# Patient Record
Sex: FEMALE | Race: WHITE | NOT HISPANIC OR LATINO | Employment: OTHER | URBAN - METROPOLITAN AREA
[De-identification: names, ages, dates, MRNs, and addresses within clinical notes are randomized per-mention and may not be internally consistent; named-entity substitution may affect disease eponyms.]

---

## 2017-01-03 RX ORDER — NITROFURANTOIN 25; 75 MG/1; MG/1
100 CAPSULE ORAL 2 TIMES DAILY
Qty: 20 CAPSULE | Refills: 0 | Status: SHIPPED | OUTPATIENT
Start: 2017-01-03 | End: 2017-01-13

## 2017-01-30 RX ORDER — LOSARTAN POTASSIUM AND HYDROCHLOROTHIAZIDE 25; 100 MG/1; MG/1
TABLET ORAL
Qty: 30 TABLET | Refills: 5 | Status: SHIPPED | OUTPATIENT
Start: 2017-01-30 | End: 2017-05-04 | Stop reason: SDUPTHER

## 2017-04-24 ENCOUNTER — TELEPHONE (OUTPATIENT)
Dept: INTERNAL MEDICINE | Facility: CLINIC | Age: 67
End: 2017-04-24

## 2017-04-24 RX ORDER — ATORVASTATIN CALCIUM 10 MG/1
TABLET, FILM COATED ORAL
Qty: 30 TABLET | Refills: 5 | Status: SHIPPED | OUTPATIENT
Start: 2017-04-24 | End: 2017-08-07 | Stop reason: SDUPTHER

## 2017-04-24 NOTE — TELEPHONE ENCOUNTER
----- Message from Luz Ryan sent at 4/24/2017  8:34 AM EDT -----  [phone]  PT NEEDS A SCRIPT FOR A STAIR LIFT SENT TO TRANSITION STAIR LIFT  [facility] PHONE # [phone]

## 2017-04-24 NOTE — TELEPHONE ENCOUNTER
I spoke with patient and  and explained this to them. They verbalized they understood but were upset because they state that medicare will not cover this and they will pay out of pocket but they need a letter to be able to claim this as a tax write off. I explained that we will need to still see her and submit this through medicare due to their regulations. They state that they will not be able to come in and see us until she gets the lift. I advised them that I would notify Evy, I have verbally spoken with them concerning this.

## 2017-04-24 NOTE — TELEPHONE ENCOUNTER
Per medicare regulations, she will need an appt prior to me providing this.  I haven't seen her in over 11 months.  Kee Hoffman MD  1:55 PM  04/24/17

## 2017-05-04 RX ORDER — LOSARTAN POTASSIUM AND HYDROCHLOROTHIAZIDE 25; 100 MG/1; MG/1
1 TABLET ORAL DAILY
Qty: 90 TABLET | Refills: 0 | Status: SHIPPED | OUTPATIENT
Start: 2017-05-04 | End: 2017-08-27 | Stop reason: SDUPTHER

## 2017-06-05 RX ORDER — DOXAZOSIN MESYLATE 4 MG/1
TABLET ORAL
Qty: 60 TABLET | Refills: 5 | Status: SHIPPED | OUTPATIENT
Start: 2017-06-05 | End: 2017-07-11 | Stop reason: SDUPTHER

## 2017-07-11 RX ORDER — DOXAZOSIN MESYLATE 4 MG/1
4 TABLET ORAL 2 TIMES DAILY
Qty: 180 TABLET | Refills: 1 | Status: SHIPPED | OUTPATIENT
Start: 2017-07-11 | End: 2018-02-02 | Stop reason: SDUPTHER

## 2017-08-07 RX ORDER — ATORVASTATIN CALCIUM 10 MG/1
10 TABLET, FILM COATED ORAL DAILY
Qty: 90 TABLET | Refills: 1 | Status: SHIPPED | OUTPATIENT
Start: 2017-08-07 | End: 2018-03-14 | Stop reason: SDUPTHER

## 2017-08-28 RX ORDER — LOSARTAN POTASSIUM AND HYDROCHLOROTHIAZIDE 25; 100 MG/1; MG/1
TABLET ORAL
Qty: 30 TABLET | Refills: 0 | Status: SHIPPED | OUTPATIENT
Start: 2017-08-28 | End: 2017-10-05 | Stop reason: SDUPTHER

## 2017-10-05 ENCOUNTER — TELEPHONE (OUTPATIENT)
Dept: INTERNAL MEDICINE | Facility: CLINIC | Age: 67
End: 2017-10-05

## 2017-10-05 RX ORDER — LOSARTAN POTASSIUM AND HYDROCHLOROTHIAZIDE 25; 100 MG/1; MG/1
1 TABLET ORAL DAILY
Qty: 30 TABLET | Refills: 0 | Status: SHIPPED | OUTPATIENT
Start: 2017-10-05 | End: 2017-10-09 | Stop reason: SDUPTHER

## 2017-10-09 RX ORDER — LOSARTAN POTASSIUM AND HYDROCHLOROTHIAZIDE 25; 100 MG/1; MG/1
1 TABLET ORAL DAILY
Qty: 90 TABLET | Refills: 0 | Status: SHIPPED | OUTPATIENT
Start: 2017-10-09 | End: 2018-01-30 | Stop reason: SDUPTHER

## 2018-01-30 RX ORDER — LOSARTAN POTASSIUM AND HYDROCHLOROTHIAZIDE 25; 100 MG/1; MG/1
TABLET ORAL
Qty: 90 TABLET | Refills: 0 | Status: SHIPPED | OUTPATIENT
Start: 2018-01-30 | End: 2018-03-14 | Stop reason: SDUPTHER

## 2018-02-02 RX ORDER — DOXAZOSIN MESYLATE 4 MG/1
TABLET ORAL
Qty: 180 TABLET | Refills: 0 | Status: SHIPPED | OUTPATIENT
Start: 2018-02-02 | End: 2018-03-14 | Stop reason: SDUPTHER

## 2018-02-26 RX ORDER — ATORVASTATIN CALCIUM 10 MG/1
TABLET, FILM COATED ORAL
Qty: 90 TABLET | Refills: 1 | OUTPATIENT
Start: 2018-02-26

## 2018-03-14 ENCOUNTER — LAB REQUISITION (OUTPATIENT)
Dept: LAB | Facility: HOSPITAL | Age: 68
End: 2018-03-14

## 2018-03-14 ENCOUNTER — OFFICE VISIT (OUTPATIENT)
Dept: INTERNAL MEDICINE | Facility: CLINIC | Age: 68
End: 2018-03-14

## 2018-03-14 VITALS
SYSTOLIC BLOOD PRESSURE: 118 MMHG | TEMPERATURE: 98.5 F | DIASTOLIC BLOOD PRESSURE: 68 MMHG | WEIGHT: 293 LBS | BODY MASS INDEX: 52.74 KG/M2 | HEART RATE: 64 BPM | RESPIRATION RATE: 20 BRPM

## 2018-03-14 DIAGNOSIS — Z12.31 ENCOUNTER FOR SCREENING MAMMOGRAM FOR BREAST CANCER: ICD-10-CM

## 2018-03-14 DIAGNOSIS — Z23 NEED FOR PNEUMOCOCCAL VACCINE: ICD-10-CM

## 2018-03-14 DIAGNOSIS — Z00.00 HEALTHCARE MAINTENANCE: ICD-10-CM

## 2018-03-14 DIAGNOSIS — K59.00 CONSTIPATION, UNSPECIFIED CONSTIPATION TYPE: ICD-10-CM

## 2018-03-14 DIAGNOSIS — Z79.4 TYPE 2 DIABETES MELLITUS WITHOUT COMPLICATION, WITH LONG-TERM CURRENT USE OF INSULIN (HCC): ICD-10-CM

## 2018-03-14 DIAGNOSIS — I10 ESSENTIAL HYPERTENSION: Primary | ICD-10-CM

## 2018-03-14 DIAGNOSIS — E11.9 TYPE 2 DIABETES MELLITUS WITHOUT COMPLICATION, WITH LONG-TERM CURRENT USE OF INSULIN (HCC): ICD-10-CM

## 2018-03-14 DIAGNOSIS — E03.9 HYPOTHYROIDISM, UNSPECIFIED TYPE: ICD-10-CM

## 2018-03-14 DIAGNOSIS — Z00.00 ROUTINE GENERAL MEDICAL EXAMINATION AT A HEALTH CARE FACILITY: ICD-10-CM

## 2018-03-14 DIAGNOSIS — E78.5 HYPERLIPIDEMIA, UNSPECIFIED HYPERLIPIDEMIA TYPE: ICD-10-CM

## 2018-03-14 LAB
ALBUMIN SERPL-MCNC: 3.4 G/DL (ref 3.2–4.8)
ALBUMIN/GLOB SERPL: 1.1 G/DL (ref 1.5–2.5)
ALP SERPL-CCNC: 100 U/L (ref 25–100)
ALT SERPL W P-5'-P-CCNC: 29 U/L (ref 7–40)
ANION GAP SERPL CALCULATED.3IONS-SCNC: 7 MMOL/L (ref 3–11)
ARTICHOKE IGE QN: 135 MG/DL (ref 0–130)
AST SERPL-CCNC: 26 U/L (ref 0–33)
BILIRUB SERPL-MCNC: 0.7 MG/DL (ref 0.3–1.2)
BUN BLD-MCNC: 33 MG/DL (ref 9–23)
BUN/CREAT SERPL: 27.5 (ref 7–25)
CALCIUM SPEC-SCNC: 9.6 MG/DL (ref 8.7–10.4)
CHLORIDE SERPL-SCNC: 98 MMOL/L (ref 99–109)
CHOLEST SERPL-MCNC: 170 MG/DL (ref 0–200)
CO2 SERPL-SCNC: 38 MMOL/L (ref 20–31)
CREAT BLD-MCNC: 1.2 MG/DL (ref 0.6–1.3)
GFR SERPL CREATININE-BSD FRML MDRD: 45 ML/MIN/1.73
GLOBULIN UR ELPH-MCNC: 3.2 GM/DL
GLUCOSE BLD-MCNC: 152 MG/DL (ref 70–100)
HBA1C MFR BLD: 5.6 % (ref 4.8–5.6)
HCV AB SER DONR QL: NORMAL
HDLC SERPL-MCNC: 33 MG/DL (ref 40–60)
POTASSIUM BLD-SCNC: 4.5 MMOL/L (ref 3.5–5.5)
PROT SERPL-MCNC: 6.6 G/DL (ref 5.7–8.2)
SODIUM BLD-SCNC: 143 MMOL/L (ref 132–146)
TRIGL SERPL-MCNC: 118 MG/DL (ref 0–150)
TSH SERPL DL<=0.05 MIU/L-ACNC: 1.91 MIU/ML (ref 0.35–5.35)

## 2018-03-14 PROCEDURE — G0009 ADMIN PNEUMOCOCCAL VACCINE: HCPCS | Performed by: INTERNAL MEDICINE

## 2018-03-14 PROCEDURE — 36415 COLL VENOUS BLD VENIPUNCTURE: CPT | Performed by: INTERNAL MEDICINE

## 2018-03-14 PROCEDURE — 86803 HEPATITIS C AB TEST: CPT | Performed by: INTERNAL MEDICINE

## 2018-03-14 PROCEDURE — 90670 PCV13 VACCINE IM: CPT | Performed by: INTERNAL MEDICINE

## 2018-03-14 PROCEDURE — 80061 LIPID PANEL: CPT | Performed by: INTERNAL MEDICINE

## 2018-03-14 PROCEDURE — 80053 COMPREHEN METABOLIC PANEL: CPT | Performed by: INTERNAL MEDICINE

## 2018-03-14 PROCEDURE — 83036 HEMOGLOBIN GLYCOSYLATED A1C: CPT | Performed by: INTERNAL MEDICINE

## 2018-03-14 PROCEDURE — 99214 OFFICE O/P EST MOD 30 MIN: CPT | Performed by: INTERNAL MEDICINE

## 2018-03-14 PROCEDURE — 84443 ASSAY THYROID STIM HORMONE: CPT | Performed by: INTERNAL MEDICINE

## 2018-03-14 RX ORDER — LEVOTHYROXINE SODIUM 0.1 MG/1
100 TABLET ORAL DAILY
Qty: 90 TABLET | Refills: 1 | Status: SHIPPED | OUTPATIENT
Start: 2018-03-14

## 2018-03-14 RX ORDER — DOXAZOSIN MESYLATE 4 MG/1
4 TABLET ORAL 2 TIMES DAILY
Qty: 180 TABLET | Refills: 1 | Status: SHIPPED | OUTPATIENT
Start: 2018-03-14 | End: 2018-10-31 | Stop reason: SDUPTHER

## 2018-03-14 RX ORDER — TORSEMIDE 20 MG/1
20 TABLET ORAL 2 TIMES DAILY
Qty: 180 TABLET | Refills: 1 | Status: SHIPPED | OUTPATIENT
Start: 2018-03-14 | End: 2019-12-28

## 2018-03-14 RX ORDER — LOSARTAN POTASSIUM AND HYDROCHLOROTHIAZIDE 25; 100 MG/1; MG/1
1 TABLET ORAL DAILY
Qty: 90 TABLET | Refills: 1 | Status: SHIPPED | OUTPATIENT
Start: 2018-03-14 | End: 2018-10-31 | Stop reason: SDUPTHER

## 2018-03-14 RX ORDER — ATORVASTATIN CALCIUM 10 MG/1
10 TABLET, FILM COATED ORAL DAILY
Qty: 90 TABLET | Refills: 1 | Status: SHIPPED | OUTPATIENT
Start: 2018-03-14 | End: 2018-09-20 | Stop reason: SDUPTHER

## 2018-03-19 LAB
ALBUMIN SERPL-MCNC: 3.4 G/DL (ref 3.2–4.8)
ALBUMIN/GLOB SERPL: 1.1 G/DL
ALP SERPL-CCNC: 100 U/L (ref 25–100)
ALT SERPL-CCNC: 29 U/L (ref 7–40)
AST SERPL-CCNC: 26 U/L (ref 0–33)
BILIRUB SERPL-MCNC: 0.7 MG/DL (ref 0.3–1.2)
BUN SERPL-MCNC: 33 MG/DL (ref 9–23)
BUN/CREAT SERPL: 27.5 (ref 7–25)
CALCIUM SERPL-MCNC: 9.6 MG/DL (ref 8.7–10.4)
CHLORIDE SERPL-SCNC: 98 MMOL/L (ref 99–109)
CHOLEST SERPL-MCNC: 170 MG/DL (ref 0–200)
CO2 SERPL-SCNC: 38 MMOL/L (ref 20–31)
CREAT SERPL-MCNC: 1.2 MG/DL (ref 0.6–1.3)
GFR SERPLBLD CREATININE-BSD FMLA CKD-EPI: 45 ML/MIN/1.732
GLOBULIN SER CALC-MCNC: 3.2 G/DL
GLUCOSE SERPL-MCNC: 152 MG/DL (ref 70–100)
HBA1C MFR BLD: 5.6 % (ref 4.8–5.6)
HCV AB S/CO SERPL IA: NEGATIVE S/CO RATIO (ref 0–0.9)
HDLC SERPL-MCNC: 33 MG/DL (ref 40–60)
LDLC SERPL CALC-MCNC: 135 MG/DL (ref 0–99)
POTASSIUM SERPL-SCNC: 4.5 MMOL/L (ref 3.5–5.5)
PROT SERPL-MCNC: 6.6 G/DL (ref 5.7–8.2)
SODIUM SERPL-SCNC: 143 MMOL/L (ref 132–146)
TRIGL SERPL-MCNC: 118 MG/DL (ref 0–150)
TSH SERPL DL<=0.005 MIU/L-ACNC: 1.91 MIU/ML (ref 0.35–5.35)
VLDLC SERPL CALC-MCNC: 24 MG/DL

## 2018-03-19 NOTE — PROGRESS NOTES
Chief Complaint   Patient presents with   • Follow-up     3 MONTH FOLLOW UP CHRONIC MEDICAL PROBLEMS       History of Present Illness    The patient presents for a follow-up related to hypertension. The patient reports that she has had no headaches, chest pain, dyspnea, edema, syncope, blurred vision or palpitations. She states that she is taking her medication as prescribed. She is not having medication side effects. She does not check her blood pressures at home.    The patient presents for a follow-up related to hyperlipidemia. She is not following a low fat diet. She reports that she is not exercising. She is taking atorvastatin. The patient is taking her medication as prescribed. She reports no medication side effects, including muscle cramps, abdominal pain, headaches or weakness. She denies orthopnea, paroxysmal nocturnal dyspnea or dyspnea on exertion.    As relates to hypothyroidism, the patient reports a good energy level. She reports constipation but denies hair loss, heat intolerance, cold intolerance, diarrhea or sweats. She is taking her medication as prescribed.    The patient presents for a follow-up related to Type 2 Diabetes Mellitus without complication with insulin usage and reports that she checks her blood sugars at home. Her sugars are checked daily. The average sugars are in the 100-150 range. She denies hypoglycemic symptoms. The patient denies polyuria, polydypsia or polyphagia. She reports no symptoms of neuropathy. The patient takes her medication as prescribed. She is taking insulin. Her injection sites are rotated appropriately. The patient does check her feet daily at home.    The patient presents for follow-up of constipation. The constipation has worsened. The patient's bowel movements occur approximately every three days. The stools are hard. Bowel Movements are not painful. There is no associated enuresis. The patient does not have abdominal pain. The patient denies neglecting the  urge or putting off bowel movements.    Review of Systems    GENERAL- Denies Unexplained Weight Loss, Fever, Chills, Sweats, Weakness or Malaise.    CARDIOVASCULAR- Denies Claudication.    GASTROINTESTINAL- Denies Nausea, Vomiting or Blood per Rectum.    PULMONARY- Denies Wheezing, Sputum Production, Cough, Hemoptysis or Pleuritic Chest Pain.    Medications      Current Outpatient Prescriptions:   •  aspirin 81 MG tablet, Take 1 tablet by mouth daily., Disp: , Rfl:   •  atorvastatin (LIPITOR) 10 MG tablet, Take 1 tablet by mouth Daily., Disp: 90 tablet, Rfl: 1  •  doxazosin (CARDURA) 4 MG tablet, Take 1 tablet by mouth 2 (Two) Times a Day., Disp: 180 tablet, Rfl: 1  •  insulin lispro protamine-insulin lispro (HUMALOG MIX 75/25) (75-25) 100 UNIT/ML suspension injection, Inject  under the skin. INJECT 60 UNITS SQ IN AM AND 46 UNITS SQ IN PM, Disp: , Rfl:   •  ipratropium (ATROVENT HFA) 17 MCG/ACT inhaler, Inhale 2 puffs 4 (Four) Times a Day., Disp: 3 inhaler, Rfl: 1  •  levothyroxine (SYNTHROID, LEVOTHROID) 100 MCG tablet, Take 1 tablet by mouth Daily., Disp: 90 tablet, Rfl: 1  •  losartan-hydrochlorothiazide (HYZAAR) 100-25 MG per tablet, Take 1 tablet by mouth Daily., Disp: 90 tablet, Rfl: 1  •  methylcellulose (CITRUCEL) oral powder, Take  by mouth daily as needed. MIX 1 TABLESPOON IN WATER AND DRINK DAILY AS NEEDED, Disp: , Rfl:   •  linaclotide (LINZESS) 145 MCG capsule capsule, Take 1 capsule by mouth Every Morning Before Breakfast., Disp: 90 capsule, Rfl: 1  •  torsemide (DEMADEX) 20 MG tablet, Take 1 tablet by mouth 2 (Two) Times a Day., Disp: 180 tablet, Rfl: 1     Allergies    Allergies   Allergen Reactions   • Amoxicillin-Pot Clavulanate Other (See Comments)     HEADACHE       Problem List    Patient Active Problem List   Diagnosis   • Anxiety   • Chronic obstructive pulmonary disease   • Diabetes mellitus   • Hyperlipidemia   • Hypertension   • Hypothyroidism   • Morbid obesity   • Extreme obesity with  alveolar hypoventilation       Medications, Allergies, Problems List and Past History were reviewed and updated.    Physical Examination    /68 (BP Location: Left arm, Patient Position: Sitting, Cuff Size: Large Adult)   Pulse 64   Temp 98.5 °F (36.9 °C) (Temporal Artery )   Resp 20   Wt 133 kg (293 lb)   LMP  (LMP Unknown)   Breastfeeding? No   BMI 52.74 kg/m²     HEENT: Head- Normocephalic Atraumatic. Facies- Within normal limits. Pinnas- Normal texture and shape bilaterally. Canals- Normal bilaterally. TMs- Normal bilaterally. Nares- Patent bilaterally. Nasal Septum- is normal. There is no tenderness to palpation over the frontal or maxillary sinuses. Lids- Normal bilaterally. Conjunctiva- Clear bilaterally. Sclera- Anicteric bilaterally. Oropharynx- Moist with no lesions. Tonsils- No enlargement, erythema or exudate.    Neck: Thyroid- non enlarged, symmetric and has no nodules. No bruits are detected. ROM- Normal Range of Motion with no rigidity.    Lungs: Auscultation- Clear to auscultation bilaterally. There are no retractions, clubbing or cyanosis. The Expiratory to Inspiratory ratio is equal.    Cardiovascular: There are no carotid bruits. Heart- Normal Rate with Regular rhythm and no murmurs. There are no gallops. There are no rubs. In the lower extremities there is no edema. The upper extremities do not have edema.    Abdomen: Soft, benign, non-tender with no masses, hernias, organomegaly or scars.    Impression and Assessment    Encounter for Immunization Administration.    Essential Hypertension.    Hyperlipidemia.    Hypothyroidism.    Type 2 Diabetes Mellitus without complication with insulin usage.    Constipation.    Plan    Constipation Plan: She was encouraged to use over the counter Miralax daily. Medication will be added as noted below.    Essential Hypertension Plan: The current plan was continued.    Hyperlipidemia Plan: She was instructed to eat a low fat diet. She was encouraged  to exercise daily. Weight loss was encouraged. The patient was instructed to continue the current medications.    Hypothyroidism Plan: Further plans will be formulated after test results are reviewed.    Type 2 Diabetes Mellitus without complication with insulin usage Plan: She will follow-up with her endocrinologist. She was referred to ophthalmology. The patient was instructed to continue the current medications.    The following was ordered for screening and health maintenance: Screening Mammogram.    Counseled regarding immunizations and applicable VIS given.    Immunizations Ordered and Administered: Prevnar 13.    Julissa was seen today for follow-up.    Diagnoses and all orders for this visit:    Essential hypertension  -     Cancel: Comprehensive Metabolic Panel  -     torsemide (DEMADEX) 20 MG tablet; Take 1 tablet by mouth 2 (Two) Times a Day.  -     losartan-hydrochlorothiazide (HYZAAR) 100-25 MG per tablet; Take 1 tablet by mouth Daily.  -     doxazosin (CARDURA) 4 MG tablet; Take 1 tablet by mouth 2 (Two) Times a Day.    Hyperlipidemia, unspecified hyperlipidemia type  -     Cancel: Comprehensive Metabolic Panel  -     Cancel: Lipid Panel  -     atorvastatin (LIPITOR) 10 MG tablet; Take 1 tablet by mouth Daily.    Hypothyroidism, unspecified type  -     Cancel: TSH  -     levothyroxine (SYNTHROID, LEVOTHROID) 100 MCG tablet; Take 1 tablet by mouth Daily.    Type 2 diabetes mellitus without complication, with long-term current use of insulin  -     Ambulatory referral to Ophthalmology  -     Cancel: Comprehensive Metabolic Panel  -     Cancel: Hemoglobin A1c    Encounter for screening mammogram for breast cancer  -     Mammo Screening Digital Tomosynthesis Bilateral With CAD; Future    Need for pneumococcal vaccine  -     Pneumococcal Conjugate Vaccine 13-Valent All (PCV13)    Healthcare maintenance  -     Cancel: Hepatitis C Antibody    Constipation, unspecified constipation type  -     linaclotide  (LINZESS) 145 MCG capsule capsule; Take 1 capsule by mouth Every Morning Before Breakfast.    Other orders  -     ipratropium (ATROVENT HFA) 17 MCG/ACT inhaler; Inhale 2 puffs 4 (Four) Times a Day.        Return to Office    The patient was instructed to return for follow-up in 1 month.    The patient was instructed to return sooner if the condition changes, worsens, or doesn't resolve.

## 2018-05-06 ENCOUNTER — TELEPHONE (OUTPATIENT)
Dept: INTERNAL MEDICINE | Facility: CLINIC | Age: 68
End: 2018-05-06

## 2018-05-06 NOTE — TELEPHONE ENCOUNTER
----- Message from Kristen Sanchez sent at 5/4/2018  2:31 PM EDT -----  Concerning ophthalmology referral, pt no showed her appt and did not reschedule  How would you like to proceed with this?

## 2018-05-23 ENCOUNTER — TELEPHONE (OUTPATIENT)
Dept: INTERNAL MEDICINE | Facility: CLINIC | Age: 68
End: 2018-05-23

## 2018-05-23 NOTE — TELEPHONE ENCOUNTER
----- Message from Ginger Cordoba sent at 5/22/2018  1:51 PM EDT -----  Patient's  dropped off a form at the front office for patient to get her parking permit for disabled person's. Form has been placed in provider's mail box up at the front office. Patient's  has requested a call back when form has been completed and ready for .     Call back for patient's , Familia: [phone]     Thank you!

## 2018-05-23 NOTE — TELEPHONE ENCOUNTER
If you can locate this form, I'll sign it so they can pick it up.  Kee Hoffman MD  1:09 PM  05/23/18

## 2018-05-30 ENCOUNTER — TELEPHONE (OUTPATIENT)
Dept: INTERNAL MEDICINE | Facility: CLINIC | Age: 68
End: 2018-05-30

## 2018-05-30 NOTE — TELEPHONE ENCOUNTER
5-30-18  S/w patient informed her that she needed to schedule her mammogram number to do this was given to patient and she gave verbal understanding and stated would do this as soon as she can.

## 2018-09-20 DIAGNOSIS — E78.5 HYPERLIPIDEMIA, UNSPECIFIED HYPERLIPIDEMIA TYPE: ICD-10-CM

## 2018-09-20 RX ORDER — ATORVASTATIN CALCIUM 10 MG/1
10 TABLET, FILM COATED ORAL DAILY
Qty: 90 TABLET | Refills: 1 | Status: SHIPPED | OUTPATIENT
Start: 2018-09-20 | End: 2019-03-26 | Stop reason: SDUPTHER

## 2018-10-31 DIAGNOSIS — I10 ESSENTIAL HYPERTENSION: ICD-10-CM

## 2018-10-31 RX ORDER — DOXAZOSIN MESYLATE 4 MG/1
4 TABLET ORAL 2 TIMES DAILY
Qty: 180 TABLET | Refills: 1 | Status: SHIPPED | OUTPATIENT
Start: 2018-10-31 | End: 2019-04-21 | Stop reason: SDUPTHER

## 2018-10-31 RX ORDER — LOSARTAN POTASSIUM AND HYDROCHLOROTHIAZIDE 25; 100 MG/1; MG/1
1 TABLET ORAL DAILY
Qty: 90 TABLET | Refills: 1 | Status: SHIPPED | OUTPATIENT
Start: 2018-10-31 | End: 2019-04-21 | Stop reason: SDUPTHER

## 2018-11-07 ENCOUNTER — OFFICE VISIT (OUTPATIENT)
Dept: INTERNAL MEDICINE | Facility: CLINIC | Age: 68
End: 2018-11-07

## 2018-11-07 VITALS
BODY MASS INDEX: 52.74 KG/M2 | RESPIRATION RATE: 20 BRPM | WEIGHT: 293 LBS | OXYGEN SATURATION: 95 % | DIASTOLIC BLOOD PRESSURE: 62 MMHG | HEART RATE: 65 BPM | SYSTOLIC BLOOD PRESSURE: 116 MMHG

## 2018-11-07 DIAGNOSIS — E78.5 HYPERLIPIDEMIA, UNSPECIFIED HYPERLIPIDEMIA TYPE: ICD-10-CM

## 2018-11-07 DIAGNOSIS — I10 ESSENTIAL HYPERTENSION: Primary | ICD-10-CM

## 2018-11-07 DIAGNOSIS — E03.9 HYPOTHYROIDISM, UNSPECIFIED TYPE: ICD-10-CM

## 2018-11-07 PROCEDURE — 36415 COLL VENOUS BLD VENIPUNCTURE: CPT | Performed by: INTERNAL MEDICINE

## 2018-11-07 PROCEDURE — 99214 OFFICE O/P EST MOD 30 MIN: CPT | Performed by: INTERNAL MEDICINE

## 2018-11-07 NOTE — PROGRESS NOTES
Chief Complaint   Patient presents with   • Hypertension       History of Present Illness      The patient presents for a follow-up related to hyperlipidemia. She is following a low fat diet. She reports that she is exercising. She is taking atorvastatin. The patient is taking her medication as prescribed. She reports no medication side effects, including muscle cramps, abdominal pain, headaches or weakness. She denies chest pain, shortness of breath, orthopnea, paroxysmal nocturnal dyspnea, dyspnea on exertion, edema, palpitations or syncope.    The patient presents for a follow-up related to hypothyroidism. She reports a good energy level. She reports no hair loss, heat intolerance, cold intolerance, diarrhea, constipation or sweats. She is taking her medication as prescribed.    The patient presents for a follow-up related to hypertension. The patient reports that she has had no headaches or blurred vision. She states that she is taking her medication as prescribed. She is not having medication side effects.    Review of Systems    GENERAL/CONSTITUTIONAL- Denies Fever, Chills, Sweats, Weakness or Malaise.    CARDIOVASCULAR- Denies Claudication.    GASTROINTESTINAL- Denies Abdominal Pain, Nausea, Vomiting, Blood per Rectum or Heartburn.    PULMONARY- Denies Wheezing, Sputum Production, Cough, Hemoptysis or Pleuritic Chest Pain.    ENDOCRINE- Denies Depression, Memory Loss, Polydypsia, Polyphagia, Polyuria, Sleep Disturbance, Weight Gain or Weight Loss.    Medications      Current Outpatient Prescriptions:   •  aspirin 81 MG tablet, Take 1 tablet by mouth daily., Disp: , Rfl:   •  atorvastatin (LIPITOR) 10 MG tablet, TAKE 1 TABLET BY MOUTH DAILY., Disp: 90 tablet, Rfl: 1  •  doxazosin (CARDURA) 4 MG tablet, TAKE 1 TABLET BY MOUTH 2 (TWO) TIMES A DAY., Disp: 180 tablet, Rfl: 1  •  insulin lispro protamine-insulin lispro (HUMALOG MIX 75/25) (75-25) 100 UNIT/ML suspension injection, Inject  under the skin. INJECT 60  UNITS SQ IN AM AND 46 UNITS SQ IN PM, Disp: , Rfl:   •  ipratropium (ATROVENT HFA) 17 MCG/ACT inhaler, Inhale 2 puffs 4 (Four) Times a Day., Disp: 3 inhaler, Rfl: 1  •  levothyroxine (SYNTHROID, LEVOTHROID) 100 MCG tablet, Take 1 tablet by mouth Daily., Disp: 90 tablet, Rfl: 1  •  linaclotide (LINZESS) 145 MCG capsule capsule, Take 1 capsule by mouth Every Morning Before Breakfast., Disp: 90 capsule, Rfl: 1  •  losartan-hydrochlorothiazide (HYZAAR) 100-25 MG per tablet, TAKE 1 TABLET BY MOUTH DAILY., Disp: 90 tablet, Rfl: 1  •  methylcellulose (CITRUCEL) oral powder, Take  by mouth daily as needed. MIX 1 TABLESPOON IN WATER AND DRINK DAILY AS NEEDED, Disp: , Rfl:   •  torsemide (DEMADEX) 20 MG tablet, Take 1 tablet by mouth 2 (Two) Times a Day., Disp: 180 tablet, Rfl: 1     Allergies    Allergies   Allergen Reactions   • Amoxicillin-Pot Clavulanate Other (See Comments)     HEADACHE       Problem List    Patient Active Problem List   Diagnosis   • Anxiety   • Chronic obstructive pulmonary disease (CMS/HCC)   • Diabetes mellitus (CMS/HCC)   • Hyperlipidemia   • Hypertension   • Hypothyroidism   • Morbid obesity (CMS/HCC)   • Extreme obesity with alveolar hypoventilation (CMS/HCC)       Medications, Allergies, Problems List and Past History were reviewed and updated.    Physical Examination    /62   Pulse 65   Resp 20   Wt 133 kg (293 lb)   LMP  (LMP Unknown)   SpO2 95%   BMI 52.74 kg/m²     HEENT: Head- Normocephalic Atraumatic. Facies- Within normal limits. Pinnas- Normal texture and shape bilaterally. Canals- Normal bilaterally. TMs- Normal bilaterally. Nares- Patent bilaterally. Nasal Septum- is normal. There is no tenderness to palpation over the frontal or maxillary sinuses. Lids- Normal bilaterally. Conjunctiva- Clear bilaterally. Sclera- Anicteric bilaterally. Oropharynx- Moist with no lesions. Tonsils- No enlargement, erythema or exudate.    Neck: Thyroid- non enlarged, symmetric and has no  nodules. No bruits are detected. ROM- Normal Range of Motion with no rigidity.    Lungs: Auscultation- Clear to auscultation bilaterally. There are no retractions, clubbing or cyanosis. The Expiratory to Inspiratory ratio is equal.    Cardiovascular: There are no carotid bruits. Heart- Normal Rate with Regular rhythm and no murmurs. There are no gallops. There are no rubs. In the lower extremities there is no edema. The upper extremities do not have edema.    Abdomen: Soft, benign, non-tender with no masses, hernias, organomegaly or scars.    Impression and Assessment    Hyperlipidemia.    Hypothyroidism.    Essential Hypertension.    Plan    Hyperlipidemia Plan: The current plan was continued.    Essential Hypertension Plan: The current plan was continued.    Hypothyroidism Plan: The current plan was continued.    Julissa was seen today for hypertension.    Diagnoses and all orders for this visit:    Essential hypertension  -     Comprehensive Metabolic Panel    Hyperlipidemia, unspecified hyperlipidemia type  -     Comprehensive Metabolic Panel  -     Lipid Panel    Hypothyroidism, unspecified type  -     TSH  -     T4, Free        Return to Office    The patient was instructed to return for follow-up in 6 months.    The patient was instructed to return sooner if the condition changes, worsens, or doesn't resolve.

## 2018-11-08 LAB
ALBUMIN SERPL-MCNC: 3.67 G/DL (ref 3.2–4.8)
ALBUMIN/GLOB SERPL: 1.5 G/DL (ref 1.5–2.5)
ALP SERPL-CCNC: 81 U/L (ref 25–100)
ALT SERPL-CCNC: 13 U/L (ref 7–40)
AST SERPL-CCNC: 15 U/L (ref 0–33)
BILIRUB SERPL-MCNC: 0.8 MG/DL (ref 0.3–1.2)
BUN SERPL-MCNC: 23 MG/DL (ref 9–23)
BUN/CREAT SERPL: 20.2 (ref 7–25)
CALCIUM SERPL-MCNC: 9.7 MG/DL (ref 8.7–10.4)
CHLORIDE SERPL-SCNC: 95 MMOL/L (ref 99–109)
CHOLEST SERPL-MCNC: 130 MG/DL (ref 0–200)
CO2 SERPL-SCNC: 38 MMOL/L (ref 20–31)
CREAT SERPL-MCNC: 1.14 MG/DL (ref 0.6–1.3)
GLOBULIN SER CALC-MCNC: 2.5 GM/DL
GLUCOSE SERPL-MCNC: 86 MG/DL (ref 70–100)
HDLC SERPL-MCNC: 29 MG/DL (ref 40–60)
LDLC SERPL CALC-MCNC: 74 MG/DL (ref 0–100)
POTASSIUM SERPL-SCNC: 4.2 MMOL/L (ref 3.5–5.5)
PROT SERPL-MCNC: 6.2 G/DL (ref 5.7–8.2)
SODIUM SERPL-SCNC: 139 MMOL/L (ref 132–146)
T4 FREE SERPL-MCNC: 1.53 NG/DL (ref 0.89–1.76)
TRIGL SERPL-MCNC: 134 MG/DL (ref 0–150)
TSH SERPL DL<=0.005 MIU/L-ACNC: 2.46 MIU/ML (ref 0.35–5.35)
VLDLC SERPL CALC-MCNC: 26.8 MG/DL

## 2019-03-26 DIAGNOSIS — E78.5 HYPERLIPIDEMIA, UNSPECIFIED HYPERLIPIDEMIA TYPE: ICD-10-CM

## 2019-03-26 RX ORDER — ATORVASTATIN CALCIUM 10 MG/1
10 TABLET, FILM COATED ORAL DAILY
Qty: 90 TABLET | Refills: 0 | Status: SHIPPED | OUTPATIENT
Start: 2019-03-26 | End: 2019-07-07 | Stop reason: SDUPTHER

## 2019-04-21 DIAGNOSIS — I10 ESSENTIAL HYPERTENSION: ICD-10-CM

## 2019-04-22 RX ORDER — DOXAZOSIN MESYLATE 4 MG/1
4 TABLET ORAL 2 TIMES DAILY
Qty: 180 TABLET | Refills: 0 | Status: SHIPPED | OUTPATIENT
Start: 2019-04-22 | End: 2019-05-22

## 2019-04-22 RX ORDER — LOSARTAN POTASSIUM AND HYDROCHLOROTHIAZIDE 25; 100 MG/1; MG/1
1 TABLET ORAL DAILY
Qty: 90 TABLET | Refills: 0 | Status: SHIPPED | OUTPATIENT
Start: 2019-04-22 | End: 2019-07-19 | Stop reason: SDUPTHER

## 2019-05-13 ENCOUNTER — TELEPHONE (OUTPATIENT)
Dept: INTERNAL MEDICINE | Facility: CLINIC | Age: 69
End: 2019-05-13

## 2019-05-22 ENCOUNTER — OFFICE VISIT (OUTPATIENT)
Dept: INTERNAL MEDICINE | Facility: CLINIC | Age: 69
End: 2019-05-22

## 2019-05-22 VITALS
RESPIRATION RATE: 22 BRPM | HEART RATE: 72 BPM | DIASTOLIC BLOOD PRESSURE: 48 MMHG | TEMPERATURE: 97.8 F | SYSTOLIC BLOOD PRESSURE: 92 MMHG

## 2019-05-22 DIAGNOSIS — F33.1 MODERATE EPISODE OF RECURRENT MAJOR DEPRESSIVE DISORDER (HCC): ICD-10-CM

## 2019-05-22 DIAGNOSIS — E78.5 HYPERLIPIDEMIA, UNSPECIFIED HYPERLIPIDEMIA TYPE: ICD-10-CM

## 2019-05-22 DIAGNOSIS — I10 ESSENTIAL HYPERTENSION: Primary | ICD-10-CM

## 2019-05-22 DIAGNOSIS — E03.9 HYPOTHYROIDISM, UNSPECIFIED TYPE: ICD-10-CM

## 2019-05-22 PROCEDURE — 99214 OFFICE O/P EST MOD 30 MIN: CPT | Performed by: INTERNAL MEDICINE

## 2019-05-22 PROCEDURE — 36415 COLL VENOUS BLD VENIPUNCTURE: CPT | Performed by: INTERNAL MEDICINE

## 2019-05-22 RX ORDER — DOXAZOSIN MESYLATE 4 MG/1
4 TABLET ORAL NIGHTLY
Qty: 30 TABLET | Refills: 5 | Status: SHIPPED | OUTPATIENT
Start: 2019-05-22 | End: 2020-01-08 | Stop reason: HOSPADM

## 2019-05-22 NOTE — PROGRESS NOTES
Chief Complaint   Patient presents with   • Follow-up     Follow up chronic medical problems       History of Present Illness      The patient presents for a follow-up related to hyperlipidemia. She is following a low fat diet. She reports that she is exercising. She is taking atorvastatin. The patient is taking her medication as prescribed. She reports no medication side effects, including muscle cramps, abdominal pain, headaches or weakness. She denies chest pain, shortness of breath, orthopnea, paroxysmal nocturnal dyspnea, dyspnea on exertion, edema, palpitations or syncope.    The patient presents for a follow-up related to hypertension. The patient reports that she has had no headaches or blurred vision. She states that she is taking her medication as prescribed. She is not having medication side effects.    The patient presents for a follow-up related to hypothyroidism. She reports that she is fatigued. She reports no hair loss, heat intolerance, cold intolerance, diarrhea, constipation or sweats. She is taking her medication as prescribed.    The patient presents for follow-up of depression. She reports currently having depression symptoms. She denies suicidal ideation. Her energy level is very low. She denies agorophobia. She sleeps well. She is not tearful. She states that her current depression symptoms are worsened. She is not on a medication but has used sertraline in the past.    Review of Systems    CONSTITUTIONAL- Denies Unexplained Weight Loss, Fever, Chills, Sweats or Weakness.    HENT- Denies Nasal Discharge, Sore Throat, Ear Pain, Ear Drainage, Sinus Pain, Nasal Congestion, Decreased Hearing or Tinnitus.    GASTROINTESTINAL- Denies Abdominal Pain, Nausea, Vomiting, Blood per Rectum or Heartburn.    PULMONARY- Denies Wheezing, Sputum Production, Cough, Hemoptysis or Pleuritic Chest Pain.    CARDIOVASCULAR- Denies Claudication or Irregular Heart Beat.    Medications      Current Outpatient  Medications:   •  aspirin 81 MG tablet, Take 1 tablet by mouth daily., Disp: , Rfl:   •  atorvastatin (LIPITOR) 10 MG tablet, Take 1 tablet by mouth Daily., Disp: 90 tablet, Rfl: 0  •  doxazosin (CARDURA) 4 MG tablet, TAKE 1 TABLET BY MOUTH 2 (TWO) TIMES A DAY., Disp: 180 tablet, Rfl: 0  •  insulin lispro protamine-insulin lispro (HUMALOG MIX 75/25) (75-25) 100 UNIT/ML suspension injection, Inject  under the skin into the appropriate area as directed. INJECT 60 UNITS SQ IN AM, Disp: , Rfl:   •  ipratropium (ATROVENT HFA) 17 MCG/ACT inhaler, Inhale 2 puffs 4 (Four) Times a Day. (Patient taking differently: Inhale 2 puffs 4 (Four) Times a Day As Needed.), Disp: 3 inhaler, Rfl: 1  •  levothyroxine (SYNTHROID, LEVOTHROID) 100 MCG tablet, Take 1 tablet by mouth Daily., Disp: 90 tablet, Rfl: 1  •  losartan-hydrochlorothiazide (HYZAAR) 100-25 MG per tablet, TAKE 1 TABLET BY MOUTH DAILY., Disp: 90 tablet, Rfl: 0  •  methylcellulose (CITRUCEL) oral powder, Take  by mouth daily as needed. MIX 1 TABLESPOON IN WATER AND DRINK DAILY AS NEEDED, Disp: , Rfl:   •  torsemide (DEMADEX) 20 MG tablet, Take 1 tablet by mouth 2 (Two) Times a Day., Disp: 180 tablet, Rfl: 1     Allergies    Allergies   Allergen Reactions   • Amoxicillin-Pot Clavulanate Other (See Comments)     HEADACHE       Problem List    Patient Active Problem List   Diagnosis   • Anxiety   • Chronic obstructive pulmonary disease (CMS/HCC)   • Diabetes mellitus (CMS/HCC)   • Hyperlipidemia   • Hypertension   • Hypothyroidism   • Morbid obesity (CMS/HCC)   • Extreme obesity with alveolar hypoventilation (CMS/HCC)       Medications, Allergies, Problems List and Past History were reviewed and updated.    Physical Examination    BP 92/48 (BP Location: Left arm, Patient Position: Sitting, Cuff Size: Adult)   Pulse 72   Temp 97.8 °F (36.6 °C) (Temporal)   Resp 22   LMP  (LMP Unknown)   Breastfeeding? No       HEENT: Facies- Within normal limits. Lids- Normal bilaterally.  Conjunctiva- Clear bilaterally. Sclera- Anicteric bilaterally.    Neck: Thyroid- non enlarged, symmetric and has no nodules. No bruits are detected. ROM- Normal Range of Motion with no rigidity.    Lungs: Auscultation- Clear to auscultation bilaterally. There are no retractions, clubbing or cyanosis. The Expiratory to Inspiratory ratio is equal.    Cardiovascular: There are no carotid bruits. Heart- Normal Rate with Regular rhythm and no murmurs. There are no gallops. There are no rubs. In the lower extremities there is no edema. The upper extremities do not have edema.    Abdomen: Soft, benign, non-tender with no masses, hernias, organomegaly or scars.    Impression and Assessment    Hyperlipidemia.    Essential Hypertension.    Hypothyroidism.    Major Depressive Disorder Single Episode Unspecified.    Plan    Hyperlipidemia Plan: The current plan was continued.    Essential Hypertension Plan: The medications were changed as noted.    Hypothyroidism Plan: The current plan was continued.    Major Depressive Disorder Single Episode Unspecified Plan: Medication was added as noted below.    Julissa was seen today for follow-up.    Diagnoses and all orders for this visit:    Essential hypertension  -     Comprehensive Metabolic Panel  -     doxazosin (CARDURA) 4 MG tablet; Take 1 tablet by mouth Every Night.    Hyperlipidemia, unspecified hyperlipidemia type  -     Comprehensive Metabolic Panel  -     Lipid Panel    Hypothyroidism, unspecified type  -     TSH  -     T4, Free    Moderate episode of recurrent major depressive disorder (CMS/HCC)  -     sertraline (ZOLOFT) 50 MG tablet; Take 1 tablet by mouth Daily.        Return to Office    The patient was instructed to return for follow-up in 4 months.    The patient was instructed to return sooner if the condition changes, worsens, or does not resolve.

## 2019-05-24 LAB
ALBUMIN SERPL-MCNC: 3.2 G/DL (ref 3.5–5.2)
ALBUMIN/GLOB SERPL: 1 G/DL
ALP SERPL-CCNC: 89 U/L (ref 39–117)
ALT SERPL-CCNC: 13 U/L (ref 1–33)
AST SERPL-CCNC: 14 U/L (ref 1–32)
BILIRUB SERPL-MCNC: 0.7 MG/DL (ref 0.2–1.2)
BUN SERPL-MCNC: 22 MG/DL (ref 8–23)
BUN/CREAT SERPL: 17.6 (ref 7–25)
CALCIUM SERPL-MCNC: 10.6 MG/DL (ref 8.6–10.5)
CHLORIDE SERPL-SCNC: 92 MMOL/L (ref 98–107)
CHOLEST SERPL-MCNC: 154 MG/DL (ref 0–200)
CO2 SERPL-SCNC: 33.8 MMOL/L (ref 22–29)
CREAT SERPL-MCNC: 1.25 MG/DL (ref 0.57–1)
GLOBULIN SER CALC-MCNC: 3.3 GM/DL
GLUCOSE SERPL-MCNC: 118 MG/DL (ref 65–99)
HDLC SERPL-MCNC: 33 MG/DL (ref 40–60)
LDLC SERPL CALC-MCNC: 88 MG/DL (ref 0–100)
POTASSIUM SERPL-SCNC: 5.2 MMOL/L (ref 3.5–5.2)
PROT SERPL-MCNC: 6.5 G/DL (ref 6–8.5)
SODIUM SERPL-SCNC: 142 MMOL/L (ref 136–145)
T4 FREE SERPL-MCNC: 1.46 NG/DL (ref 0.93–1.7)
TRIGL SERPL-MCNC: 163 MG/DL (ref 0–150)
TSH SERPL DL<=0.005 MIU/L-ACNC: 3.14 UIU/ML (ref 0.45–4.5)
VLDLC SERPL CALC-MCNC: 32.6 MG/DL

## 2019-06-08 DIAGNOSIS — E83.52 HYPERCALCEMIA: Primary | ICD-10-CM

## 2019-06-24 DIAGNOSIS — F33.1 MODERATE EPISODE OF RECURRENT MAJOR DEPRESSIVE DISORDER (HCC): ICD-10-CM

## 2019-07-07 DIAGNOSIS — E78.5 HYPERLIPIDEMIA, UNSPECIFIED HYPERLIPIDEMIA TYPE: ICD-10-CM

## 2019-07-08 RX ORDER — ATORVASTATIN CALCIUM 10 MG/1
TABLET, FILM COATED ORAL
Qty: 90 TABLET | Refills: 1 | Status: SHIPPED | OUTPATIENT
Start: 2019-07-08 | End: 2019-12-23

## 2019-07-17 ENCOUNTER — TELEPHONE (OUTPATIENT)
Dept: INTERNAL MEDICINE | Facility: CLINIC | Age: 69
End: 2019-07-17

## 2019-07-17 NOTE — TELEPHONE ENCOUNTER
7-17-19  S/w patient informed of blood work needing to be drawn , she stated was unaware of this but will try to come in within the next week or two .  Lab hours were given.     abdominal pain

## 2019-07-19 DIAGNOSIS — I10 ESSENTIAL HYPERTENSION: ICD-10-CM

## 2019-07-19 RX ORDER — LOSARTAN POTASSIUM AND HYDROCHLOROTHIAZIDE 25; 100 MG/1; MG/1
1 TABLET ORAL DAILY
Qty: 90 TABLET | Refills: 0 | Status: SHIPPED | OUTPATIENT
Start: 2019-07-19 | End: 2019-10-13 | Stop reason: SDUPTHER

## 2019-07-19 RX ORDER — DOXAZOSIN MESYLATE 4 MG/1
4 TABLET ORAL NIGHTLY
Qty: 90 TABLET | Refills: 0 | Status: SHIPPED | OUTPATIENT
Start: 2019-07-19 | End: 2019-12-23

## 2019-10-13 DIAGNOSIS — I10 ESSENTIAL HYPERTENSION: ICD-10-CM

## 2019-10-14 RX ORDER — LOSARTAN POTASSIUM AND HYDROCHLOROTHIAZIDE 25; 100 MG/1; MG/1
1 TABLET ORAL DAILY
Qty: 90 TABLET | Refills: 0 | Status: SHIPPED | OUTPATIENT
Start: 2019-10-14 | End: 2020-01-08 | Stop reason: HOSPADM

## 2019-11-08 ENCOUNTER — LAB REQUISITION (OUTPATIENT)
Dept: LAB | Facility: HOSPITAL | Age: 69
End: 2019-11-08

## 2019-11-08 ENCOUNTER — OFFICE VISIT (OUTPATIENT)
Dept: INTERNAL MEDICINE | Facility: CLINIC | Age: 69
End: 2019-11-08

## 2019-11-08 DIAGNOSIS — E03.9 HYPOTHYROIDISM, UNSPECIFIED TYPE: ICD-10-CM

## 2019-11-08 DIAGNOSIS — E78.5 HYPERLIPIDEMIA, UNSPECIFIED HYPERLIPIDEMIA TYPE: ICD-10-CM

## 2019-11-08 DIAGNOSIS — E83.52 HYPERCALCEMIA: ICD-10-CM

## 2019-11-08 DIAGNOSIS — E11.9 TYPE 2 DIABETES MELLITUS WITHOUT COMPLICATION, WITH LONG-TERM CURRENT USE OF INSULIN (HCC): Primary | ICD-10-CM

## 2019-11-08 DIAGNOSIS — Z79.4 TYPE 2 DIABETES MELLITUS WITHOUT COMPLICATION, WITH LONG-TERM CURRENT USE OF INSULIN (HCC): Primary | ICD-10-CM

## 2019-11-08 DIAGNOSIS — Z00.00 ROUTINE GENERAL MEDICAL EXAMINATION AT A HEALTH CARE FACILITY: ICD-10-CM

## 2019-11-08 DIAGNOSIS — L89.302 PRESSURE INJURY OF BUTTOCK, STAGE 2, UNSPECIFIED LATERALITY (HCC): ICD-10-CM

## 2019-11-08 DIAGNOSIS — I10 ESSENTIAL HYPERTENSION: ICD-10-CM

## 2019-11-08 LAB
ALBUMIN SERPL-MCNC: 3.5 G/DL (ref 3.5–5.2)
ALBUMIN/GLOB SERPL: 1.1 G/DL
ALP SERPL-CCNC: 94 U/L (ref 39–117)
ALT SERPL W P-5'-P-CCNC: 10 U/L (ref 1–33)
ANION GAP SERPL CALCULATED.3IONS-SCNC: 12 MMOL/L (ref 5–15)
AST SERPL-CCNC: 15 U/L (ref 1–32)
BASOPHILS # BLD AUTO: 0.04 10*3/MM3 (ref 0–0.2)
BASOPHILS NFR BLD AUTO: 0.5 % (ref 0–1.5)
BILIRUB SERPL-MCNC: 0.6 MG/DL (ref 0.2–1.2)
BUN BLD-MCNC: 24 MG/DL (ref 8–23)
BUN/CREAT SERPL: 17.6 (ref 7–25)
CALCIUM SPEC-SCNC: 10.1 MG/DL (ref 8.6–10.5)
CHLORIDE SERPL-SCNC: 93 MMOL/L (ref 98–107)
CHOLEST SERPL-MCNC: 158 MG/DL (ref 0–200)
CO2 SERPL-SCNC: 36 MMOL/L (ref 22–29)
CREAT BLD-MCNC: 1.36 MG/DL (ref 0.57–1)
DEPRECATED RDW RBC AUTO: 53.5 FL (ref 37–54)
EOSINOPHIL # BLD AUTO: 0.09 10*3/MM3 (ref 0–0.4)
EOSINOPHIL NFR BLD AUTO: 1.1 % (ref 0.3–6.2)
ERYTHROCYTE [DISTWIDTH] IN BLOOD BY AUTOMATED COUNT: 13.4 % (ref 12.3–15.4)
GFR SERPL CREATININE-BSD FRML MDRD: 38 ML/MIN/1.73
GLOBULIN UR ELPH-MCNC: 3.1 GM/DL
GLUCOSE BLD-MCNC: 180 MG/DL (ref 65–99)
HCT VFR BLD AUTO: 42.1 % (ref 34–46.6)
HDLC SERPL-MCNC: 33 MG/DL (ref 40–60)
HGB BLD-MCNC: 13 G/DL (ref 12–15.9)
IMM GRANULOCYTES # BLD AUTO: 0.03 10*3/MM3 (ref 0–0.05)
IMM GRANULOCYTES NFR BLD AUTO: 0.4 % (ref 0–0.5)
LDLC SERPL CALC-MCNC: 85 MG/DL (ref 0–100)
LDLC/HDLC SERPL: 2.57 {RATIO}
LYMPHOCYTES # BLD AUTO: 1.45 10*3/MM3 (ref 0.7–3.1)
LYMPHOCYTES NFR BLD AUTO: 17.7 % (ref 19.6–45.3)
MCH RBC QN AUTO: 33.2 PG (ref 26.6–33)
MCHC RBC AUTO-ENTMCNC: 30.9 G/DL (ref 31.5–35.7)
MCV RBC AUTO: 107.4 FL (ref 79–97)
MONOCYTES # BLD AUTO: 0.35 10*3/MM3 (ref 0.1–0.9)
MONOCYTES NFR BLD AUTO: 4.3 % (ref 5–12)
NEUTROPHILS # BLD AUTO: 6.23 10*3/MM3 (ref 1.7–7)
NEUTROPHILS NFR BLD AUTO: 76 % (ref 42.7–76)
NRBC BLD AUTO-RTO: 0 /100 WBC (ref 0–0.2)
PLATELET # BLD AUTO: 198 10*3/MM3 (ref 140–450)
PMV BLD AUTO: 11.7 FL (ref 6–12)
POTASSIUM BLD-SCNC: 4.7 MMOL/L (ref 3.5–5.2)
PROT SERPL-MCNC: 6.6 G/DL (ref 6–8.5)
RBC # BLD AUTO: 3.92 10*6/MM3 (ref 3.77–5.28)
SODIUM BLD-SCNC: 141 MMOL/L (ref 136–145)
T4 FREE SERPL-MCNC: 1.57 NG/DL (ref 0.93–1.7)
TRIGL SERPL-MCNC: 201 MG/DL (ref 0–150)
TSH SERPL DL<=0.05 MIU/L-ACNC: 1.96 UIU/ML (ref 0.27–4.2)
VLDLC SERPL-MCNC: 40.2 MG/DL
WBC NRBC COR # BLD: 8.19 10*3/MM3 (ref 3.4–10.8)

## 2019-11-08 PROCEDURE — 82310 ASSAY OF CALCIUM: CPT | Performed by: INTERNAL MEDICINE

## 2019-11-08 PROCEDURE — 84443 ASSAY THYROID STIM HORMONE: CPT | Performed by: INTERNAL MEDICINE

## 2019-11-08 PROCEDURE — 80061 LIPID PANEL: CPT | Performed by: INTERNAL MEDICINE

## 2019-11-08 PROCEDURE — 99214 OFFICE O/P EST MOD 30 MIN: CPT | Performed by: INTERNAL MEDICINE

## 2019-11-08 PROCEDURE — 80053 COMPREHEN METABOLIC PANEL: CPT | Performed by: INTERNAL MEDICINE

## 2019-11-08 PROCEDURE — 84439 ASSAY OF FREE THYROXINE: CPT | Performed by: INTERNAL MEDICINE

## 2019-11-08 PROCEDURE — 82306 VITAMIN D 25 HYDROXY: CPT | Performed by: INTERNAL MEDICINE

## 2019-11-08 PROCEDURE — 85025 COMPLETE CBC W/AUTO DIFF WBC: CPT | Performed by: INTERNAL MEDICINE

## 2019-11-08 PROCEDURE — 83970 ASSAY OF PARATHORMONE: CPT | Performed by: INTERNAL MEDICINE

## 2019-11-09 LAB — 25(OH)D3 SERPL-MCNC: 13.1 NG/ML (ref 30–100)

## 2019-11-10 VITALS — DIASTOLIC BLOOD PRESSURE: 52 MMHG | SYSTOLIC BLOOD PRESSURE: 104 MMHG | HEART RATE: 80 BPM | TEMPERATURE: 97.1 F

## 2019-11-11 LAB
CALCIUM SERPL-MCNC: 9.6 MG/DL (ref 8.7–10.3)
INTACT PTH: NORMAL
PTH-INTACT SERPL-MCNC: NORMAL PG/ML

## 2019-11-11 NOTE — PROGRESS NOTES
Chief Complaint   Patient presents with   • Follow-up       History of Present Illness      The patient presents for a follow-up related to Type 2 Diabetes Mellitus and reports that she doesn't check her blood sugars at home. She denies hypoglycemic symptoms. The patient denies polyuria, polydypsia or polyphagia. She reports no symptoms of neuropathy. The patient takes her medication as prescribed. She is taking insulin. Her injection sites are rotated appropriately. The patient does check her feet daily at home. She denies chest pain, shortness of breath, orthopnea, paroxysmal nocturnal dyspnea, dyspnea on exertion, edema, palpitations or syncope.    The patient presents for a follow-up related to hyperlipidemia. She is not following a low fat diet. She reports that she is not exercising. She is taking atorvastatin. The patient is taking her medication as prescribed. She reports no medication side effects, including muscle cramps, abdominal pain, headaches or weakness.    The patient presents for a follow-up related to hypertension. The patient reports that she has had no headaches or blurred vision. She states that she is taking her medication as prescribed. She is not having medication side effects.    The patient presents for a follow-up related to hypothyroidism. She reports a good energy level. She reports no hair loss, heat intolerance, cold intolerance, diarrhea, constipation or sweats. She is taking her medication as prescribed.    The patient presents for a follow-up visit regarding her hypercalcemia. She denies anorexia, nausea, vomiting, abdominal pain or bone pain. There are no reports of confusion, depression, nocturia, muscle pain, weakness or pruritis. She denies nephrolithiasis. There patient denies excessive intake of vitamin A, and she denies excessive intake of vitamin D. She denies cough, hemoptysis or breast lumps.    She presents for an initial evaluation with an ulcerated area on the gluteal  cleft. This has been present for two months. The condition is painful and enlarging. There are no exposures to people with similar lesions. No prior treatment has been administered. Her , who presents with her, states that she wants to sit in a wheelchair all day. They have tried egg-crate pillows. She is refusing to shower. Her  attempts to bath her but she doesn't cooperate.    Review of Systems    CONSTITUTIONAL- Denies Unexplained Weight Loss, Fever, Chills, Sweats, Weakness or Malaise.    HENT- Denies Nasal Discharge, Sore Throat, Ear Pain, Ear Drainage, Sinus Pain, Nasal Congestion, Decreased Hearing or Tinnitus.    GASTROINTESTINAL- Denies Blood per Rectum or Heartburn.    PULMONARY- Denies Wheezing, Sputum Production or Pleuritic Chest Pain.    CARDIOVASCULAR- Denies Claudication or Irregular Heart Beat.    Medications      Current Outpatient Medications:   •  aspirin 81 MG tablet, Take 1 tablet by mouth daily., Disp: , Rfl:   •  atorvastatin (LIPITOR) 10 MG tablet, TAKE 1 TABLET BY MOUTH EVERY DAY, Disp: 90 tablet, Rfl: 1  •  doxazosin (CARDURA) 4 MG tablet, Take 1 tablet by mouth Every Night., Disp: 30 tablet, Rfl: 5  •  doxazosin (CARDURA) 4 MG tablet, Take 1 tablet by mouth Every Night., Disp: 90 tablet, Rfl: 0  •  insulin lispro protamine-insulin lispro (HUMALOG MIX 75/25) (75-25) 100 UNIT/ML suspension injection, Inject  under the skin into the appropriate area as directed. INJECT 60 UNITS SQ IN AM, Disp: , Rfl:   •  ipratropium (ATROVENT HFA) 17 MCG/ACT inhaler, Inhale 2 puffs 4 (Four) Times a Day., Disp: 3 inhaler, Rfl: 1  •  levothyroxine (SYNTHROID, LEVOTHROID) 100 MCG tablet, Take 1 tablet by mouth Daily., Disp: 90 tablet, Rfl: 1  •  losartan-hydrochlorothiazide (HYZAAR) 100-25 MG per tablet, TAKE 1 TABLET BY MOUTH DAILY., Disp: 90 tablet, Rfl: 0  •  methylcellulose (CITRUCEL) oral powder, Take  by mouth daily as needed. MIX 1 TABLESPOON IN WATER AND DRINK DAILY AS NEEDED, Disp: ,  Rfl:   •  sertraline (ZOLOFT) 50 MG tablet, Take 1 tablet by mouth Daily., Disp: 90 tablet, Rfl: 1  •  torsemide (DEMADEX) 20 MG tablet, Take 1 tablet by mouth 2 (Two) Times a Day., Disp: 180 tablet, Rfl: 1     Allergies    Allergies   Allergen Reactions   • Amoxicillin-Pot Clavulanate Other (See Comments)     HEADACHE       Problem List    Patient Active Problem List   Diagnosis   • Anxiety   • Chronic obstructive pulmonary disease (CMS/HCC)   • Diabetes mellitus (CMS/HCC)   • Hyperlipidemia   • Hypertension   • Hypothyroidism   • Morbid obesity (CMS/HCC)   • Extreme obesity with alveolar hypoventilation (CMS/HCC)       Medications, Allergies, Problems List and Past History were reviewed and updated.    Physical Examination    /52 (BP Location: Left arm, Cuff Size: Large Adult)   Pulse 80   Temp 97.1 °F (36.2 °C)   LMP  (LMP Unknown)     HEENT: Facies- Within normal limits. Lids- Normal bilaterally. Conjunctiva- Clear bilaterally. Sclera- Anicteric bilaterally.    Neck: Thyroid- non enlarged, symmetric and has no nodules. No bruits are detected.    Lungs: Auscultation- Clear to auscultation bilaterally. There are no retractions, clubbing or cyanosis. The Expiratory to Inspiratory ratio is equal.    Cardiovascular: There are no carotid bruits. Heart- Normal Rate with Regular rhythm and no murmurs. There are no gallops. There are no rubs. In the lower extremities there is no edema. The upper extremities do not have edema.    Abdomen: Soft, benign, non-tender with no masses, hernias, organomegaly or scars.    Feet: The feet are symmetric with normal alicia landmarks. There is no tenderness to palpation bilaterally. The feet have normal posterior tibial and dorsalis pedis pulses and normal capillary refill bilaterally. The monofilament examination is normal bilaterally.       The arches are normal bilaterally. There are no skin or nail lesions present. There are no ingrown nails. There are no bunions  noted.    Dermatologic: The patient has an ulcerated area on the gluteal cleft. The ulceration is deep red and erythematous. The affected skin is ulcerated and the condition is noted to be cratered and diffusely discolored.    Impression and Assessment    Decubitus Ulcer.    Type 2 Diabetes Mellitus.    Hyperlipidemia.    Essential Hypertension.    Hypothyroidism.    Hypercalcemia.    Plan    Hyperlipidemia Plan: She was instructed to eat a low fat diet. She was encouraged to exercise daily. Weight loss was encouraged. The patient was instructed to continue the current medications.    Essential Hypertension Plan: The current plan was continued.    Type 2 Diabetes Mellitus Plan: The current plan was continued.    Hypothyroidism Plan: The current plan was continued.    Hypercalcemia Plan: Further plans will be formulated after test results are reviewed.    Decubitus Ulcer Plan: Skin care was discussed with the patient. She was referred to the wound clinic. Discussed the need to allow her  to assist her with showers and bathing.    Julissa was seen today for follow-up.    Diagnoses and all orders for this visit:    Type 2 diabetes mellitus without complication, with long-term current use of insulin (CMS/[facility])  -     Cancel: CBC & Differential    Hyperlipidemia, unspecified hyperlipidemia type  -     Cancel: CBC & Differential  -     Cancel: Comprehensive Metabolic Panel  -     Cancel: Lipid Panel  -     Cancel: CBC Auto Differential  -     Cancel: CBC & Differential    Essential hypertension  -     Cancel: CBC & Differential  -     Cancel: Comprehensive Metabolic Panel  -     Cancel: CBC Auto Differential  -     Cancel: CBC & Differential    Hypothyroidism, unspecified type  -     Cancel: TSH  -     Cancel: T4, Free  -     Cancel: CBC & Differential    Pressure injury of buttock, stage 2, unspecified laterality (CMS/[facility])  -     Cancel: CBC & Differential  -     Ambulatory Referral to Wound  Clinic    Hypercalcemia  -     Cancel: Vitamin D 25 Hydroxy  -     PTH, Intact & Calcium  -     Cancel: CBC & Differential    Other orders  -     ipratropium (ATROVENT HFA) 17 MCG/ACT inhaler; Inhale 2 puffs 4 (Four) Times a Day.        Return to Office    The patient was instructed to return for follow-up in 4 months.    The patient was instructed to return sooner if the condition changes, worsens, or does not resolve.

## 2019-12-23 DIAGNOSIS — F33.1 MODERATE EPISODE OF RECURRENT MAJOR DEPRESSIVE DISORDER (HCC): ICD-10-CM

## 2019-12-23 DIAGNOSIS — I10 ESSENTIAL HYPERTENSION: ICD-10-CM

## 2019-12-23 DIAGNOSIS — E78.5 HYPERLIPIDEMIA, UNSPECIFIED HYPERLIPIDEMIA TYPE: ICD-10-CM

## 2019-12-23 RX ORDER — DOXAZOSIN MESYLATE 4 MG/1
TABLET ORAL
Qty: 90 TABLET | Refills: 0 | Status: SHIPPED | OUTPATIENT
Start: 2019-12-23 | End: 2019-12-28

## 2019-12-23 RX ORDER — ATORVASTATIN CALCIUM 10 MG/1
TABLET, FILM COATED ORAL
Qty: 90 TABLET | Refills: 1 | Status: SHIPPED | OUTPATIENT
Start: 2019-12-23 | End: 2020-07-21 | Stop reason: SDUPTHER

## 2019-12-28 ENCOUNTER — HOSPITAL ENCOUNTER (INPATIENT)
Facility: HOSPITAL | Age: 69
LOS: 11 days | Discharge: NURSING, REHAB OR LONG-TERM CARE | End: 2020-01-08
Attending: EMERGENCY MEDICINE | Admitting: INTERNAL MEDICINE

## 2019-12-28 ENCOUNTER — APPOINTMENT (OUTPATIENT)
Dept: GENERAL RADIOLOGY | Facility: HOSPITAL | Age: 69
End: 2019-12-28

## 2019-12-28 ENCOUNTER — APPOINTMENT (OUTPATIENT)
Dept: CT IMAGING | Facility: HOSPITAL | Age: 69
End: 2019-12-28

## 2019-12-28 DIAGNOSIS — Z74.09 IMPAIRED MOBILITY AND ADLS: ICD-10-CM

## 2019-12-28 DIAGNOSIS — J18.9 PNEUMONIA OF RIGHT LUNG DUE TO INFECTIOUS ORGANISM, UNSPECIFIED PART OF LUNG: Primary | ICD-10-CM

## 2019-12-28 DIAGNOSIS — Z78.9 IMPAIRED MOBILITY AND ADLS: ICD-10-CM

## 2019-12-28 PROBLEM — S31.000A SACRAL WOUND: Status: ACTIVE | Noted: 2019-12-28

## 2019-12-28 PROBLEM — N28.9 RENAL INSUFFICIENCY: Status: ACTIVE | Noted: 2019-12-28

## 2019-12-28 PROBLEM — J96.01 ACUTE RESPIRATORY FAILURE WITH HYPOXIA (HCC): Status: ACTIVE | Noted: 2019-12-28

## 2019-12-28 PROBLEM — R55 SYNCOPE AND COLLAPSE: Status: ACTIVE | Noted: 2019-12-28

## 2019-12-28 LAB
ALBUMIN SERPL-MCNC: 3.4 G/DL (ref 3.5–5.2)
ALBUMIN/GLOB SERPL: 1 G/DL
ALP SERPL-CCNC: 97 U/L (ref 39–117)
ALT SERPL W P-5'-P-CCNC: 10 U/L (ref 1–33)
ANION GAP SERPL CALCULATED.3IONS-SCNC: 13 MMOL/L (ref 5–15)
ARTERIAL PATENCY WRIST A: ABNORMAL
AST SERPL-CCNC: 18 U/L (ref 1–32)
ATMOSPHERIC PRESS: ABNORMAL MM[HG]
BACTERIA UR QL AUTO: ABNORMAL /HPF
BASE EXCESS BLDA CALC-SCNC: 10.4 MMOL/L (ref 0–2)
BASOPHILS # BLD AUTO: 0.02 10*3/MM3 (ref 0–0.2)
BASOPHILS NFR BLD AUTO: 0.2 % (ref 0–1.5)
BDY SITE: ABNORMAL
BILIRUB SERPL-MCNC: 0.8 MG/DL (ref 0.2–1.2)
BILIRUB UR QL STRIP: ABNORMAL
BODY TEMPERATURE: 37 C
BUN BLD-MCNC: 20 MG/DL (ref 8–23)
BUN/CREAT SERPL: 17.7 (ref 7–25)
CALCIUM SPEC-SCNC: 9.9 MG/DL (ref 8.6–10.5)
CHLORIDE SERPL-SCNC: 90 MMOL/L (ref 98–107)
CK SERPL-CCNC: 52 U/L (ref 20–180)
CLARITY UR: ABNORMAL
CO2 BLDA-SCNC: 39.9 MMOL/L (ref 22–33)
CO2 SERPL-SCNC: 34 MMOL/L (ref 22–29)
COHGB MFR BLD: 2.1 % (ref 0–2)
COLOR UR: ABNORMAL
CREAT BLD-MCNC: 1.13 MG/DL (ref 0.57–1)
D-LACTATE SERPL-SCNC: 1.2 MMOL/L (ref 0.5–2)
D-LACTATE SERPL-SCNC: 3.3 MMOL/L (ref 0.5–2)
DEPRECATED RDW RBC AUTO: 57.5 FL (ref 37–54)
EOSINOPHIL # BLD AUTO: 0.06 10*3/MM3 (ref 0–0.4)
EOSINOPHIL NFR BLD AUTO: 0.7 % (ref 0.3–6.2)
ERYTHROCYTE [DISTWIDTH] IN BLOOD BY AUTOMATED COUNT: 14.5 % (ref 12.3–15.4)
GFR SERPL CREATININE-BSD FRML MDRD: 48 ML/MIN/1.73
GLOBULIN UR ELPH-MCNC: 3.5 GM/DL
GLUCOSE BLD-MCNC: 177 MG/DL (ref 65–99)
GLUCOSE BLDC GLUCOMTR-MCNC: 136 MG/DL (ref 70–130)
GLUCOSE UR STRIP-MCNC: NEGATIVE MG/DL
GRAN CASTS URNS QL MICRO: ABNORMAL /LPF
HCO3 BLDA-SCNC: 38 MMOL/L (ref 20–26)
HCT VFR BLD AUTO: 40.3 % (ref 34–46.6)
HCT VFR BLD CALC: 39.3 %
HGB BLD-MCNC: 13 G/DL (ref 12–15.9)
HGB BLDA-MCNC: 12.8 G/DL (ref 14–18)
HGB UR QL STRIP.AUTO: NEGATIVE
HOLD SPECIMEN: NORMAL
HOROWITZ INDEX BLD+IHG-RTO: 32 %
HYALINE CASTS UR QL AUTO: ABNORMAL /LPF
IMM GRANULOCYTES # BLD AUTO: 0.06 10*3/MM3 (ref 0–0.05)
IMM GRANULOCYTES NFR BLD AUTO: 0.7 % (ref 0–0.5)
KETONES UR QL STRIP: ABNORMAL
LEUKOCYTE ESTERASE UR QL STRIP.AUTO: ABNORMAL
LYMPHOCYTES # BLD AUTO: 0.95 10*3/MM3 (ref 0.7–3.1)
LYMPHOCYTES NFR BLD AUTO: 11 % (ref 19.6–45.3)
MAGNESIUM SERPL-MCNC: 1.6 MG/DL (ref 1.6–2.4)
MCH RBC QN AUTO: 34.9 PG (ref 26.6–33)
MCHC RBC AUTO-ENTMCNC: 32.3 G/DL (ref 31.5–35.7)
MCV RBC AUTO: 108.3 FL (ref 79–97)
METHGB BLD QL: 0.8 % (ref 0–1.5)
MODALITY: ABNORMAL
MONOCYTES # BLD AUTO: 0.31 10*3/MM3 (ref 0.1–0.9)
MONOCYTES NFR BLD AUTO: 3.6 % (ref 5–12)
NEUTROPHILS # BLD AUTO: 7.23 10*3/MM3 (ref 1.7–7)
NEUTROPHILS NFR BLD AUTO: 83.8 % (ref 42.7–76)
NITRITE UR QL STRIP: POSITIVE
NOTE: ABNORMAL
NRBC BLD AUTO-RTO: 0 /100 WBC (ref 0–0.2)
NT-PROBNP SERPL-MCNC: 98.6 PG/ML (ref 5–900)
OXYHGB MFR BLDV: 90.8 % (ref 94–99)
PCO2 BLDA: 63.7 MM HG (ref 35–45)
PCO2 TEMP ADJ BLD: 63.7 MM HG (ref 35–45)
PH BLDA: 7.38 PH UNITS (ref 7.35–7.45)
PH UR STRIP.AUTO: <=5 [PH] (ref 5–8)
PH, TEMP CORRECTED: 7.38 PH UNITS
PLATELET # BLD AUTO: 168 10*3/MM3 (ref 140–450)
PMV BLD AUTO: 10.1 FL (ref 6–12)
PO2 BLDA: 68 MM HG (ref 83–108)
PO2 TEMP ADJ BLD: 68 MM HG (ref 83–108)
POTASSIUM BLD-SCNC: 3.6 MMOL/L (ref 3.5–5.2)
PROCALCITONIN SERPL-MCNC: 0.09 NG/ML (ref 0.1–0.25)
PROCALCITONIN SERPL-MCNC: 0.12 NG/ML (ref 0.1–0.25)
PROT SERPL-MCNC: 6.9 G/DL (ref 6–8.5)
PROT UR QL STRIP: ABNORMAL
RBC # BLD AUTO: 3.72 10*6/MM3 (ref 3.77–5.28)
RBC # UR: ABNORMAL /HPF
REF LAB TEST METHOD: ABNORMAL
SODIUM BLD-SCNC: 137 MMOL/L (ref 136–145)
SP GR UR STRIP: 1.02 (ref 1–1.03)
SQUAMOUS #/AREA URNS HPF: ABNORMAL /HPF
TROPONIN T SERPL-MCNC: <0.01 NG/ML (ref 0–0.03)
TSH SERPL DL<=0.05 MIU/L-ACNC: 2.92 UIU/ML (ref 0.27–4.2)
UROBILINOGEN UR QL STRIP: ABNORMAL
VENTILATOR MODE: ABNORMAL
WBC NRBC COR # BLD: 8.63 10*3/MM3 (ref 3.4–10.8)
WBC UR QL AUTO: ABNORMAL /HPF
WHOLE BLOOD HOLD SPECIMEN: NORMAL
WHOLE BLOOD HOLD SPECIMEN: NORMAL

## 2019-12-28 PROCEDURE — 93005 ELECTROCARDIOGRAM TRACING: CPT | Performed by: EMERGENCY MEDICINE

## 2019-12-28 PROCEDURE — 99223 1ST HOSP IP/OBS HIGH 75: CPT | Performed by: INTERNAL MEDICINE

## 2019-12-28 PROCEDURE — 84443 ASSAY THYROID STIM HORMONE: CPT | Performed by: EMERGENCY MEDICINE

## 2019-12-28 PROCEDURE — 84484 ASSAY OF TROPONIN QUANT: CPT | Performed by: EMERGENCY MEDICINE

## 2019-12-28 PROCEDURE — 87147 CULTURE TYPE IMMUNOLOGIC: CPT | Performed by: PHYSICIAN ASSISTANT

## 2019-12-28 PROCEDURE — 83605 ASSAY OF LACTIC ACID: CPT | Performed by: EMERGENCY MEDICINE

## 2019-12-28 PROCEDURE — 87040 BLOOD CULTURE FOR BACTERIA: CPT | Performed by: PHYSICIAN ASSISTANT

## 2019-12-28 PROCEDURE — 87086 URINE CULTURE/COLONY COUNT: CPT | Performed by: PHYSICIAN ASSISTANT

## 2019-12-28 PROCEDURE — 83735 ASSAY OF MAGNESIUM: CPT | Performed by: EMERGENCY MEDICINE

## 2019-12-28 PROCEDURE — P9612 CATHETERIZE FOR URINE SPEC: HCPCS

## 2019-12-28 PROCEDURE — 36600 WITHDRAWAL OF ARTERIAL BLOOD: CPT

## 2019-12-28 PROCEDURE — 82550 ASSAY OF CK (CPK): CPT | Performed by: EMERGENCY MEDICINE

## 2019-12-28 PROCEDURE — 25010000002 AZITHROMYCIN PER 500 MG: Performed by: EMERGENCY MEDICINE

## 2019-12-28 PROCEDURE — 71045 X-RAY EXAM CHEST 1 VIEW: CPT

## 2019-12-28 PROCEDURE — 99285 EMERGENCY DEPT VISIT HI MDM: CPT

## 2019-12-28 PROCEDURE — 87150 DNA/RNA AMPLIFIED PROBE: CPT | Performed by: PHYSICIAN ASSISTANT

## 2019-12-28 PROCEDURE — 84145 PROCALCITONIN (PCT): CPT | Performed by: EMERGENCY MEDICINE

## 2019-12-28 PROCEDURE — 71250 CT THORAX DX C-: CPT

## 2019-12-28 PROCEDURE — 82962 GLUCOSE BLOOD TEST: CPT

## 2019-12-28 PROCEDURE — 81001 URINALYSIS AUTO W/SCOPE: CPT | Performed by: EMERGENCY MEDICINE

## 2019-12-28 PROCEDURE — 85025 COMPLETE CBC W/AUTO DIFF WBC: CPT | Performed by: EMERGENCY MEDICINE

## 2019-12-28 PROCEDURE — 70450 CT HEAD/BRAIN W/O DYE: CPT

## 2019-12-28 PROCEDURE — 82805 BLOOD GASES W/O2 SATURATION: CPT

## 2019-12-28 PROCEDURE — 25010000002 CEFTRIAXONE PER 250 MG: Performed by: EMERGENCY MEDICINE

## 2019-12-28 PROCEDURE — 80053 COMPREHEN METABOLIC PANEL: CPT | Performed by: EMERGENCY MEDICINE

## 2019-12-28 PROCEDURE — 83880 ASSAY OF NATRIURETIC PEPTIDE: CPT | Performed by: EMERGENCY MEDICINE

## 2019-12-28 RX ORDER — ASPIRIN 81 MG/1
81 TABLET ORAL DAILY
Status: DISCONTINUED | OUTPATIENT
Start: 2019-12-29 | End: 2020-01-08 | Stop reason: HOSPADM

## 2019-12-28 RX ORDER — DEXTROSE MONOHYDRATE 25 G/50ML
25 INJECTION, SOLUTION INTRAVENOUS
Status: DISCONTINUED | OUTPATIENT
Start: 2019-12-28 | End: 2020-01-08 | Stop reason: HOSPADM

## 2019-12-28 RX ORDER — SODIUM CHLORIDE 0.9 % (FLUSH) 0.9 %
10 SYRINGE (ML) INJECTION EVERY 12 HOURS SCHEDULED
Status: DISCONTINUED | OUTPATIENT
Start: 2019-12-28 | End: 2020-01-08 | Stop reason: HOSPADM

## 2019-12-28 RX ORDER — ATORVASTATIN CALCIUM 10 MG/1
10 TABLET, FILM COATED ORAL NIGHTLY
Status: DISCONTINUED | OUTPATIENT
Start: 2019-12-29 | End: 2020-01-08 | Stop reason: HOSPADM

## 2019-12-28 RX ORDER — SODIUM CHLORIDE 0.9 % (FLUSH) 0.9 %
10 SYRINGE (ML) INJECTION AS NEEDED
Status: DISCONTINUED | OUTPATIENT
Start: 2019-12-28 | End: 2020-01-08 | Stop reason: HOSPADM

## 2019-12-28 RX ORDER — ONDANSETRON 2 MG/ML
4 INJECTION INTRAMUSCULAR; INTRAVENOUS EVERY 6 HOURS PRN
Status: DISCONTINUED | OUTPATIENT
Start: 2019-12-28 | End: 2020-01-08 | Stop reason: HOSPADM

## 2019-12-28 RX ORDER — CHOLECALCIFEROL (VITAMIN D3) 125 MCG
5 CAPSULE ORAL NIGHTLY PRN
Status: DISCONTINUED | OUTPATIENT
Start: 2019-12-28 | End: 2019-12-31

## 2019-12-28 RX ORDER — LEVOTHYROXINE SODIUM 0.1 MG/1
100 TABLET ORAL
Status: DISCONTINUED | OUTPATIENT
Start: 2019-12-29 | End: 2020-01-08 | Stop reason: HOSPADM

## 2019-12-28 RX ORDER — ACETAMINOPHEN 160 MG/5ML
650 SOLUTION ORAL EVERY 4 HOURS PRN
Status: DISCONTINUED | OUTPATIENT
Start: 2019-12-28 | End: 2020-01-05

## 2019-12-28 RX ORDER — ACETAMINOPHEN 325 MG/1
650 TABLET ORAL EVERY 4 HOURS PRN
Status: DISCONTINUED | OUTPATIENT
Start: 2019-12-28 | End: 2020-01-08 | Stop reason: HOSPADM

## 2019-12-28 RX ORDER — SODIUM CHLORIDE 9 MG/ML
75 INJECTION, SOLUTION INTRAVENOUS CONTINUOUS
Status: DISCONTINUED | OUTPATIENT
Start: 2019-12-28 | End: 2020-01-05

## 2019-12-28 RX ORDER — HEPARIN SODIUM 5000 [USP'U]/ML
5000 INJECTION, SOLUTION INTRAVENOUS; SUBCUTANEOUS EVERY 12 HOURS SCHEDULED
Status: DISCONTINUED | OUTPATIENT
Start: 2019-12-28 | End: 2020-01-03

## 2019-12-28 RX ORDER — NICOTINE POLACRILEX 4 MG
15 LOZENGE BUCCAL
Status: DISCONTINUED | OUTPATIENT
Start: 2019-12-28 | End: 2020-01-08 | Stop reason: HOSPADM

## 2019-12-28 RX ORDER — IPRATROPIUM BROMIDE AND ALBUTEROL SULFATE 2.5; .5 MG/3ML; MG/3ML
3 SOLUTION RESPIRATORY (INHALATION) EVERY 4 HOURS PRN
Status: DISCONTINUED | OUTPATIENT
Start: 2019-12-28 | End: 2020-01-08 | Stop reason: HOSPADM

## 2019-12-28 RX ORDER — ACETAMINOPHEN 650 MG/1
650 SUPPOSITORY RECTAL EVERY 4 HOURS PRN
Status: DISCONTINUED | OUTPATIENT
Start: 2019-12-28 | End: 2020-01-05

## 2019-12-28 RX ADMIN — CEFTRIAXONE 1 G: 1 INJECTION, POWDER, FOR SOLUTION INTRAMUSCULAR; INTRAVENOUS at 21:56

## 2019-12-28 RX ADMIN — AZITHROMYCIN MONOHYDRATE 500 MG: 500 INJECTION, POWDER, LYOPHILIZED, FOR SOLUTION INTRAVENOUS at 23:30

## 2019-12-28 RX ADMIN — SODIUM CHLORIDE 100 ML/HR: 900 INJECTION INTRAVENOUS at 23:31

## 2019-12-28 RX ADMIN — SODIUM CHLORIDE 1000 ML: 9 INJECTION, SOLUTION INTRAVENOUS at 19:30

## 2019-12-28 RX ADMIN — SODIUM CHLORIDE, PRESERVATIVE FREE 10 ML: 5 INJECTION INTRAVENOUS at 23:30

## 2019-12-29 LAB
ANION GAP SERPL CALCULATED.3IONS-SCNC: 9 MMOL/L (ref 5–15)
B PARAPERT DNA SPEC QL NAA+PROBE: NOT DETECTED
B PERT DNA SPEC QL NAA+PROBE: NOT DETECTED
BUN BLD-MCNC: 22 MG/DL (ref 8–23)
BUN/CREAT SERPL: 19.8 (ref 7–25)
C PNEUM DNA NPH QL NAA+NON-PROBE: NOT DETECTED
CALCIUM SPEC-SCNC: 9.1 MG/DL (ref 8.6–10.5)
CHLORIDE SERPL-SCNC: 94 MMOL/L (ref 98–107)
CO2 SERPL-SCNC: 35 MMOL/L (ref 22–29)
CREAT BLD-MCNC: 1.11 MG/DL (ref 0.57–1)
DEPRECATED RDW RBC AUTO: 57.6 FL (ref 37–54)
ERYTHROCYTE [DISTWIDTH] IN BLOOD BY AUTOMATED COUNT: 14.6 % (ref 12.3–15.4)
FLUAV H1 2009 PAND RNA NPH QL NAA+PROBE: NOT DETECTED
FLUAV H1 HA GENE NPH QL NAA+PROBE: NOT DETECTED
FLUAV H3 RNA NPH QL NAA+PROBE: NOT DETECTED
FLUAV SUBTYP SPEC NAA+PROBE: NOT DETECTED
FLUBV RNA ISLT QL NAA+PROBE: NOT DETECTED
GFR SERPL CREATININE-BSD FRML MDRD: 49 ML/MIN/1.73
GLUCOSE BLD-MCNC: 137 MG/DL (ref 65–99)
GLUCOSE BLDC GLUCOMTR-MCNC: 135 MG/DL (ref 70–130)
GLUCOSE BLDC GLUCOMTR-MCNC: 149 MG/DL (ref 70–130)
GLUCOSE BLDC GLUCOMTR-MCNC: 155 MG/DL (ref 70–130)
GLUCOSE BLDC GLUCOMTR-MCNC: 155 MG/DL (ref 70–130)
HADV DNA SPEC NAA+PROBE: NOT DETECTED
HCOV 229E RNA SPEC QL NAA+PROBE: NOT DETECTED
HCOV HKU1 RNA SPEC QL NAA+PROBE: NOT DETECTED
HCOV NL63 RNA SPEC QL NAA+PROBE: NOT DETECTED
HCOV OC43 RNA SPEC QL NAA+PROBE: NOT DETECTED
HCT VFR BLD AUTO: 35.7 % (ref 34–46.6)
HGB BLD-MCNC: 11.4 G/DL (ref 12–15.9)
HMPV RNA NPH QL NAA+NON-PROBE: NOT DETECTED
HPIV1 RNA SPEC QL NAA+PROBE: NOT DETECTED
HPIV2 RNA SPEC QL NAA+PROBE: NOT DETECTED
HPIV3 RNA NPH QL NAA+PROBE: NOT DETECTED
HPIV4 P GENE NPH QL NAA+PROBE: NOT DETECTED
M PNEUMO IGG SER IA-ACNC: NOT DETECTED
MCH RBC QN AUTO: 34.7 PG (ref 26.6–33)
MCHC RBC AUTO-ENTMCNC: 31.9 G/DL (ref 31.5–35.7)
MCV RBC AUTO: 108.5 FL (ref 79–97)
PLATELET # BLD AUTO: 163 10*3/MM3 (ref 140–450)
PMV BLD AUTO: 10.8 FL (ref 6–12)
POTASSIUM BLD-SCNC: 3.8 MMOL/L (ref 3.5–5.2)
RBC # BLD AUTO: 3.29 10*6/MM3 (ref 3.77–5.28)
RHINOVIRUS RNA SPEC NAA+PROBE: NOT DETECTED
RSV RNA NPH QL NAA+NON-PROBE: NOT DETECTED
SODIUM BLD-SCNC: 138 MMOL/L (ref 136–145)
WBC NRBC COR # BLD: 10.75 10*3/MM3 (ref 3.4–10.8)

## 2019-12-29 PROCEDURE — 82962 GLUCOSE BLOOD TEST: CPT

## 2019-12-29 PROCEDURE — 99232 SBSQ HOSP IP/OBS MODERATE 35: CPT | Performed by: FAMILY MEDICINE

## 2019-12-29 PROCEDURE — 25010000002 CEFTRIAXONE

## 2019-12-29 PROCEDURE — 93005 ELECTROCARDIOGRAM TRACING: CPT | Performed by: FAMILY MEDICINE

## 2019-12-29 PROCEDURE — 63710000001 INSULIN LISPRO (HUMAN) PER 5 UNITS: Performed by: PHYSICIAN ASSISTANT

## 2019-12-29 PROCEDURE — 25010000002 HEPARIN (PORCINE) PER 1000 UNITS: Performed by: PHYSICIAN ASSISTANT

## 2019-12-29 PROCEDURE — 80048 BASIC METABOLIC PNL TOTAL CA: CPT | Performed by: PHYSICIAN ASSISTANT

## 2019-12-29 PROCEDURE — 0100U HC BIOFIRE FILMARRAY RESP PANEL 2: CPT | Performed by: PHYSICIAN ASSISTANT

## 2019-12-29 PROCEDURE — 84484 ASSAY OF TROPONIN QUANT: CPT | Performed by: NURSE PRACTITIONER

## 2019-12-29 PROCEDURE — 93010 ELECTROCARDIOGRAM REPORT: CPT | Performed by: INTERNAL MEDICINE

## 2019-12-29 PROCEDURE — 85027 COMPLETE CBC AUTOMATED: CPT | Performed by: PHYSICIAN ASSISTANT

## 2019-12-29 RX ORDER — NITROGLYCERIN 0.4 MG/1
0.4 TABLET SUBLINGUAL
Status: DISCONTINUED | OUTPATIENT
Start: 2019-12-29 | End: 2020-01-08 | Stop reason: HOSPADM

## 2019-12-29 RX ADMIN — SERTRALINE HYDROCHLORIDE 50 MG: 50 TABLET ORAL at 09:01

## 2019-12-29 RX ADMIN — TERAZOSIN HYDROCHLORIDE ANHYDROUS 5 MG: 2 CAPSULE ORAL at 00:17

## 2019-12-29 RX ADMIN — INSULIN LISPRO 2 UNITS: 100 INJECTION, SOLUTION INTRAVENOUS; SUBCUTANEOUS at 12:32

## 2019-12-29 RX ADMIN — SODIUM CHLORIDE 100 ML/HR: 900 INJECTION INTRAVENOUS at 21:07

## 2019-12-29 RX ADMIN — CEFTRIAXONE 1 G: 1 INJECTION, POWDER, FOR SOLUTION INTRAMUSCULAR; INTRAVENOUS at 22:06

## 2019-12-29 RX ADMIN — MELATONIN TAB 5 MG 5 MG: 5 TAB at 21:07

## 2019-12-29 RX ADMIN — SODIUM CHLORIDE 100 ML/HR: 900 INJECTION INTRAVENOUS at 21:08

## 2019-12-29 RX ADMIN — TERAZOSIN HYDROCHLORIDE ANHYDROUS 5 MG: 2 CAPSULE ORAL at 21:07

## 2019-12-29 RX ADMIN — ATORVASTATIN CALCIUM 10 MG: 10 TABLET, FILM COATED ORAL at 21:07

## 2019-12-29 RX ADMIN — DOXYCYCLINE 100 MG: 100 INJECTION, POWDER, LYOPHILIZED, FOR SOLUTION INTRAVENOUS at 02:41

## 2019-12-29 RX ADMIN — ASPIRIN 81 MG: 81 TABLET, COATED ORAL at 09:01

## 2019-12-29 RX ADMIN — HEPARIN SODIUM 5000 UNITS: 5000 INJECTION INTRAVENOUS; SUBCUTANEOUS at 00:18

## 2019-12-29 RX ADMIN — LEVOTHYROXINE SODIUM 100 MCG: 100 TABLET ORAL at 06:21

## 2019-12-29 RX ADMIN — HEPARIN SODIUM 5000 UNITS: 5000 INJECTION INTRAVENOUS; SUBCUTANEOUS at 21:07

## 2019-12-29 RX ADMIN — DOXYCYCLINE 100 MG: 100 INJECTION, POWDER, LYOPHILIZED, FOR SOLUTION INTRAVENOUS at 21:29

## 2019-12-29 RX ADMIN — HEPARIN SODIUM 5000 UNITS: 5000 INJECTION INTRAVENOUS; SUBCUTANEOUS at 09:01

## 2019-12-30 LAB
ALBUMIN SERPL-MCNC: 2.9 G/DL (ref 3.5–5.2)
ALBUMIN/GLOB SERPL: 0.9 G/DL
ALP SERPL-CCNC: 74 U/L (ref 39–117)
ALT SERPL W P-5'-P-CCNC: 11 U/L (ref 1–33)
ANION GAP SERPL CALCULATED.3IONS-SCNC: 12 MMOL/L (ref 5–15)
AST SERPL-CCNC: 18 U/L (ref 1–32)
BACTERIA BLD CULT: NORMAL
BACTERIA SPEC AEROBE CULT: NO GROWTH
BASOPHILS # BLD AUTO: 0.02 10*3/MM3 (ref 0–0.2)
BASOPHILS NFR BLD AUTO: 0.2 % (ref 0–1.5)
BILIRUB SERPL-MCNC: 1 MG/DL (ref 0.2–1.2)
BUN BLD-MCNC: 18 MG/DL (ref 8–23)
BUN/CREAT SERPL: 20.9 (ref 7–25)
CALCIUM SPEC-SCNC: 9.6 MG/DL (ref 8.6–10.5)
CHLORIDE SERPL-SCNC: 94 MMOL/L (ref 98–107)
CO2 SERPL-SCNC: 31 MMOL/L (ref 22–29)
CREAT BLD-MCNC: 0.86 MG/DL (ref 0.57–1)
DEPRECATED RDW RBC AUTO: 60.1 FL (ref 37–54)
EOSINOPHIL # BLD AUTO: 0.19 10*3/MM3 (ref 0–0.4)
EOSINOPHIL NFR BLD AUTO: 2.3 % (ref 0.3–6.2)
ERYTHROCYTE [DISTWIDTH] IN BLOOD BY AUTOMATED COUNT: 14.7 % (ref 12.3–15.4)
GFR SERPL CREATININE-BSD FRML MDRD: 65 ML/MIN/1.73
GLOBULIN UR ELPH-MCNC: 3.4 GM/DL
GLUCOSE BLD-MCNC: 123 MG/DL (ref 65–99)
GLUCOSE BLDC GLUCOMTR-MCNC: 135 MG/DL (ref 70–130)
GLUCOSE BLDC GLUCOMTR-MCNC: 142 MG/DL (ref 70–130)
GLUCOSE BLDC GLUCOMTR-MCNC: 144 MG/DL (ref 70–130)
GLUCOSE BLDC GLUCOMTR-MCNC: 170 MG/DL (ref 70–130)
HCT VFR BLD AUTO: 35.2 % (ref 34–46.6)
HGB BLD-MCNC: 11.2 G/DL (ref 12–15.9)
IMM GRANULOCYTES # BLD AUTO: 0.02 10*3/MM3 (ref 0–0.05)
IMM GRANULOCYTES NFR BLD AUTO: 0.2 % (ref 0–0.5)
LYMPHOCYTES # BLD AUTO: 1.63 10*3/MM3 (ref 0.7–3.1)
LYMPHOCYTES NFR BLD AUTO: 19.6 % (ref 19.6–45.3)
MACROCYTES BLD QL SMEAR: NORMAL
MAGNESIUM SERPL-MCNC: 1.7 MG/DL (ref 1.6–2.4)
MCH RBC QN AUTO: 35.1 PG (ref 26.6–33)
MCHC RBC AUTO-ENTMCNC: 31.8 G/DL (ref 31.5–35.7)
MCV RBC AUTO: 110.3 FL (ref 79–97)
MONOCYTES # BLD AUTO: 0.4 10*3/MM3 (ref 0.1–0.9)
MONOCYTES NFR BLD AUTO: 4.8 % (ref 5–12)
NEUTROPHILS # BLD AUTO: 6.07 10*3/MM3 (ref 1.7–7)
NEUTROPHILS NFR BLD AUTO: 72.9 % (ref 42.7–76)
NRBC BLD AUTO-RTO: 0 /100 WBC (ref 0–0.2)
PLAT MORPH BLD: NORMAL
PLATELET # BLD AUTO: 163 10*3/MM3 (ref 140–450)
PMV BLD AUTO: 10.5 FL (ref 6–12)
POTASSIUM BLD-SCNC: 3.5 MMOL/L (ref 3.5–5.2)
PROCALCITONIN SERPL-MCNC: 0.23 NG/ML (ref 0.1–0.25)
PROT SERPL-MCNC: 6.3 G/DL (ref 6–8.5)
RBC # BLD AUTO: 3.19 10*6/MM3 (ref 3.77–5.28)
SODIUM BLD-SCNC: 137 MMOL/L (ref 136–145)
TROPONIN T SERPL-MCNC: <0.01 NG/ML (ref 0–0.03)
WBC MORPH BLD: NORMAL
WBC NRBC COR # BLD: 8.33 10*3/MM3 (ref 3.4–10.8)

## 2019-12-30 PROCEDURE — 83735 ASSAY OF MAGNESIUM: CPT

## 2019-12-30 PROCEDURE — 25010000002 VANCOMYCIN

## 2019-12-30 PROCEDURE — 97166 OT EVAL MOD COMPLEX 45 MIN: CPT

## 2019-12-30 PROCEDURE — 84145 PROCALCITONIN (PCT): CPT

## 2019-12-30 PROCEDURE — 99232 SBSQ HOSP IP/OBS MODERATE 35: CPT | Performed by: HOSPITALIST

## 2019-12-30 PROCEDURE — 80053 COMPREHEN METABOLIC PANEL: CPT

## 2019-12-30 PROCEDURE — 85025 COMPLETE CBC W/AUTO DIFF WBC: CPT | Performed by: FAMILY MEDICINE

## 2019-12-30 PROCEDURE — 25010000002 CEFTRIAXONE PER 250 MG

## 2019-12-30 PROCEDURE — 25010000002 HEPARIN (PORCINE) PER 1000 UNITS: Performed by: PHYSICIAN ASSISTANT

## 2019-12-30 PROCEDURE — 97162 PT EVAL MOD COMPLEX 30 MIN: CPT

## 2019-12-30 PROCEDURE — 85007 BL SMEAR W/DIFF WBC COUNT: CPT | Performed by: FAMILY MEDICINE

## 2019-12-30 PROCEDURE — 82962 GLUCOSE BLOOD TEST: CPT

## 2019-12-30 PROCEDURE — 87040 BLOOD CULTURE FOR BACTERIA: CPT | Performed by: NURSE PRACTITIONER

## 2019-12-30 RX ORDER — DOXYCYCLINE 100 MG/1
100 CAPSULE ORAL EVERY 12 HOURS SCHEDULED
Status: DISCONTINUED | OUTPATIENT
Start: 2019-12-30 | End: 2020-01-03

## 2019-12-30 RX ORDER — CEFTRIAXONE 1 G/1
1 INJECTION, POWDER, FOR SOLUTION INTRAMUSCULAR; INTRAVENOUS EVERY 24 HOURS
Status: DISCONTINUED | OUTPATIENT
Start: 2019-12-30 | End: 2019-12-30

## 2019-12-30 RX ADMIN — DOXYCYCLINE 100 MG: 100 CAPSULE ORAL at 21:20

## 2019-12-30 RX ADMIN — VANCOMYCIN HYDROCHLORIDE 2750 MG: 1 INJECTION, POWDER, LYOPHILIZED, FOR SOLUTION INTRAVENOUS at 05:03

## 2019-12-30 RX ADMIN — SERTRALINE HYDROCHLORIDE 50 MG: 50 TABLET ORAL at 09:17

## 2019-12-30 RX ADMIN — ATORVASTATIN CALCIUM 10 MG: 10 TABLET, FILM COATED ORAL at 21:20

## 2019-12-30 RX ADMIN — CEFTRIAXONE 1 G: 1 INJECTION, POWDER, FOR SOLUTION INTRAMUSCULAR; INTRAVENOUS at 09:27

## 2019-12-30 RX ADMIN — INSULIN LISPRO 2 UNITS: 100 INJECTION, SOLUTION INTRAVENOUS; SUBCUTANEOUS at 21:20

## 2019-12-30 RX ADMIN — ASPIRIN 81 MG: 81 TABLET, COATED ORAL at 09:17

## 2019-12-30 RX ADMIN — HEPARIN SODIUM 5000 UNITS: 5000 INJECTION INTRAVENOUS; SUBCUTANEOUS at 21:20

## 2019-12-30 RX ADMIN — LEVOTHYROXINE SODIUM 100 MCG: 100 TABLET ORAL at 06:46

## 2019-12-30 RX ADMIN — SODIUM CHLORIDE, PRESERVATIVE FREE 10 ML: 5 INJECTION INTRAVENOUS at 21:21

## 2019-12-30 RX ADMIN — HEPARIN SODIUM 5000 UNITS: 5000 INJECTION INTRAVENOUS; SUBCUTANEOUS at 09:17

## 2019-12-30 RX ADMIN — SODIUM CHLORIDE 100 ML/HR: 900 INJECTION INTRAVENOUS at 18:45

## 2019-12-30 RX ADMIN — DOXYCYCLINE 100 MG: 100 CAPSULE ORAL at 09:17

## 2019-12-31 LAB
BACTERIA SPEC AEROBE CULT: ABNORMAL
GLUCOSE BLDC GLUCOMTR-MCNC: 146 MG/DL (ref 70–130)
GLUCOSE BLDC GLUCOMTR-MCNC: 150 MG/DL (ref 70–130)
GLUCOSE BLDC GLUCOMTR-MCNC: 152 MG/DL (ref 70–130)
GLUCOSE BLDC GLUCOMTR-MCNC: 154 MG/DL (ref 70–130)
GRAM STN SPEC: ABNORMAL

## 2019-12-31 PROCEDURE — 99232 SBSQ HOSP IP/OBS MODERATE 35: CPT | Performed by: HOSPITALIST

## 2019-12-31 PROCEDURE — 25010000002 HEPARIN (PORCINE) PER 1000 UNITS: Performed by: PHYSICIAN ASSISTANT

## 2019-12-31 PROCEDURE — 25010000002 CEFTRIAXONE PER 250 MG

## 2019-12-31 PROCEDURE — 82962 GLUCOSE BLOOD TEST: CPT

## 2019-12-31 RX ORDER — CHOLECALCIFEROL (VITAMIN D3) 125 MCG
5 CAPSULE ORAL NIGHTLY
Status: DISCONTINUED | OUTPATIENT
Start: 2019-12-31 | End: 2020-01-08 | Stop reason: HOSPADM

## 2019-12-31 RX ORDER — TERAZOSIN 1 MG/1
2 CAPSULE ORAL NIGHTLY
Status: DISCONTINUED | OUTPATIENT
Start: 2019-12-31 | End: 2020-01-08 | Stop reason: HOSPADM

## 2019-12-31 RX ADMIN — HEPARIN SODIUM 5000 UNITS: 5000 INJECTION INTRAVENOUS; SUBCUTANEOUS at 20:25

## 2019-12-31 RX ADMIN — TERAZOSIN HYDROCHLORIDE 2 MG: 2 CAPSULE ORAL at 20:23

## 2019-12-31 RX ADMIN — INSULIN LISPRO 2 UNITS: 100 INJECTION, SOLUTION INTRAVENOUS; SUBCUTANEOUS at 20:25

## 2019-12-31 RX ADMIN — DOXYCYCLINE 100 MG: 100 CAPSULE ORAL at 20:25

## 2019-12-31 RX ADMIN — ASPIRIN 81 MG: 81 TABLET, COATED ORAL at 08:16

## 2019-12-31 RX ADMIN — ATORVASTATIN CALCIUM 10 MG: 10 TABLET, FILM COATED ORAL at 20:23

## 2019-12-31 RX ADMIN — LEVOTHYROXINE SODIUM 100 MCG: 100 TABLET ORAL at 05:27

## 2019-12-31 RX ADMIN — SERTRALINE HYDROCHLORIDE 50 MG: 50 TABLET ORAL at 08:16

## 2019-12-31 RX ADMIN — INSULIN LISPRO 2 UNITS: 100 INJECTION, SOLUTION INTRAVENOUS; SUBCUTANEOUS at 12:33

## 2019-12-31 RX ADMIN — DOXYCYCLINE 100 MG: 100 CAPSULE ORAL at 08:16

## 2019-12-31 RX ADMIN — SODIUM CHLORIDE 100 ML/HR: 900 INJECTION INTRAVENOUS at 00:41

## 2019-12-31 RX ADMIN — INSULIN LISPRO 2 UNITS: 100 INJECTION, SOLUTION INTRAVENOUS; SUBCUTANEOUS at 17:17

## 2019-12-31 RX ADMIN — SODIUM CHLORIDE 100 ML/HR: 900 INJECTION INTRAVENOUS at 11:11

## 2019-12-31 RX ADMIN — CEFTRIAXONE 1 G: 1 INJECTION, POWDER, FOR SOLUTION INTRAMUSCULAR; INTRAVENOUS at 08:16

## 2019-12-31 RX ADMIN — HEPARIN SODIUM 5000 UNITS: 5000 INJECTION INTRAVENOUS; SUBCUTANEOUS at 08:16

## 2019-12-31 RX ADMIN — SODIUM CHLORIDE, PRESERVATIVE FREE 10 ML: 5 INJECTION INTRAVENOUS at 08:26

## 2020-01-01 LAB
25(OH)D3 SERPL-MCNC: 19.1 NG/ML (ref 30–100)
GLUCOSE BLDC GLUCOMTR-MCNC: 127 MG/DL (ref 70–130)
GLUCOSE BLDC GLUCOMTR-MCNC: 137 MG/DL (ref 70–130)
GLUCOSE BLDC GLUCOMTR-MCNC: 147 MG/DL (ref 70–130)
GLUCOSE BLDC GLUCOMTR-MCNC: 171 MG/DL (ref 70–130)

## 2020-01-01 PROCEDURE — 25010000002 CEFTRIAXONE PER 250 MG

## 2020-01-01 PROCEDURE — 82962 GLUCOSE BLOOD TEST: CPT

## 2020-01-01 PROCEDURE — 25010000002 HEPARIN (PORCINE) PER 1000 UNITS: Performed by: PHYSICIAN ASSISTANT

## 2020-01-01 PROCEDURE — 99232 SBSQ HOSP IP/OBS MODERATE 35: CPT | Performed by: NURSE PRACTITIONER

## 2020-01-01 PROCEDURE — 82306 VITAMIN D 25 HYDROXY: CPT | Performed by: HOSPITALIST

## 2020-01-01 RX ADMIN — DOXYCYCLINE 100 MG: 100 CAPSULE ORAL at 21:13

## 2020-01-01 RX ADMIN — ATORVASTATIN CALCIUM 10 MG: 10 TABLET, FILM COATED ORAL at 21:13

## 2020-01-01 RX ADMIN — SODIUM CHLORIDE 75 ML/HR: 900 INJECTION INTRAVENOUS at 10:12

## 2020-01-01 RX ADMIN — HEPARIN SODIUM 5000 UNITS: 5000 INJECTION INTRAVENOUS; SUBCUTANEOUS at 21:13

## 2020-01-01 RX ADMIN — DOXYCYCLINE 100 MG: 100 CAPSULE ORAL at 08:31

## 2020-01-01 RX ADMIN — ASPIRIN 81 MG: 81 TABLET, COATED ORAL at 08:31

## 2020-01-01 RX ADMIN — TERAZOSIN HYDROCHLORIDE 2 MG: 2 CAPSULE ORAL at 21:12

## 2020-01-01 RX ADMIN — INSULIN LISPRO 2 UNITS: 100 INJECTION, SOLUTION INTRAVENOUS; SUBCUTANEOUS at 21:13

## 2020-01-01 RX ADMIN — SODIUM CHLORIDE, PRESERVATIVE FREE 10 ML: 5 INJECTION INTRAVENOUS at 08:31

## 2020-01-01 RX ADMIN — MELATONIN TAB 5 MG 5 MG: 5 TAB at 21:12

## 2020-01-01 RX ADMIN — LEVOTHYROXINE SODIUM 100 MCG: 100 TABLET ORAL at 05:13

## 2020-01-01 RX ADMIN — SERTRALINE HYDROCHLORIDE 50 MG: 50 TABLET ORAL at 08:31

## 2020-01-01 RX ADMIN — CEFTRIAXONE 1 G: 1 INJECTION, POWDER, FOR SOLUTION INTRAMUSCULAR; INTRAVENOUS at 08:30

## 2020-01-01 RX ADMIN — HEPARIN SODIUM 5000 UNITS: 5000 INJECTION INTRAVENOUS; SUBCUTANEOUS at 08:31

## 2020-01-01 NOTE — PROGRESS NOTES
[facility]  PROGRESS NOTE    Patient Name: Julissa Kee  : 1950  MRN: [id number]    Date of Admission: 2019  Primary Care Physician: Kee Hoffman MD    Subjective   Subjective     CC: Altered mental status    HPI:  in room today.  She may need melatonin.  She is worried about her blood pressure.  She is asking about an enema today.  They were watching the [employer] game earlier    Review of Systems    Gen- No fevers, chills  CV- No chest pain, palpitations  Resp- No cough, dyspnea  GI- No N/V/D, abd pain    Objective   Objective     Vital Signs:   Temp:  [97.8 °F (36.6 °C)-98 °F (36.7 °C)] 98 °F (36.7 °C)  Heart Rate:  [71-88] 71  Resp:  [18] 18  BP: (156-174)/(64-80) 171/80     Physical Exam:    Constitutional: No acute distress, awake, alert  HENT: NCAT, dry tongue  Respiratory: Poor inspiratory effort, distant breath sounds  Cardiovascular: RRR, no murmurs, rubs, or gallops, palpable pedal pulses bilaterally  Gastrointestinal: Positive bowel sounds, soft, nontender, obese abdomen  Musculoskeletal: Chronic edema  Psychiatric: Appropriate affect, cooperative  Neurologic: Oriented x 3, generalized weakness  Skin: dry skin, + tenting    Results Reviewed:    Results from last 7 days   Lab Units 19  1725   WBC 10*3/mm3 8.33 10.75  --  8.63   HEMOGLOBIN g/dL 11.2* 11.4*  --  13.0   HEMATOCRIT % 35.2 35.7  --  40.3   PLATELETS 10*3/mm3 163 163  --  168   PROCALCITONIN ng/mL 0.23  --  0.12 0.09*     Results from last 7 days   Lab Units 19  03319  1725   SODIUM mmol/L 137  --  138 137   POTASSIUM mmol/L 3.5  --  3.8 3.6   CHLORIDE mmol/L 94*  --  94* 90*   CO2 mmol/L 31.0*  --  35.0* 34.0*   BUN mg/dL 18  --  22 20   CREATININE mg/dL 0.86  --  1.11* 1.13*   GLUCOSE mg/dL 123*  --  137* 177*   CALCIUM mg/dL 9.6  --  9.1 9.9   ALT (SGPT) U/L 11  --   --   10   AST (SGOT) U/L 18  --   --  18   TROPONIN T ng/mL  --  <0.010  --  <0.010   PROBNP pg/mL  --   --   --  98.6     Estimated Creatinine Clearance: 84.2 mL/min (by C-G formula based on SCr of 0.86 mg/dL).    Microbiology Results Abnormal     Procedure Component Value - Date/Time    Blood Culture - Blood, Arm, Left [[id number]] Collected:  12/30/19 0415    Lab Status:  Preliminary result Specimen:  Blood from Arm, Left Updated:  12/31/19 0645     Blood Culture No growth at 24 hours    Blood Culture - Blood, Hand, Left [[id number]] Collected:  12/30/19 0420    Lab Status:  Preliminary result Specimen:  Blood from Hand, Left Updated:  12/31/19 0645     Blood Culture No growth at 24 hours    Blood Culture - Blood, Hand, Right [[id number]]  (Abnormal) Collected:  12/28/19 2010    Lab Status:  Final result Specimen:  Blood from Hand, Right Updated:  12/31/19 0638     Blood Culture Staphylococcus, coagulase negative     Comment: Probable contaminant requires clinical correlation, susceptibility not performed unless requested by physician.          Gram Stain Aerobic Bottle Gram positive cocci in clusters    Blood Culture - Blood, Hand, Right [[id number]] Collected:  12/28/19 1920    Lab Status:  Preliminary result Specimen:  Blood from Hand, Right Updated:  12/30/19 2100     Blood Culture No growth at 2 days    Blood Culture ID, PCR - Blood, Hand, Right [[id number]]  (Normal) Collected:  12/28/19 2010    Lab Status:  Final result Specimen:  Blood from Hand, Right Updated:  12/30/19 0503     BCID, PCR No organism detected by BCID PCR.    Urine Culture - Urine, Urine, Catheter [[id number]]  (Normal) Collected:  12/28/19 1908    Lab Status:  Final result Specimen:  Urine, Catheter Updated:  12/30/19 0405     Urine Culture No growth    Respiratory Panel, PCR - Swab, Nasopharynx [[id number]]  (Normal) Collected:  12/29/19 0136    Lab Status:  Final result Specimen:  Swab from Nasopharynx Updated:  12/29/19 0858     ADENOVIRUS, PCR  Not Detected     Coronavirus 229E Not Detected     Coronavirus HKU1 Not Detected     Coronavirus NL63 Not Detected     Coronavirus OC43 Not Detected     Human Metapneumovirus Not Detected     Human Rhinovirus/Enterovirus Not Detected     Influenza B PCR Not Detected     Parainfluenza Virus 1 Not Detected     Parainfluenza Virus 2 Not Detected     Parainfluenza Virus 3 Not Detected     Parainfluenza Virus 4 Not Detected     Bordetella pertussis pcr Not Detected     Influenza A H1 2009 PCR Not Detected     Chlamydophila pneumoniae PCR Not Detected     Mycoplasma pneumo by PCR Not Detected     Influenza A PCR Not Detected     Influenza A H3 Not Detected     Influenza A H1 Not Detected     RSV, PCR Not Detected     Bordetella parapertussis PCR Not Detected          Imaging Results (Last 24 Hours)     ** No results found for the last 24 hours. **          I have reviewed the medications:  Scheduled Meds:  aspirin 81 mg Oral Daily   atorvastatin 10 mg Oral Nightly   cefTRIAXone 1 g Intravenous Q24H   doxycycline 100 mg Oral Q12H   heparin (porcine) 5,000 Units Subcutaneous Q12H   insulin lispro 0-7 Units Subcutaneous 4x Daily With Meals & Nightly   levothyroxine 100 mcg Oral Q AM   sertraline 50 mg Oral Daily   sodium chloride 10 mL Intravenous Q12H   terazosin 2 mg Oral Nightly     Continuous Infusions:  sodium chloride 75 mL/hr Last Rate: 100 mL/hr (12/31/19 1111)     PRN Meds:.•  acetaminophen **OR** acetaminophen **OR** acetaminophen  •  dextrose  •  dextrose  •  glucagon (human recombinant)  •  ipratropium-albuterol  •  melatonin  •  nitroglycerin  •  ondansetron  •  sodium chloride  •  sodium chloride      Assessment/Plan   Assessment & Plan     Active Hospital Problems    Diagnosis  POA   • **Acute respiratory failure with hypoxia (CMS/[facility]) [J96.01]  Yes   • Renal insufficiency [N28.9]  Yes   • Sacral wound [S31.000A]  Yes   • CAP (community acquired pneumonia) [J18.9]  Yes   • Pneumonia of right lung due to  infectious organism [J18.9]  Yes   • Syncope and collapse [R55]  Yes   • Chronic obstructive pulmonary disease (CMS/HCC) [J44.9]  Yes   • Diabetes mellitus (CMS/HCC) [E11.9]  Yes   • Hyperlipidemia [E78.5]  Yes   • Hypertension [I10]  Yes   • Hypothyroidism [E03.9]  Yes   • Extreme obesity with alveolar hypoventilation (CMS/HCC) [E66.2]  Yes   • Anxiety [F41.9]  Yes      Resolved Hospital Problems   No resolved problems to display.        Brief Hospital Course to date:  Julissa Kee is a 69 y.o. female admitted with encephalopathy    Altered mental status  -Multiple issues  Acute hypoxic respiratory failure  -She was hypoxic on admission  -May have undiagnosed sleep apnea  -History of obesity hypoventilation syndrome  -Multilobar pneumonia  Mobility issues  -She appears to be debilitated  -Decubitus ulcer  Morbid obesity  -Poor hygiene  Chronic carbon dioxide retention  -Needs BiPAP at night  -Minimize oxygen supplementation  Hypertension  -Decrease IV fluids  -Blood pressure medication      DVT Prophylaxis: Heparin 5000 units subcu every 12 hours    Disposition: I expect the patient to be discharged to SNF in a few days    CODE STATUS:   Code Status and Medical Interventions:   Ordered at: 12/28/19 1938     Level Of Support Discussed With:    Patient     Code Status:    CPR     Medical Interventions (Level of Support Prior to Arrest):    Full         Electronically signed by Idris Lael MD, 12/31/19, 8:11 PM.

## 2020-01-01 NOTE — PROGRESS NOTES
INFECTIOUS DISEASE PROGRESS NOTE     Julissa Kee  1950  [id number]      Admission Date: 12/28/2019      Requesting Provider: No ref. provider found  Evaluating Physician: Austen Redding MD    Reason for Consultation: Right middle and lower lobe pneumonia, positive blood culture.      History of present illness:  12/30/19:    Patient is a 69 y.o. female seen today for right middle and lower lobe pneumonia, and a positive blood culture. She had a syncopal episode in her bathroom when the family called EMS.  She has been afebrile without leukocytosis.  Chest xray without parenchymal process, with Chest CT, right middle and lower lobe developing bronchopneumonia. One blood culture has turned positive for gram positive cocci  In clusters.  Viral respiratory PCR negative.  Vancomycin and Zosyn initiated and we were consulted for evaluation and treatment.  She has no IV access and the vancomycin and Zosyn cannot be administered.  She was previously given ceftriaxone and doxycycline.  She denies any increased sputum production, nausea, vomiting, dysuria.   12/31/19:   She denies any increased sputum production, shortness of breath. She remains afebrile.  She now has a peripheral IV and her ceftriaxone has been switched from IM to IV.  1/1/20: She denies any shortness of breath.  No sputum production.  She remains afebrile.     Past Medical History:   Diagnosis Date   • Acute bronchitis    • Acute sinusitis    • Anxiety    • Bacterial conjunctivitis    • Chest pain    • COPD (chronic obstructive pulmonary disease) (CMS/[facility])    • Diabetes mellitus (CMS/[facility])    • Dyspnea    • Dysuria    • Edema    • Elbow injury    • Elbow pain    • Eye drainage    • Fatigue    • Foot pain, right    • Humerus distal fracture    • Hyperlipidemia    • Hypertension    • Hypothyroidism    • Morbid obesity (CMS/[facility])    • Pickwickian syndrome (CMS/[facility])    • Pressure ulcer stage I    • Psoriasis    • SOB (shortness of breath)    • Symptoms  of urinary tract infection    • Urinary frequency    • Urinary tract infection    • Vaginal candidiasis        History reviewed. No pertinent surgical history.    History reviewed. No pertinent family history.    Social History     Socioeconomic History   • Marital status:      Spouse name: Not on file   • Number of children: Not on file   • Years of education: Not on file   • Highest education level: Not on file   Tobacco Use   • Smoking status: Former Smoker     Types: Cigarettes     Last attempt to quit: 2006     Years since quittin.0   • Smokeless tobacco: Never Used   Substance and Sexual Activity   • Alcohol use: No     Frequency: Never   • Drug use: Never   • Sexual activity: Defer       Allergies   Allergen Reactions   • Amoxicillin-Pot Clavulanate Other (See Comments)     HEADACHE - has tolerated ceftriaxone 2019   • Bactrim [Sulfamethoxazole-Trimethoprim] Other (See Comments)     .         Medication:    Current Facility-Administered Medications:   •  acetaminophen (TYLENOL) tablet 650 mg, 650 mg, Oral, Q4H PRN **OR** acetaminophen (TYLENOL) 160 MG/5ML solution 650 mg, 650 mg, Oral, Q4H PRN **OR** acetaminophen (TYLENOL) suppository 650 mg, 650 mg, Rectal, Q4H PRN, Jonnie Sky, PA-C  •  aspirin EC tablet 81 mg, 81 mg, Oral, Daily, Jonnie Sky, PA-C, 81 mg at 19  •  atorvastatin (LIPITOR) tablet 10 mg, 10 mg, Oral, Nightly, Jonnie Sky, PA-C, 10 mg at 19  •  cefTRIAXone (ROCEPHIN) 1 g/100 mL 0.9% NS (MBP), 1 g, Intravenous, Q24H, Eyal Aj, formerly [facility], 1 g at 19  •  dextrose (D50W) 25 g/ 50mL Intravenous Solution 25 g, 25 g, Intravenous, Q15 Min PRN, Jonnie Sky PA-C  •  dextrose (GLUTOSE) oral gel 15 g, 15 g, Oral, Q15 Min PRN, Jonnie Sky, PA-C  •  doxycycline (MONODOX) capsule 100 mg, 100 mg, Oral, Q12H, Nata Thomas APRN, 100 mg at 19  •  glucagon (human recombinant) (GLUCAGEN DIAGNOSTIC) injection 1 mg, 1  mg, Subcutaneous, Q15 Min PRN, Jonnie Sky PA-C  •  heparin (porcine) 5000 UNIT/ML injection 5,000 Units, 5,000 Units, Subcutaneous, Q12H, Jonnie Sky PA-C, 5,000 Units at 12/31/19 2025  •  insulin lispro (humaLOG) injection 0-7 Units, 0-7 Units, Subcutaneous, 4x Daily With Meals & Nightly, Jonnie Sky PA-C, 2 Units at 12/31/19 2025  •  ipratropium-albuterol (DUO-NEB) nebulizer solution 3 mL, 3 mL, Nebulization, Q4H PRN, Jonnie Syk PA-C  •  levothyroxine (SYNTHROID, LEVOTHROID) tablet 100 mcg, 100 mcg, Oral, Q AM, Jonnie Sky PA-C, 100 mcg at 01/01/20 0513  •  melatonin tablet 5 mg, 5 mg, Oral, Nightly, Idris Leal MD  •  nitroglycerin (NITROSTAT) SL tablet 0.4 mg, 0.4 mg, Sublingual, Q5 Min PRN, Maegan Ramos, APRN  •  ondansetron (ZOFRAN) injection 4 mg, 4 mg, Intravenous, Q6H PRN, Jonnie Sky PA-C  •  sertraline (ZOLOFT) tablet 50 mg, 50 mg, Oral, Daily, Jonnie Sky PA-C, 50 mg at 12/31/19 0816  •  sodium chloride 0.9 % flush 10 mL, 10 mL, Intravenous, PRN, Austen Jack MD  •  sodium chloride 0.9 % flush 10 mL, 10 mL, Intravenous, Q12H, Jonnie Sky PA-C, 10 mL at 12/31/19 0826  •  sodium chloride 0.9 % flush 10 mL, 10 mL, Intravenous, PRN, Jonnie Sky PA-C  •  sodium chloride 0.9 % infusion, 75 mL/hr, Intravenous, Continuous, Idris Leal MD, Last Rate: 75 mL/hr at 12/31/19 2027, 75 mL/hr at 12/31/19 2027  •  terazosin (HYTRIN) capsule 2 mg, 2 mg, Oral, Nightly, Idris Leal MD, 2 mg at 12/31/19 2023    Antibiotics:  Anti-Infectives (From admission, onward)    Ordered     Dose/Rate Route Frequency Start Stop    12/30/19 0842  cefTRIAXone (ROCEPHIN) 1 g/100 mL 0.9% NS (MBP)  Review   Ordering Provider:  Eyal Aj RPH    1 g  over 30 Minutes Intravenous Every 24 Hours Scheduled 12/30/19 0930 01/06/20 0859    12/30/19 0711  doxycycline (MONODOX) capsule 100 mg  Review   Ordering Provider:  Nata Thomas APRN    100 mg Oral  Every 12 Hours Scheduled 19 0900 20 0859    19  cefTRIAXone (ROCEPHIN) 1 g/100 mL 0.9% NS (MBP)     Ordering Provider:  Austen Jack MD    1 g  over 30 Minutes Intravenous Once 19 22319  AZITHROMYCIN 500 MG/250 ML 0.9% NS IVPB (vial-mate)     Ordering Provider:  Austen Jack MD    500 mg  over 60 Minutes Intravenous Once 19 0030                Physical Exam:   Vital Signs  Temp (24hrs), Av.4 °F (36.9 °C), Min:98 °F (36.7 °C), Max:98.7 °F (37.1 °C)    Temp  Min: 98 °F (36.7 °C)  Max: 98.7 °F (37.1 °C)  BP  Min: 155/50  Max: 171/80  Pulse  Min: 71  Max: 87  Resp  Min: 18  Max: 20  SpO2  Min: 94 %  Max: 97 %    GENERAL: Awake and alert, in no acute distress.   HEENT: Normocephalic, atraumatic.  PERRL. EOMI. No conjunctival injection. No icterus. Oropharynx clear without evidence of thrush or exudate.     HEART: RRR; No murmur, rubs, gallops.   LUNGS: diminished throughout, no wheezes, rhonchi   ABDOMEN: Soft, nontender, nondistended. Positive bowel sounds. No rebound or guarding. NO mass or HSM. Pannus scabbed lesions, crusting   EXT:  No cyanosis,   :  Without Mccarty catheter.  MSK:  No joint effusions   SKIN: Warm and dry      NEURO: Oriented to PPT  PSYCHIATRIC: Normal insight and judgement. Cooperative with PE  Bilateral thighs with purplish red areas, sacrum with stage 1-2 decubitus.      Laboratory Data    Results from last 7 days   Lab Units 19  0336 19  0357 19  1725   WBC 10*3/mm3 8.33 10.75 8.63   HEMOGLOBIN g/dL 11.2* 11.4* 13.0   HEMATOCRIT % 35.2 35.7 40.3   PLATELETS 10*3/mm3 163 163 168     Results from last 7 days   Lab Units 19  0336   SODIUM mmol/L 137   POTASSIUM mmol/L 3.5   CHLORIDE mmol/L 94*   CO2 mmol/L 31.0*   BUN mg/dL 18   CREATININE mg/dL 0.86   GLUCOSE mg/dL 123*   CALCIUM mg/dL 9.6     Results from last 7 days   Lab Units 19  0336   ALK PHOS U/L 74   BILIRUBIN mg/dL 1.0    ALT (SGPT) U/L 11   AST (SGOT) U/L 18             Results from last 7 days   Lab Units 12/28/19  2213   LACTATE mmol/L 1.2     Results from last 7 days   Lab Units 12/28/19  1725   CK TOTAL U/L 52         Estimated Creatinine Clearance: 95.3 mL/min (by C-G formula based on SCr of 0.86 mg/dL).      Microbiology:  Blood Culture   Date Value Ref Range Status   12/28/2019 Abnormal Stain (A)  Preliminary     BCID, PCR   Date Value Ref Range Status   12/28/2019 No organism detected by BCID PCR. No organism detected by BCID PCR. Final   CNS    Urine Culture   Date Value Ref Range Status   12/28/2019 No growth  Final           Radiology:  Imaging Results (Last 72 Hours)     Procedure Component Value Units Date/Time    CT Head Without Contrast [[id number]] Collected:  12/28/19 1838     Updated:  12/29/19 1905    Narrative:       EXAMINATION: CT HEAD WO CONTRAST - 12/28/2019     INDICATION: Fall, weakness. Back pain.      TECHNIQUE: CT head without intervenous contrast     The radiation dose reduction device was turned on for each scan per the  ALARA (As Low as Reasonably Achievable) protocol.     COMPARISON: None.     FINDINGS: Midline structures are symmetric without evidence of mass,  mass effect or midline shift. Ventricles and sulci within normal limits  for patient age without intra-axial hemorrhage or extra-axial fluid  collection. Globes and orbits unremarkable. Visualized paranasal sinuses  and mastoid air cells are grossly clear and well pneumatized. Calvarium  intact.       Impression:       No acute intracranial abnormality, specifically no acute  intracranial hemorrhage. Calvarium intact.     DICTATED:   12/28/2019  EDITED/ls :   12/28/2019         This report was finalized on 12/29/2019 7:02 PM by Dr. Kamran Herbert.       CT Chest Without Contrast [[id number]] Collected:  12/28/19 1841     Updated:  12/29/19 1905    Narrative:       EXAMINATION: CT CHEST WO CONTRAST - 12/28/2019     INDICATION: weak, hypoxic.      TECHNIQUE: CT chest without intravenous contrast     The radiation dose reduction device was turned on for each scan per the  ALARA (As Low as Reasonably Achievable) protocol.     COMPARISON: None.     FINDINGS: Thyroid is homogeneous in attenuation. No bulky mediastinal  adenopathy. Central airways are patent. Esophagus in normal course and  caliber. Atherosclerotic nonaneurysmal thoracic aorta. Cardiac size  borderline enlarged without pericardial effusion demonstrating coronary  calcifications. Extended lung windows demonstrate scattered  opacifications in the right middle lobe and superior portion right lower  lobe concerning for early developing airspace disease of  bronchopneumonia atypical etiology not excluded without effusion  evident. Degenerative changes of the spine without aggressive osseous  lesion. No soft tissue body wall findings of concern. Visualized  portions of the upper abdomen demonstrate prior cholecystectomy along  with nonobstructing left nephrolithiasis.       Impression:       1. Confluent opacifications of groundglass attenuation in the right  middle lobe and superior portion right lower lobe concerning for acute  airspace disease such as developing bronchopneumonia of early  involvement without pleural effusion. Follow-up to resolution as these  areas may represent or may be hiding underlying nodularity.  2. Nonobstructing left nephrolithiasis.     DICTATED:   12/28/2019  EDITED/ls :   12/28/2019         This report was finalized on 12/29/2019 7:02 PM by Dr. Kamran Herbert.       XR Chest 1 View [[id number]] Collected:  12/28/19 1753     Updated:  12/29/19 1416    Narrative:          EXAMINATION: XR CHEST 1 VW - 12/28/2019     INDICATION: Altered mental status.     COMPARISON: Chest x-ray 12/04/2014     FINDINGS: Cardiac size enlarged with pulmonary vascularity in  normal  limits. Mild chronic changes without focal consolidation. No  pneumothorax or pleural effusion. Degenerative changes  of the spine  including stable appearing chondroid focus left humeral head seen on  2014 exam.       Impression:       Cardiac size enlarged without acute parenchymal process.     DICTATED:   12/28/2019  EDITED/ls :   12/28/2019      This report was finalized on 12/29/2019 2:13 PM by Dr. Kamran Herbert.         I read her CT scans      Impression:   1.  Right middle and lower lobe pneumonia- she does not appear to be severely ill from pneumonia at this point with no cough, sputum production, fever, or leukocytosis.  She has very poor/limited IV access.  We have been able to reestablish IV access but I think it would be prudent to just leave her on intravenous ceftriaxone and oral doxycycline at this point.  2.  Decubitus ulcer, stage 1-2-without evidence of cellulitis  3.  Diabetes mellitus, type 2  4.  Obesity  5.  CNS bacteremia- most likely a skin contaminant.  6.  Poor IV access  7.  Poor personal hygiene      PLAN/RECOMMENDATIONS:  1.  Ceftriaxone 1 g IV daily-I think we can give her a fairly short course of ceftriaxone  2.  Continue oral doxycycline  3.  Possible discharge tomorrow to rehab    Dr. Redding has obtained the history, performed the physical exam and formulated the above treatment plan.        Austen Redding MD  1/1/2020  7:01 AM

## 2020-01-01 NOTE — PROGRESS NOTES
[facility]  PROGRESS NOTE    Patient Name: Julissa Kee  : 1950  MRN: [id number]    Date of Admission: 2019  Primary Care Physician: Kee Hoffman MD    Subjective   Subjective     CC: Hospital follow up for pneumonia    HPI:     Sitting upright in bed this afternoon.  No family at bedside.  Reports that she isn't feeling great today but has no specific complaints.  Denies shortness of air today.     Review of Systems    Gen- No fevers, chills  CV- No chest pain, palpitations  Resp- No cough, dyspnea  GI- No N/V/D, abd pain    Objective   Objective     Vital Signs:   Temp:  [97.5 °F (36.4 °C)-98.7 °F (37.1 °C)] 97.5 °F (36.4 °C)  Heart Rate:  [67-87] 71  Resp:  [18-20] 18  BP: (155-171)/(50-84) 161/58     Physical Exam:    Constitutional: No acute distress, awake, alert, upright in bed, no family at bedside.   HENT: NCAT, mucous membranes moist  Respiratory: Poor inspiratory effort, distant breath sounds due to body habitus, unlabored breathing on nasal cannula  Cardiovascular: RRR, no murmurs, rubs, or gallops, palpable pedal pulses bilaterally  Gastrointestinal: Positive bowel sounds, soft, nontender, obese abdomen  Musculoskeletal: Chronic lower extremity edema  Psychiatric: Appropriate affect, cooperative  Neurologic: Oriented x 3, generalized weakness  Skin: dry skin, no rashes noted on exposed extremities.     Results Reviewed:    Results from last 7 days   Lab Units 19  1725   WBC 10*3/mm3 8.33 10.75  --  8.63   HEMOGLOBIN g/dL 11.2* 11.4*  --  13.0   HEMATOCRIT % 35.2 35.7  --  40.3   PLATELETS 10*3/mm3 163 163  --  168   PROCALCITONIN ng/mL 0.23  --  0.12 0.09*     Results from last 7 days   Lab Units 19  0336 19  1725   SODIUM mmol/L 137  --  138 137   POTASSIUM mmol/L 3.5  --  3.8 3.6   CHLORIDE mmol/L 94*  --  94* 90*   CO2 mmol/L 31.0*  --  35.0*  34.0*   BUN mg/dL 18  --  22 20   CREATININE mg/dL 0.86  --  1.11* 1.13*   GLUCOSE mg/dL 123*  --  137* 177*   CALCIUM mg/dL 9.6  --  9.1 9.9   ALT (SGPT) U/L 11  --   --  10   AST (SGOT) U/L 18  --   --  18   TROPONIN T ng/mL  --  <0.010  --  <0.010   PROBNP pg/mL  --   --   --  98.6     Estimated Creatinine Clearance: 95.3 mL/min (by C-G formula based on SCr of 0.86 mg/dL).    Microbiology Results Abnormal     Procedure Component Value - Date/Time    Blood Culture - Blood, Arm, Left [[id number]] Collected:  12/30/19 0415    Lab Status:  Preliminary result Specimen:  Blood from Arm, Left Updated:  01/01/20 0645     Blood Culture No growth at 2 days    Blood Culture - Blood, Hand, Left [[id number]] Collected:  12/30/19 0420    Lab Status:  Preliminary result Specimen:  Blood from Hand, Left Updated:  01/01/20 0645     Blood Culture No growth at 2 days    Blood Culture - Blood, Hand, Right [[id number]] Collected:  12/28/19 1920    Lab Status:  Preliminary result Specimen:  Blood from Hand, Right Updated:  12/31/19 2100     Blood Culture No growth at 3 days    Blood Culture - Blood, Hand, Right [[id number]]  (Abnormal) Collected:  12/28/19 2010    Lab Status:  Final result Specimen:  Blood from Hand, Right Updated:  12/31/19 0638     Blood Culture Staphylococcus, coagulase negative     Comment: Probable contaminant requires clinical correlation, susceptibility not performed unless requested by physician.          Gram Stain Aerobic Bottle Gram positive cocci in clusters    Blood Culture ID, PCR - Blood, Hand, Right [[id number]]  (Normal) Collected:  12/28/19 2010    Lab Status:  Final result Specimen:  Blood from Hand, Right Updated:  12/30/19 0503     BCID, PCR No organism detected by BCID PCR.    Urine Culture - Urine, Urine, Catheter [[id number]]  (Normal) Collected:  12/28/19 1908    Lab Status:  Final result Specimen:  Urine, Catheter Updated:  12/30/19 0405     Urine Culture No growth    Respiratory Panel, PCR - Swab,  Nasopharynx [[id number]]  (Normal) Collected:  12/29/19 0136    Lab Status:  Final result Specimen:  Swab from Nasopharynx Updated:  12/29/19 0858     ADENOVIRUS, PCR Not Detected     Coronavirus 229E Not Detected     Coronavirus HKU1 Not Detected     Coronavirus NL63 Not Detected     Coronavirus OC43 Not Detected     Human Metapneumovirus Not Detected     Human Rhinovirus/Enterovirus Not Detected     Influenza B PCR Not Detected     Parainfluenza Virus 1 Not Detected     Parainfluenza Virus 2 Not Detected     Parainfluenza Virus 3 Not Detected     Parainfluenza Virus 4 Not Detected     Bordetella pertussis pcr Not Detected     Influenza A H1 2009 PCR Not Detected     Chlamydophila pneumoniae PCR Not Detected     Mycoplasma pneumo by PCR Not Detected     Influenza A PCR Not Detected     Influenza A H3 Not Detected     Influenza A H1 Not Detected     RSV, PCR Not Detected     Bordetella parapertussis PCR Not Detected          Imaging Results (Last 24 Hours)     ** No results found for the last 24 hours. **          I have reviewed the medications:  Scheduled Meds:    aspirin 81 mg Oral Daily   atorvastatin 10 mg Oral Nightly   cefTRIAXone 1 g Intravenous Q24H   doxycycline 100 mg Oral Q12H   heparin (porcine) 5,000 Units Subcutaneous Q12H   insulin lispro 0-7 Units Subcutaneous 4x Daily With Meals & Nightly   levothyroxine 100 mcg Oral Q AM   melatonin 5 mg Oral Nightly   sertraline 50 mg Oral Daily   sodium chloride 10 mL Intravenous Q12H   terazosin 2 mg Oral Nightly     Continuous Infusions:    sodium chloride 75 mL/hr Last Rate: 75 mL/hr (01/01/20 1012)     PRN Meds:.•  acetaminophen **OR** acetaminophen **OR** acetaminophen  •  dextrose  •  dextrose  •  glucagon (human recombinant)  •  ipratropium-albuterol  •  nitroglycerin  •  ondansetron  •  sodium chloride  •  sodium chloride      Assessment/Plan   Assessment & Plan     Active Hospital Problems    Diagnosis  POA   • **Acute respiratory failure with hypoxia  "(CMS/[facility]) [J96.01]  Yes   • Renal insufficiency [N28.9]  Yes   • Sacral wound [S31.000A]  Yes   • CAP (community acquired pneumonia) [J18.9]  Yes   • Pneumonia of right lung due to infectious organism [J18.9]  Yes   • Syncope and collapse [R55]  Yes   • Chronic obstructive pulmonary disease (CMS/[facility]) [J44.9]  Yes   • Diabetes mellitus (CMS/[facility]) [E11.9]  Yes   • Hyperlipidemia [E78.5]  Yes   • Hypertension [I10]  Yes   • Hypothyroidism [E03.9]  Yes   • Extreme obesity with alveolar hypoventilation (CMS/[facility]) [E66.2]  Yes   • Anxiety [F41.9]  Yes      Resolved Hospital Problems   No resolved problems to display.        Brief Hospital Course to date:  Julissa Kee is a 69 y.o. female past medical history significant for COPD on supplemental oxygen, DM2, HTN, HLD, renal insuffiencey, and morbid obesity. She presented to ED s/p syncopal episode.Records reviewed indicate that patient is largely immobile with profoundly poor hygiene. She has several wounds that including sacral decubitus ulcer. She was living at home and \"not letting her family take care of her.\"     Acute on Chronic Respiratory failure with hypoxia:  - in the setting of COPD and obesity hypoventilation syndrome  - secondary to developing pneumonia. Demonstrating on CT imaging.  - continue rocephin and doxycycline.  - appreciate ID input.   - continue supplemental oxygen, duonebs, IS  - pulmonary toilet     CNS bacteremia  - likely skin contaminant.     UTI   -cont rocephin/doxy      DM   Glucose 136-155  Continue current regimen    Sacral wound, lower extremity changes, debility:  - wound care consult  - PT/OT  - case management assisting with rehab options.      Renal insufficiency:  - appears chronic. Currently at baseline      HTN:  - controlled and stable. Continue home meds     Hypothyroidism:  -TSH 2.920    DVT Prophylaxis: Heparin 5000 units subcu every 12 hours    Disposition: I expect the patient to be discharged to SNF once bed found- referrals " have been sent.     CODE STATUS:   Code Status and Medical Interventions:   Ordered at: 12/28/19 1938     Level Of Support Discussed With:    Patient     Code Status:    CPR     Medical Interventions (Level of Support Prior to Arrest):    Full         Electronically signed by KALIN Mcginnis, 01/01/20, 2:19 PM.

## 2020-01-01 NOTE — PLAN OF CARE
Problem: Patient Care Overview  Goal: Plan of Care Review  Outcome: Ongoing (interventions implemented as appropriate)  Flowsheets (Taken 1/1/2020 1611)  Plan of Care Reviewed With: patient; spouse  Outcome Summary: Vital signs wnl. Oxygen level greater than 90% on 2 liters. Patient refusing to be turned during shift. Explained importance of being turned. No complaints of shortness of breath. Awating rehab placment.

## 2020-01-02 LAB
ALBUMIN SERPL-MCNC: 2.7 G/DL (ref 3.5–5.2)
ALBUMIN/GLOB SERPL: 0.9 G/DL
ALP SERPL-CCNC: 62 U/L (ref 39–117)
ALT SERPL W P-5'-P-CCNC: 11 U/L (ref 1–33)
ANION GAP SERPL CALCULATED.3IONS-SCNC: 8 MMOL/L (ref 5–15)
AST SERPL-CCNC: 14 U/L (ref 1–32)
BACTERIA SPEC AEROBE CULT: NORMAL
BASOPHILS # BLD AUTO: 0.03 10*3/MM3 (ref 0–0.2)
BASOPHILS NFR BLD AUTO: 0.4 % (ref 0–1.5)
BILIRUB SERPL-MCNC: 0.6 MG/DL (ref 0.2–1.2)
BUN BLD-MCNC: 14 MG/DL (ref 8–23)
BUN/CREAT SERPL: 17.7 (ref 7–25)
CALCIUM SPEC-SCNC: 9.3 MG/DL (ref 8.6–10.5)
CHLORIDE SERPL-SCNC: 99 MMOL/L (ref 98–107)
CO2 SERPL-SCNC: 30 MMOL/L (ref 22–29)
CREAT BLD-MCNC: 0.79 MG/DL (ref 0.57–1)
DEPRECATED RDW RBC AUTO: 58.6 FL (ref 37–54)
EOSINOPHIL # BLD AUTO: 0.23 10*3/MM3 (ref 0–0.4)
EOSINOPHIL NFR BLD AUTO: 3 % (ref 0.3–6.2)
ERYTHROCYTE [DISTWIDTH] IN BLOOD BY AUTOMATED COUNT: 14.6 % (ref 12.3–15.4)
GFR SERPL CREATININE-BSD FRML MDRD: 72 ML/MIN/1.73
GLOBULIN UR ELPH-MCNC: 3.1 GM/DL
GLUCOSE BLD-MCNC: 163 MG/DL (ref 65–99)
GLUCOSE BLDC GLUCOMTR-MCNC: 136 MG/DL (ref 70–130)
GLUCOSE BLDC GLUCOMTR-MCNC: 142 MG/DL (ref 70–130)
GLUCOSE BLDC GLUCOMTR-MCNC: 173 MG/DL (ref 70–130)
GLUCOSE BLDC GLUCOMTR-MCNC: 184 MG/DL (ref 70–130)
HCT VFR BLD AUTO: 32.5 % (ref 34–46.6)
HGB BLD-MCNC: 10.5 G/DL (ref 12–15.9)
IMM GRANULOCYTES # BLD AUTO: 0.06 10*3/MM3 (ref 0–0.05)
IMM GRANULOCYTES NFR BLD AUTO: 0.8 % (ref 0–0.5)
LYMPHOCYTES # BLD AUTO: 1.32 10*3/MM3 (ref 0.7–3.1)
LYMPHOCYTES NFR BLD AUTO: 17.1 % (ref 19.6–45.3)
MAGNESIUM SERPL-MCNC: 1.4 MG/DL (ref 1.6–2.4)
MCH RBC QN AUTO: 35.4 PG (ref 26.6–33)
MCHC RBC AUTO-ENTMCNC: 32.3 G/DL (ref 31.5–35.7)
MCV RBC AUTO: 109.4 FL (ref 79–97)
MONOCYTES # BLD AUTO: 0.54 10*3/MM3 (ref 0.1–0.9)
MONOCYTES NFR BLD AUTO: 7 % (ref 5–12)
NEUTROPHILS # BLD AUTO: 5.52 10*3/MM3 (ref 1.7–7)
NEUTROPHILS NFR BLD AUTO: 71.7 % (ref 42.7–76)
NRBC BLD AUTO-RTO: 0 /100 WBC (ref 0–0.2)
PLATELET # BLD AUTO: 167 10*3/MM3 (ref 140–450)
PMV BLD AUTO: 9.5 FL (ref 6–12)
POTASSIUM BLD-SCNC: 3.7 MMOL/L (ref 3.5–5.2)
PROT SERPL-MCNC: 5.8 G/DL (ref 6–8.5)
RBC # BLD AUTO: 2.97 10*6/MM3 (ref 3.77–5.28)
SODIUM BLD-SCNC: 137 MMOL/L (ref 136–145)
TROPONIN T SERPL-MCNC: <0.01 NG/ML (ref 0–0.03)
WBC NRBC COR # BLD: 7.7 10*3/MM3 (ref 3.4–10.8)

## 2020-01-02 PROCEDURE — 93005 ELECTROCARDIOGRAM TRACING: CPT | Performed by: NURSE PRACTITIONER

## 2020-01-02 PROCEDURE — 84484 ASSAY OF TROPONIN QUANT: CPT | Performed by: NURSE PRACTITIONER

## 2020-01-02 PROCEDURE — 25010000002 HEPARIN (PORCINE) PER 1000 UNITS: Performed by: PHYSICIAN ASSISTANT

## 2020-01-02 PROCEDURE — 97530 THERAPEUTIC ACTIVITIES: CPT

## 2020-01-02 PROCEDURE — 85025 COMPLETE CBC W/AUTO DIFF WBC: CPT | Performed by: NURSE PRACTITIONER

## 2020-01-02 PROCEDURE — 82962 GLUCOSE BLOOD TEST: CPT

## 2020-01-02 PROCEDURE — 93010 ELECTROCARDIOGRAM REPORT: CPT | Performed by: INTERNAL MEDICINE

## 2020-01-02 PROCEDURE — 25010000002 CEFTRIAXONE PER 250 MG

## 2020-01-02 PROCEDURE — 99231 SBSQ HOSP IP/OBS SF/LOW 25: CPT | Performed by: NURSE PRACTITIONER

## 2020-01-02 PROCEDURE — 80053 COMPREHEN METABOLIC PANEL: CPT | Performed by: NURSE PRACTITIONER

## 2020-01-02 PROCEDURE — 97110 THERAPEUTIC EXERCISES: CPT

## 2020-01-02 PROCEDURE — 83735 ASSAY OF MAGNESIUM: CPT | Performed by: NURSE PRACTITIONER

## 2020-01-02 RX ADMIN — HEPARIN SODIUM 5000 UNITS: 5000 INJECTION INTRAVENOUS; SUBCUTANEOUS at 21:11

## 2020-01-02 RX ADMIN — TERAZOSIN HYDROCHLORIDE 2 MG: 2 CAPSULE ORAL at 21:11

## 2020-01-02 RX ADMIN — ASPIRIN 81 MG: 81 TABLET, COATED ORAL at 09:49

## 2020-01-02 RX ADMIN — ATORVASTATIN CALCIUM 10 MG: 10 TABLET, FILM COATED ORAL at 21:11

## 2020-01-02 RX ADMIN — SODIUM CHLORIDE 75 ML/HR: 900 INJECTION INTRAVENOUS at 21:12

## 2020-01-02 RX ADMIN — MELATONIN TAB 5 MG 5 MG: 5 TAB at 21:10

## 2020-01-02 RX ADMIN — HEPARIN SODIUM 5000 UNITS: 5000 INJECTION INTRAVENOUS; SUBCUTANEOUS at 09:49

## 2020-01-02 RX ADMIN — DOXYCYCLINE 100 MG: 100 CAPSULE ORAL at 09:49

## 2020-01-02 RX ADMIN — LEVOTHYROXINE SODIUM 100 MCG: 100 TABLET ORAL at 05:58

## 2020-01-02 RX ADMIN — ACETAMINOPHEN 650 MG: 325 TABLET ORAL at 21:11

## 2020-01-02 RX ADMIN — DOXYCYCLINE 100 MG: 100 CAPSULE ORAL at 21:11

## 2020-01-02 RX ADMIN — SERTRALINE HYDROCHLORIDE 50 MG: 50 TABLET ORAL at 09:49

## 2020-01-02 RX ADMIN — ACETAMINOPHEN 650 MG: 325 TABLET ORAL at 12:22

## 2020-01-02 RX ADMIN — INSULIN LISPRO 2 UNITS: 100 INJECTION, SOLUTION INTRAVENOUS; SUBCUTANEOUS at 21:13

## 2020-01-02 RX ADMIN — CEFTRIAXONE 1 G: 1 INJECTION, POWDER, FOR SOLUTION INTRAMUSCULAR; INTRAVENOUS at 09:50

## 2020-01-02 RX ADMIN — INSULIN LISPRO 2 UNITS: 100 INJECTION, SOLUTION INTRAVENOUS; SUBCUTANEOUS at 11:38

## 2020-01-02 RX ADMIN — SODIUM CHLORIDE, PRESERVATIVE FREE 10 ML: 5 INJECTION INTRAVENOUS at 09:49

## 2020-01-02 RX ADMIN — SODIUM CHLORIDE 75 ML/HR: 900 INJECTION INTRAVENOUS at 10:00

## 2020-01-02 NOTE — PROGRESS NOTES
[facility] Medicine Services  PROGRESS NOTE    Patient Name: Julissa Kee  : 1950  MRN: [id number]    Date of Admission: 2019  Primary Care Physician: Kee Hoffman MD    Subjective   Subjective     CC: Hospital follow up for pneumonia    HPI:     Resting comfortably in bed with nurse at bedside.  No overnight issues.  Feels about the same as she did yesterday.      Review of Systems    Gen- No fevers, chills  CV- No chest pain, palpitations  Resp- No cough, dyspnea  GI- No N/V/D, abd pain    Objective   Objective     Vital Signs:   Temp:  [97.6 °F (36.4 °C)-98.9 °F (37.2 °C)] 97.6 °F (36.4 °C)  Heart Rate:  [70-84] 77  Resp:  [18-20] 18  BP: (132-170)/(51-80) 161/65     Physical Exam:    Constitutional: No acute distress, awake, alert, upright in bed, no family at bedside.   HENT: NCAT, mucous membranes moist  Respiratory: Poor inspiratory effort, distant breath sounds due to body habitus, unlabored breathing on nasal cannula  Cardiovascular: RRR, no murmurs, rubs, or gallops, palpable pedal pulses bilaterally  Gastrointestinal: Positive bowel sounds, soft, nontender, obese abdomen  Musculoskeletal: Chronic lower extremity edema  Psychiatric: Appropriate affect, cooperative  Neurologic: Oriented x 3, generalized weakness  Skin: dry skin, no rashes noted on exposed extremities.   Exam unchanged from     Results Reviewed:    Results from last 7 days   Lab Units 19  1725   WBC 10*3/mm3 8.33 10.75  --  8.63   HEMOGLOBIN g/dL 11.2* 11.4*  --  13.0   HEMATOCRIT % 35.2 35.7  --  40.3   PLATELETS 10*3/mm3 163 163  --  168   PROCALCITONIN ng/mL 0.23  --  0.12 0.09*     Results from last 7 days   Lab Units 19  0336 195 19  1725   SODIUM mmol/L 137  --  138 137   POTASSIUM mmol/L 3.5  --  3.8 3.6   CHLORIDE mmol/L 94*  --  94* 90*   CO2 mmol/L 31.0*  --  35.0* 34.0*   BUN mg/dL 18  --   22 20   CREATININE mg/dL 0.86  --  1.11* 1.13*   GLUCOSE mg/dL 123*  --  137* 177*   CALCIUM mg/dL 9.6  --  9.1 9.9   ALT (SGPT) U/L 11  --   --  10   AST (SGOT) U/L 18  --   --  18   TROPONIN T ng/mL  --  <0.010  --  <0.010   PROBNP pg/mL  --   --   --  98.6     Estimated Creatinine Clearance: 95.3 mL/min (by C-G formula based on SCr of 0.86 mg/dL).    Microbiology Results Abnormal     Procedure Component Value - Date/Time    Blood Culture - Blood, Arm, Left [[id number]] Collected:  12/30/19 0415    Lab Status:  Preliminary result Specimen:  Blood from Arm, Left Updated:  01/02/20 0645     Blood Culture No growth at 3 days    Blood Culture - Blood, Hand, Left [[id number]] Collected:  12/30/19 0420    Lab Status:  Preliminary result Specimen:  Blood from Hand, Left Updated:  01/02/20 0645     Blood Culture No growth at 3 days    Blood Culture - Blood, Hand, Right [[id number]] Collected:  12/28/19 1920    Lab Status:  Preliminary result Specimen:  Blood from Hand, Right Updated:  01/01/20 2100     Blood Culture No growth at 4 days    Blood Culture - Blood, Hand, Right [[id number]]  (Abnormal) Collected:  12/28/19 2010    Lab Status:  Final result Specimen:  Blood from Hand, Right Updated:  12/31/19 0638     Blood Culture Staphylococcus, coagulase negative     Comment: Probable contaminant requires clinical correlation, susceptibility not performed unless requested by physician.          Gram Stain Aerobic Bottle Gram positive cocci in clusters    Blood Culture ID, PCR - Blood, Hand, Right [[id number]]  (Normal) Collected:  12/28/19 2010    Lab Status:  Final result Specimen:  Blood from Hand, Right Updated:  12/30/19 0503     BCID, PCR No organism detected by BCID PCR.    Urine Culture - Urine, Urine, Catheter [[id number]]  (Normal) Collected:  12/28/19 1908    Lab Status:  Final result Specimen:  Urine, Catheter Updated:  12/30/19 0405     Urine Culture No growth    Respiratory Panel, PCR - Swab, Nasopharynx [[id number]]   (Normal) Collected:  12/29/19 0136    Lab Status:  Final result Specimen:  Swab from Nasopharynx Updated:  12/29/19 0858     ADENOVIRUS, PCR Not Detected     Coronavirus 229E Not Detected     Coronavirus HKU1 Not Detected     Coronavirus NL63 Not Detected     Coronavirus OC43 Not Detected     Human Metapneumovirus Not Detected     Human Rhinovirus/Enterovirus Not Detected     Influenza B PCR Not Detected     Parainfluenza Virus 1 Not Detected     Parainfluenza Virus 2 Not Detected     Parainfluenza Virus 3 Not Detected     Parainfluenza Virus 4 Not Detected     Bordetella pertussis pcr Not Detected     Influenza A H1 2009 PCR Not Detected     Chlamydophila pneumoniae PCR Not Detected     Mycoplasma pneumo by PCR Not Detected     Influenza A PCR Not Detected     Influenza A H3 Not Detected     Influenza A H1 Not Detected     RSV, PCR Not Detected     Bordetella parapertussis PCR Not Detected          Imaging Results (Last 24 Hours)     ** No results found for the last 24 hours. **          I have reviewed the medications:  Scheduled Meds:    aspirin 81 mg Oral Daily   atorvastatin 10 mg Oral Nightly   cefTRIAXone 1 g Intravenous Q24H   doxycycline 100 mg Oral Q12H   heparin (porcine) 5,000 Units Subcutaneous Q12H   insulin lispro 0-7 Units Subcutaneous 4x Daily With Meals & Nightly   levothyroxine 100 mcg Oral Q AM   melatonin 5 mg Oral Nightly   sertraline 50 mg Oral Daily   sodium chloride 10 mL Intravenous Q12H   terazosin 2 mg Oral Nightly     Continuous Infusions:    sodium chloride 75 mL/hr Last Rate: 75 mL/hr (01/02/20 1000)     PRN Meds:.•  acetaminophen **OR** acetaminophen **OR** acetaminophen  •  dextrose  •  dextrose  •  glucagon (human recombinant)  •  ipratropium-albuterol  •  nitroglycerin  •  ondansetron  •  sodium chloride  •  sodium chloride      Assessment/Plan   Assessment & Plan     Active Hospital Problems    Diagnosis  POA   • **Acute respiratory failure with hypoxia (CMS/[facility]) [J96.01]  Yes   •  "Renal insufficiency [N28.9]  Yes   • Sacral wound [S31.000A]  Yes   • CAP (community acquired pneumonia) [J18.9]  Yes   • Pneumonia of right lung due to infectious organism [J18.9]  Yes   • Syncope and collapse [R55]  Yes   • Chronic obstructive pulmonary disease (CMS/HCC) [J44.9]  Yes   • Diabetes mellitus (CMS/HCC) [E11.9]  Yes   • Hyperlipidemia [E78.5]  Yes   • Hypertension [I10]  Yes   • Hypothyroidism [E03.9]  Yes   • Extreme obesity with alveolar hypoventilation (CMS/[facility]) [E66.2]  Yes   • Anxiety [F41.9]  Yes      Resolved Hospital Problems   No resolved problems to display.        Brief Hospital Course to date:  Julissa Kee is a 69 y.o. female past medical history significant for COPD on supplemental oxygen, DM2, HTN, HLD, renal insuffiencey, and morbid obesity. She presented to ED s/p syncopal episode.Records reviewed indicate that patient is largely immobile with profoundly poor hygiene. She has several wounds that including sacral decubitus ulcer. She was living at home and \"not letting her family take care of her.\"     Acute on Chronic Respiratory failure with hypoxia:  - in the setting of COPD and obesity hypoventilation syndrome  - secondary to developing pneumonia. Demonstrating on CT imaging.  - continue rocephin and doxycycline.  - appreciate ID input.   - continue supplemental oxygen, duonebs, IS  - pulmonary toilet     CNS bacteremia  - likely skin contaminant.     UTI   -cont rocephin/doxy      DM   Glucose 136-155  Continue current regimen    Sacral wound, lower extremity changes, debility:  - wound care consult  - PT/OT  - case management assisting with rehab options.      Renal insufficiency:  - appears chronic. Currently at baseline      HTN:  - controlled and stable. Continue home meds     Hypothyroidism:  -TSH 2.920    DVT Prophylaxis: Heparin 5000 units subcu every 12 hours    Disposition: I expect the patient to be discharged to SNF once bed found- referrals have been sent.     CODE " STATUS:   Code Status and Medical Interventions:   Ordered at: 12/28/19 1938     Level Of Support Discussed With:    Patient     Code Status:    CPR     Medical Interventions (Level of Support Prior to Arrest):    Full         Electronically signed by KALIN Mcginnis, 01/02/20, 1:28 PM.

## 2020-01-02 NOTE — THERAPY TREATMENT NOTE
Patient Name: Julissa Kee  : 1950    MRN: [id number]                              Today's Date: 2020       Admit Date: 2019    Visit Dx:     ICD-10-CM ICD-9-CM   1. Pneumonia of right lung due to infectious organism, unspecified part of lung J18.9 483.8   2. Impaired mobility and ADLs Z74.09 799.89     Patient Active Problem List   Diagnosis   • Anxiety   • Chronic obstructive pulmonary disease (CMS/HCC)   • Diabetes mellitus (CMS/HCC)   • Hyperlipidemia   • Hypertension   • Hypothyroidism   • Morbid obesity (CMS/HCC)   • Extreme obesity with alveolar hypoventilation (CMS/HCC)   • Renal insufficiency   • Sacral wound   • CAP (community acquired pneumonia)   • Acute respiratory failure with hypoxia (CMS/[facility])   • Pneumonia of right lung due to infectious organism   • Syncope and collapse     Past Medical History:   Diagnosis Date   • Acute bronchitis    • Acute sinusitis    • Anxiety    • Bacterial conjunctivitis    • Chest pain    • COPD (chronic obstructive pulmonary disease) (CMS/[facility])    • Diabetes mellitus (CMS/[facility])    • Dyspnea    • Dysuria    • Edema    • Elbow injury    • Elbow pain    • Eye drainage    • Fatigue    • Foot pain, right    • Humerus distal fracture    • Hyperlipidemia    • Hypertension    • Hypothyroidism    • Morbid obesity (CMS/HCC)    • Pickwickian syndrome (CMS/[facility])    • Pressure ulcer stage I    • Psoriasis    • SOB (shortness of breath)    • Symptoms of urinary tract infection    • Urinary frequency    • Urinary tract infection    • Vaginal candidiasis      History reviewed. No pertinent surgical history.  General Information     Row Name 20 1246          PT Evaluation Time/Intention    Document Type  therapy note (daily note)  (Pended)   -AS     Mode of Treatment  physical therapy  (Pended)   -AS     Row Name 20 1246          General Information    Patient Profile Reviewed?  yes  (Pended)   -AS     Existing Precautions/Restrictions  fall;oxygen therapy  device and L/min  (Pended)   -AS     Barriers to Rehab  previous functional deficit  (Pended)   -AS     Row Name 01/02/20 1246          Cognitive Assessment/Intervention- PT/OT    Orientation Status (Cognition)  oriented x 3  (Pended)   -AS     Row Name 01/02/20 1246          Safety Issues, Functional Mobility    Safety Issues Affecting Function (Mobility)  ability to follow commands;safety precaution awareness;safety precautions follow-through/compliance;sequencing abilities;judgment;problem solving  (Pended)   -AS     Impairments Affecting Function (Mobility)  balance;range of motion (ROM);endurance/activity tolerance;motor planning;pain;postural/trunk control;strength;shortness of breath  (Pended)   -AS       User Key  (r) = Recorded By, (t) = Taken By, (c) = Cosigned By    Initials Name Provider Type    AS Mellisa Trevizo, PT Student PT Student        Mobility     Row Name 01/02/20 1247          Bed Mobility Assessment/Treatment    Bed Mobility Assessment/Treatment  scooting/bridging;supine-sit  (Pended)   -AS     Scooting/Bridging [place] (Bed Mobility)  maximum assist (25% patient effort);2 person assist  (Pended)   -AS     Supine-Sit [place] (Bed Mobility)  maximum assist (25% patient effort);2 person assist  (Pended)   -AS     Assistive Device (Bed Mobility)  draw sheet;bed rails  (Pended)   -AS       User Key  (r) = Recorded By, (t) = Taken By, (c) = Cosigned By    Initials Name Provider Type    AS Mellisa Trevizo PT Student PT Student        Obj/Interventions     Row Name 01/02/20 1247          Therapeutic Exercise    Lower Extremity (Therapeutic Exercise)  LAQ (long arc quad), bilateral  (Pended)   -AS     Lower Extremity Range of Motion (Therapeutic Exercise)  hip abduction/adduction, bilateral;hip internal/external rotation, bilateral;knee flexion/extension, left;ankle dorsiflexion/plantar flexion, bilateral  (Pended)   -AS     Exercise Type (Therapeutic Exercise)  AAROM (active assistive  range of motion);AROM (active range of motion)  (Pended)   -AS     Position (Therapeutic Exercise)  supine  (Pended)   -AS     Sets/Reps (Therapeutic Exercise)  1x15 of hip abd/add, hip IR/ER; 1x5 LAQ; 1x10 ankle DF/PF, knee flex/ext  (Pended)   -AS     Expected Outcome (Therapeutic Exercise)  facilitate normal movement patterns;increase active range of motion;improve performance, transfer skills;improve functional tolerance, self-care activity;improve functional tolerance, household activity  (Pended)   -AS     Row Name 01/02/20 1247          Static Sitting Balance    Level of [place] (Unsupported Sitting, Static Balance)  minimal assist, 75% patient effort;2 person assist  (Pended)   -AS     Sitting Position (Unsupported Sitting, Static Balance)  sitting on edge of bed  (Pended)   -AS     Time Able to Maintain Position (Unsupported Sitting, Static Balance)  30 to 45 seconds  (Pended)   -AS     [facility] Name 01/02/20 1247          Dynamic Sitting Balance    Level of [place], Reaches Outside Midline (Sitting, Dynamic Balance)  minimal assist, 75% patient effort;2 person assist  (Pended)   -AS     Sitting Position, Reaches Outside Midline (Sitting, Dynamic Balance)  sitting on edge of bed  (Pended)   -AS       User Key  (r) = Recorded By, (t) = Taken By, (c) = Cosigned By    Initials Name Provider Type    AS Mellisa Trevizo, PT Student PT Student        Goals/Plan    No documentation.       Clinical Impression     Row Name 01/02/20 1249          Pain Assessment    Additional Documentation  Pain Scale: Numbers Pre/Post-Treatment (Group)  (Pended)   -AS     [facility] Name 01/02/20 1249          Pain Scale: Numbers Pre/Post-Treatment    Pain Scale: Numbers, Pretreatment  5/10  (Pended)   -AS     Pain Scale: Numbers, Post-Treatment  5/10  (Pended)   -AS     Pain Location - Side  Left  (Pended)   -AS     Pain Location - Orientation  lower  (Pended)   -AS     Pain Location  extremity  (Pended)   -AS     Pre/Post Treatment  Pain Comment  pt noted pain in LLE at beginning of treatment, and pain in the low back at the end of treatment  (Pended)   -AS     Pain Intervention(s)  Repositioned;Ambulation/increased activity  (Pended)   -AS     Row Name 01/02/20 1249          Plan of Care Review    Plan of Care Reviewed With  patient  (Pended)   -AS     Progress  declining  (Pended)   -AS     Outcome Summary  Pt completed supine ther ex and sat EOB. Pt refused further mobility 2/2 pain in her LLE and fatigue. Recommend PT 3x/week 2/2 pt's low levels of motivation vs pain. Recommended/c SNF.  (Pended)   -AS     Row Name 01/02/20 1241          Physical Therapy Clinical Impression    Rehab Potential (PT Clinical Summary)  fair, will monitor progress closely  (Pended)   -AS     Row Name 01/02/20 1249          Vital Signs    Pretreatment Heart Rate (beats/min)  86  (Pended)   -AS     Intratreatment Heart Rate (beats/min)  94  (Pended)   -AS     Posttreatment Heart Rate (beats/min)  89  (Pended)   -AS     Pre SpO2 (%)  94  (Pended)   -AS     O2 Delivery Pre Treatment  supplemental O2  (Pended)   -AS     Post SpO2 (%)  95  (Pended)   -AS     O2 Delivery Post Treatment  supplemental O2  (Pended)   -AS     Pre Patient Position  Supine  (Pended)   -AS     Intra Patient Position  Sitting  (Pended)   -AS     Post Patient Position  Supine  (Pended)   -AS     Rest Breaks   1  (Pended)  pt took seated rest break upon sitting EOB prior to completing LAQ  -AS     Row Name 01/02/20 2260          Positioning and Restraints    Pre-Treatment Position  in bed  (Pended)   -AS     Post Treatment Position  bed  (Pended)   -AS     In Bed  supine;call light within reach;encouraged to call for assist;waffle boots/both  (Pended)   -AS       User Key  (r) = Recorded By, (t) = Taken By, (c) = Cosigned By    Initials Name Provider Type    AS Mellisa Trevizo, PT Student PT Student        Outcome Measures     Row Name 01/02/20 0625          How much help from another person  do you currently need...    Turning from your back to your side while in flat bed without using bedrails?  2  (Pended)   -AS     Moving from lying on back to sitting on the side of a flat bed without bedrails?  2  (Pended)   -AS     Moving to and from a bed to a chair (including a wheelchair)?  1  (Pended)   -AS     Standing up from a chair using your arms (e.g., wheelchair, bedside chair)?  2  (Pended)   -AS     Climbing 3-5 steps with a railing?  1  (Pended)   -AS     To walk in hospital room?  1  (Pended)   -AS     AM-PAC 6 Clicks Score (PT)  9  (Pended)   -KZ (r) AS (t)     Row Name 01/02/20 1252          Functional Assessment    Outcome Measure Options  AM-PAC 6 Clicks Basic Mobility (PT)  (Pended)   -AS       User Key  (r) = Recorded By, (t) = Taken By, (c) = Cosigned By    Initials Name Provider Type    Chrissy Watt RN Registered Nurse    AS Mellisa Trevizo, PT Student PT Student          PT Recommendation and Plan     Outcome Summary/Treatment Plan (PT)  Anticipated Discharge Disposition (PT): (P) skilled nursing facility  Plan of Care Reviewed With: (P) patient  Progress: (P) declining  Outcome Summary: (P) Pt completed supine ther ex and sat EOB. Pt refused further mobility 2/2 pain in her LLE and fatigue. Recommend PT 3x/week 2/2 pt's low levels of motivation vs pain. Recommended/c SNF.     Time Calculation:   PT Charges     Row Name 01/02/20 1254             Time Calculation    Start Time  1038  (Pended)   -AS      PT Received On  01/02/20  (Pended)   -AS      PT Goal Re-Cert Due Date  01/09/20  (Pended)   -AS         Time Calculation- PT    Total Timed Code Minutes- PT  37 minute(s)  (Pended)   -AS         Timed Charges    76064 - PT Therapeutic Exercise Minutes  17  (Pended)   -AS      63218 - PT Therapeutic Activity Minutes  20  (Pended)   -AS        User Key  (r) = Recorded By, (t) = Taken By, (c) = Cosigned By    Initials Name Provider Type    AS Mellisa Trevizo, PT Student PT Student         Therapy Charges for Today     Code Description Service Date Service Provider Modifiers Qty    [id number]  PT THER PROC EA 15 MIN 1/2/2020 Mellisa Trevizo, PT Student GP 1    [id number]  PT THERAPEUTIC ACT EA 15 MIN 1/2/2020 Mellisa Trevizo, PT Student GP 1          PT G-Codes  Outcome Measure Options: (P) AM-PAC 6 Clicks Basic Mobility (PT)  AM-PAC 6 Clicks Score (PT): (P) 9  AM-PAC 6 Clicks Score (OT): 11    Mellisa Trevizo, PT Student  1/2/2020

## 2020-01-02 NOTE — PROGRESS NOTES
INFECTIOUS DISEASE PROGRESS NOTE     Julissa Kee  1950  [id number]      Admission Date: 12/28/2019      Requesting Provider: No ref. provider found  Evaluating Physician: Austen Redding MD    Reason for Consultation: Right middle and lower lobe pneumonia, positive blood culture.      History of present illness:  12/30/19:    Patient is a 69 y.o. female seen today for right middle and lower lobe pneumonia, and a positive blood culture. She had a syncopal episode in her bathroom when the family called EMS.  She has been afebrile without leukocytosis.  Chest xray without parenchymal process, with Chest CT, right middle and lower lobe developing bronchopneumonia. One blood culture has turned positive for gram positive cocci  In clusters.  Viral respiratory PCR negative.  Vancomycin and Zosyn initiated and we were consulted for evaluation and treatment.  She has no IV access and the vancomycin and Zosyn cannot be administered.  She was previously given ceftriaxone and doxycycline.  She denies any increased sputum production, nausea, vomiting, dysuria.   12/31/19:   She denies any increased sputum production, shortness of breath. She remains afebrile.  She now has a peripheral IV and her ceftriaxone has been switched from IM to IV.  1/1/20: She denies any shortness of breath.  No sputum production.  She remains afebrile.   1/2/20:  She remains on 2L 02 NC.  No shortness of breath, cough. Refuses to be turned per nursing.  Awaiting placement.     Past Medical History:   Diagnosis Date   • Acute bronchitis    • Acute sinusitis    • Anxiety    • Bacterial conjunctivitis    • Chest pain    • COPD (chronic obstructive pulmonary disease) (CMS/HCC)    • Diabetes mellitus (CMS/[facility])    • Dyspnea    • Dysuria    • Edema    • Elbow injury    • Elbow pain    • Eye drainage    • Fatigue    • Foot pain, right    • Humerus distal fracture    • Hyperlipidemia    • Hypertension    • Hypothyroidism    • Morbid obesity (CMS/[facility])     • Pickwickian syndrome (CMS/HCC)    • Pressure ulcer stage I    • Psoriasis    • SOB (shortness of breath)    • Symptoms of urinary tract infection    • Urinary frequency    • Urinary tract infection    • Vaginal candidiasis        History reviewed. No pertinent surgical history.    History reviewed. No pertinent family history.    Social History     Socioeconomic History   • Marital status:      Spouse name: Not on file   • Number of children: Not on file   • Years of education: Not on file   • Highest education level: Not on file   Tobacco Use   • Smoking status: Former Smoker     Types: Cigarettes     Last attempt to quit: 2006     Years since quittin.0   • Smokeless tobacco: Never Used   Substance and Sexual Activity   • Alcohol use: No     Frequency: Never   • Drug use: Never   • Sexual activity: Defer       Allergies   Allergen Reactions   • Amoxicillin-Pot Clavulanate Other (See Comments)     HEADACHE - has tolerated ceftriaxone 2019   • Bactrim [Sulfamethoxazole-Trimethoprim] Other (See Comments)     .         Medication:    Current Facility-Administered Medications:   •  acetaminophen (TYLENOL) tablet 650 mg, 650 mg, Oral, Q4H PRN **OR** acetaminophen (TYLENOL) 160 MG/5ML solution 650 mg, 650 mg, Oral, Q4H PRN **OR** acetaminophen (TYLENOL) suppository 650 mg, 650 mg, Rectal, Q4H PRN, Jonnie Sky PA-C  •  aspirin EC tablet 81 mg, 81 mg, Oral, Daily, Jonnie Sky PA-C, 81 mg at 20 0831  •  atorvastatin (LIPITOR) tablet 10 mg, 10 mg, Oral, Nightly, Jonnie Sky PA-C, 10 mg at 20 2113  •  cefTRIAXone (ROCEPHIN) 1 g/100 mL 0.9% NS (MBP), 1 g, Intravenous, Q24H, Eyal Aj, RPH, 1 g at 20 0830  •  dextrose (D50W) 25 g/ 50mL Intravenous Solution 25 g, 25 g, Intravenous, Q15 Min PRN, Jonnie Sky PA-C  •  dextrose (GLUTOSE) oral gel 15 g, 15 g, Oral, Q15 Min PRN, Jonnie Sky PA-C  •  doxycycline (MONODOX) capsule 100 mg, 100 mg, Oral, Q12H,  Nata Thomas, APRN, 100 mg at 01/01/20 2113  •  glucagon (human recombinant) (GLUCAGEN DIAGNOSTIC) injection 1 mg, 1 mg, Subcutaneous, Q15 Min PRN, Jonnie Sky PA-C  •  heparin (porcine) 5000 UNIT/ML injection 5,000 Units, 5,000 Units, Subcutaneous, Q12H, Jonnie Sky PA-C, 5,000 Units at 01/01/20 2113  •  insulin lispro (humaLOG) injection 0-7 Units, 0-7 Units, Subcutaneous, 4x Daily With Meals & Nightly, Jonnie Sky PA-C, 2 Units at 01/01/20 2113  •  ipratropium-albuterol (DUO-NEB) nebulizer solution 3 mL, 3 mL, Nebulization, Q4H PRN, Jonnie Sky PA-C  •  levothyroxine (SYNTHROID, LEVOTHROID) tablet 100 mcg, 100 mcg, Oral, Q AM, Jonnie Sky PA-C, 100 mcg at 01/02/20 0558  •  melatonin tablet 5 mg, 5 mg, Oral, Nightly, Idris Leal MD, 5 mg at 01/01/20 2112  •  nitroglycerin (NITROSTAT) SL tablet 0.4 mg, 0.4 mg, Sublingual, Q5 Min PRN, Maegan Ramos, APRN  •  ondansetron (ZOFRAN) injection 4 mg, 4 mg, Intravenous, Q6H PRN, Jonnie Sky PA-C  •  sertraline (ZOLOFT) tablet 50 mg, 50 mg, Oral, Daily, Jonnie Sky PA-C, 50 mg at 01/01/20 0831  •  sodium chloride 0.9 % flush 10 mL, 10 mL, Intravenous, PRN, Austen Jack MD  •  sodium chloride 0.9 % flush 10 mL, 10 mL, Intravenous, Q12H, Jonnie Sky PA-C, 10 mL at 01/01/20 0831  •  sodium chloride 0.9 % flush 10 mL, 10 mL, Intravenous, PRN, Jonnie Sky PA-C  •  sodium chloride 0.9 % infusion, 75 mL/hr, Intravenous, Continuous, Idris Leal MD, Last Rate: 75 mL/hr at 01/01/20 1012, 75 mL/hr at 01/01/20 1012  •  terazosin (HYTRIN) capsule 2 mg, 2 mg, Oral, Nightly, Idris Leal MD, 2 mg at 01/01/20 2112    Antibiotics:  Anti-Infectives (From admission, onward)    Ordered     Dose/Rate Route Frequency Start Stop    12/30/19 0842  cefTRIAXone (ROCEPHIN) 1 g/100 mL 0.9% NS (MBP)     Meredith Zambrano RPH reviewed the order on 01/01/20 1021.   Ordering Provider:  Eyal Aj RPH    1 g  over 30  Minutes Intravenous Every 24 Hours Scheduled 19 0930 20 0859    19 0711  doxycycline (MONODOX) capsule 100 mg     Meredith Zambrano [facility] reviewed the order on 20 1021.   Ordering Provider:  Nata Thomas APRN    100 mg Oral Every 12 Hours Scheduled 19 0900 20 0859    19 1921  cefTRIAXone (ROCEPHIN) 1 g/100 mL 0.9% NS (MBP)     Ordering Provider:  Austen Jack MD    1 g  over 30 Minutes Intravenous Once 19 1923 19 2231    19 192  AZITHROMYCIN 500 MG/250 ML 0.9% NS IVPB (vial-mate)     Ordering Provider:  Austen Jack MD    500 mg  over 60 Minutes Intravenous Once 19 0030                Physical Exam:   Vital Signs  Temp (24hrs), Av.1 °F (36.7 °C), Min:97.6 °F (36.4 °C), Max:98.9 °F (37.2 °C)    Temp  Min: 97.6 °F (36.4 °C)  Max: 98.9 °F (37.2 °C)  BP  Min: 132/80  Max: 149/60  Pulse  Min: 70  Max: 84  Resp  Min: 18  Max: 20  SpO2  Min: 94 %  Max: 97 %    GENERAL: Awake and alert, in no acute distress.   HEENT: Normocephalic, atraumatic No conjunctival injection. No icterus. Oropharynx clear without evidence of thrush or exudate.     HEART: RRR; No murmur, rubs, gallops.   LUNGS: diminished throughout, no wheezes, rhonchi   ABDOMEN: Soft, nontender, nondistended. Positive bowel sounds. No rebound or guarding. NO mass or HSM. Pannus scabbed lesions, crusting   EXT:  No cyanosis,   :  + purewick   MSK:  No joint effusions   SKIN: Warm and dry      NEURO: Oriented to PPT.  She moves all 4 extremities.  Bilateral thighs with purplish red areas, sacrum with stage 1-2 decubitus.      Laboratory Data    Results from last 7 days   Lab Units 19  0336 19  0357 19  1725   WBC 10*3/mm3 8.33 10.75 8.63   HEMOGLOBIN g/dL 11.2* 11.4* 13.0   HEMATOCRIT % 35.2 35.7 40.3   PLATELETS 10*3/mm3 163 163 168     Results from last 7 days   Lab Units 19  0336   SODIUM mmol/L 137   POTASSIUM mmol/L 3.5   CHLORIDE mmol/L 94*    CO2 mmol/L 31.0*   BUN mg/dL 18   CREATININE mg/dL 0.86   GLUCOSE mg/dL 123*   CALCIUM mg/dL 9.6     Results from last 7 days   Lab Units 12/30/19  0336   ALK PHOS U/L 74   BILIRUBIN mg/dL 1.0   ALT (SGPT) U/L 11   AST (SGOT) U/L 18             Results from last 7 days   Lab Units 12/28/19  2213   LACTATE mmol/L 1.2     Results from last 7 days   Lab Units 12/28/19  1725   CK TOTAL U/L 52         Estimated Creatinine Clearance: 95.3 mL/min (by C-G formula based on SCr of 0.86 mg/dL).      Microbiology:  Blood Culture   Date Value Ref Range Status   12/28/2019 Abnormal Stain (A)  Preliminary     BCID, PCR   Date Value Ref Range Status   12/28/2019 No organism detected by BCID PCR. No organism detected by BCID PCR. Final   CNS    Urine Culture   Date Value Ref Range Status   12/28/2019 No growth  Final           Radiology:  Imaging Results (Last 72 Hours)     ** No results found for the last 72 hours. **      I read her CT scans      Impression:   1.  Right middle and lower lobe pneumonia- she is clinically improving and I think we could consider discontinuation of antibiotic therapy tomorrow.    2.  Decubitus ulcer, stage 1-2-without evidence of cellulitis  3.  Diabetes mellitus, type 2  4.  Obesity  5.  CNS bacteremia- most likely a skin contaminant.  6.  Poor IV access  7.  Poor personal hygiene      PLAN/RECOMMENDATIONS:  1.  Continue intravenous ceftriaxone  2.  Continue oral doxycycline  3.  I will consider discontinuation of antibiotic therapy tomorrow  4.  Discharge to rehab    Dr. Redding has obtained the history, performed the physical exam and formulated the above treatment plan.      I discussed her situation with Dr. Carter today.    Austen Redding MD  1/2/2020  8:15 AM

## 2020-01-02 NOTE — PLAN OF CARE
Problem: Patient Care Overview  Goal: Plan of Care Review  Flowsheets (Taken 1/2/2020 7984)  Progress: declining (Pended)  Plan of Care Reviewed With: patient (Pended)  Outcome Summary: Pt completed supine ther ex and sat EOB. Pt refused further mobility 2/2 pain in her LLE and fatigue. Recommend PT 3x/week 2/2 pt's low levels of motivation vs pain. Recommended/c SNF. (Pended)

## 2020-01-02 NOTE — PROGRESS NOTES
Continued Stay Note  [facility]     Patient Name: Julissa Kee  MRN: [id number]  Today's Date: 1/2/2020    Admit Date: 12/28/2019    Discharge Plan     Row Name 01/02/20 1559       Plan    Plan  skilled nursing facility    Plan Comments  I followed up on referrals made to skilled facilities today. [facility] are not willing to offer Mrs. Kee a bed. I have called [facility] multiple times today. Per Sadie in admissions, they are reviewing the referral still and will let me know once a decision is made. I have spoken to Mrs. Kee and her  at the bedside. I notified them that if [facility] is not willing to accept her, then additional referrals will need to be made. They verbalized understanding.    Final Discharge Disposition Code  03 - skilled nursing facility (SNF)        Discharge Codes    No documentation.             Andrew Sosa RN

## 2020-01-02 NOTE — PLAN OF CARE
Pt uncooperative with turning and repositioning today despite multiple skin issues.  Continues to need lots of encouragement for any kind of activity.  Weaned to 1L NC today. Sat on edge of bed briefly with PT today. Vitals stable. Awaiting rehab placement.

## 2020-01-03 ENCOUNTER — APPOINTMENT (OUTPATIENT)
Dept: CARDIOLOGY | Facility: HOSPITAL | Age: 70
End: 2020-01-03

## 2020-01-03 LAB
GLUCOSE BLDC GLUCOMTR-MCNC: 143 MG/DL (ref 70–130)
GLUCOSE BLDC GLUCOMTR-MCNC: 147 MG/DL (ref 70–130)
GLUCOSE BLDC GLUCOMTR-MCNC: 159 MG/DL (ref 70–130)
GLUCOSE BLDC GLUCOMTR-MCNC: 170 MG/DL (ref 70–130)
MAGNESIUM SERPL-MCNC: 1.6 MG/DL (ref 1.6–2.4)

## 2020-01-03 PROCEDURE — 25010000002 ENOXAPARIN PER 10 MG

## 2020-01-03 PROCEDURE — 93306 TTE W/DOPPLER COMPLETE: CPT | Performed by: INTERNAL MEDICINE

## 2020-01-03 PROCEDURE — 99233 SBSQ HOSP IP/OBS HIGH 50: CPT | Performed by: NURSE PRACTITIONER

## 2020-01-03 PROCEDURE — 83735 ASSAY OF MAGNESIUM: CPT | Performed by: NURSE PRACTITIONER

## 2020-01-03 PROCEDURE — 99222 1ST HOSP IP/OBS MODERATE 55: CPT | Performed by: INTERNAL MEDICINE

## 2020-01-03 PROCEDURE — 25010000002 HEPARIN (PORCINE) PER 1000 UNITS: Performed by: NURSE PRACTITIONER

## 2020-01-03 PROCEDURE — 25010000002 CEFTRIAXONE PER 250 MG

## 2020-01-03 PROCEDURE — 82962 GLUCOSE BLOOD TEST: CPT

## 2020-01-03 PROCEDURE — 97110 THERAPEUTIC EXERCISES: CPT

## 2020-01-03 PROCEDURE — 25010000002 MAGNESIUM SULFATE 2 GM/50ML SOLUTION: Performed by: NURSE PRACTITIONER

## 2020-01-03 PROCEDURE — 99291 CRITICAL CARE FIRST HOUR: CPT | Performed by: NURSE PRACTITIONER

## 2020-01-03 PROCEDURE — 97530 THERAPEUTIC ACTIVITIES: CPT

## 2020-01-03 PROCEDURE — 93306 TTE W/DOPPLER COMPLETE: CPT

## 2020-01-03 RX ORDER — HEPARIN SODIUM 5000 [USP'U]/ML
5000 INJECTION, SOLUTION INTRAVENOUS; SUBCUTANEOUS EVERY 12 HOURS SCHEDULED
Status: DISCONTINUED | OUTPATIENT
Start: 2020-01-03 | End: 2020-01-08 | Stop reason: HOSPADM

## 2020-01-03 RX ORDER — MAGNESIUM SULFATE HEPTAHYDRATE 40 MG/ML
2 INJECTION, SOLUTION INTRAVENOUS ONCE
Status: COMPLETED | OUTPATIENT
Start: 2020-01-03 | End: 2020-01-03

## 2020-01-03 RX ADMIN — MAGNESIUM SULFATE 2 G: 2 INJECTION INTRAVENOUS at 02:02

## 2020-01-03 RX ADMIN — MELATONIN TAB 5 MG 5 MG: 5 TAB at 21:01

## 2020-01-03 RX ADMIN — INSULIN LISPRO 2 UNITS: 100 INJECTION, SOLUTION INTRAVENOUS; SUBCUTANEOUS at 18:24

## 2020-01-03 RX ADMIN — ACETAMINOPHEN 650 MG: 325 TABLET ORAL at 21:00

## 2020-01-03 RX ADMIN — ATORVASTATIN CALCIUM 10 MG: 10 TABLET, FILM COATED ORAL at 21:01

## 2020-01-03 RX ADMIN — SODIUM CHLORIDE 75 ML/HR: 900 INJECTION INTRAVENOUS at 21:00

## 2020-01-03 RX ADMIN — HEPARIN SODIUM 5000 UNITS: 5000 INJECTION INTRAVENOUS; SUBCUTANEOUS at 16:51

## 2020-01-03 RX ADMIN — INSULIN LISPRO 2 UNITS: 100 INJECTION, SOLUTION INTRAVENOUS; SUBCUTANEOUS at 21:01

## 2020-01-03 RX ADMIN — SODIUM CHLORIDE, PRESERVATIVE FREE 10 ML: 5 INJECTION INTRAVENOUS at 08:11

## 2020-01-03 RX ADMIN — HEPARIN SODIUM 5000 UNITS: 5000 INJECTION INTRAVENOUS; SUBCUTANEOUS at 21:01

## 2020-01-03 RX ADMIN — ENOXAPARIN SODIUM 170 MG: 100 INJECTION SUBCUTANEOUS at 02:23

## 2020-01-03 RX ADMIN — LEVOTHYROXINE SODIUM 100 MCG: 100 TABLET ORAL at 05:10

## 2020-01-03 RX ADMIN — SERTRALINE HYDROCHLORIDE 50 MG: 50 TABLET ORAL at 08:10

## 2020-01-03 RX ADMIN — ASPIRIN 81 MG: 81 TABLET, COATED ORAL at 08:09

## 2020-01-03 RX ADMIN — DOXYCYCLINE 100 MG: 100 CAPSULE ORAL at 08:09

## 2020-01-03 RX ADMIN — ACETAMINOPHEN 650 MG: 325 TABLET ORAL at 08:10

## 2020-01-03 RX ADMIN — CEFTRIAXONE 1 G: 1 INJECTION, POWDER, FOR SOLUTION INTRAMUSCULAR; INTRAVENOUS at 08:09

## 2020-01-03 RX ADMIN — TERAZOSIN HYDROCHLORIDE 2 MG: 2 CAPSULE ORAL at 21:01

## 2020-01-03 NOTE — PROGRESS NOTES
Continued Stay Note  [facility]     Patient Name: Julissa Kee  MRN: [id number]  Today's Date: 1/3/2020    Admit Date: 12/28/2019    Discharge Plan     Row Name 01/03/20 1408       Plan    Plan  skilled nursing facility    Patient/Family in Agreement with Plan  yes    Plan Comments  I met with Mrs. Kee, her , and their daughter Luz at the bedside to discuss discharge options. They would like additional referrals made to [facility] and [facility]. I have made these referrals. This will require insurance approval.     Final Discharge Disposition Code  03 - skilled nursing facility (SNF)         Andrew Sosa RN

## 2020-01-03 NOTE — SIGNIFICANT NOTE
[facility]  CRITICAL CARE NOTE    Patient Name: Julissa Kee  : 1950  MRN: [id number]    Date of Admission: 2019  Length of Stay: 6    Subjective     Event/HPI:  New onset of atrial fib.    Objective     Vital Signs:   Temp:  [97.6 °F (36.4 °C)-98.9 °F (37.2 °C)] 97.8 °F (36.6 °C)  Heart Rate:  [70-97] 84  Resp:  [18-20] 18  BP: (139-170)/(51-69) 146/53       Pertinent Physical Exam:  Julissa Kee is a 6- y/o female presenting with new onset of atrial fib. Patient presented to the hospital with acute respiratory failure r/t CAP. Patient's [id number] is 4. Patient denies chest pain, heart palp.     Results Reviewed:  I have personally reviewed current lab, radiology, and data and agree.    Results from last 7 days   Lab Units 20   WBC 10*3/mm3 7.70 8.33 10.75   HEMOGLOBIN g/dL 10.5* 11.2* 11.4*   HEMATOCRIT % 32.5* 35.2 35.7   PLATELETS 10*3/mm3 167 163 163     Results from last 7 days   Lab Units 207 19  1725   SODIUM mmol/L 137 137 138 137   POTASSIUM mmol/L 3.7 3.5 3.8 3.6   CHLORIDE mmol/L 99 94* 94* 90*   CO2 mmol/L 30.0* 31.0* 35.0* 34.0*   BUN mg/dL 14 18 22 20   CREATININE mg/dL 0.79 0.86 1.11* 1.13*   GLUCOSE mg/dL 163* 123* 137* 177*   CALCIUM mg/dL 9.3 9.6 9.1 9.9   ALT (SGPT) U/L 11 11  --  10   AST (SGOT) U/L 14 18  --  18     No results found for: BNP  No results found for: PHART, PCO2    Microbiology Results Abnormal     Procedure Component Value - Date/Time    Blood Culture - Blood, Hand, Right [[id number]] Collected:  19 1920    Lab Status:  Final result Specimen:  Blood from Hand, Right Updated:  20 2100     Blood Culture No growth at 5 days    Blood Culture - Blood, Arm, Left [[id number]] Collected:  19 0415    Lab Status:  Preliminary result Specimen:  Blood from Arm, Left Updated:  20 0645     Blood Culture No growth at 3  days    Blood Culture - Blood, Hand, Left [[id number]] Collected:  12/30/19 0420    Lab Status:  Preliminary result Specimen:  Blood from Hand, Left Updated:  01/02/20 0645     Blood Culture No growth at 3 days    Blood Culture - Blood, Hand, Right [[id number]]  (Abnormal) Collected:  12/28/19 2010    Lab Status:  Final result Specimen:  Blood from Hand, Right Updated:  12/31/19 0638     Blood Culture Staphylococcus, coagulase negative     Comment: Probable contaminant requires clinical correlation, susceptibility not performed unless requested by physician.          Gram Stain Aerobic Bottle Gram positive cocci in clusters    Blood Culture ID, PCR - Blood, Hand, Right [[id number]]  (Normal) Collected:  12/28/19 2010    Lab Status:  Final result Specimen:  Blood from Hand, Right Updated:  12/30/19 0503     BCID, PCR No organism detected by BCID PCR.    Urine Culture - Urine, Urine, Catheter [[id number]]  (Normal) Collected:  12/28/19 1908    Lab Status:  Final result Specimen:  Urine, Catheter Updated:  12/30/19 0405     Urine Culture No growth    Respiratory Panel, PCR - Swab, Nasopharynx [[id number]]  (Normal) Collected:  12/29/19 0136    Lab Status:  Final result Specimen:  Swab from Nasopharynx Updated:  12/29/19 0858     ADENOVIRUS, PCR Not Detected     Coronavirus 229E Not Detected     Coronavirus HKU1 Not Detected     Coronavirus NL63 Not Detected     Coronavirus OC43 Not Detected     Human Metapneumovirus Not Detected     Human Rhinovirus/Enterovirus Not Detected     Influenza B PCR Not Detected     Parainfluenza Virus 1 Not Detected     Parainfluenza Virus 2 Not Detected     Parainfluenza Virus 3 Not Detected     Parainfluenza Virus 4 Not Detected     Bordetella pertussis pcr Not Detected     Influenza A H1 2009 PCR Not Detected     Chlamydophila pneumoniae PCR Not Detected     Mycoplasma pneumo by PCR Not Detected     Influenza A PCR Not Detected     Influenza A H3 Not Detected     Influenza A H1 Not Detected      RSV, PCR Not Detected     Bordetella parapertussis PCR Not Detected          Imaging Results (Last 24 Hours)     ** No results found for the last 24 hours. **             I have reviewed the current medications.    Assessment / Plan     Active Hospital Problems    Diagnosis POA   • **Acute respiratory failure with hypoxia (CMS/HCC) [J96.01] Yes   • Renal insufficiency [N28.9] Yes   • Sacral wound [S31.000A] Yes   • CAP (community acquired pneumonia) [J18.9] Yes   • Pneumonia of right lung due to infectious organism [J18.9] Yes   • Syncope and collapse [R55] Yes   • Chronic obstructive pulmonary disease (CMS/HCC) [J44.9] Yes   • Diabetes mellitus (CMS/[facility]) [E11.9] Yes   • Hyperlipidemia [E78.5] Yes   • Hypertension [I10] Yes   • Hypothyroidism [E03.9] Yes   • Extreme obesity with alveolar hypoventilation (CMS/[facility]) [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      CODE STATUS:    Code Status and Medical Interventions:   Ordered at: 12/28/19 1938     Level Of Support Discussed With:    Patient     Code Status:    CPR     Medical Interventions (Level of Support Prior to Arrest):    Full         Critical Care time spent in direct patient care:    30 minutes (excluding procedure time, if applicable) including high complexity decision making to assess and treat vital organ system failure in this individual who has impairment of one or more vital organ systems such that there is a high probability of imminent or life threatening deterioration in the patient’s condition. Failure to initiate the above interventions on an urgent basis would likely result in sudden, clinically significant or life threatening deterioration in the patient's condition.      Electronically signed by KALIN Anaya, 01/03/20, 1:48 AM.

## 2020-01-03 NOTE — THERAPY TREATMENT NOTE
"Patient Name: Julissa Kee  : 1950    MRN: [id number]                              Today's Date: 1/3/2020       Admit Date: 2019    Visit Dx:     ICD-10-CM ICD-9-CM   1. Pneumonia of right lung due to infectious organism, unspecified part of lung J18.9 483.8   2. Impaired mobility and ADLs Z74.09 799.89     Patient Active Problem List   Diagnosis   • Anxiety   • Chronic obstructive pulmonary disease (CMS/HCC)   • Diabetes mellitus (CMS/HCC)   • Hyperlipidemia   • Hypertension   • Hypothyroidism   • Morbid obesity (CMS/HCC)   • Extreme obesity with alveolar hypoventilation (CMS/HCC)   • Renal insufficiency   • Sacral wound   • CAP (community acquired pneumonia)   • Acute respiratory failure with hypoxia (CMS/[facility])   • Pneumonia of right lung due to infectious organism   • Syncope and collapse     Past Medical History:   Diagnosis Date   • Acute bronchitis    • Acute sinusitis    • Anxiety    • Bacterial conjunctivitis    • Chest pain    • COPD (chronic obstructive pulmonary disease) (CMS/[facility])    • Diabetes mellitus (CMS/HCC)    • Dyspnea    • Dysuria    • Edema    • Elbow injury    • Elbow pain    • Eye drainage    • Fatigue    • Foot pain, right    • Humerus distal fracture    • Hyperlipidemia    • Hypertension    • Hypothyroidism    • Morbid obesity (CMS/HCC)    • Pickwickian syndrome (CMS/[facility])    • Pressure ulcer stage I    • Psoriasis    • SOB (shortness of breath)    • Symptoms of urinary tract infection    • Urinary frequency    • Urinary tract infection    • Vaginal candidiasis      History reviewed. No pertinent surgical history.  General Information     Row Name 20 1602          PT Evaluation Time/Intention    Document Type  therapy note (daily note)  -DM     Mode of Treatment  physical therapy  -DM     Row Name 20 6411          General Information    Existing Precautions/Restrictions  fall;oxygen therapy device and L/min SYNCOPE/fell PTA(\"LLE bent back under pt\" per spouse); sacral " wound  -DM     Barriers to Rehab  ineffective coping anx.  -DM       User Key  (r) = Recorded By, (t) = Taken By, (c) = Cosigned By    Initials Name Provider Type    Shruthi Dominguez PT Physical Therapist        Mobility     Row Name 01/03/20 1602          Bed Mobility Assessment/Treatment    Bed Mobility Assessment/Treatment  scooting/bridging;rolling left;rolling right  -DM     Rolling Left [place] (Bed Mobility)  verbal cues;maximum assist (25% patient effort);1 person to manage equipment  -DM     Rolling Right [place] (Bed Mobility)  verbal cues;maximum assist (25% patient effort);1 person to manage equipment  -DM     Scooting/Bridging [place] (Bed Mobility)  verbal cues;maximum assist (25% patient effort);2 person assist  -DM     Assistive Device (Bed Mobility)  bed rails;draw sheet  -DM     Comment (Bed Mobility)  pt had just been moved to specialty bed via mech lift sling,& C/O severe LBP & Foot/ankle pain, but coaxed to coop. w/ PT (nsg warned pt has been non-compliant)   -DM     Row Name 01/03/20 1602          Transfer Assessment/Treatment    Comment (Transfers)  cues for HP,seq;pt decl. EOB sitting or mech lift transf to chair(worn out from lift to new bed),but agreed to long sitting in specialty bed x 5 min;onset signif LBP & req. return to semi-recl. position HOB raised to long sitting position x 5 min  -DM     Row Name 01/03/20 1602          Gait/Stairs Assessment/Training    [place] Level (Gait)  unable to assess  -DM     Comment (Gait/Stairs)  declined gt trial  -DM       User Key  (r) = Recorded By, (t) = Taken By, (c) = Cosigned By    Initials Name Provider Type    Shruthi Dominguez PT Physical Therapist        Obj/Interventions     Row Name 01/03/20 1602          Therapeutic Exercise    Upper Extremity Range of Motion (Therapeutic Exercise)  shoulder flexion/extension, bilateral;shoulder abduction/adduction, bilateral;shoulder horizontal abduction/adduction,  bilateral;elbow flexion/extension, bilateral init min A for L Shldr ROM,then pt able to perform w/ VC's only  -DM     Lower Extremity (Therapeutic Exercise)  gluteal sets;hamstring sets, bilateral;heel slides, bilateral;quad sets, bilateral;SAQ (short arc quad), bilateral only dom[place]a 5 reps of HS & GS  -DM     Lower Extremity Range of Motion (Therapeutic Exercise)  hip abduction/adduction, bilateral;hip internal/external rotation, bilateral;ankle dorsiflexion/plantar flexion, bilateral Abdom sets  -DM     Exercise Type (Therapeutic Exercise)  AAROM (active assistive range of motion);AROM (active range of motion);isometric contraction, static  -DM     Position (Therapeutic Exercise)  seated;supine did UE exer in long sitting  -DM     Sets/Reps (Therapeutic Exercise)  1/10 (1/15 on AP/AC; 1/5 for HS,GS)  -DM     Expected Outcome (Therapeutic Exercise)  improve functional stability;improve functional tolerance, single extremity activity  -DM     Comment (Therapeutic Exercise)  C/O prog incr back pain throughout long-sitting exer  -DM     Row Name 01/03/20 1602          Static Sitting Balance    Level of [place] (Unsupported Sitting, Static Balance)  moderate assist, 50 to 74% patient effort  -DM     Sitting Position (Unsupported Sitting, Static Balance)  long sitting on bed  -DM     Time Able to Maintain Position (Unsupported Sitting, Static Balance)  4 to 5 minutes  -DM     Comment (Unsupported Sitting, Static Balance)  mod A to hold trunk forward in long sitting while pillow placed behind head/shldrs  -DM     Row Name 01/03/20 1602          Dynamic Sitting Balance    Level of [place], Reaches Outside Midline (Sitting, Dynamic Balance)  maximal assist, 25 to 49% patient effort  -DM     Sitting Position, Reaches Outside Midline (Sitting, Dynamic Balance)  long sitting on bed  -DM     Comment, Reaches Outside Midline (Sitting, Dynamic Balance)  max A to flex forward  -DM       User Key  (r) = Recorded By, (t) =  Taken By, (c) = Cosigned By    Initials Name Provider Type    Shruthi Dominguez, PT Physical Therapist        Goals/Plan    No documentation.       Clinical Impression     Row Name 01/03/20 1602          Pain Assessment    Additional Documentation  Pain Scale: Numbers Pre/Post-Treatment (Group)  -DM     Row Name 01/03/20 1602          Pain Scale: Numbers Pre/Post-Treatment    Pain Scale: Numbers, Pretreatment  3/10  -DM     Pain Scale: Numbers, Post-Treatment  6/10  -DM     Pain Location  back & B ankles/feet  -DM     Pre/Post Treatment Pain Comment  pt (F) moaning loudly/grimacing when performing Hip exer, rolling & during HOB being raised/lowered for long sitting  -DM     Pain Intervention(s)  Medication (See MAR);Repositioned;Elevated;Rest  -DM     Row Name 01/03/20 1602          Vital Signs    Pre Systolic BP Rehab  138  -DM     Pre Treatment Diastolic BP  54  -DM     Intra Systolic BP Rehab  145  -DM     Intra Treatment Diastolic BP  61  -DM     Post Systolic BP Rehab  109  -DM     Post Treatment Diastolic BP  55  -DM     Pretreatment Heart Rate (beats/min)  91 irreg  -DM     Intratreatment Heart Rate (beats/min)  102  -DM     Posttreatment Heart Rate (beats/min)  94  -DM     Pre SpO2 (%)  92  -DM     O2 Delivery Pre Treatment  supplemental O2 1 L  -DM     Intra SpO2 (%)  90  -DM     O2 Delivery Intra Treatment  supplemental O2  -DM     Post SpO2 (%)  97  -DM     O2 Delivery Post Treatment  supplemental O2  -DM     Pre Patient Position  Supine  -DM     Intra Patient Position  Sitting  -DM     Post Patient Position  Supine  -DM     Row Name 01/03/20 1602          Positioning and Restraints    Pre-Treatment Position  in bed  -DM     Post Treatment Position  bed  -DM     In Bed  notified nsg;supine;call light within reach;encouraged to call for assist;with family/caregiver;RUE elevated;legs elevated  -DM       User Key  (r) = Recorded By, (t) = Taken By, (c) = Cosigned By    Initials Name Provider Type    MARLENY  Shruthi Soto, PT Physical Therapist        Outcome Measures     Row Name 01/03/20 1602          How much help from another person do you currently need...    Turning from your back to your side while in flat bed without using bedrails?  2  -DM     Moving from lying on back to sitting on the side of a flat bed without bedrails?  2  -DM     Moving to and from a bed to a chair (including a wheelchair)?  1  -DM     Standing up from a chair using your arms (e.g., wheelchair, bedside chair)?  2  -DM     Climbing 3-5 steps with a railing?  1  -DM     To walk in hospital room?  1  -DM     AM-PAC 6 Clicks Score (PT)  9  -DM     Row Name 01/03/20 1602          Functional Assessment    Outcome Measure Options  AM-PAC 6 Clicks Basic Mobility (PT)  -DM       User Key  (r) = Recorded By, (t) = Taken By, (c) = Cosigned By    Initials Name Provider Type    DM Shruthi Soto, PT Physical Therapist          PT Recommendation and Plan     Outcome Summary/Treatment Plan (PT)  Anticipated Discharge Disposition (PT): skilled nursing facility  Plan of Care Reviewed With: spouse, patient  Progress: improving  Outcome Summary: Able to perform bed mobil/rolling w/ max 1 + 1A and scooting to HOB w/ max 2A,then ther exer x 10 reps (LE's in sup.; UE'S in long sitting x 5 min), but c/o severe back & B foot/ankle pain, therefore decl. EOB, transf to chair  or gt trial; noted desat to 90% on 1 L, signif decr. BP during long sitting, & HR incr. to 102 (irreg)     Time Calculation:   PT Charges     Row Name 01/03/20 1707             Time Calculation    Start Time  1602  -DM      PT Received On  01/03/20  -DM      PT Goal Re-Cert Due Date  01/09/20  -DM         Time Calculation- PT    Total Timed Code Minutes- PT  28 minute(s)  -DM         Timed Charges    15464 - PT Therapeutic Exercise Minutes  19  -DM      14113 - PT Therapeutic Activity Minutes  9  -DM        User Key  (r) = Recorded By, (t) = Taken By, (c) = Cosigned By    Initials Name  Provider Type    DM Shruthi Soto, PT Physical Therapist        Therapy Charges for Today     Code Description Service Date Service Provider Modifiers Qty    26092408164 HC PT THER PROC EA 15 MIN 1/3/2020 Shruthi Soto, PT GP 1    40212682758 HC PT THERAPEUTIC ACT EA 15 MIN 1/3/2020 Shruthi Soto, PT GP 1          PT G-Codes  Outcome Measure Options: AM-PAC 6 Clicks Basic Mobility (PT)  AM-PAC 6 Clicks Score (PT): 9  AM-PAC 6 Clicks Score (OT): 11    Shruthi Soto, PT  1/3/2020

## 2020-01-03 NOTE — PROGRESS NOTES
[facility] Medicine Services  PROGRESS NOTE    Patient Name: Julissa Kee  : 1950  MRN: [id number]    Date of Admission: 2019  Primary Care Physician: Kee Hoffman MD    Subjective   Subjective     CC: Hospital follow up for pneumonia    HPI:   Patient resting in bed with  at bedside.  Breathing about the same today.  Denies any chest pain or palpitations.  Has no history of any heart arrhythmia, including a fib. Discussed DC plans and case management to meet with them at 1 today.    Review of Systems    Gen- No fevers, chills  CV- No chest pain, palpitations  Resp- No cough, dyspnea  GI- No N/V/D, abd pain, hard to eat food asked for a soft diet.        Objective   Objective     Vital Signs:   Temp:  [97.8 °F (36.6 °C)-98.5 °F (36.9 °C)] 98.2 °F (36.8 °C)  Heart Rate:  [76-97] 91  Resp:  [16-18] 16  BP: (107-146)/(43-55) 138/54     Physical Exam:    Constitutional: No acute distress, awake, alert, resting comfortably in bed  HENT: NCAT, mucous membranes moist  Respiratory: distant breath sounds,  respiratory effort normal, 2l NC  94%  Cardiovascular: RRR, appears NSR with occasional PAC on monitor currently, no murmurs, rubs, or gallops, palpable pedal pulses bilaterally  Gastrointestinal: Positive bowel sounds, soft, nontender, nondistended  Musculoskeletal: chronic bilateral LE edema  Psychiatric: flat affect, cooperative  Neurologic: Oriented x 3, strength symmetric in all extremities, Cranial Nerves grossly intact to confrontation, speech clear  Skin: No rashes, sacral wound ID following- did not visualize.      Results Reviewed:    Results from last 7 days   Lab Units 20  2133 19  0336 19  0357 19  1725   WBC 10*3/mm3 7.70 8.33 10.75  --  8.63   HEMOGLOBIN g/dL 10.5* 11.2* 11.4*  --  13.0   HEMATOCRIT % 32.5* 35.2 35.7  --  40.3   PLATELETS 10*3/mm3 167 163 163  --  168   PROCALCITONIN ng/mL  --  0.23  --  0.12  0.09*     Results from last 7 days   Lab Units 01/02/20  2133 12/30/19  0336 12/29/19  2355 12/29/19  0357 12/28/19  1725   SODIUM mmol/L 137 137  --  138 137   POTASSIUM mmol/L 3.7 3.5  --  3.8 3.6   CHLORIDE mmol/L 99 94*  --  94* 90*   CO2 mmol/L 30.0* 31.0*  --  35.0* 34.0*   BUN mg/dL 14 18  --  22 20   CREATININE mg/dL 0.79 0.86  --  1.11* 1.13*   GLUCOSE mg/dL 163* 123*  --  137* 177*   CALCIUM mg/dL 9.3 9.6  --  9.1 9.9   ALT (SGPT) U/L 11 11  --   --  10   AST (SGOT) U/L 14 18  --   --  18   TROPONIN T ng/mL <0.010  --  <0.010  --  <0.010   PROBNP pg/mL  --   --   --   --  98.6     Estimated Creatinine Clearance: 102.5 mL/min (by C-G formula based on SCr of 0.79 mg/dL).    Microbiology Results Abnormal     Procedure Component Value - Date/Time    Blood Culture - Blood, Arm, Left [[id number]] Collected:  12/30/19 0415    Lab Status:  Preliminary result Specimen:  Blood from Arm, Left Updated:  01/03/20 0645     Blood Culture No growth at 4 days    Blood Culture - Blood, Hand, Left [[id number]] Collected:  12/30/19 0420    Lab Status:  Preliminary result Specimen:  Blood from Hand, Left Updated:  01/03/20 0645     Blood Culture No growth at 4 days    Blood Culture - Blood, Hand, Right [[id number]] Collected:  12/28/19 1920    Lab Status:  Final result Specimen:  Blood from Hand, Right Updated:  01/02/20 2100     Blood Culture No growth at 5 days    Blood Culture - Blood, Hand, Right [[id number]]  (Abnormal) Collected:  12/28/19 2010    Lab Status:  Final result Specimen:  Blood from Hand, Right Updated:  12/31/19 0638     Blood Culture Staphylococcus, coagulase negative     Comment: Probable contaminant requires clinical correlation, susceptibility not performed unless requested by physician.          Gram Stain Aerobic Bottle Gram positive cocci in clusters    Blood Culture ID, PCR - Blood, Hand, Right [[id number]]  (Normal) Collected:  12/28/19 2010    Lab Status:  Final result Specimen:  Blood from Hand, Right  Updated:  12/30/19 0503     BCID, PCR No organism detected by BCID PCR.    Urine Culture - Urine, Urine, Catheter [[id number]]  (Normal) Collected:  12/28/19 1908    Lab Status:  Final result Specimen:  Urine, Catheter Updated:  12/30/19 0405     Urine Culture No growth    Respiratory Panel, PCR - Swab, Nasopharynx [[id number]]  (Normal) Collected:  12/29/19 0136    Lab Status:  Final result Specimen:  Swab from Nasopharynx Updated:  12/29/19 0858     ADENOVIRUS, PCR Not Detected     Coronavirus 229E Not Detected     Coronavirus HKU1 Not Detected     Coronavirus NL63 Not Detected     Coronavirus OC43 Not Detected     Human Metapneumovirus Not Detected     Human Rhinovirus/Enterovirus Not Detected     Influenza B PCR Not Detected     Parainfluenza Virus 1 Not Detected     Parainfluenza Virus 2 Not Detected     Parainfluenza Virus 3 Not Detected     Parainfluenza Virus 4 Not Detected     Bordetella pertussis pcr Not Detected     Influenza A H1 2009 PCR Not Detected     Chlamydophila pneumoniae PCR Not Detected     Mycoplasma pneumo by PCR Not Detected     Influenza A PCR Not Detected     Influenza A H3 Not Detected     Influenza A H1 Not Detected     RSV, PCR Not Detected     Bordetella parapertussis PCR Not Detected          Imaging Results (Last 24 Hours)     ** No results found for the last 24 hours. **          I have reviewed the medications:  Scheduled Meds:    aspirin 81 mg Oral Daily   atorvastatin 10 mg Oral Nightly   insulin lispro 0-7 Units Subcutaneous 4x Daily With Meals & Nightly   levothyroxine 100 mcg Oral Q AM   melatonin 5 mg Oral Nightly   sertraline 50 mg Oral Daily   sodium chloride 10 mL Intravenous Q12H   terazosin 2 mg Oral Nightly     Continuous Infusions:    Pharmacy to Dose enoxaparin (LOVENOX)     sodium chloride 75 mL/hr Last Rate: 75 mL/hr (01/02/20 2112)     PRN Meds:.•  acetaminophen **OR** acetaminophen **OR** acetaminophen  •  dextrose  •  dextrose  •  glucagon (human recombinant)  •   "ipratropium-albuterol  •  nitroglycerin  •  ondansetron  •  Pharmacy to Dose enoxaparin (LOVENOX)  •  sodium chloride  •  sodium chloride      Assessment/Plan   Assessment & Plan     Active Hospital Problems    Diagnosis  POA   • **Acute respiratory failure with hypoxia (CMS/[facility]) [J96.01]  Yes   • Renal insufficiency [N28.9]  Yes   • Sacral wound [S31.000A]  Yes   • CAP (community acquired pneumonia) [J18.9]  Yes   • Pneumonia of right lung due to infectious organism [J18.9]  Yes   • Syncope and collapse [R55]  Yes   • Chronic obstructive pulmonary disease (CMS/[facility]) [J44.9]  Yes   • Diabetes mellitus (CMS/[facility]) [E11.9]  Yes   • Hyperlipidemia [E78.5]  Yes   • Hypertension [I10]  Yes   • Hypothyroidism [E03.9]  Yes   • Extreme obesity with alveolar hypoventilation (CMS/[facility]) [E66.2]  Yes   • Anxiety [F41.9]  Yes      Resolved Hospital Problems   No resolved problems to display.        Brief Hospital Course to date:  Julissa Kee is a 69 y.o. female past medical history significant for COPD on supplemental oxygen, DM2, HTN, HLD, renal insuffiencey, and morbid obesity. She presented to ED s/p syncopal episode.Records reviewed indicate that patient is largely immobile with profoundly poor hygiene. She has several wounds that including sacral decubitus ulcer. She was living at home and \"not letting her family take care of her.\"     Acute on Chronic Respiratory failure with hypoxia:  - in the setting of COPD and obesity hypoventilation syndrome  - secondary to developing pneumonia. Demonstrating on CT imaging.  - continue rocephin and doxycycline.  - appreciate ID input.   - continue supplemental oxygen, duonebs, IS  - pulmonary toilet     Questionable atrial fibrillation overnight  -cardiology was consulted and feel that she may be having short bursts of atrial tach.  -send home with an event monitory to evaluate for occult a fib and any other arrhythmias that may predispose to syncope (she has had syncopal episodes at " home).  Call cardiology office on day of dc to have telemetry monitor placed before discharge.  -lovenox discontinued.        CNS bacteremia  - likely skin contaminant.     UTI   -urine culture from 12/28 with no growth       DM   Glucose 136-155  Continue current regimen    Sacral wound, lower extremity changes, debility:  -ID following  - PT/OT  - case management assisting with rehab options.      Renal insufficiency:  - appears chronic. Currently at baseline      HTN:  - controlled and stable. Continue home meds     Hypothyroidism:  -TSH 2.920    Dysphagia  -Soft texture ground diet ordered    DVT Prophylaxis: Heparin 5000 units subcu every 12 hours    Disposition: I expect the patient to be discharged to SNF once bed found- referrals have been sent.     CODE STATUS:   Code Status and Medical Interventions:   Ordered at: 12/28/19 1938     Level Of Support Discussed With:    Patient     Code Status:    CPR     Medical Interventions (Level of Support Prior to Arrest):    Full         Electronically signed by KALIN Mcginnis, 01/03/20, 12:20 PM.

## 2020-01-03 NOTE — PLAN OF CARE
Problem: Patient Care Overview  Goal: Plan of Care Review  Flowsheets (Taken 1/3/2020 1707)  Progress: improving  Plan of Care Reviewed With: spouse; patient  Outcome Summary: Able to perform bed mobil/rolling w/ max 1 + 1A and scooting to HOB w/ max 2A,then ther exer x 10 reps (LE's in sup.; UE'S in long sitting x 5 min), but c/o severe back & B foot/ankle pain, therefore decl. EOB, transf to chair  or gt trial; noted desat to 90% on 1 L, signif decr. BP during long sitting, & HR incr. to 102 (irreg)

## 2020-01-03 NOTE — PROGRESS NOTES
INFECTIOUS DISEASE PROGRESS NOTE     Julissa Kee  1950  [id number]      Admission Date: 12/28/2019      Requesting Provider: No ref. provider found  Evaluating Physician: Austen Redding MD    Reason for Consultation: Right middle and lower lobe pneumonia, positive blood culture.      History of present illness:  12/30/19:    Patient is a 69 y.o. female seen today for right middle and lower lobe pneumonia, and a positive blood culture. She had a syncopal episode in her bathroom when the family called EMS.  She has been afebrile without leukocytosis.  Chest xray without parenchymal process, with Chest CT, right middle and lower lobe developing bronchopneumonia. One blood culture has turned positive for gram positive cocci  In clusters.  Viral respiratory PCR negative.  Vancomycin and Zosyn initiated and we were consulted for evaluation and treatment.  She has no IV access and the vancomycin and Zosyn cannot be administered.  She was previously given ceftriaxone and doxycycline.  She denies any increased sputum production, nausea, vomiting, dysuria.   12/31/19:   She denies any increased sputum production, shortness of breath. She remains afebrile.  She now has a peripheral IV and her ceftriaxone has been switched from IM to IV.  1/1/20: She denies any shortness of breath.  No sputum production.  She remains afebrile.   1/2/20:  She remains on 2L 02 NC.  No shortness of breath, cough. Refuses to be turned per nursing.  Awaiting placement.   1/3/20:  New onset of afib last pm.  She is waiting to hear about SNF placement.  No n/v/d    Past Medical History:   Diagnosis Date   • Acute bronchitis    • Acute sinusitis    • Anxiety    • Bacterial conjunctivitis    • Chest pain    • COPD (chronic obstructive pulmonary disease) (CMS/[facility])    • Diabetes mellitus (CMS/[facility])    • Dyspnea    • Dysuria    • Edema    • Elbow injury    • Elbow pain    • Eye drainage    • Fatigue    • Foot pain, right    • Humerus distal  fracture    • Hyperlipidemia    • Hypertension    • Hypothyroidism    • Morbid obesity (CMS/HCC)    • Pickwickian syndrome (CMS/HCC)    • Pressure ulcer stage I    • Psoriasis    • SOB (shortness of breath)    • Symptoms of urinary tract infection    • Urinary frequency    • Urinary tract infection    • Vaginal candidiasis        History reviewed. No pertinent surgical history.    History reviewed. No pertinent family history.    Social History     Socioeconomic History   • Marital status:      Spouse name: Not on file   • Number of children: Not on file   • Years of education: Not on file   • Highest education level: Not on file   Tobacco Use   • Smoking status: Former Smoker     Types: Cigarettes     Last attempt to quit: 2006     Years since quittin.0   • Smokeless tobacco: Never Used   Substance and Sexual Activity   • Alcohol use: No     Frequency: Never   • Drug use: Never   • Sexual activity: Defer       Allergies   Allergen Reactions   • Amoxicillin-Pot Clavulanate Other (See Comments)     HEADACHE - has tolerated ceftriaxone 2019   • Bactrim [Sulfamethoxazole-Trimethoprim] Other (See Comments)     .         Medication:    Current Facility-Administered Medications:   •  acetaminophen (TYLENOL) tablet 650 mg, 650 mg, Oral, Q4H PRN, 650 mg at 20 0810 **OR** acetaminophen (TYLENOL) 160 MG/5ML solution 650 mg, 650 mg, Oral, Q4H PRN **OR** acetaminophen (TYLENOL) suppository 650 mg, 650 mg, Rectal, Q4H PRN, Jonnie Sky PA-C  •  aspirin EC tablet 81 mg, 81 mg, Oral, Daily, Jonnie Sky PA-C, 81 mg at 20 0809  •  atorvastatin (LIPITOR) tablet 10 mg, 10 mg, Oral, Nightly, Jonnie Sky PA-C, 10 mg at 20 2111  •  dextrose (D50W) 25 g/ 50mL Intravenous Solution 25 g, 25 g, Intravenous, Q15 Min PRN, Jonnie Sky PA-C  •  dextrose (GLUTOSE) oral gel 15 g, 15 g, Oral, Q15 Min PRN, Jonnie Sky PA-C  •  enoxaparin (LOVENOX) syringe 170 mg, 1 mg/kg,  Subcutaneous, Q12H, Tiago Fermin, RPH, 170 mg at 01/03/20 0223  •  glucagon (human recombinant) (GLUCAGEN DIAGNOSTIC) injection 1 mg, 1 mg, Subcutaneous, Q15 Min PRN, Jonnie Sky PA-C  •  insulin lispro (humaLOG) injection 0-7 Units, 0-7 Units, Subcutaneous, 4x Daily With Meals & Nightly, Jonnie Sky PA-C, 2 Units at 01/02/20 2113  •  ipratropium-albuterol (DUO-NEB) nebulizer solution 3 mL, 3 mL, Nebulization, Q4H PRN, Jonnie Sky PA-C  •  levothyroxine (SYNTHROID, LEVOTHROID) tablet 100 mcg, 100 mcg, Oral, Q AM, Jonnie Sky PA-C, 100 mcg at 01/03/20 0510  •  melatonin tablet 5 mg, 5 mg, Oral, Nightly, Idris Leal MD, 5 mg at 01/02/20 2110  •  nitroglycerin (NITROSTAT) SL tablet 0.4 mg, 0.4 mg, Sublingual, Q5 Min PRN, Maegan Ramos, APRN  •  ondansetron (ZOFRAN) injection 4 mg, 4 mg, Intravenous, Q6H PRN, Jonnie Sky PA-C  •  Pharmacy to Dose enoxaparin (LOVENOX), , Does not apply, Continuous PRN, Madeleine Du, APRN  •  sertraline (ZOLOFT) tablet 50 mg, 50 mg, Oral, Daily, Jonnie Sky PA-C, 50 mg at 01/03/20 0810  •  sodium chloride 0.9 % flush 10 mL, 10 mL, Intravenous, PRN, Austen Jack MD  •  sodium chloride 0.9 % flush 10 mL, 10 mL, Intravenous, Q12H, Jonnie Sky PA-C, 10 mL at 01/03/20 0811  •  sodium chloride 0.9 % flush 10 mL, 10 mL, Intravenous, PRN, Jonnie Sky PA-C  •  sodium chloride 0.9 % infusion, 75 mL/hr, Intravenous, Continuous, Idris Leal MD, Last Rate: 75 mL/hr at 01/02/20 2112, 75 mL/hr at 01/02/20 2112  •  terazosin (HYTRIN) capsule 2 mg, 2 mg, Oral, Nightly, Idris Leal MD, 2 mg at 01/02/20 2111    Antibiotics:  Anti-Infectives (From admission, onward)    Ordered     Dose/Rate Route Frequency Start Stop    12/28/19 1921  cefTRIAXone (ROCEPHIN) 1 g/100 mL 0.9% NS (MBP)     Ordering Provider:  Austen Jack MD    1 g  over 30 Minutes Intravenous Once 12/28/19 1923 12/28/19 2231    12/28/19 1921  AZITHROMYCIN  500 MG/250 ML 0.9% NS IVPB (vial-mate)     Ordering Provider:  Austen Jack MD    500 mg  over 60 Minutes Intravenous Once 19 1923 12/29/19 0030                Physical Exam:   Vital Signs  Temp (24hrs), Av °F (36.7 °C), Min:97.8 °F (36.6 °C), Max:98.5 °F (36.9 °C)    Temp  Min: 97.8 °F (36.6 °C)  Max: 98.5 °F (36.9 °C)  BP  Min: 107/43  Max: 146/53  Pulse  Min: 76  Max: 97  Resp  Min: 16  Max: 18  SpO2  Min: 92 %  Max: 97 %    GENERAL: Awake and alert, in no acute distress.   HEENT: Normocephalic, atraumatic No conjunctival injection. No icterus. Oropharynx clear without evidence of thrush or exudate.     HEART: 1/6 systolic murmur  LUNGS: diminished throughout, no wheezes, rhonchi   ABDOMEN: Soft, nontender, nondistended. Positive bowel sounds. No rebound or guarding. NO mass or HSM. Pannus scabbed lesions, crusting   EXT:  No cyanosis,   :  + purewick   MSK:  No joint effusions   SKIN: Warm and dry      NEURO: Oriented to PPT.  She moves all 4 extremities.  Bilateral thighs with purplish red areas, sacrum with stage 1-2 decubitus, improving     Laboratory Data    Results from last 7 days   Lab Units 20  2133 19  0336 19  0357   WBC 10*3/mm3 7.70 8.33 10.75   HEMOGLOBIN g/dL 10.5* 11.2* 11.4*   HEMATOCRIT % 32.5* 35.2 35.7   PLATELETS 10*3/mm3 167 163 163     Results from last 7 days   Lab Units 20  2133   SODIUM mmol/L 137   POTASSIUM mmol/L 3.7   CHLORIDE mmol/L 99   CO2 mmol/L 30.0*   BUN mg/dL 14   CREATININE mg/dL 0.79   GLUCOSE mg/dL 163*   CALCIUM mg/dL 9.3     Results from last 7 days   Lab Units 20  2133   ALK PHOS U/L 62   BILIRUBIN mg/dL 0.6   ALT (SGPT) U/L 11   AST (SGOT) U/L 14             Results from last 7 days   Lab Units 19  2213   LACTATE mmol/L 1.2     Results from last 7 days   Lab Units 19  1725   CK TOTAL U/L 52         Estimated Creatinine Clearance: 102.5 mL/min (by C-G formula based on SCr of 0.79 mg/dL).      Microbiology:  Blood  Culture   Date Value Ref Range Status   12/28/2019 Abnormal Stain (A)  Preliminary     BCID, PCR   Date Value Ref Range Status   12/28/2019 No organism detected by BCID PCR. No organism detected by BCID PCR. Final   CNS    Urine Culture   Date Value Ref Range Status   12/28/2019 No growth  Final           Radiology:  Imaging Results (Last 72 Hours)     ** No results found for the last 72 hours. **      I read her CT scans      Impression:   1.  Right middle and lower lobe pneumonia- we will discontinue antibiotic therapy today  2.  Decubitus ulcer, stage 1-2-without evidence of cellulitis  3.  Diabetes mellitus, type 2  4.  Obesity  5.  CNS bacteremia- most likely a skin contaminant.  6.  Poor IV access  7.  Poor personal hygiene      PLAN/RECOMMENDATIONS:  1. Discontinue intravenous ceftriaxone  2. Discontinue oral doxycycline  3. Discharge to rehab    Dr. Redding has obtained the history, performed the physical exam and formulated the above treatment plan.       Austen Redding MD  1/3/2020  11:56 AM

## 2020-01-03 NOTE — CONSULTS
[facility] Consult Note      Referring Provider: No ref. provider found  Primary Provider:  Kee Hoffman MD  Reason for Consultation: New onset Afib    Problem list:    1. Premature atrial contractions  2. Type II diabetes  3. Hypertension  4. Hyperlipidemia  5. Renal insuffiencey  6. Morbid obesity  7. Reports history of a prior cardiac catheterization and stress test at [facility] approximately 5 years ago, no records seen, she reports this may have been with Dr. Alexander?    Allergies:  Amoxicillin-pot clavulanate and Bactrim [sulfamethoxazole-trimethoprim]    Home/Current Medications:      Current Facility-Administered Medications:   •  acetaminophen (TYLENOL) tablet 650 mg, 650 mg, Oral, Q4H PRN, 650 mg at 01/03/20 0810 **OR** acetaminophen (TYLENOL) 160 MG/5ML solution 650 mg, 650 mg, Oral, Q4H PRN **OR** acetaminophen (TYLENOL) suppository 650 mg, 650 mg, Rectal, Q4H PRN, Jonnie Sky PA-C  •  aspirin EC tablet 81 mg, 81 mg, Oral, Daily, Jonnie Sky PA-C, 81 mg at 01/03/20 0809  •  atorvastatin (LIPITOR) tablet 10 mg, 10 mg, Oral, Nightly, Jonnie Sky PA-C, 10 mg at 01/02/20 2111  •  dextrose (D50W) 25 g/ 50mL Intravenous Solution 25 g, 25 g, Intravenous, Q15 Min PRN, Jonnie Sky PA-C  •  dextrose (GLUTOSE) oral gel 15 g, 15 g, Oral, Q15 Min PRN, Jonnie Sky PA-C  •  enoxaparin (LOVENOX) syringe 170 mg, 1 mg/kg, Subcutaneous, Q12H, Tiago Fermin, [facility], 170 mg at 01/03/20 0223  •  glucagon (human recombinant) (GLUCAGEN DIAGNOSTIC) injection 1 mg, 1 mg, Subcutaneous, Q15 Min PRN, Jonnie Sky PA-C  •  insulin lispro (humaLOG) injection 0-7 Units, 0-7 Units, Subcutaneous, 4x Daily With Meals & Nightly, Jonnie Sky PA-C, 2 Units at 01/02/20 2113  •  ipratropium-albuterol (DUO-NEB) nebulizer solution 3 mL, 3 mL, Nebulization, Q4H PRN, Jonnie Sky PA-C  •  levothyroxine (SYNTHROID, LEVOTHROID) tablet 100 mcg, 100 mcg, Oral, Q  AM, Jonnie Sky PA-C, 100 mcg at 01/03/20 0510  •  melatonin tablet 5 mg, 5 mg, Oral, Nightly, Idris Leal MD, 5 mg at 01/02/20 2110  •  nitroglycerin (NITROSTAT) SL tablet 0.4 mg, 0.4 mg, Sublingual, Q5 Min PRN, Maegan Ramos, APRN  •  ondansetron (ZOFRAN) injection 4 mg, 4 mg, Intravenous, Q6H PRN, Jonnie Sky PA-C  •  Pharmacy to Dose enoxaparin (LOVENOX), , Does not apply, Continuous PRN, Madeleine Du, APRN  •  sertraline (ZOLOFT) tablet 50 mg, 50 mg, Oral, Daily, Jonnie Sky PA-C, 50 mg at 01/03/20 0810  •  sodium chloride 0.9 % flush 10 mL, 10 mL, Intravenous, PRN, Austen Jack MD  •  sodium chloride 0.9 % flush 10 mL, 10 mL, Intravenous, Q12H, Jonnie Sky PA-C, 10 mL at 01/03/20 0811  •  sodium chloride 0.9 % flush 10 mL, 10 mL, Intravenous, PRN, Jonnie Sky PA-C  •  sodium chloride 0.9 % infusion, 75 mL/hr, Intravenous, Continuous, Idris Leal MD, Last Rate: 75 mL/hr at 01/02/20 2112, 75 mL/hr at 01/02/20 2112  •  terazosin (HYTRIN) capsule 2 mg, 2 mg, Oral, Nightly, Idris Leal MD, 2 mg at 01/02/20 2111      History of present illness:  Ms. Kee is a 68 y/o female with the above noted past medical history who presented to [facility] On 12/28/19 with complaints of syncopal episode.  Per the patient and her  she had gone to use the bathroom.  She urinated.  Was finished and was walking back to her bedroom when she had loss of consciousness.  Her  states he was downstairs and heard a large thump and when he came to evaluate her she was moaning but was not coherent.  She remained like that for a few minutes then regained full consciousness.  He denied any seizure activity.  The patient denied any preceding prodrome of chest pain, palpitations, dyspnea, lightheadedness.  She has never had any prior syncopal episodes.     While in the ED a noncontrast CT of the chest workup revealed PNA and she was admitted for acute on chronic respiratory failure  "with hypoxia. She was admitted by medicine, started on Abx and ID was consulted. Overnight, there was concern for possible new onset atrial fibrillation. She was apparently asymptomatic at the time. She was started on Lovenox 1 mg/kg BID for stroke prophy. Cardiology is consulted for further evaluation/management.  Review of her 10 telemetry shows sinus rhythm with PACs and short runs of atrial tachycardia but there is no atrial fibrillation documented she remains asymptomatic. She has no known history of atrial fibrillation or any heart problems. She denies worsening shortness of breath, chest pain, LH, dizziness. No PND, orthopnea, ART. Further review of telemetry and EKGs reveal NSR with frequent PACs      Social History:  Social History     Socioeconomic History   • Marital status:      Spouse name: Not on file   • Number of children: Not on file   • Years of education: Not on file   • Highest education level: Not on file   Tobacco Use   • Smoking status: Former Smoker     Types: Cigarettes     Last attempt to quit: 2006     Years since quittin.0   • Smokeless tobacco: Never Used   Substance and Sexual Activity   • Alcohol use: No     Frequency: Never   • Drug use: Never   • Sexual activity: Defer       Family History:  History reviewed. No pertinent family history.    Review of Systems  Pertinent positives are listed in the HPI.  All other systems reviewed are negative.     Objective     Vital Sign Min/Max for last 24 hours  Temp  Min: 97.8 °F (36.6 °C)  Max: 98.5 °F (36.9 °C)   BP  Min: 107/43  Max: 161/65   Pulse  Min: 74  Max: 97   Resp  Min: 16  Max: 18   SpO2  Min: 92 %  Max: 97 %   No data recorded   No data recorded     Flowsheet Rows      First Filed Value   Admission Height  162.6 cm (64\") Documented at 2019 1700   Admission Weight  134 kg (295 lb) Documented at 2019 1700          Physical Exam:  GENERAL: This is a poorly-groomed obese female who is in no acute distress. Alert " and oriented x3. Normal mood and affect.   SKIN: Pink and warm without rash or abnormality noted.  Fungal toenail infection present, venous stasis lower legs  HEENT: Head is normocephalic and atraumatic. Pupils are equal and reactive to light bilaterally. Mucous membranes are pink and moist.   NECK: Supple without lymphadenopathy or thyromegaly. There is no jugular venous distention at 30°.  No carotid bruits  LUNGS: Clear to auscultation bilaterally without wheezing, rhonchi, or rales noted.   CARDIOVASCULAR: The heart has a regular rate irregular rhythm with a normal S1 and S2. There is a soft systolic murmur murmur, gallop, rub, or click appreciated. The PMI is nondisplaced. Carotid upstrokes are 2+ and symmetrical without bruits.  There are occasional extrasystoles  ABDOMEN: Soft and nondistended with positive bowel sounds x4. The patient denies tenderness of palpitation.   MUSCULOSKELETAL: There are no obvious bony abnormalities. Normal range of tenderness to palpation.   NEUROLOGICAL: Nonfocal.   PERIPHERAL VASCULAR: Femoral pulses are 2+ and symmetrical without bruits. Posterior tibial and dorsalis pedis pulses are 2+ and symmetrical. There is no peripheral edema.      EKG:  NSR with frequent PACs  Tele:  NSR with frequent PACs 70's.  No atrial fibrillation seen    Labs:      Lab Results   Component Value Date    WBC 7.70 01/02/2020    HGB 10.5 (L) 01/02/2020    HCT 32.5 (L) 01/02/2020    .4 (H) 01/02/2020     01/02/2020     Lab Results   Component Value Date    GLUCOSE 163 (H) 01/02/2020    BUN 14 01/02/2020    CREATININE 0.79 01/02/2020    EGFRIFNONA 72 01/02/2020    EGFRIFAFRI 51 (L) 05/22/2019    BCR 17.7 01/02/2020    K 3.7 01/02/2020    CO2 30.0 (H) 01/02/2020    CALCIUM 9.3 01/02/2020    PROTENTOTREF 6.5 05/22/2019    ALBUMIN 2.70 (L) 01/02/2020    LABIL2 1.0 05/22/2019    AST 14 01/02/2020    ALT 11 01/02/2020     Lab Results   Component Value Date    CKTOTAL 52 12/28/2019    TROPONINT  <0.010 01/02/2020     No results found for: INR, PROTIME  Lab Results   Component Value Date    CHOL 158 11/08/2019    CHLPL 154 05/22/2019    TRIG 201 (H) 11/08/2019    HDL 33 (L) 11/08/2019    LDL 85 11/08/2019              Results Review:  I reviewed the patients new clinical results.      Assessment and Plan:  1) PACs  [place]  -Concern for atrial fibrillation overnight per note, 1/3/20. She was asymptomatic. Review of available EKGs and telemetry is consistent with NSR with frequent PACs and short runs of Atach.    - TSH WNL  - Send home with an event monitor to evaluate for occult Afib, any other arrhythmias which may predispose to syncope.  Order for 30-day mobile cardiac telemetry monitor placed.  Call our office on day of discharge and will be placed  - If patient were to be symptomatic, could add metoprolol   - No indication for Lovenox for stroke prevention at this time.  Will discontinue anticoagulation    2) Syncope  - patient suffered a syncopal event which brought her to ED  - Will obtain echocardiogram to evaluate for source of syncope  - Send home with an event monitor to evaluate for cause of syncope    4) CAP  acute on chronic respiratory failure with hypoxia  hypoventilation due to obesity  COPD  - On Abx  - per medicine and ID    5) CNS bacteremia  - ID following, suspected skin contaminant    6) UTI  - on Abx    7) T2DM  - per primary team    8) CKD  - near baseline    9) HTN, controlled  - Continue current regimen, adjust as needed  -Some home medications are currently held given predominant normotension    We will place a follow-up appointment with me in the office to review results of the 30-day monitor in about 6 weeks      I discussed the patients findings and my recommendations with patient.    Scribed for Dr. Thony Ohara MD by KALIN Weber. 1/3/2020  11:13 AM      I,Thony Ohara M.D., personally performed the services described in this documentation as scribed  by the above named individual in my presence, and it is both accurate and complete.  1/3/2020  12:22 PM

## 2020-01-03 NOTE — PROGRESS NOTES
Continued Stay Note  [facility]     Patient Name: Julissa Kee  MRN: [id number]  Today's Date: 1/3/2020    Admit Date: 12/28/2019    Discharge Plan     Row Name 01/03/20 1150       Plan    Plan Comments  Per Sadie at [facility], they are not able to offer Mrs. Kee a skilled bed. I discussed this with Mr. Kee outside of the room. I notified him that we will need to make additional referrals. He is very hesitant to make referrals to other facilities. I encouraged him to discuss this with his daughter Luz and I told him I would discuss it with him again at the bedside this afternoon.        Discharge Codes    No documentation.             Andrew Sosa RN

## 2020-01-04 LAB
BACTERIA SPEC AEROBE CULT: NORMAL
BACTERIA SPEC AEROBE CULT: NORMAL
BH CV ECHO MEAS - AO MAX PG (FULL): 12.1 MMHG
BH CV ECHO MEAS - AO MAX PG: 18.1 MMHG
BH CV ECHO MEAS - AO MEAN PG (FULL): 6.7 MMHG
BH CV ECHO MEAS - AO MEAN PG: 10.3 MMHG
BH CV ECHO MEAS - AO ROOT AREA (BSA CORRECTED): 1
BH CV ECHO MEAS - AO ROOT AREA: 5 CM^2
BH CV ECHO MEAS - AO ROOT DIAM: 2.5 CM
BH CV ECHO MEAS - AO V2 MAX: 212.7 CM/SEC
BH CV ECHO MEAS - AO V2 MEAN: 150.3 CM/SEC
BH CV ECHO MEAS - AO V2 VTI: 40.2 CM
BH CV ECHO MEAS - AVA(I,A): 2.1 CM^2
BH CV ECHO MEAS - AVA(I,D): 2.1 CM^2
BH CV ECHO MEAS - AVA(V,A): 1.8 CM^2
BH CV ECHO MEAS - AVA(V,D): 1.8 CM^2
BH CV ECHO MEAS - BSA(HAYCOCK): 2.9 M^2
BH CV ECHO MEAS - BSA: 2.5 M^2
BH CV ECHO MEAS - BZI_BMI: 62.8 KILOGRAMS/M^2
BH CV ECHO MEAS - BZI_METRIC_HEIGHT: 162.6 CM
BH CV ECHO MEAS - BZI_METRIC_WEIGHT: 166 KG
BH CV ECHO MEAS - EDV(CUBED): 111.1 ML
BH CV ECHO MEAS - EDV(MOD-SP2): 33 ML
BH CV ECHO MEAS - EDV(MOD-SP4): 68 ML
BH CV ECHO MEAS - EDV(TEICH): 107.9 ML
BH CV ECHO MEAS - EF(CUBED): 62.2 %
BH CV ECHO MEAS - EF(MOD-SP2): 63.6 %
BH CV ECHO MEAS - EF(MOD-SP4): 76.5 %
BH CV ECHO MEAS - EF(TEICH): 53.7 %
BH CV ECHO MEAS - ESV(CUBED): 42 ML
BH CV ECHO MEAS - ESV(MOD-SP2): 12 ML
BH CV ECHO MEAS - ESV(MOD-SP4): 16 ML
BH CV ECHO MEAS - ESV(TEICH): 50 ML
BH CV ECHO MEAS - FS: 27.7 %
BH CV ECHO MEAS - IVS/LVPW: 0.93
BH CV ECHO MEAS - IVSD: 1.4 CM
BH CV ECHO MEAS - LA DIMENSION: 3.9 CM
BH CV ECHO MEAS - LA/AO: 1.5
BH CV ECHO MEAS - LAD MAJOR: 6.8 CM
BH CV ECHO MEAS - LAT PEAK E' VEL: 5.7 CM/SEC
BH CV ECHO MEAS - LV DIASTOLIC VOL/BSA (35-75): 26.9 ML/M^2
BH CV ECHO MEAS - LV MASS(C)D: 283.3 GRAMS
BH CV ECHO MEAS - LV MASS(C)DI: 112 GRAMS/M^2
BH CV ECHO MEAS - LV MAX PG: 6 MMHG
BH CV ECHO MEAS - LV MEAN PG: 3.6 MMHG
BH CV ECHO MEAS - LV SYSTOLIC VOL/BSA (12-30): 6.3 ML/M^2
BH CV ECHO MEAS - LV V1 MAX: 122.4 CM/SEC
BH CV ECHO MEAS - LV V1 MEAN: 89.8 CM/SEC
BH CV ECHO MEAS - LV V1 VTI: 27.2 CM
BH CV ECHO MEAS - LVIDD: 4.8 CM
BH CV ECHO MEAS - LVIDS: 3.5 CM
BH CV ECHO MEAS - LVLD AP2: 5.1 CM
BH CV ECHO MEAS - LVLD AP4: 7.3 CM
BH CV ECHO MEAS - LVLS AP2: 5.2 CM
BH CV ECHO MEAS - LVLS AP4: 5.8 CM
BH CV ECHO MEAS - LVOT AREA (M): 3.1 CM^2
BH CV ECHO MEAS - LVOT AREA: 3.1 CM^2
BH CV ECHO MEAS - LVOT DIAM: 2 CM
BH CV ECHO MEAS - LVPWD: 1.5 CM
BH CV ECHO MEAS - MED PEAK E' VEL: 6.8 CM/SEC
BH CV ECHO MEAS - PA ACC SLOPE: 1431 CM/SEC^2
BH CV ECHO MEAS - PA ACC TIME: 0.1 SEC
BH CV ECHO MEAS - PA PR(ACCEL): 33.1 MMHG
BH CV ECHO MEAS - RAP SYSTOLE: 8 MMHG
BH CV ECHO MEAS - RVSP: 31 MMHG
BH CV ECHO MEAS - SI(AO): 79.6 ML/M^2
BH CV ECHO MEAS - SI(CUBED): 27.3 ML/M^2
BH CV ECHO MEAS - SI(LVOT): 33.1 ML/M^2
BH CV ECHO MEAS - SI(MOD-SP2): 8.3 ML/M^2
BH CV ECHO MEAS - SI(MOD-SP4): 20.6 ML/M^2
BH CV ECHO MEAS - SI(TEICH): 22.9 ML/M^2
BH CV ECHO MEAS - SV(AO): 201.3 ML
BH CV ECHO MEAS - SV(CUBED): 69.2 ML
BH CV ECHO MEAS - SV(LVOT): 83.6 ML
BH CV ECHO MEAS - SV(MOD-SP2): 21 ML
BH CV ECHO MEAS - SV(MOD-SP4): 52 ML
BH CV ECHO MEAS - SV(TEICH): 57.9 ML
BH CV ECHO MEAS - TR MAX PG: 23 MMHG
BH CV ECHO MEAS - TR MAX VEL: 239.8 CM/SEC
BH CV VAS BP RIGHT ARM: NORMAL MMHG
GLUCOSE BLDC GLUCOMTR-MCNC: 127 MG/DL (ref 70–130)
GLUCOSE BLDC GLUCOMTR-MCNC: 138 MG/DL (ref 70–130)
GLUCOSE BLDC GLUCOMTR-MCNC: 148 MG/DL (ref 70–130)
GLUCOSE BLDC GLUCOMTR-MCNC: 150 MG/DL (ref 70–130)
MAXIMAL PREDICTED HEART RATE: 150 BPM
STRESS TARGET HR: 128 BPM

## 2020-01-04 PROCEDURE — 99231 SBSQ HOSP IP/OBS SF/LOW 25: CPT | Performed by: FAMILY MEDICINE

## 2020-01-04 PROCEDURE — 82962 GLUCOSE BLOOD TEST: CPT

## 2020-01-04 PROCEDURE — 93010 ELECTROCARDIOGRAM REPORT: CPT | Performed by: INTERNAL MEDICINE

## 2020-01-04 PROCEDURE — 93005 ELECTROCARDIOGRAM TRACING: CPT | Performed by: INTERNAL MEDICINE

## 2020-01-04 PROCEDURE — 25010000002 HEPARIN (PORCINE) PER 1000 UNITS: Performed by: NURSE PRACTITIONER

## 2020-01-04 PROCEDURE — 99232 SBSQ HOSP IP/OBS MODERATE 35: CPT | Performed by: INTERNAL MEDICINE

## 2020-01-04 RX ORDER — METOPROLOL SUCCINATE 25 MG/1
25 TABLET, EXTENDED RELEASE ORAL
Status: DISCONTINUED | OUTPATIENT
Start: 2020-01-04 | End: 2020-01-08 | Stop reason: HOSPADM

## 2020-01-04 RX ADMIN — LEVOTHYROXINE SODIUM 100 MCG: 100 TABLET ORAL at 05:13

## 2020-01-04 RX ADMIN — METOPROLOL SUCCINATE 25 MG: 25 TABLET, EXTENDED RELEASE ORAL at 09:54

## 2020-01-04 RX ADMIN — ATORVASTATIN CALCIUM 10 MG: 10 TABLET, FILM COATED ORAL at 21:28

## 2020-01-04 RX ADMIN — HEPARIN SODIUM 5000 UNITS: 5000 INJECTION INTRAVENOUS; SUBCUTANEOUS at 21:28

## 2020-01-04 RX ADMIN — ASPIRIN 81 MG: 81 TABLET, COATED ORAL at 08:31

## 2020-01-04 RX ADMIN — HEPARIN SODIUM 5000 UNITS: 5000 INJECTION INTRAVENOUS; SUBCUTANEOUS at 08:31

## 2020-01-04 RX ADMIN — SERTRALINE HYDROCHLORIDE 50 MG: 50 TABLET ORAL at 08:31

## 2020-01-04 RX ADMIN — SODIUM CHLORIDE, PRESERVATIVE FREE 10 ML: 5 INJECTION INTRAVENOUS at 08:32

## 2020-01-04 RX ADMIN — SODIUM CHLORIDE 75 ML/HR: 900 INJECTION INTRAVENOUS at 09:54

## 2020-01-04 RX ADMIN — MELATONIN TAB 5 MG 5 MG: 5 TAB at 21:28

## 2020-01-04 RX ADMIN — TERAZOSIN HYDROCHLORIDE 2 MG: 2 CAPSULE ORAL at 21:28

## 2020-01-04 NOTE — PROGRESS NOTES
"[facility] - Progress Note    Julissa Kee  1950  [id number]     LOS: 7 days     Patient Care Team:  Kee Hoffman MD as PCP - General  Kee Hoffman MD as PCP - Family Medicine    01/04/20    Chief Complaint: syncope,PAC's     Subjective:   No acute events overnight.  Normotensive.  Still with occasional ectopy on telemetry.      aspirin 81 mg Oral Daily   atorvastatin 10 mg Oral Nightly   heparin (porcine) 5,000 Units Subcutaneous Q12H   insulin lispro 0-7 Units Subcutaneous 4x Daily With Meals & Nightly   levothyroxine 100 mcg Oral Q AM   melatonin 5 mg Oral Nightly   sertraline 50 mg Oral Daily   sodium chloride 10 mL Intravenous Q12H   terazosin 2 mg Oral Nightly       Objective:  Vitals:   height is 162.6 cm (64\") and weight is 166 kg (366 lb) (abnormal). Her oral temperature is 97.2 °F (36.2 °C). Her blood pressure is 130/56 and her pulse is 67. Her respiration is 16 and oxygen saturation is 99%.     Intake/Output Summary (Last 24 hours) at 1/4/2020 0706  Last data filed at 1/4/2020 0620  Gross per 24 hour   Intake 1881 ml   Output 2000 ml   Net -119 ml       Physical Exam:  GENERAL: This is a poorly-groomed obese female who is in no acute distress. Alert and oriented x3.   SKIN:  Fungal toenail infection present, venous stasis lower legs  LUNGS: Clear to auscultation bilaterally without wheezing, rhonchi, or rales noted.   CARDIOVASCULAR: The heart has a regular rate irregular rhythm with a normal S1 and S2. There is a soft systolic murmur  There are occasional extrasystoles  ABDOMEN: Soft and nondistended    MUSCULOSKELETAL: There are no obvious bony abnormalities. Normal range of tenderness to palpation.                 Results from last 7 days   Lab Units 01/02/20  2133   WBC 10*3/mm3 7.70   HEMOGLOBIN g/dL 10.5*   HEMATOCRIT % 32.5*   PLATELETS 10*3/mm3 167     Results from last 7 days   Lab Units 01/02/20  2133   SODIUM mmol/L 137   POTASSIUM mmol/L 3.7 "   CHLORIDE mmol/L 99   CO2 mmol/L 30.0*   BUN mg/dL 14   CREATININE mg/dL 0.79   CALCIUM mg/dL 9.3   BILIRUBIN mg/dL 0.6   ALK PHOS U/L 62   ALT (SGPT) U/L 11   AST (SGOT) U/L 14   GLUCOSE mg/dL 163*         Results from last 7 days   Lab Units 12/28/19  1725   TSH uIU/mL 2.920         Results from last 7 days   Lab Units 12/28/19  1725   PROBNP pg/mL 98.6         EKG-01/04/20- NSR with PAC's    ECHO:  · Left ventricular systolic function is normal. Estimated EF appears to be in the range of 61 - 65%.  · Left ventricular wall thickness is consistent with moderate concentric hypertrophy.  · Normal right ventricular wall thickness, systolic function and septal motion noted. Right ventricular cavity is borderline dilated.  · Trace tricuspid valve regurgitation is present. Estimated right ventricular systolic pressure from tricuspid regurgitation is normal (<35 mmHg)  · The valve appears trileaflet. The aortic valve is abnormal in structure. There is mild calcification of the aortic valve.Trace aortic valve regurgitation is present. No hemodynamically significant aortic valve stenosis is present.    Problem List:  1. PAC's/Atrial Tachycardia  - home with event monitor at discharge  - follow-up with Dr. Ohara in 6 weeks  2. Syncope  3. Acute on Chronic respiratory failure with hypoxia  -on abx  -per medicine and ID  4. Morbid Obesity  5. UTI-Abx  6. DM2  7. Hypertension     Plan:  -Echocardiogram shows normal ejection fraction and no significant valve disease which would contribute to syncope  -Telemetry and EKGs continue to show sinus rhythm with occasional PACs and short runs of atrial tachycardia  -We will add Toprol XL 25 mg daily to help suppress PACs, patient largely asymptomatic  -Okay for discharge from a cardiology standpoint, she is awaiting rehab placement  -We will sign off, please call with further questions    Discussed plan of care with Dr. Raul Martinez RN. 1/4/2020  7:06 AM

## 2020-01-04 NOTE — PROGRESS NOTES
INFECTIOUS DISEASE PROGRESS NOTE     Julissa Kee  1950  [id number]      Admission Date: 12/28/2019      Requesting Provider: No ref. provider found  Evaluating Physician: Austen Redding MD    Reason for Consultation: Right middle and lower lobe pneumonia, positive blood culture.      History of present illness:  12/30/19:    Patient is a 69 y.o. female seen today for right middle and lower lobe pneumonia, and a positive blood culture. She had a syncopal episode in her bathroom when the family called EMS.  She has been afebrile without leukocytosis.  Chest xray without parenchymal process, with Chest CT, right middle and lower lobe developing bronchopneumonia. One blood culture has turned positive for gram positive cocci  In clusters.  Viral respiratory PCR negative.  Vancomycin and Zosyn initiated and we were consulted for evaluation and treatment.  She has no IV access and the vancomycin and Zosyn cannot be administered.  She was previously given ceftriaxone and doxycycline.  She denies any increased sputum production, nausea, vomiting, dysuria.   12/31/19:   She denies any increased sputum production, shortness of breath. She remains afebrile.  She now has a peripheral IV and her ceftriaxone has been switched from IM to IV.  1/1/20: She denies any shortness of breath.  No sputum production.  She remains afebrile.   1/2/20:  She remains on 2L 02 NC.  No shortness of breath, cough. Refuses to be turned per nursing.  Awaiting placement.   1/3/20:  New onset of afib last pm.  She is waiting to hear about SNF placement.  No n/v/d    1/4/2020: She denies increased cough and sputum production.  She has remained afebrile.    Past Medical History:   Diagnosis Date   • Acute bronchitis    • Acute sinusitis    • Anxiety    • Bacterial conjunctivitis    • Chest pain    • COPD (chronic obstructive pulmonary disease) (CMS/[facility])    • Diabetes mellitus (CMS/[facility])    • Dyspnea    • Dysuria    • Edema    • Elbow injury     • Elbow pain    • Eye drainage    • Fatigue    • Foot pain, right    • Humerus distal fracture    • Hyperlipidemia    • Hypertension    • Hypothyroidism    • Morbid obesity (CMS/HCC)    • Pickwickian syndrome (CMS/HCC)    • Pressure ulcer stage I    • Psoriasis    • SOB (shortness of breath)    • Symptoms of urinary tract infection    • Urinary frequency    • Urinary tract infection    • Vaginal candidiasis        History reviewed. No pertinent surgical history.    History reviewed. No pertinent family history.    Social History     Socioeconomic History   • Marital status:      Spouse name: Not on file   • Number of children: Not on file   • Years of education: Not on file   • Highest education level: Not on file   Tobacco Use   • Smoking status: Former Smoker     Types: Cigarettes     Last attempt to quit: 2006     Years since quittin.0   • Smokeless tobacco: Never Used   Substance and Sexual Activity   • Alcohol use: No     Frequency: Never   • Drug use: Never   • Sexual activity: Defer       Allergies   Allergen Reactions   • Amoxicillin-Pot Clavulanate Other (See Comments)     HEADACHE - has tolerated ceftriaxone 2019   • Bactrim [Sulfamethoxazole-Trimethoprim] Other (See Comments)     .         Medication:    Current Facility-Administered Medications:   •  acetaminophen (TYLENOL) tablet 650 mg, 650 mg, Oral, Q4H PRN, 650 mg at 20 2100 **OR** acetaminophen (TYLENOL) 160 MG/5ML solution 650 mg, 650 mg, Oral, Q4H PRN **OR** acetaminophen (TYLENOL) suppository 650 mg, 650 mg, Rectal, Q4H PRN, Jonnie Sky PA-C  •  aspirin EC tablet 81 mg, 81 mg, Oral, Daily, Jonnie Sky PA-C, 81 mg at 20 0831  •  atorvastatin (LIPITOR) tablet 10 mg, 10 mg, Oral, Nightly, Jonnie Sky PA-C, 10 mg at 20 2101  •  dextrose (D50W) 25 g/ 50mL Intravenous Solution 25 g, 25 g, Intravenous, Q15 Min PRN, Jonnie Sky PA-C  •  dextrose (GLUTOSE) oral gel 15 g, 15 g, Oral, Q15 Min  PRN, Jonnie Sky PA-C  •  glucagon (human recombinant) (GLUCAGEN DIAGNOSTIC) injection 1 mg, 1 mg, Subcutaneous, Q15 Min PRN, Jonnie Sky PA-C  •  heparin (porcine) 5000 UNIT/ML injection 5,000 Units, 5,000 Units, Subcutaneous, Q12H, Roseann Bedolla APRN, 5,000 Units at 01/04/20 0831  •  insulin lispro (humaLOG) injection 0-7 Units, 0-7 Units, Subcutaneous, 4x Daily With Meals & Nightly, Jonnie Sky PA-C, 2 Units at 01/03/20 2101  •  ipratropium-albuterol (DUO-NEB) nebulizer solution 3 mL, 3 mL, Nebulization, Q4H PRN, Jonnie Sky PA-C  •  levothyroxine (SYNTHROID, LEVOTHROID) tablet 100 mcg, 100 mcg, Oral, Q AM, Jonnie Sky PA-C, 100 mcg at 01/04/20 0513  •  melatonin tablet 5 mg, 5 mg, Oral, Nightly, Idris Leal MD, 5 mg at 01/03/20 2101  •  metoprolol succinate XL (TOPROL-XL) 24 hr tablet 25 mg, 25 mg, Oral, Q24H, Thony Ohara MD, 25 mg at 01/04/20 0954  •  nitroglycerin (NITROSTAT) SL tablet 0.4 mg, 0.4 mg, Sublingual, Q5 Min PRN, Maegan Ramos, APRN  •  ondansetron (ZOFRAN) injection 4 mg, 4 mg, Intravenous, Q6H PRN, Jonnie Sky PA-C  •  sertraline (ZOLOFT) tablet 50 mg, 50 mg, Oral, Daily, Jonnie Sky PA-C, 50 mg at 01/04/20 0831  •  sodium chloride 0.9 % flush 10 mL, 10 mL, Intravenous, PRN, Austen Jack MD  •  sodium chloride 0.9 % flush 10 mL, 10 mL, Intravenous, Q12H, Jonnie Sky PA-C, 10 mL at 01/04/20 0832  •  sodium chloride 0.9 % flush 10 mL, 10 mL, Intravenous, PRN, Jonnie Sky PA-C  •  sodium chloride 0.9 % infusion, 75 mL/hr, Intravenous, Continuous, Idris Leal MD, Last Rate: 75 mL/hr at 01/04/20 0954, 75 mL/hr at 01/04/20 0954  •  terazosin (HYTRIN) capsule 2 mg, 2 mg, Oral, Nightly, Idris Leal MD, 2 mg at 01/03/20 2101    Antibiotics:  Anti-Infectives (From admission, onward)    Ordered     Dose/Rate Route Frequency Start Stop    12/28/19 1921  cefTRIAXone (ROCEPHIN) 1 g/100 mL 0.9% NS (MBP)     Ordering  Provider:  Austen Jack MD    1 g  over 30 Minutes Intravenous Once 19 2231    19  AZITHROMYCIN 500 MG/250 ML 0.9% NS IVPB (vial-mate)     Ordering Provider:  Austen Jack MD    500 mg  over 60 Minutes Intravenous Once 19 0030                Physical Exam:   Vital Signs  Temp (24hrs), Av.7 °F (36.5 °C), Min:97.2 °F (36.2 °C), Max:98.2 °F (36.8 °C)    Temp  Min: 97.2 °F (36.2 °C)  Max: 98.2 °F (36.8 °C)  BP  Min: 121/59  Max: 145/61  Pulse  Min: 67  Max: 101  Resp  Min: 16  Max: 18  SpO2  Min: 91 %  Max: 99 %    GENERAL: Awake and alert, in no acute distress.   HEENT: Normocephalic, atraumatic No conjunctival injection. No icterus. Oropharynx clear without evidence of thrush or exudate.     HEART: 1/6 systolic murmur  LUNGS: diminished throughout, no wheezes, rhonchi   ABDOMEN: Soft, nontender, nondistended. Positive bowel sounds. No rebound or guarding. NO mass or HSM. Pannus scabbed lesions, crusting   EXT:  No cyanosis,   :  + purewick   MSK:  No joint effusions   SKIN: Warm and dry      NEURO: Oriented to PPT.  She moves all 4 extremities.  Bilateral thighs with purplish red areas, sacrum with stage 1-2 decubitus, improving     Laboratory Data    Results from last 7 days   Lab Units 20  2133 19  0336 19  0357   WBC 10*3/mm3 7.70 8.33 10.75   HEMOGLOBIN g/dL 10.5* 11.2* 11.4*   HEMATOCRIT % 32.5* 35.2 35.7   PLATELETS 10*3/mm3 167 163 163     Results from last 7 days   Lab Units 20  2133   SODIUM mmol/L 137   POTASSIUM mmol/L 3.7   CHLORIDE mmol/L 99   CO2 mmol/L 30.0*   BUN mg/dL 14   CREATININE mg/dL 0.79   GLUCOSE mg/dL 163*   CALCIUM mg/dL 9.3     Results from last 7 days   Lab Units 20  2133   ALK PHOS U/L 62   BILIRUBIN mg/dL 0.6   ALT (SGPT) U/L 11   AST (SGOT) U/L 14             Results from last 7 days   Lab Units 19  2213   LACTATE mmol/L 1.2     Results from last 7 days   Lab Units 19  1725   CK  TOTAL U/L 52         Estimated Creatinine Clearance: 102.5 mL/min (by C-G formula based on SCr of 0.79 mg/dL).      Microbiology:  Blood Culture   Date Value Ref Range Status   12/28/2019 Abnormal Stain (A)  Preliminary     BCID, PCR   Date Value Ref Range Status   12/28/2019 No organism detected by BCID PCR. No organism detected by BCID PCR. Final   CNS    Urine Culture   Date Value Ref Range Status   12/28/2019 No growth  Final           Radiology:  Imaging Results (Last 72 Hours)     ** No results found for the last 72 hours. **      I read her CT scans      Impression:   1.  Right middle and lower lobe pneumonia- she is stable off of antibiotic therapy.  2.  Decubitus ulcer, stage 1-2-without evidence of cellulitis  3.  Diabetes mellitus, type 2  4.  Obesity  5.  CNS bacteremia- most likely a skin contaminant.  6.  Poor IV access  7.  Poor personal hygiene      PLAN/RECOMMENDATIONS:  1.  Continue off of antibiotic therapy  2.  Discharge to rehab soon    I will sign off  Austen Redding MD  1/4/2020  1:26 PM

## 2020-01-04 NOTE — PROGRESS NOTES
[facility] Medicine Services  PROGRESS NOTE    Patient Name: Julissa Kee  : 1950  MRN: [id number]    Date of Admission: 2019  Primary Care Physician: Kee Hoffman MD    Subjective   Subjective     CC: Hospital follow up for pneumonia    HPI:   Patient is unhappy about discontinuing the vineet-wick catheter.  She wet the bed.  She is reluctant to get up and move around but has been encouraged by nursing.  She denies abdominal pain chest pain shortness of air nausea or vomiting.    Review of Systems    Gen- No fevers, chills  CV- No chest pain, palpitations  Resp- No cough, dyspnea  GI- No N/V/D, abd pain        Objective   Objective     Vital Signs:   Temp:  [97.2 °F (36.2 °C)-98.2 °F (36.8 °C)] 97.6 °F (36.4 °C)  Heart Rate:  [] 82  Resp:  [16-18] 18  BP: (121-145)/(42-61) 141/57     Physical Exam:    Constitutional: No acute distress, awake, alert, resting comfortably in bed  HENT: NCAT, mucous membranes moist  Respiratory: distant breath sounds,  respiratory effort normal, 2l NC   Cardiovascular: RRR, appears NSR with occasional PAC on monitor currently  Gastrointestinal: Positive bowel sounds, obese, soft, nontender, nondistended  Musculoskeletal: chronic bilateral LE edema  Psychiatric: flat affect, cooperative  Neurologic: Oriented x 3, strength symmetric in all extremities, Cranial Nerves grossly intact to confrontation, speech clear    Results Reviewed:    Results from last 7 days   Lab Units 20  1725   WBC 10*3/mm3 7.70 8.33 10.75  --  8.63   HEMOGLOBIN g/dL 10.5* 11.2* 11.4*  --  13.0   HEMATOCRIT % 32.5* 35.2 35.7  --  40.3   PLATELETS 10*3/mm3 167 163 163  --  168   PROCALCITONIN ng/mL  --  0.23  --  0.12 0.09*     Results from last 7 days   Lab Units 2028/19  1725   SODIUM mmol/L 137 137  --  138 137   POTASSIUM mmol/L 3.7  3.5  --  3.8 3.6   CHLORIDE mmol/L 99 94*  --  94* 90*   CO2 mmol/L 30.0* 31.0*  --  35.0* 34.0*   BUN mg/dL 14 18  --  22 20   CREATININE mg/dL 0.79 0.86  --  1.11* 1.13*   GLUCOSE mg/dL 163* 123*  --  137* 177*   CALCIUM mg/dL 9.3 9.6  --  9.1 9.9   ALT (SGPT) U/L 11 11  --   --  10   AST (SGOT) U/L 14 18  --   --  18   TROPONIN T ng/mL <0.010  --  <0.010  --  <0.010   PROBNP pg/mL  --   --   --   --  98.6     Estimated Creatinine Clearance: 102.5 mL/min (by C-G formula based on SCr of 0.79 mg/dL).    Microbiology Results Abnormal     Procedure Component Value - Date/Time    Blood Culture - Blood, Arm, Left [[id number]] Collected:  12/30/19 0415    Lab Status:  Final result Specimen:  Blood from Arm, Left Updated:  01/04/20 0645     Blood Culture No growth at 5 days    Blood Culture - Blood, Hand, Left [[id number]] Collected:  12/30/19 0420    Lab Status:  Final result Specimen:  Blood from Hand, Left Updated:  01/04/20 0645     Blood Culture No growth at 5 days    Blood Culture - Blood, Hand, Right [[id number]] Collected:  12/28/19 1920    Lab Status:  Final result Specimen:  Blood from Hand, Right Updated:  01/02/20 2100     Blood Culture No growth at 5 days    Blood Culture - Blood, Hand, Right [[id number]]  (Abnormal) Collected:  12/28/19 2010    Lab Status:  Final result Specimen:  Blood from Hand, Right Updated:  12/31/19 0638     Blood Culture Staphylococcus, coagulase negative     Comment: Probable contaminant requires clinical correlation, susceptibility not performed unless requested by physician.          Gram Stain Aerobic Bottle Gram positive cocci in clusters    Blood Culture ID, PCR - Blood, Hand, Right [[id number]]  (Normal) Collected:  12/28/19 2010    Lab Status:  Final result Specimen:  Blood from Hand, Right Updated:  12/30/19 0503     BCID, PCR No organism detected by BCID PCR.    Urine Culture - Urine, Urine, Catheter [[id number]]  (Normal) Collected:  12/28/19 2249    Lab Status:  Final result  Specimen:  Urine, Catheter Updated:  12/30/19 0405     Urine Culture No growth    Respiratory Panel, PCR - Swab, Nasopharynx [[id number]]  (Normal) Collected:  12/29/19 0136    Lab Status:  Final result Specimen:  Swab from Nasopharynx Updated:  12/29/19 0858     ADENOVIRUS, PCR Not Detected     Coronavirus 229E Not Detected     Coronavirus HKU1 Not Detected     Coronavirus NL63 Not Detected     Coronavirus OC43 Not Detected     Human Metapneumovirus Not Detected     Human Rhinovirus/Enterovirus Not Detected     Influenza B PCR Not Detected     Parainfluenza Virus 1 Not Detected     Parainfluenza Virus 2 Not Detected     Parainfluenza Virus 3 Not Detected     Parainfluenza Virus 4 Not Detected     Bordetella pertussis pcr Not Detected     Influenza A H1 2009 PCR Not Detected     Chlamydophila pneumoniae PCR Not Detected     Mycoplasma pneumo by PCR Not Detected     Influenza A PCR Not Detected     Influenza A H3 Not Detected     Influenza A H1 Not Detected     RSV, PCR Not Detected     Bordetella parapertussis PCR Not Detected          Imaging Results (Last 24 Hours)     ** No results found for the last 24 hours. **          I have reviewed the medications:  Scheduled Meds:    aspirin 81 mg Oral Daily   atorvastatin 10 mg Oral Nightly   heparin (porcine) 5,000 Units Subcutaneous Q12H   insulin lispro 0-7 Units Subcutaneous 4x Daily With Meals & Nightly   levothyroxine 100 mcg Oral Q AM   melatonin 5 mg Oral Nightly   metoprolol succinate XL 25 mg Oral Q24H   sertraline 50 mg Oral Daily   sodium chloride 10 mL Intravenous Q12H   terazosin 2 mg Oral Nightly     Continuous Infusions:    sodium chloride 75 mL/hr Last Rate: 75 mL/hr (01/04/20 0968)     PRN Meds:.•  acetaminophen **OR** acetaminophen **OR** acetaminophen  •  dextrose  •  dextrose  •  glucagon (human recombinant)  •  ipratropium-albuterol  •  nitroglycerin  •  ondansetron  •  sodium chloride  •  sodium chloride      Assessment/Plan   Assessment & Plan  "    Active Hospital Problems    Diagnosis  POA   • **Acute respiratory failure with hypoxia (CMS/HCC) [J96.01]  Yes   • Renal insufficiency [N28.9]  Yes   • Sacral wound [S31.000A]  Yes   • CAP (community acquired pneumonia) [J18.9]  Yes   • Pneumonia of right lung due to infectious organism [J18.9]  Yes   • Syncope and collapse [R55]  Yes   • Chronic obstructive pulmonary disease (CMS/HCC) [J44.9]  Yes   • Diabetes mellitus (CMS/HCC) [E11.9]  Yes   • Hyperlipidemia [E78.5]  Yes   • Hypertension [I10]  Yes   • Hypothyroidism [E03.9]  Yes   • Extreme obesity with alveolar hypoventilation (CMS/HCC) [E66.2]  Yes   • Anxiety [F41.9]  Yes      Resolved Hospital Problems   No resolved problems to display.        Brief Hospital Course to date:  Julissa Kee is a 69 y.o. female past medical history significant for COPD on supplemental oxygen, DM2, HTN, HLD, renal insuffiencey, and morbid obesity. She presented to ED s/p syncopal episode.Records reviewed indicate that patient is largely immobile with profoundly poor hygiene. She has several wounds that including sacral decubitus ulcer. She was living at home and \"not letting her family take care of her.\"     Acute on Chronic Respiratory failure with hypoxia:  - in the setting of COPD and obesity hypoventilation syndrome  - secondary to developing pneumonia. Demonstrating on CT imaging.  - continue rocephin and doxycycline.  - appreciate ID input.   - continue supplemental oxygen, duonebs, IS  - pulmonary toilet     atrial tachycardia without atrial fibrillation  -cardiology was consulted and feel that she may be having short bursts of atrial tachycardia with PACs and not atrial fibrillation  -send home with an event monitory to evaluate for occult a fib and any other arrhythmias that may predispose to syncope (she has had syncopal episodes at home).  Call cardiology office on day of dc to have telemetry monitor placed before discharge.  -lovenox discontinued.    -Follow-up " with Dr. Ohara in 6 weeks      CNS bacteremia  - likely skin contaminant.     UTI   -urine culture from 12/28 with no growth       DM   Glucose 138-159  Continue current regimen    Sacral wound, lower extremity changes, debility:  -ID following  - PT/OT  - case management assisting with rehab options.      Renal insufficiency:  - appears chronic. Currently at baseline      HTN:  - controlled and stable. Continue home meds     Hypothyroidism:  -TSH 2.920    Dysphagia  -Soft texture ground diet ordered    DVT Prophylaxis: Heparin 5000 units subcu every 12 hours    Disposition: I expect the patient to be discharged to SNF once bed found- referrals have been sent.     CODE STATUS:   Code Status and Medical Interventions:   Ordered at: 12/28/19 1938     Level Of Support Discussed With:    Patient     Code Status:    CPR     Medical Interventions (Level of Support Prior to Arrest):    Full         Electronically signed by Jerica Carter MD, 01/04/20, 1:10 PM.

## 2020-01-05 LAB
ANION GAP SERPL CALCULATED.3IONS-SCNC: 7 MMOL/L (ref 5–15)
BUN BLD-MCNC: 8 MG/DL (ref 8–23)
BUN/CREAT SERPL: 12.7 (ref 7–25)
CALCIUM SPEC-SCNC: 9.3 MG/DL (ref 8.6–10.5)
CHLORIDE SERPL-SCNC: 101 MMOL/L (ref 98–107)
CO2 SERPL-SCNC: 33 MMOL/L (ref 22–29)
CREAT BLD-MCNC: 0.63 MG/DL (ref 0.57–1)
GFR SERPL CREATININE-BSD FRML MDRD: 93 ML/MIN/1.73
GLUCOSE BLD-MCNC: 129 MG/DL (ref 65–99)
GLUCOSE BLDC GLUCOMTR-MCNC: 130 MG/DL (ref 70–130)
GLUCOSE BLDC GLUCOMTR-MCNC: 131 MG/DL (ref 70–130)
GLUCOSE BLDC GLUCOMTR-MCNC: 132 MG/DL (ref 70–130)
GLUCOSE BLDC GLUCOMTR-MCNC: 132 MG/DL (ref 70–130)
HBA1C MFR BLD: 5.2 % (ref 4.8–5.6)
MAGNESIUM SERPL-MCNC: 1.3 MG/DL (ref 1.6–2.4)
POTASSIUM BLD-SCNC: 3.8 MMOL/L (ref 3.5–5.2)
SODIUM BLD-SCNC: 141 MMOL/L (ref 136–145)

## 2020-01-05 PROCEDURE — 80048 BASIC METABOLIC PNL TOTAL CA: CPT | Performed by: PHYSICIAN ASSISTANT

## 2020-01-05 PROCEDURE — 82746 ASSAY OF FOLIC ACID SERUM: CPT | Performed by: PHYSICIAN ASSISTANT

## 2020-01-05 PROCEDURE — 25010000002 MAGNESIUM SULFATE 2 GM/50ML SOLUTION: Performed by: PHYSICIAN ASSISTANT

## 2020-01-05 PROCEDURE — 83735 ASSAY OF MAGNESIUM: CPT | Performed by: PHYSICIAN ASSISTANT

## 2020-01-05 PROCEDURE — 25010000002 HEPARIN (PORCINE) PER 1000 UNITS: Performed by: NURSE PRACTITIONER

## 2020-01-05 PROCEDURE — 82962 GLUCOSE BLOOD TEST: CPT

## 2020-01-05 PROCEDURE — 99232 SBSQ HOSP IP/OBS MODERATE 35: CPT | Performed by: PHYSICIAN ASSISTANT

## 2020-01-05 PROCEDURE — 83036 HEMOGLOBIN GLYCOSYLATED A1C: CPT | Performed by: PHYSICIAN ASSISTANT

## 2020-01-05 PROCEDURE — 82607 VITAMIN B-12: CPT | Performed by: PHYSICIAN ASSISTANT

## 2020-01-05 RX ORDER — MAGNESIUM SULFATE HEPTAHYDRATE 40 MG/ML
2 INJECTION, SOLUTION INTRAVENOUS AS NEEDED
Status: DISCONTINUED | OUTPATIENT
Start: 2020-01-05 | End: 2020-01-08 | Stop reason: HOSPADM

## 2020-01-05 RX ORDER — POTASSIUM CHLORIDE 750 MG/1
40 CAPSULE, EXTENDED RELEASE ORAL AS NEEDED
Status: DISCONTINUED | OUTPATIENT
Start: 2020-01-05 | End: 2020-01-08 | Stop reason: HOSPADM

## 2020-01-05 RX ORDER — POTASSIUM CHLORIDE 1.5 G/1.77G
40 POWDER, FOR SOLUTION ORAL AS NEEDED
Status: DISCONTINUED | OUTPATIENT
Start: 2020-01-05 | End: 2020-01-08 | Stop reason: HOSPADM

## 2020-01-05 RX ORDER — MAGNESIUM SULFATE HEPTAHYDRATE 40 MG/ML
4 INJECTION, SOLUTION INTRAVENOUS AS NEEDED
Status: DISCONTINUED | OUTPATIENT
Start: 2020-01-05 | End: 2020-01-08 | Stop reason: HOSPADM

## 2020-01-05 RX ORDER — AMOXICILLIN 250 MG
2 CAPSULE ORAL 2 TIMES DAILY
Status: DISCONTINUED | OUTPATIENT
Start: 2020-01-05 | End: 2020-01-08 | Stop reason: HOSPADM

## 2020-01-05 RX ADMIN — TERAZOSIN HYDROCHLORIDE 2 MG: 2 CAPSULE ORAL at 22:36

## 2020-01-05 RX ADMIN — ACETAMINOPHEN 650 MG: 325 TABLET ORAL at 09:48

## 2020-01-05 RX ADMIN — ATORVASTATIN CALCIUM 10 MG: 10 TABLET, FILM COATED ORAL at 22:37

## 2020-01-05 RX ADMIN — POLYETHYLENE GLYCOL 3350 17 G: 17 POWDER, FOR SOLUTION ORAL at 13:02

## 2020-01-05 RX ADMIN — MAGNESIUM SULFATE HEPTAHYDRATE 2 G: 40 INJECTION, SOLUTION INTRAVENOUS at 17:11

## 2020-01-05 RX ADMIN — SENNOSIDES AND DOCUSATE SODIUM 2 TABLET: 8.6; 5 TABLET ORAL at 22:37

## 2020-01-05 RX ADMIN — LEVOTHYROXINE SODIUM 100 MCG: 100 TABLET ORAL at 05:55

## 2020-01-05 RX ADMIN — LINAGLIPTIN 5 MG: 5 TABLET, FILM COATED ORAL at 13:02

## 2020-01-05 RX ADMIN — SERTRALINE HYDROCHLORIDE 50 MG: 50 TABLET ORAL at 09:48

## 2020-01-05 RX ADMIN — SENNOSIDES AND DOCUSATE SODIUM 2 TABLET: 8.6; 5 TABLET ORAL at 13:02

## 2020-01-05 RX ADMIN — ASPIRIN 81 MG: 81 TABLET, COATED ORAL at 09:48

## 2020-01-05 RX ADMIN — METOPROLOL SUCCINATE 25 MG: 25 TABLET, EXTENDED RELEASE ORAL at 09:48

## 2020-01-05 RX ADMIN — SODIUM CHLORIDE, PRESERVATIVE FREE 10 ML: 5 INJECTION INTRAVENOUS at 22:37

## 2020-01-05 RX ADMIN — MAGNESIUM SULFATE HEPTAHYDRATE 2 G: 40 INJECTION, SOLUTION INTRAVENOUS at 15:05

## 2020-01-05 RX ADMIN — HEPARIN SODIUM 5000 UNITS: 5000 INJECTION INTRAVENOUS; SUBCUTANEOUS at 22:37

## 2020-01-05 RX ADMIN — MELATONIN TAB 5 MG 5 MG: 5 TAB at 22:37

## 2020-01-05 RX ADMIN — MAGNESIUM SULFATE HEPTAHYDRATE 2 G: 40 INJECTION, SOLUTION INTRAVENOUS at 13:02

## 2020-01-05 RX ADMIN — HEPARIN SODIUM 5000 UNITS: 5000 INJECTION INTRAVENOUS; SUBCUTANEOUS at 09:48

## 2020-01-05 NOTE — PROGRESS NOTES
[facility] Medicine Services  PROGRESS NOTE    Patient Name: Julissa Kee  : 1950  MRN: [id number]    Date of Admission: 2019  Primary Care Physician: Kee Hoffman MD    Subjective     CC: f/u PNA     HPI:   Laying in bed. Slept okay last night. No chest pain or dyspnea. O2 weaned to 2.5L NC - desaturated to 87% on room air. Says she wears O2 at night at baseline but doesn't know how many liters. No abdominal pain, nausea or vomiting. Hasn't moved bowels in a few days. Concerned about how she will move bowels - doesn't like bedpan but has been very reluctant to get out of bed.     Review of Systems  Gen- No fevers, chills  CV- No chest pain, palpitations  Resp- No cough, dyspnea, (+) hypoxia   GI- No N/V/D, abd pain, (+) constipation    Objective     Vital Signs:   Temp:  [97.5 °F (36.4 °C)-98.1 °F (36.7 °C)] 97.5 °F (36.4 °C)  Heart Rate:  [] 62  Resp:  [18-20] 18  BP: (121-141)/(37-62) 121/45     Physical Exam:  Constitutional: No acute distress, awake, alert and conversant. Laying in bed.   HENT: NCAT, mucous membranes moist  Respiratory: Clear to auscultation bilaterally, respiratory effort normal. Sat 90-93% on 2.5L NC   Cardiovascular: RRR, no murmurs, rubs, or gallops, palpable pedal pulses bilaterally  Gastrointestinal: Positive bowel sounds, soft, nontender, nondistended  Musculoskeletal: Chronic non-pitting bilateral ankle edema. BLEs tender to palpation.   Psychiatric: Flat affect, cooperative  Neurologic: Oriented x 3, strength symmetric in all extremities, Cranial Nerves grossly intact to confrontation, speech clear. Mentation seems slow   Skin: No rashes    Results Reviewed:    Results from last 7 days   Lab Units 20  2133 19  0336   WBC 10*3/mm3 7.70 8.33   HEMOGLOBIN g/dL 10.5* 11.2*   HEMATOCRIT % 32.5* 35.2   PLATELETS 10*3/mm3 167 163   PROCALCITONIN ng/mL  --  0.23     Results from last 7 days   Lab Units 20  0654  12/30/19  0336 12/29/19  4465   SODIUM mmol/L 137 137  --    POTASSIUM mmol/L 3.7 3.5  --    CHLORIDE mmol/L 99 94*  --    CO2 mmol/L 30.0* 31.0*  --    BUN mg/dL 14 18  --    CREATININE mg/dL 0.79 0.86  --    GLUCOSE mg/dL 163* 123*  --    CALCIUM mg/dL 9.3 9.6  --    ALT (SGPT) U/L 11 11  --    AST (SGOT) U/L 14 18  --    TROPONIN T ng/mL <0.010  --  <0.010     Estimated Creatinine Clearance: 102.5 mL/min (by C-G formula based on SCr of 0.79 mg/dL).    Microbiology Results Abnormal     Procedure Component Value - Date/Time    Blood Culture - Blood, Arm, Left [[id number]] Collected:  12/30/19 0415    Lab Status:  Final result Specimen:  Blood from Arm, Left Updated:  01/04/20 0645     Blood Culture No growth at 5 days    Blood Culture - Blood, Hand, Left [[id number]] Collected:  12/30/19 0420    Lab Status:  Final result Specimen:  Blood from Hand, Left Updated:  01/04/20 0645     Blood Culture No growth at 5 days    Blood Culture - Blood, Hand, Right [[id number]] Collected:  12/28/19 1920    Lab Status:  Final result Specimen:  Blood from Hand, Right Updated:  01/02/20 2100     Blood Culture No growth at 5 days    Blood Culture - Blood, Hand, Right [[id number]]  (Abnormal) Collected:  12/28/19 2010    Lab Status:  Final result Specimen:  Blood from Hand, Right Updated:  12/31/19 0638     Blood Culture Staphylococcus, coagulase negative     Comment: Probable contaminant requires clinical correlation, susceptibility not performed unless requested by physician.          Gram Stain Aerobic Bottle Gram positive cocci in clusters    Blood Culture ID, PCR - Blood, Hand, Right [[id number]]  (Normal) Collected:  12/28/19 2010    Lab Status:  Final result Specimen:  Blood from Hand, Right Updated:  12/30/19 0503     BCID, PCR No organism detected by BCID PCR.    Urine Culture - Urine, Urine, Catheter [[id number]]  (Normal) Collected:  12/28/19 1908    Lab Status:  Final result Specimen:  Urine, Catheter Updated:  12/30/19 0405      Urine Culture No growth    Respiratory Panel, PCR - Swab, Nasopharynx [[id number]]  (Normal) Collected:  12/29/19 0136    Lab Status:  Final result Specimen:  Swab from Nasopharynx Updated:  12/29/19 0858     ADENOVIRUS, PCR Not Detected     Coronavirus 229E Not Detected     Coronavirus HKU1 Not Detected     Coronavirus NL63 Not Detected     Coronavirus OC43 Not Detected     Human Metapneumovirus Not Detected     Human Rhinovirus/Enterovirus Not Detected     Influenza B PCR Not Detected     Parainfluenza Virus 1 Not Detected     Parainfluenza Virus 2 Not Detected     Parainfluenza Virus 3 Not Detected     Parainfluenza Virus 4 Not Detected     Bordetella pertussis pcr Not Detected     Influenza A H1 2009 PCR Not Detected     Chlamydophila pneumoniae PCR Not Detected     Mycoplasma pneumo by PCR Not Detected     Influenza A PCR Not Detected     Influenza A H3 Not Detected     Influenza A H1 Not Detected     RSV, PCR Not Detected     Bordetella parapertussis PCR Not Detected        Imaging Results (Last 24 Hours)     ** No results found for the last 24 hours. **        I have reviewed the medications:  Scheduled Meds:    aspirin 81 mg Oral Daily   atorvastatin 10 mg Oral Nightly   heparin (porcine) 5,000 Units Subcutaneous Q12H   insulin lispro 0-7 Units Subcutaneous 4x Daily With Meals & Nightly   levothyroxine 100 mcg Oral Q AM   melatonin 5 mg Oral Nightly   metoprolol succinate XL 25 mg Oral Q24H   sertraline 50 mg Oral Daily   sodium chloride 10 mL Intravenous Q12H   terazosin 2 mg Oral Nightly     Continuous Infusions:     PRN Meds:  •  acetaminophen **OR** [DISCONTINUED] acetaminophen **OR** [DISCONTINUED] acetaminophen  •  dextrose  •  dextrose  •  glucagon (human recombinant)  •  ipratropium-albuterol  •  nitroglycerin  •  ondansetron  •  sodium chloride  •  sodium chloride    Assessment & Plan     Active Hospital Problems    Diagnosis  POA   • **Acute respiratory failure with hypoxia (CMS/[facility]) [J96.01]  Yes  "  • Renal insufficiency [N28.9]  Yes   • Sacral wound [S31.000A]  Yes   • CAP (community acquired pneumonia) [J18.9]  Yes   • Pneumonia of right lung due to infectious organism [J18.9]  Yes   • Syncope and collapse [R55]  Yes   • Chronic obstructive pulmonary disease (CMS/HCC) [J44.9]  Yes   • Diabetes mellitus (CMS/HCC) [E11.9]  Yes   • Hyperlipidemia [E78.5]  Yes   • Hypertension [I10]  Yes   • Hypothyroidism [E03.9]  Yes   • Extreme obesity with alveolar hypoventilation (CMS/HCC) [E66.2]  Yes   • Anxiety [F41.9]  Yes      Resolved Hospital Problems   No resolved problems to display.     Brief Hospital Course to date:  Julissa Kee is a 69 y.o. female with PMH significant for COPD on supplemental oxygen, DM2, HTN, HLD, renal insuffiency, and morbid obesity. She is largely immobile with profoundly poor hygiene. She has several wounds that including sacral decubitus ulcer. She was living at home and \"not letting her family take care of her.\" Admitted to [facility] 12/28/2019 for a/c respiratory failure and atrial tachycardia.     Acute on chronic respiratory failure with hypoxia  Right middle lobe pneumonia   - in the setting of COPD and obesity hypoventilation syndrome  - secondary to developing pneumonia. Demonstrated on CT imaging.  - Appreciate ID assistance. s/p IV Rocephin/Docycyline. Now monitoring off of antibiotics. ID has signed off.   - Encourage IS, wean O2 as able     Atrial tachycardia without atrial fibrillation  - Appreciate cardiology assistance - cardiology felt that patient may be having short bursts of atrial tachycardia with PACs and not true atrial fibrillation  - Started on Toprol XL 25mg PO daily   - Plan to discharge with event monitor to evaluate for occult afib/other arrhythmia that may predispose her to syncope (she has had syncopal episodes at home).    - Need to call cardiology office on day of dc to have event monitor placed before discharge.   - Follow-up with Dr. Ohara in 6 weeks    CNS " bacteremia  - likely skin contaminant.     UTI   - Urine culture from 12/28 with no growth  - Encourage out of bed for toileting     DMII  - Check A1c  - Patient was using insulin at home, doesn't know dose and  wasn't checking blood sugars  - Check A1c  - Sugars here have been largely controlled off of insulin and she is using minimal SSI  - Start Linagliptin 5mg QAM and monitor on PO therapy    Sacral wound, lower extremity changes, debility:  - ID following - monitoring off of abx   - PT/OT  - case management assisting with rehab options.      Renal insufficiency:  - appears chronic. Creatinine at baseline      HTN:  - controlled and stable. Continue home meds     Hypothyroidism:  -TSH 2.920    Dysphagia  - Soft texture ground diet ordered    DVT Prophylaxis: Heparin   Disposition: I expect the patient to be discharged to SNF when a bed is available     CODE STATUS:   Code Status and Medical Interventions:   Ordered at: 12/28/19 1938     Level Of Support Discussed With:    Patient     Code Status:    CPR     Medical Interventions (Level of Support Prior to Arrest):    Full     Electronically signed by Xiomara Plascencia PA-C, 01/05/20, 9:42 AM.

## 2020-01-06 LAB
FOLATE SERPL-MCNC: <2 NG/ML (ref 4.78–24.2)
GLUCOSE BLDC GLUCOMTR-MCNC: 121 MG/DL (ref 70–130)
GLUCOSE BLDC GLUCOMTR-MCNC: 125 MG/DL (ref 70–130)
GLUCOSE BLDC GLUCOMTR-MCNC: 130 MG/DL (ref 70–130)
GLUCOSE BLDC GLUCOMTR-MCNC: 158 MG/DL (ref 70–130)
MAGNESIUM SERPL-MCNC: 2.1 MG/DL (ref 1.6–2.4)
VIT B12 BLD-MCNC: 263 PG/ML (ref 211–946)

## 2020-01-06 PROCEDURE — 82962 GLUCOSE BLOOD TEST: CPT

## 2020-01-06 PROCEDURE — 25010000002 HEPARIN (PORCINE) PER 1000 UNITS: Performed by: NURSE PRACTITIONER

## 2020-01-06 PROCEDURE — 83735 ASSAY OF MAGNESIUM: CPT | Performed by: PHYSICIAN ASSISTANT

## 2020-01-06 PROCEDURE — 63710000001 INSULIN LISPRO (HUMAN) PER 5 UNITS: Performed by: PHYSICIAN ASSISTANT

## 2020-01-06 PROCEDURE — 99232 SBSQ HOSP IP/OBS MODERATE 35: CPT | Performed by: INTERNAL MEDICINE

## 2020-01-06 PROCEDURE — 97110 THERAPEUTIC EXERCISES: CPT

## 2020-01-06 RX ADMIN — TERAZOSIN HYDROCHLORIDE 2 MG: 2 CAPSULE ORAL at 21:06

## 2020-01-06 RX ADMIN — HEPARIN SODIUM 5000 UNITS: 5000 INJECTION INTRAVENOUS; SUBCUTANEOUS at 08:53

## 2020-01-06 RX ADMIN — ASPIRIN 81 MG: 81 TABLET, COATED ORAL at 08:54

## 2020-01-06 RX ADMIN — ATORVASTATIN CALCIUM 10 MG: 10 TABLET, FILM COATED ORAL at 21:06

## 2020-01-06 RX ADMIN — SERTRALINE HYDROCHLORIDE 50 MG: 50 TABLET ORAL at 08:54

## 2020-01-06 RX ADMIN — INSULIN LISPRO 2 UNITS: 100 INJECTION, SOLUTION INTRAVENOUS; SUBCUTANEOUS at 12:10

## 2020-01-06 RX ADMIN — POLYETHYLENE GLYCOL 3350 17 G: 17 POWDER, FOR SOLUTION ORAL at 08:54

## 2020-01-06 RX ADMIN — SODIUM CHLORIDE, PRESERVATIVE FREE 10 ML: 5 INJECTION INTRAVENOUS at 08:54

## 2020-01-06 RX ADMIN — HEPARIN SODIUM 5000 UNITS: 5000 INJECTION INTRAVENOUS; SUBCUTANEOUS at 21:07

## 2020-01-06 RX ADMIN — MELATONIN TAB 5 MG 5 MG: 5 TAB at 21:06

## 2020-01-06 RX ADMIN — SENNOSIDES AND DOCUSATE SODIUM 2 TABLET: 8.6; 5 TABLET ORAL at 08:53

## 2020-01-06 RX ADMIN — SODIUM CHLORIDE, PRESERVATIVE FREE 10 ML: 5 INJECTION INTRAVENOUS at 21:07

## 2020-01-06 RX ADMIN — LINAGLIPTIN 5 MG: 5 TABLET, FILM COATED ORAL at 08:54

## 2020-01-06 RX ADMIN — METOPROLOL SUCCINATE 25 MG: 25 TABLET, EXTENDED RELEASE ORAL at 08:54

## 2020-01-06 RX ADMIN — SENNOSIDES AND DOCUSATE SODIUM 2 TABLET: 8.6; 5 TABLET ORAL at 21:06

## 2020-01-06 RX ADMIN — LEVOTHYROXINE SODIUM 100 MCG: 100 TABLET ORAL at 05:32

## 2020-01-06 NOTE — PROGRESS NOTES
Continued Stay Note  [facility]     Patient Name: Julissa Kee  MRN: [id number]  Today's Date: 1/6/2020    Admit Date: 12/28/2019    Discharge Plan     Row Name 01/06/20 1425       Plan    Plan  skilled nursing facility    Plan Comments  I followed up on referrals made on Friday. Mrs. Kee has not received any bed offers to date. I discussed this with Mrs. Kee and her  Familia. I informed him that I will have to make additional referrals to the rest of the facilities in [facility] since no bed offer has been made. He verbalized understanding. I have called additional referrals to [facility], and [facility].    Final Discharge Disposition Code  03 - skilled nursing facility (SNF)        Discharge Codes    No documentation.             Andrew Sosa RN

## 2020-01-06 NOTE — PLAN OF CARE
Problem: Patient Care Overview  Goal: Plan of Care Review  Flowsheets (Taken 1/6/2020 0317)  Progress: no change  Plan of Care Reviewed With: patient  Outcome Summary: Pt req max encouragement to participate, eventually agreed to bed level exercises. Pt completed LE exercises with cues. PT spoke with pt about importance of mobility and trying to walk, pt stated maybe next time she could try to walk with a walker. Cont to progress as tolerated, however, if partipation does not improve DC from IP PT services.

## 2020-01-06 NOTE — PLAN OF CARE
VSS stable, awaiting placemen in SNF, patient unable to self care, q2 turn maintained. Patient complains of intermittent leg pain, mostly sensitive to the touch on bilateral distal lower extremity. Neovascular intact. Patient 02 titrated down to 2LNC, unable to maintain sats above 87 w/ hob decreased below 45, with patient requesting due to positional comfort.  Oral dm2 meds added, achs.

## 2020-01-06 NOTE — THERAPY TREATMENT NOTE
Patient Name: Julissa Kee  : 1950    MRN: [id number]                              Today's Date: 2020       Admit Date: 2019    Visit Dx:     ICD-10-CM ICD-9-CM   1. Pneumonia of right lung due to infectious organism, unspecified part of lung J18.9 483.8   2. Impaired mobility and ADLs Z74.09 799.89     Patient Active Problem List   Diagnosis   • Anxiety   • Chronic obstructive pulmonary disease (CMS/HCC)   • Diabetes mellitus (CMS/HCC)   • Hyperlipidemia   • Hypertension   • Hypothyroidism   • Morbid obesity (CMS/HCC)   • Extreme obesity with alveolar hypoventilation (CMS/HCC)   • Renal insufficiency   • Sacral wound   • CAP (community acquired pneumonia)   • Acute respiratory failure with hypoxia (CMS/[facility])   • Pneumonia of right lung due to infectious organism   • Syncope and collapse     Past Medical History:   Diagnosis Date   • Acute bronchitis    • Acute sinusitis    • Anxiety    • Bacterial conjunctivitis    • Chest pain    • COPD (chronic obstructive pulmonary disease) (CMS/[facility])    • Diabetes mellitus (CMS/[facility])    • Dyspnea    • Dysuria    • Edema    • Elbow injury    • Elbow pain    • Eye drainage    • Fatigue    • Foot pain, right    • Humerus distal fracture    • Hyperlipidemia    • Hypertension    • Hypothyroidism    • Morbid obesity (CMS/HCC)    • Pickwickian syndrome (CMS/[facility])    • Pressure ulcer stage I    • Psoriasis    • SOB (shortness of breath)    • Symptoms of urinary tract infection    • Urinary frequency    • Urinary tract infection    • Vaginal candidiasis      History reviewed. No pertinent surgical history.  General Information     Row Name 20 1544          PT Evaluation Time/Intention    Document Type  therapy note (daily note)  -ZACHARIAH     Mode of Treatment  physical therapy  -     Row Name 20 1544          General Information    Patient Profile Reviewed?  yes  -ZACHARIAH     Existing Precautions/Restrictions  fall;oxygen therapy device and L/min;other (see comments)  Sacral wound.   -ZACHARIAH     Row Name 01/06/20 1544          Cognitive Assessment/Intervention- PT/OT    Orientation Status (Cognition)  oriented x 3  -ZACHARIAH     Row Name 01/06/20 1544          Safety Issues, Functional Mobility    Impairments Affecting Function (Mobility)  balance;strength;pain;postural/trunk control;endurance/activity tolerance  -ZACHARIAH     Comment, Safety Issues/Impairments (Mobility)  Pt does not appear motivated to participate, req max encouragement for bed level exercises.   -ZACHARIAH       User Key  (r) = Recorded By, (t) = Taken By, (c) = Cosigned By    Initials Name Provider Type    Nina Payne PT Physical Therapist        Mobility     Row Name 01/06/20 1545          Bed Mobility Assessment/Treatment    Comment (Bed Mobility)  Pt declined.   -ZACHARIAH     Row Name 01/06/20 1545          Transfer Assessment/Treatment    Comment (Transfers)  Pt declined.   -ZACHARIAH     Row Name 01/06/20 1545          Gait/Stairs Assessment/Training    Comment (Gait/Stairs)  Declined.   -ZACHARIAH       User Key  (r) = Recorded By, (t) = Taken By, (c) = Cosigned By    Initials Name Provider Type    Nina Payne PT Physical Therapist        Obj/Interventions     Row Name 01/06/20 1546          Therapeutic Exercise    Lower Extremity (Therapeutic Exercise)  quad sets, bilateral;heel slides, bilateral  -ZACHARIAH     Lower Extremity Range of Motion (Therapeutic Exercise)  hip abduction/adduction, bilateral;hip internal/external rotation, bilateral;ankle dorsiflexion/plantar flexion, bilateral  -ZACHARIAH     Exercise Type (Therapeutic Exercise)  AROM (active range of motion)  -ZACHARIAH     Position (Therapeutic Exercise)  supine  -ZACHARIAH     Sets/Reps (Therapeutic Exercise)  10-15  -ZACHARIAH     Comment (Therapeutic Exercise)  Pt req max encouragement to participate. However, once started pt completed with cues and demonstration.   -ZACHARIAH       User Key  (r) = Recorded By, (t) = Taken By, (c) = Cosigned By    Initials Name Provider Type    Nina Payne PT  Physical Therapist        Goals/Plan    No documentation.       Clinical Impression     Row Name 01/06/20 1547          Pain Assessment    Additional Documentation  Pain Scale: FACES Pre/Post-Treatment (Group)  -     Row Name 01/06/20 1547          Pain Scale: FACES Pre/Post-Treatment    Pain: FACES Scale, Pretreatment  2-->hurts little bit  -ZACHARIAH     Pain: FACES Scale, Post-Treatment  2-->hurts little bit  -ZACHARIAH     Pre/Post Treatment Pain Comment  Pt stated her back was a little sore.   -     Row Name 01/06/20 1547          Plan of Care Review    Plan of Care Reviewed With  patient  -ZACHARIAH     Progress  no change  -ZACHARIAH     Outcome Summary  Pt req max encouragement to participate, eventually agreed to bed level exercises. Pt completed LE exercises with cues. PT spoke with pt about importance of mobility and trying to walk, pt stated maybe next time she could try to walk with a walker. Cont to progress as tolerated, however, if partipation does not improve DC from IP PT services.   -     Row Name 01/06/20 1547          Physical Therapy Clinical Impression    Criteria for Skilled Interventions Met (PT Clinical Impression)  yes;treatment indicated  -ZACHARIAH     Rehab Potential (PT Clinical Summary)  fair, will monitor progress closely  -     Row Name 01/06/20 1547          Vital Signs    Pre Systolic BP Rehab  125  -ZACHARIAH     Pre Treatment Diastolic BP  52  -ZACHARIAH     Pretreatment Heart Rate (beats/min)  63  -ZACHARIAH     Pre SpO2 (%)  100  -ZACHARIAH     O2 Delivery Pre Treatment  supplemental O2  -ZACHARIAH     O2 Delivery Intra Treatment  supplemental O2  -ZACHARIAH     Post SpO2 (%)  94  -ZACHARIAH     O2 Delivery Post Treatment  supplemental O2  -ZACHARIAH     Pre Patient Position  Supine  -ZACHARIAH     Intra Patient Position  Supine  -ZACHARIAH     Post Patient Position  Supine  -ZACHARIAH     Row Name 01/06/20 1547          Positioning and Restraints    Pre-Treatment Position  in bed  -ZACHARIAH     Post Treatment Position  bed  -ZACHARIAH     In Bed  notified nsg;supine;encouraged to call for  assist;call light within reach;waffle boots/both  -ZACHARIAH       User Key  (r) = Recorded By, (t) = Taken By, (c) = Cosigned By    Initials Name Provider Type    Nina Payne PT Physical Therapist        Outcome Measures     Row Name 01/06/20 0931          How much help from another person do you currently need...    Turning from your back to your side while in flat bed without using bedrails?  2  -ZACHARIAH     Moving from lying on back to sitting on the side of a flat bed without bedrails?  1  -ZACHARIAH     Moving to and from a bed to a chair (including a wheelchair)?  1  -ZACHARIAH     Standing up from a chair using your arms (e.g., wheelchair, bedside chair)?  2  -ZACHARIAH     Climbing 3-5 steps with a railing?  1  -ZACHARIAH     To walk in hospital room?  1  -ZACHARIAH     AM-PAC 6 Clicks Score (PT)  8  -ZACHARIAH     Row Name 01/06/20 1551          Functional Assessment    Outcome Measure Options  AM-PAC 6 Clicks Basic Mobility (PT)  -ZACHARIAH       User Key  (r) = Recorded By, (t) = Taken By, (c) = Cosigned By    Initials Name Provider Type    Nina Payne PT Physical Therapist          PT Recommendation and Plan     Outcome Summary/Treatment Plan (PT)  Anticipated Discharge Disposition (PT): skilled nursing facility  Plan of Care Reviewed With: patient  Progress: no change  Outcome Summary: Pt req max encouragement to participate, eventually agreed to bed level exercises. Pt completed LE exercises with cues. PT spoke with pt about importance of mobility and trying to walk, pt stated maybe next time she could try to walk with a walker. Cont to progress as tolerated, however, if partipation does not improve DC from IP PT services.      Time Calculation:   PT Charges     Row Name 01/06/20 1662             Time Calculation    Start Time  1525  -ZACHARIAH      PT Received On  01/06/20  -ZACHARIAH      PT Goal Re-Cert Due Date  01/09/20  -ZACHARIAH         Time Calculation- PT    Total Timed Code Minutes- PT  18 minute(s)  -ZACHARIAH         Timed Charges    48439 - PT Therapeutic  Exercise Minutes  18  -ZACHARIAH        User Key  (r) = Recorded By, (t) = Taken By, (c) = Cosigned By    Initials Name Provider Type    Nina Payne, PT Physical Therapist        Therapy Charges for Today     Code Description Service Date Service Provider Modifiers Qty    [id number] HC PT THER PROC EA 15 MIN 1/6/2020 Nina Stout, SUHA GP 1          PT G-Codes  Outcome Measure Options: AM-PAC 6 Clicks Basic Mobility (PT)  AM-PAC 6 Clicks Score (PT): 8  AM-PAC 6 Clicks Score (OT): 11    Nina Stout PT  1/6/2020

## 2020-01-06 NOTE — PLAN OF CARE
"Pt remains on oxygen, 2L and needs more when flat for repositioning. Pt not interested in getting out of bed but did help a little with bathing.  at bedside for part of the day, asking about nursing homes. I explained her refusal to participate in therapy.  says \"Well, we have to kind of force her to work with them.\" I explained that we cannot force people to do anything. Rhythm remains sinus, but arrhythmia. Pt slept on and off today.   "

## 2020-01-06 NOTE — PROGRESS NOTES
[facility] Medicine Services  PROGRESS NOTE    Patient Name: Julissa Kee  : 1950  MRN: [id number]    Date of Admission: 2019  Primary Care Physician: Kee Hoffman MD    Subjective     CC: f/u PNA     HPI:   Still feels quite weak in general, but says she is eating well.    Review of Systems  Gen- No fevers, chills  CV- No chest pain, palpitations  Resp- No cough, dyspnea  GI- No N/V/D, abd pain        Objective     Vital Signs:   Temp:  [97.5 °F (36.4 °C)-97.8 °F (36.6 °C)] 97.6 °F (36.4 °C)  Heart Rate:  [60-71] 63  Resp:  [18] 18  BP: (121-128)/(43-52) 125/52     Physical Exam:  Constitutional -no acute distress, non toxic, in bed  HEENT-NCAT, mucous membranes moist  CV-RRR, S1 S2 normal, no m/r/g  Resp-grossly clear bilaterally  Abd-soft, non-tender, non-distended, normo active bowel sounds, morbidly obese  Ext-No lower extremity cyanosis, clubbing or edema bilaterally  Neuro-alert but at times answers questions slowly, speech clear   Psych-flat affect   Skin- No rash on exposed UE or LE bilaterally      Results Reviewed:    Results from last 7 days   Lab Units 20  2133   WBC 10*3/mm3 7.70   HEMOGLOBIN g/dL 10.5*   HEMATOCRIT % 32.5*   PLATELETS 10*3/mm3 167     Results from last 7 days   Lab Units 20  0926 20  2133   SODIUM mmol/L 141 137   POTASSIUM mmol/L 3.8 3.7   CHLORIDE mmol/L 101 99   CO2 mmol/L 33.0* 30.0*   BUN mg/dL 8 14   CREATININE mg/dL 0.63 0.79   GLUCOSE mg/dL 129* 163*   CALCIUM mg/dL 9.3 9.3   ALT (SGPT) U/L  --  11   AST (SGOT) U/L  --  14   TROPONIN T ng/mL  --  <0.010     Estimated Creatinine Clearance: 102.5 mL/min (by C-G formula based on SCr of 0.63 mg/dL).    Microbiology Results Abnormal     Procedure Component Value - Date/Time    Blood Culture - Blood, Arm, Left [[id number]] Collected:  19 0415    Lab Status:  Final result Specimen:  Blood from Arm, Left Updated:  20 0645     Blood Culture No growth at 5  days    Blood Culture - Blood, Hand, Left [[id number]] Collected:  12/30/19 0420    Lab Status:  Final result Specimen:  Blood from Hand, Left Updated:  01/04/20 0645     Blood Culture No growth at 5 days    Blood Culture - Blood, Hand, Right [[id number]] Collected:  12/28/19 1920    Lab Status:  Final result Specimen:  Blood from Hand, Right Updated:  01/02/20 2100     Blood Culture No growth at 5 days    Blood Culture - Blood, Hand, Right [[id number]]  (Abnormal) Collected:  12/28/19 2010    Lab Status:  Final result Specimen:  Blood from Hand, Right Updated:  12/31/19 0638     Blood Culture Staphylococcus, coagulase negative     Comment: Probable contaminant requires clinical correlation, susceptibility not performed unless requested by physician.          Gram Stain Aerobic Bottle Gram positive cocci in clusters    Blood Culture ID, PCR - Blood, Hand, Right [[id number]]  (Normal) Collected:  12/28/19 2010    Lab Status:  Final result Specimen:  Blood from Hand, Right Updated:  12/30/19 0503     BCID, PCR No organism detected by BCID PCR.    Urine Culture - Urine, Urine, Catheter [[id number]]  (Normal) Collected:  12/28/19 1908    Lab Status:  Final result Specimen:  Urine, Catheter Updated:  12/30/19 0405     Urine Culture No growth    Respiratory Panel, PCR - Swab, Nasopharynx [[id number]]  (Normal) Collected:  12/29/19 0136    Lab Status:  Final result Specimen:  Swab from Nasopharynx Updated:  12/29/19 0858     ADENOVIRUS, PCR Not Detected     Coronavirus 229E Not Detected     Coronavirus HKU1 Not Detected     Coronavirus NL63 Not Detected     Coronavirus OC43 Not Detected     Human Metapneumovirus Not Detected     Human Rhinovirus/Enterovirus Not Detected     Influenza B PCR Not Detected     Parainfluenza Virus 1 Not Detected     Parainfluenza Virus 2 Not Detected     Parainfluenza Virus 3 Not Detected     Parainfluenza Virus 4 Not Detected     Bordetella pertussis pcr Not Detected     Influenza A H1 2009 PCR Not  Detected     Chlamydophila pneumoniae PCR Not Detected     Mycoplasma pneumo by PCR Not Detected     Influenza A PCR Not Detected     Influenza A H3 Not Detected     Influenza A H1 Not Detected     RSV, PCR Not Detected     Bordetella parapertussis PCR Not Detected        Imaging Results (Last 24 Hours)     ** No results found for the last 24 hours. **        I have reviewed the medications:  Scheduled Meds:    aspirin 81 mg Oral Daily   atorvastatin 10 mg Oral Nightly   heparin (porcine) 5,000 Units Subcutaneous Q12H   insulin lispro 0-7 Units Subcutaneous TID With Meals   levothyroxine 100 mcg Oral Q AM   linagliptin 5 mg Oral Daily   melatonin 5 mg Oral Nightly   metoprolol succinate XL 25 mg Oral Q24H   polyethylene glycol 17 g Oral BID   sennosides-docusate 2 tablet Oral BID   sertraline 50 mg Oral Daily   sodium chloride 10 mL Intravenous Q12H   terazosin 2 mg Oral Nightly     Continuous Infusions:     PRN Meds:  •  acetaminophen **OR** [DISCONTINUED] acetaminophen **OR** [DISCONTINUED] acetaminophen  •  dextrose  •  dextrose  •  glucagon (human recombinant)  •  ipratropium-albuterol  •  magnesium sulfate **OR** magnesium sulfate **OR** magnesium sulfate  •  nitroglycerin  •  ondansetron  •  potassium chloride  •  potassium chloride  •  sodium chloride  •  sodium chloride    Assessment & Plan     Active Hospital Problems    Diagnosis  POA   • **Acute respiratory failure with hypoxia (CMS/HCC) [J96.01]  Yes   • Renal insufficiency [N28.9]  Yes   • Sacral wound [S31.000A]  Yes   • CAP (community acquired pneumonia) [J18.9]  Yes   • Pneumonia of right lung due to infectious organism [J18.9]  Yes   • Syncope and collapse [R55]  Yes   • Chronic obstructive pulmonary disease (CMS/HCC) [J44.9]  Yes   • Diabetes mellitus (CMS/HCC) [E11.9]  Yes   • Hyperlipidemia [E78.5]  Yes   • Hypertension [I10]  Yes   • Hypothyroidism [E03.9]  Yes   • Extreme obesity with alveolar hypoventilation (CMS/HCC) [E66.2]  Yes   • Anxiety  "[F41.9]  Yes      Resolved Hospital Problems   No resolved problems to display.     Brief Hospital Course to date:  Julissa Kee is a 69 y.o. female with PMH significant for COPD on supplemental oxygen, DM2, HTN, HLD, renal insuffiency, and morbid obesity. She is largely immobile with profoundly poor hygiene. She has several wounds that including sacral decubitus ulcer. She was living at home and \"not letting her family take care of her.\" Admitted to [facility] 12/28/2019 for a/c respiratory failure and atrial tachycardia.     Acute on chronic respiratory failure with hypoxia  Right middle lobe pneumonia   - in the setting of COPD and obesity hypoventilation syndrome  - secondary to developing pneumonia. Demonstrated on CT imaging.  - Appreciate ID assistance. s/p IV Rocephin/Docycyline. Now monitoring off of antibiotics. ID has signed off.   - Encourage IS, wean O2 as able     Atrial tachycardia without atrial fibrillation  - Appreciate cardiology assistance - cardiology felt that patient may be having short bursts of atrial tachycardia with PACs and not true atrial fibrillation  - Started on Toprol XL 25mg PO daily   - Plan to discharge with event monitor to evaluate for occult afib/other arrhythmia that may predispose her to syncope (she has had syncopal episodes at home).    - Need to call cardiology office on day of dc to have event monitor placed before discharge.   - Follow-up with Dr. Ohara in 6 weeks    CNS bacteremia  - likely skin contaminant.     UTI   - Urine culture from 12/28 with no growth  - Encourage out of bed for toileting     DMII  -A1c 5.2  - Patient was using insulin at home, doesn't know dose and  wasn't checking blood sugars  -24-hour glucose ranges from 121-158  - Start Linagliptin 5mg QAM and monitor on PO therapy    Sacral wound, lower extremity changes, debility:  - ID following - monitoring off of abx   - PT/OT  - case management assisting with rehab options.      Renal " insufficiency:  - appears chronic. Creatinine at baseline      HTN:  - controlled and stable. Continue home meds     Hypothyroidism:  -TSH 2.920    Dysphagia  - Soft texture ground diet ordered    DVT Prophylaxis: Heparin   Disposition: I expect the patient to be discharged to SNF when a bed is available, appears medically ready for transfer anytime    CODE STATUS:   Code Status and Medical Interventions:   Ordered at: 12/28/19 1938     Level Of Support Discussed With:    Patient     Code Status:    CPR     Medical Interventions (Level of Support Prior to Arrest):    Full     Electronically signed by Mika Suero MD, 01/06/20, 6:12 PM.

## 2020-01-07 LAB
GLUCOSE BLDC GLUCOMTR-MCNC: 118 MG/DL (ref 70–130)
GLUCOSE BLDC GLUCOMTR-MCNC: 120 MG/DL (ref 70–130)
GLUCOSE BLDC GLUCOMTR-MCNC: 132 MG/DL (ref 70–130)
GLUCOSE BLDC GLUCOMTR-MCNC: 144 MG/DL (ref 70–130)

## 2020-01-07 PROCEDURE — 99231 SBSQ HOSP IP/OBS SF/LOW 25: CPT | Performed by: INTERNAL MEDICINE

## 2020-01-07 PROCEDURE — 94640 AIRWAY INHALATION TREATMENT: CPT

## 2020-01-07 PROCEDURE — 25010000002 HEPARIN (PORCINE) PER 1000 UNITS: Performed by: NURSE PRACTITIONER

## 2020-01-07 PROCEDURE — 82962 GLUCOSE BLOOD TEST: CPT

## 2020-01-07 PROCEDURE — 94799 UNLISTED PULMONARY SVC/PX: CPT

## 2020-01-07 RX ADMIN — SERTRALINE HYDROCHLORIDE 50 MG: 50 TABLET ORAL at 08:34

## 2020-01-07 RX ADMIN — SENNOSIDES AND DOCUSATE SODIUM 2 TABLET: 8.6; 5 TABLET ORAL at 08:34

## 2020-01-07 RX ADMIN — LEVOTHYROXINE SODIUM 100 MCG: 100 TABLET ORAL at 06:08

## 2020-01-07 RX ADMIN — HEPARIN SODIUM 5000 UNITS: 5000 INJECTION INTRAVENOUS; SUBCUTANEOUS at 08:34

## 2020-01-07 RX ADMIN — MELATONIN TAB 5 MG 5 MG: 5 TAB at 21:44

## 2020-01-07 RX ADMIN — ATORVASTATIN CALCIUM 10 MG: 10 TABLET, FILM COATED ORAL at 21:44

## 2020-01-07 RX ADMIN — LINAGLIPTIN 5 MG: 5 TABLET, FILM COATED ORAL at 08:34

## 2020-01-07 RX ADMIN — SENNOSIDES AND DOCUSATE SODIUM 2 TABLET: 8.6; 5 TABLET ORAL at 21:44

## 2020-01-07 RX ADMIN — TERAZOSIN HYDROCHLORIDE 2 MG: 2 CAPSULE ORAL at 21:43

## 2020-01-07 RX ADMIN — HEPARIN SODIUM 5000 UNITS: 5000 INJECTION INTRAVENOUS; SUBCUTANEOUS at 21:44

## 2020-01-07 RX ADMIN — ASPIRIN 81 MG: 81 TABLET, COATED ORAL at 08:34

## 2020-01-07 RX ADMIN — METOPROLOL SUCCINATE 25 MG: 25 TABLET, EXTENDED RELEASE ORAL at 08:34

## 2020-01-07 RX ADMIN — IPRATROPIUM BROMIDE AND ALBUTEROL SULFATE 3 ML: 2.5; .5 SOLUTION RESPIRATORY (INHALATION) at 14:26

## 2020-01-07 RX ADMIN — SODIUM CHLORIDE, PRESERVATIVE FREE 10 ML: 5 INJECTION INTRAVENOUS at 08:36

## 2020-01-07 NOTE — PLAN OF CARE
Pt remained stable throughout shift, and tolerated 2L NC with saturations in the low 90's. Pt did desaturate with laying down and movement while changing out her bed overnight, however was able to increase her saturations with deep breathing. Pt was very anxious and did not tolerate being in the lift for changing out her bed. Pt was calmed and reassured during this time. Pts wounds remain stable with no continuing decline from previous assessments. Pt encouraged to help with turning every 2 hours, however patient was not willing to be turned as RN suggested.   Problem: Patient Care Overview  Goal: Plan of Care Review  Outcome: Ongoing (interventions implemented as appropriate)  Flowsheets (Taken 1/7/2020 0355)  Progress: no change  Plan of Care Reviewed With: patient  Goal: Individualization and Mutuality  Outcome: Ongoing (interventions implemented as appropriate)  Goal: Discharge Needs Assessment  Outcome: Ongoing (interventions implemented as appropriate)  Goal: Interprofessional Rounds/Family Conf  Outcome: Ongoing (interventions implemented as appropriate)     Problem: Fall Risk (Adult)  Goal: Identify Related Risk Factors and Signs and Symptoms  Outcome: Ongoing (interventions implemented as appropriate)  Flowsheets  Taken 12/31/2019 1809 by Tanisha Rosas LPN  Related Risk Factors (Fall Risk): gait/mobility problems;fatigue/slow reaction;history of falls  Taken 1/7/2020 0355 by Leonard Daley RN  Signs and Symptoms (Fall Risk): presence of risk factors  Goal: Absence of Fall  Outcome: Ongoing (interventions implemented as appropriate)  Flowsheets (Taken 12/31/2019 0459 by Tonia Cloud RN)  Absence of Fall: making progress toward outcome     Problem: Skin Injury Risk (Adult)  Goal: Identify Related Risk Factors and Signs and Symptoms  Outcome: Ongoing (interventions implemented as appropriate)  Flowsheets (Taken 12/31/2019 1809 by Tanisha Rosas LPN)  Related Risk Factors (Skin Injury  Risk): body weight extremes;edema;mobility impaired  Goal: Skin Health and Integrity  Outcome: Ongoing (interventions implemented as appropriate)  Flowsheets (Taken 1/7/2020 0355)  Skin Health and Integrity: making progress toward outcome     Problem: Self-Care Deficit (Adult,Obstetrics,Pediatric)  Goal: Identify Related Risk Factors and Signs and Symptoms  Outcome: Ongoing (interventions implemented as appropriate)  Flowsheets (Taken 12/31/2019 1809 by Tanisha Rosas LPN)  Related Risk Factors (Self-Care Deficit): immobility;fatigue/weakness  Goal: Improved Ability to Perform BADL and IADL  Outcome: Ongoing (interventions implemented as appropriate)  Flowsheets (Taken 12/31/2019 1809 by Tanisha Rosas LPN)  Improved Ability to Perform BADL and IADL: unable to achieve outcome     Problem: Breathing Pattern Ineffective (Adult)  Goal: Identify Related Risk Factors and Signs and Symptoms  Outcome: Ongoing (interventions implemented as appropriate)  Flowsheets  Taken 1/6/2020 1709 by Shweta Evangelista RN  Related Risk Factors (Breathing Pattern Ineffective): immobility;obesity;infection  Taken 1/7/2020 0355 by Leonard Daley RN  Signs and Symptoms (Breathing Pattern Ineffective): activity intolerance;anxiousness;breathing pattern altered  Goal: Effective Oxygenation/Ventilation  Outcome: Ongoing (interventions implemented as appropriate)  Flowsheets (Taken 1/7/2020 0355)  Effective Oxygenation/Ventilation: making progress toward outcome  Goal: Anxiety/Fear Reduction  Outcome: Ongoing (interventions implemented as appropriate)  Flowsheets (Taken 1/7/2020 0355)  Anxiety/Fear Reduction: making progress toward outcome

## 2020-01-07 NOTE — PROGRESS NOTES
"Adult Nutrition  Assessment/PES    Patient Name:  Julissa Kee  YOB: 1950  MRN: [id number]  Admit Date:  12/28/2019    Assessment Date:  1/7/2020    Comments:      Reason for Assessment     Row Name 01/07/20 0800          Reason for Assessment    Reason For Assessment  follow-up protocol 15\"                   Nutrition Prescription Ordered     Row Name 01/07/20 0800          Nutrition Prescription PO    Current PO Diet  SOFT TEXTURE, GROUND     Supplement  Other (comment) PREMIER PROTEIN     Supplement Frequency  2 times a day     Common Modifiers  Consistent Carbohydrate                 Problem/Interventions:  Problem 1     Row Name 01/07/20 0800          Nutrition Diagnoses Problem 1    Problem 1  Overweight/Obesity     Etiology (related to)  -- excess caloric intake     Signs/Symptoms (evidenced by)  BMI     BMI  -- >50         Problem 2     Row Name 01/07/20 0801          Nutrition Diagnoses Problem 2    Problem 2  Nutrition Appropriate for Condition at this Time     Signs/Symptoms (evidenced by)  PO Intake     Percent (%) intake recorded  67 %     Over number of meals  3                   Education/Evaluation     Row Name 01/07/20 0801          Monitor/Evaluation    Monitor  Per protocol           Electronically signed by:  Mayi Bauman RD  01/07/20 8:02 AM  "

## 2020-01-07 NOTE — PLAN OF CARE
Problem: Patient Care Overview  Goal: Plan of Care Review  Outcome: Ongoing (interventions implemented as appropriate)  Flowsheets (Taken 1/7/2020 2910)  Progress: no change  Plan of Care Reviewed With: patient  Note:   Pt refused turning today. P.t. Did not work with pt today. She had no c/o VSS.If insurance approves, she has an amb sched for 1200 tomorrow to go to snf

## 2020-01-07 NOTE — PROGRESS NOTES
Continued Stay Note  [facility]     Patient Name: Julissa Kee  MRN: [id number]  Today's Date: 1/7/2020    Admit Date: 12/28/2019    Discharge Plan     Row Name 01/07/20 1144       Plan    Plan  [facility]    Patient/Family in Agreement with Plan  yes    Plan Comments  Per Lianna in admissions at [facility], they can offer Mrs. Kee a skilled bed pending insurance approval. I met with Mrs. Kee and her  Familia at the bedside and notified them. They are agreeable to this. I have tentatively scheduled an ambulance to transport Mrs. Kee 1-8 @ 12:00 if insurance approval is obtained.     Final Discharge Disposition Code  03 - skilled nursing facility (SNF)        Discharge Codes    No documentation.       Expected Discharge Date and Time     Expected Discharge Date Expected Discharge Time    Jan 8, 2020             Andrew Sosa RN

## 2020-01-08 VITALS
HEIGHT: 64 IN | OXYGEN SATURATION: 93 % | WEIGHT: 293 LBS | BODY MASS INDEX: 50.02 KG/M2 | TEMPERATURE: 97.5 F | DIASTOLIC BLOOD PRESSURE: 46 MMHG | RESPIRATION RATE: 18 BRPM | HEART RATE: 71 BPM | SYSTOLIC BLOOD PRESSURE: 132 MMHG

## 2020-01-08 PROBLEM — J18.9 CAP (COMMUNITY ACQUIRED PNEUMONIA): Status: RESOLVED | Noted: 2019-12-28 | Resolved: 2020-01-08

## 2020-01-08 PROBLEM — R55 SYNCOPE AND COLLAPSE: Status: RESOLVED | Noted: 2019-12-28 | Resolved: 2020-01-08

## 2020-01-08 PROBLEM — J96.01 ACUTE RESPIRATORY FAILURE WITH HYPOXIA (HCC): Status: RESOLVED | Noted: 2019-12-28 | Resolved: 2020-01-08

## 2020-01-08 PROBLEM — J18.9 PNEUMONIA OF RIGHT LUNG DUE TO INFECTIOUS ORGANISM: Status: RESOLVED | Noted: 2019-12-28 | Resolved: 2020-01-08

## 2020-01-08 LAB
GLUCOSE BLDC GLUCOMTR-MCNC: 116 MG/DL (ref 70–130)
GLUCOSE BLDC GLUCOMTR-MCNC: 118 MG/DL (ref 70–130)

## 2020-01-08 PROCEDURE — 99239 HOSP IP/OBS DSCHRG MGMT >30: CPT | Performed by: INTERNAL MEDICINE

## 2020-01-08 PROCEDURE — 82962 GLUCOSE BLOOD TEST: CPT

## 2020-01-08 PROCEDURE — 25010000002 HEPARIN (PORCINE) PER 1000 UNITS: Performed by: NURSE PRACTITIONER

## 2020-01-08 RX ORDER — BISACODYL 10 MG
10 SUPPOSITORY, RECTAL RECTAL ONCE
Status: COMPLETED | OUTPATIENT
Start: 2020-01-08 | End: 2020-01-08

## 2020-01-08 RX ORDER — ONDANSETRON 2 MG/ML
4 INJECTION INTRAMUSCULAR; INTRAVENOUS EVERY 6 HOURS PRN
Start: 2020-01-08 | End: 2021-01-01

## 2020-01-08 RX ORDER — IPRATROPIUM BROMIDE AND ALBUTEROL SULFATE 2.5; .5 MG/3ML; MG/3ML
3 SOLUTION RESPIRATORY (INHALATION) EVERY 4 HOURS PRN
Qty: 360 ML
Start: 2020-01-08 | End: 2020-04-06

## 2020-01-08 RX ORDER — TERAZOSIN 2 MG/1
2 CAPSULE ORAL NIGHTLY
Status: ON HOLD
Start: 2020-01-08 | End: 2021-01-01

## 2020-01-08 RX ORDER — CHOLECALCIFEROL (VITAMIN D3) 125 MCG
5 CAPSULE ORAL NIGHTLY
Status: ON HOLD
Start: 2020-01-08 | End: 2021-01-01

## 2020-01-08 RX ORDER — ACETAMINOPHEN 325 MG/1
650 TABLET ORAL EVERY 4 HOURS PRN
Status: ON HOLD
Start: 2020-01-08 | End: 2021-01-01

## 2020-01-08 RX ORDER — AMOXICILLIN 250 MG
2 CAPSULE ORAL 2 TIMES DAILY
Status: ON HOLD
Start: 2020-01-08 | End: 2021-01-01

## 2020-01-08 RX ORDER — METOPROLOL SUCCINATE 25 MG/1
25 TABLET, EXTENDED RELEASE ORAL
Start: 2020-01-09 | End: 2020-04-06

## 2020-01-08 RX ADMIN — Medication 1 TABLET: at 09:46

## 2020-01-08 RX ADMIN — SERTRALINE HYDROCHLORIDE 50 MG: 50 TABLET ORAL at 08:46

## 2020-01-08 RX ADMIN — POLYETHYLENE GLYCOL 3350 17 G: 17 POWDER, FOR SOLUTION ORAL at 08:45

## 2020-01-08 RX ADMIN — LINAGLIPTIN 5 MG: 5 TABLET, FILM COATED ORAL at 08:46

## 2020-01-08 RX ADMIN — BISACODYL 10 MG: 10 SUPPOSITORY RECTAL at 10:50

## 2020-01-08 RX ADMIN — ASPIRIN 81 MG: 81 TABLET, COATED ORAL at 08:46

## 2020-01-08 RX ADMIN — HEPARIN SODIUM 5000 UNITS: 5000 INJECTION INTRAVENOUS; SUBCUTANEOUS at 08:45

## 2020-01-08 RX ADMIN — LEVOTHYROXINE SODIUM 100 MCG: 100 TABLET ORAL at 06:02

## 2020-01-08 RX ADMIN — SENNOSIDES AND DOCUSATE SODIUM 2 TABLET: 8.6; 5 TABLET ORAL at 08:46

## 2020-01-08 RX ADMIN — METOPROLOL SUCCINATE 25 MG: 25 TABLET, EXTENDED RELEASE ORAL at 08:46

## 2020-01-08 NOTE — PLAN OF CARE
Problem: Fall Risk (Adult)  Goal: Identify Related Risk Factors and Signs and Symptoms  1/8/2020 1417 by Olya Islas RN  Outcome: Ongoing (interventions implemented as appropriate)  1/8/2020 1417 by Olya Islas RN  Outcome: Outcome(s) achieved  Goal: Absence of Fall  1/8/2020 1417 by Olya Islas RN  Outcome: Ongoing (interventions implemented as appropriate)  1/8/2020 1417 by Olya Islas RN  Outcome: Outcome(s) achieved     Problem: Skin Injury Risk (Adult)  Goal: Identify Related Risk Factors and Signs and Symptoms  1/8/2020 1417 by Olya Islas RN  Outcome: Ongoing (interventions implemented as appropriate)  1/8/2020 1417 by Olya Islas RN  Outcome: Outcome(s) achieved  Goal: Skin Health and Integrity  1/8/2020 1417 by Olya Islas RN  Outcome: Ongoing (interventions implemented as appropriate)  1/8/2020 1417 by Olya Islas RN  Outcome: Outcome(s) achieved     Problem: Self-Care Deficit (Adult,Obstetrics,Pediatric)  Goal: Identify Related Risk Factors and Signs and Symptoms  1/8/2020 1417 by Olya Islas RN  Outcome: Ongoing (interventions implemented as appropriate)  1/8/2020 1417 by Olya Islas RN  Outcome: Outcome(s) achieved  Goal: Improved Ability to Perform BADL and IADL  1/8/2020 1417 by Olya Islas RN  Outcome: Ongoing (interventions implemented as appropriate)  1/8/2020 1417 by Olya Islas RN  Outcome: Outcome(s) achieved     Problem: Breathing Pattern Ineffective (Adult)  Goal: Identify Related Risk Factors and Signs and Symptoms  1/8/2020 1417 by Olya Islas RN  Outcome: Ongoing (interventions implemented as appropriate)  1/8/2020 1417 by Olya Islas RN  Outcome: Outcome(s) achieved  Goal: Effective Oxygenation/Ventilation  1/8/2020 1417 by Olya Islas RN  Outcome: Ongoing (interventions implemented as appropriate)  1/8/2020 1417 by Olya Islas RN  Outcome: Outcome(s) achieved  Goal: Anxiety/Fear Reduction  1/8/2020 1417 by Olya Islas RN  Outcome:  Ongoing (interventions implemented as appropriate)  1/8/2020 1417 by Olya Islas, RN  Outcome: Outcome(s) achieved

## 2020-01-08 NOTE — DISCHARGE SUMMARY
[facility] Medicine Services  DISCHARGE SUMMARY    Patient Name: Julissa Kee  : 1950  MRN: [id number]    Date of Admission: 2019  4:36 PM  Date of Discharge:  19  Primary Care Physician: Kee Hoffman MD    Consults     Date and Time Order Name Status Description    1/3/2020 0147 Inpatient Cardiology Consult Completed     2019 0407 Inpatient Infectious Diseases Consult Completed           Hospital Course     Presenting Problem:   Pneumonia of right lung due to infectious organism, unspecified part of lung [J18.9]    Active Hospital Problems    Diagnosis  POA   • Renal insufficiency [N28.9]  Yes   • Sacral wound [S31.000A]  Yes   • Chronic obstructive pulmonary disease (CMS/HCC) [J44.9]  Yes   • Diabetes mellitus (CMS/HCC) [E11.9]  Yes   • Hyperlipidemia [E78.5]  Yes   • Hypertension [I10]  Yes   • Hypothyroidism [E03.9]  Yes   • Extreme obesity with alveolar hypoventilation (CMS/HCC) [E66.2]  Yes   • Anxiety [F41.9]  Yes      Resolved Hospital Problems    Diagnosis Date Resolved POA   • **Acute respiratory failure with hypoxia (CMS/HCC) [J96.01] 2020 Yes   • CAP (community acquired pneumonia) [J18.9] 2020 Yes   • Pneumonia of right lung due to infectious organism [J18.9] 2020 Yes   • Syncope and collapse [R55] 2020 Yes          Hospital Course:  Julissa Kee is a 69 y.o. female with PMH significant for COPD on supplemental oxygen, DM2, HTN, HLD, renal insuffiency, and morbid obesity. She has several wounds that including sacral decubitus ulcer. Admitted to [facility] 2019 for a/c respiratory failure and atrial tachycardia.      Acute on chronic respiratory failure with hypoxia  Right middle lobe pneumonia   - in the setting of COPD and obesity hypoventilation syndrome  - secondary to developing pneumonia. Demonstrated on CT imaging.  - Appreciate ID assistance. s/p IV Rocephin/Docycyline. Now monitoring off of antibiotics. ID has  signed off.   - Encourage IS, wean O2 as able      Atrial tachycardia without atrial fibrillation  - Appreciate cardiology assistance - cardiology felt that patient may be having short bursts of atrial tachycardia with PACs and not true atrial fibrillation  - Started on Toprol XL 25mg PO daily   - Plan to discharge with event monitor to evaluate for occult afib/other arrhythmia that may predispose her to syncope (she has had syncopal episodes at home).    - Follow-up with Cardiology Dr. Ohara in 6 weeks     CNS bacteremia  - likely skin contaminant.     UTI   - Urine culture from 12/28 with no growth  - Encourage out of bed for toileting      DMII  -A1c 5.2  - Patient was using insulin at home, doesn't know dose and  wasn't checking blood sugars  - 24-hour glucose ranged from 118 to 144  - Started Linagliptin 5mg QAM and monitor on PO therapy     Sacral wound, lower extremity changes, debility:  - ID following - monitoring off of abx   - PT/OT  - wound care  - case management assisting with rehab options.      Renal insufficiency:  - appears chronic. Creatinine at baseline      HTN:  - controlled and stable. Continue home meds     Hypothyroidism:  -TSH 2.920    Folate deficiency  - started B12 and folate replacement     Dysphagia  - Soft texture ground diet ordered        Discharge Follow Up Recommendations for labs/diagnostics:      Day of Discharge     HPI:   Feels OK, still some generalized weakness, denies shortness of breath, looking forward to rehab    Review of Systems  Gen- No fevers, chills  CV- No chest pain, palpitations  Resp- No cough, dyspnea  GI- No N/V/D, abd pain      Otherwise ROS is negative except as mentioned in the HPI.    Vital Signs:   Temp:  [97.5 °F (36.4 °C)-98.4 °F (36.9 °C)] 97.5 °F (36.4 °C)  Heart Rate:  [62-90] 65  Resp:  [18] 18  BP: (126-156)/(46-83) 132/46     Physical Exam:  Constitutional -no acute distress, non toxic, in bed, lying flat  HEENT-NCAT, mucous membranes  moist  CV-RRR, S1 S2 normal, no m/r/g  Resp-grossly clear bilaterally  Abd-soft, non-tender, non-distended, normo active bowel sounds, morbidly obese  Ext-No lower extremity cyanosis, clubbing or edema bilaterally  Neuro-alert and oriented, speech clear, moves all extremities   Psych-normal affect   Skin- No rash on exposed UE or LE bilaterally      Pertinent  and/or Most Recent Results     Results from last 7 days   Lab Units 01/05/20  0926 01/02/20  2133   WBC 10*3/mm3  --  7.70   HEMOGLOBIN g/dL  --  10.5*   HEMATOCRIT %  --  32.5*   PLATELETS 10*3/mm3  --  167   SODIUM mmol/L 141 137   POTASSIUM mmol/L 3.8 3.7   CHLORIDE mmol/L 101 99   CO2 mmol/L 33.0* 30.0*   BUN mg/dL 8 14   CREATININE mg/dL 0.63 0.79   GLUCOSE mg/dL 129* 163*   CALCIUM mg/dL 9.3 9.3     Results from last 7 days   Lab Units 01/02/20  2133   BILIRUBIN mg/dL 0.6   ALK PHOS U/L 62   ALT (SGPT) U/L 11   AST (SGOT) U/L 14           Invalid input(s): TG, LDLCALC, LDLREALC  Results from last 7 days   Lab Units 01/05/20  0926 01/02/20  2133   HEMOGLOBIN A1C % 5.20  --    TROPONIN T ng/mL  --  <0.010       Brief Urine Lab Results  (Last result in the past 365 days)      Color   Clarity   Blood   Leuk Est   Nitrite   Protein   CREAT   Urine HCG        12/28/19 1908 Dark Yellow Cloudy Negative Small (1+) Positive >=300 mg/dL (3+)               Microbiology Results Abnormal     Procedure Component Value - Date/Time    Blood Culture - Blood, Arm, Left [[id number]] Collected:  12/30/19 0415    Lab Status:  Final result Specimen:  Blood from Arm, Left Updated:  01/04/20 0645     Blood Culture No growth at 5 days    Blood Culture - Blood, Hand, Left [[id number]] Collected:  12/30/19 0420    Lab Status:  Final result Specimen:  Blood from Hand, Left Updated:  01/04/20 0645     Blood Culture No growth at 5 days    Blood Culture - Blood, Hand, Right [[id number]] Collected:  12/28/19 1920    Lab Status:  Final result Specimen:  Blood from Hand, Right Updated:   01/02/20 2100     Blood Culture No growth at 5 days    Blood Culture - Blood, Hand, Right [[id number]]  (Abnormal) Collected:  12/28/19 2010    Lab Status:  Final result Specimen:  Blood from Hand, Right Updated:  12/31/19 0638     Blood Culture Staphylococcus, coagulase negative     Comment: Probable contaminant requires clinical correlation, susceptibility not performed unless requested by physician.          Gram Stain Aerobic Bottle Gram positive cocci in clusters    Blood Culture ID, PCR - Blood, Hand, Right [[id number]]  (Normal) Collected:  12/28/19 2010    Lab Status:  Final result Specimen:  Blood from Hand, Right Updated:  12/30/19 0503     BCID, PCR No organism detected by BCID PCR.    Urine Culture - Urine, Urine, Catheter [[id number]]  (Normal) Collected:  12/28/19 1908    Lab Status:  Final result Specimen:  Urine, Catheter Updated:  12/30/19 0405     Urine Culture No growth    Respiratory Panel, PCR - Swab, Nasopharynx [[id number]]  (Normal) Collected:  12/29/19 0136    Lab Status:  Final result Specimen:  Swab from Nasopharynx Updated:  12/29/19 0858     ADENOVIRUS, PCR Not Detected     Coronavirus 229E Not Detected     Coronavirus HKU1 Not Detected     Coronavirus NL63 Not Detected     Coronavirus OC43 Not Detected     Human Metapneumovirus Not Detected     Human Rhinovirus/Enterovirus Not Detected     Influenza B PCR Not Detected     Parainfluenza Virus 1 Not Detected     Parainfluenza Virus 2 Not Detected     Parainfluenza Virus 3 Not Detected     Parainfluenza Virus 4 Not Detected     Bordetella pertussis pcr Not Detected     Influenza A H1 2009 PCR Not Detected     Chlamydophila pneumoniae PCR Not Detected     Mycoplasma pneumo by PCR Not Detected     Influenza A PCR Not Detected     Influenza A H3 Not Detected     Influenza A H1 Not Detected     RSV, PCR Not Detected     Bordetella parapertussis PCR Not Detected          Imaging Results (All)     Procedure Component Value Units Date/Time    CT Head  Without Contrast [[id number]] Collected:  12/28/19 1838     Updated:  12/29/19 1905    Narrative:       EXAMINATION: CT HEAD WO CONTRAST - 12/28/2019     INDICATION: Fall, weakness. Back pain.      TECHNIQUE: CT head without intervenous contrast     The radiation dose reduction device was turned on for each scan per the  ALARA (As Low as Reasonably Achievable) protocol.     COMPARISON: None.     FINDINGS: Midline structures are symmetric without evidence of mass,  mass effect or midline shift. Ventricles and sulci within normal limits  for patient age without intra-axial hemorrhage or extra-axial fluid  collection. Globes and orbits unremarkable. Visualized paranasal sinuses  and mastoid air cells are grossly clear and well pneumatized. Calvarium  intact.       Impression:       No acute intracranial abnormality, specifically no acute  intracranial hemorrhage. Calvarium intact.     DICTATED:   12/28/2019  EDITED/ls :   12/28/2019         This report was finalized on 12/29/2019 7:02 PM by Dr. Kamran Herbert.       CT Chest Without Contrast [[id number]] Collected:  12/28/19 1841     Updated:  12/29/19 1905    Narrative:       EXAMINATION: CT CHEST WO CONTRAST - 12/28/2019     INDICATION: weak, hypoxic.     TECHNIQUE: CT chest without intravenous contrast     The radiation dose reduction device was turned on for each scan per the  ALARA (As Low as Reasonably Achievable) protocol.     COMPARISON: None.     FINDINGS: Thyroid is homogeneous in attenuation. No bulky mediastinal  adenopathy. Central airways are patent. Esophagus in normal course and  caliber. Atherosclerotic nonaneurysmal thoracic aorta. Cardiac size  borderline enlarged without pericardial effusion demonstrating coronary  calcifications. Extended lung windows demonstrate scattered  opacifications in the right middle lobe and superior portion right lower  lobe concerning for early developing airspace disease of  bronchopneumonia atypical etiology not excluded  without effusion  evident. Degenerative changes of the spine without aggressive osseous  lesion. No soft tissue body wall findings of concern. Visualized  portions of the upper abdomen demonstrate prior cholecystectomy along  with nonobstructing left nephrolithiasis.       Impression:       1. Confluent opacifications of groundglass attenuation in the right  middle lobe and superior portion right lower lobe concerning for acute  airspace disease such as developing bronchopneumonia of early  involvement without pleural effusion. Follow-up to resolution as these  areas may represent or may be hiding underlying nodularity.  2. Nonobstructing left nephrolithiasis.     DICTATED:   12/28/2019  EDITED/ls :   12/28/2019         This report was finalized on 12/29/2019 7:02 PM by Dr. Kamran Herbert.       XR Chest 1 View [[id number]] Collected:  12/28/19 1753     Updated:  12/29/19 1416    Narrative:          EXAMINATION: XR CHEST 1 VW - 12/28/2019     INDICATION: Altered mental status.     COMPARISON: Chest x-ray 12/04/2014     FINDINGS: Cardiac size enlarged with pulmonary vascularity in  normal  limits. Mild chronic changes without focal consolidation. No  pneumothorax or pleural effusion. Degenerative changes of the spine  including stable appearing chondroid focus left humeral head seen on  2014 exam.       Impression:       Cardiac size enlarged without acute parenchymal process.     DICTATED:   12/28/2019  EDITED/ls :   12/28/2019      This report was finalized on 12/29/2019 2:13 PM by Dr. Kamran Herbert.                       Results for orders placed during the hospital encounter of 12/28/19   Adult Transthoracic Echo Complete W/ Cont if Necessary Per Protocol    Narrative · Left ventricular systolic function is normal. Estimated EF appears to be   in the range of 61 - 65%.  · Left ventricular wall thickness is consistent with moderate concentric   hypertrophy.  · Normal right ventricular wall thickness, systolic function  and septal   motion noted. Right ventricular cavity is borderline dilated.  · Trace tricuspid valve regurgitation is present. Estimated right   ventricular systolic pressure from tricuspid regurgitation is normal (<35   mmHg)  · The valve appears trileaflet. The aortic valve is abnormal in structure.   There is mild calcification of the aortic valve.Trace aortic valve   regurgitation is present. No hemodynamically significant aortic valve   stenosis is present.            Discharge Details        Discharge Medications      New Medications      Instructions Start Date   acetaminophen 325 MG tablet  Commonly known as:  TYLENOL   650 mg, Oral, Every 4 Hours PRN      folic acid-vit B6-vit B12 2.2-25-1 MG tablet tablet  Commonly known as:  FOLGARD   1 tablet, Oral, Daily   Start Date:  January 9, 2020     insulin lispro 100 UNIT/ML injection  Commonly known as:  humaLOG   0-7 Units, Subcutaneous, 3 Times Daily With Meals      ipratropium-albuterol 0.5-2.5 mg/3 ml nebulizer  Commonly known as:  DUO-NEB   3 mL, Nebulization, Every 4 Hours PRN      linagliptin 5 MG tablet tablet  Commonly known as:  TRADJENTA   5 mg, Oral, Daily   Start Date:  January 9, 2020     melatonin 5 MG tablet tablet   5 mg, Oral, Nightly      metoprolol succinate XL 25 MG 24 hr tablet  Commonly known as:  TOPROL-XL   25 mg, Oral, Every 24 Hours Scheduled   Start Date:  January 9, 2020     ondansetron 4 MG/2ML injection  Commonly known as:  ZOFRAN   4 mg, Intravenous, Every 6 Hours PRN      polyethylene glycol pack packet  Commonly known as:  MIRALAX   17 g, Oral, 2 Times Daily      sennosides-docusate 8.6-50 MG per tablet  Commonly known as:  PERICOLACE   2 tablets, Oral, 2 Times Daily      terazosin 2 MG capsule  Commonly known as:  HYTRIN   2 mg, Oral, Nightly         Continue These Medications      Instructions Start Date   aspirin 81 MG tablet   1 tablet, Oral, Daily      atorvastatin 10 MG tablet  Commonly known as:  LIPITOR   TAKE 1 TABLET BY  MOUTH EVERY DAY      levothyroxine 100 MCG tablet  Commonly known as:  SYNTHROID, LEVOTHROID   100 mcg, Oral, Daily      sertraline 50 MG tablet  Commonly known as:  ZOLOFT   TAKE 1 TABLET BY MOUTH EVERY DAY         Stop These Medications    CITRUCEL oral powder  Generic drug:  methylcellulose     doxazosin 4 MG tablet  Commonly known as:  CARDURA     HUMULIN 70/30 KWIKPEN SC     ipratropium 17 MCG/ACT inhaler  Commonly known as:  ATROVENT HFA     losartan-hydrochlorothiazide 100-25 MG per tablet  Commonly known as:  HYZAAR            Allergies   Allergen Reactions   • Amoxicillin-Pot Clavulanate Other (See Comments)     HEADACHE - has tolerated ceftriaxone 12/2019   • Bactrim [Sulfamethoxazole-Trimethoprim] Other (See Comments)     .         Discharge Disposition:  Rehab Facility or Unit (DC - External)    Diet:  Hospital:  Diet Order   Procedures   • Diet Soft Texture; Ground; Consistent Carbohydrate, GI Soft       Activity:      Restrictions or Other Recommendations:         CODE STATUS:    Code Status and Medical Interventions:   Ordered at: 12/28/19 1938     Level Of Support Discussed With:    Patient     Code Status:    CPR     Medical Interventions (Level of Support Prior to Arrest):    Full       Future Appointments   Date Time Provider Department Center   1/8/2020  1:00 PM SUN[facility] HOLTER/EVENT [facility] None   2/25/2020 11:15 AM Thony Ohara MD [facility] None   3/10/2020  4:00 PM Kee Hoffman MD [facility] None       Additional Instructions for the Follow-ups that You Need to Schedule     Discharge Follow-up with PCP   As directed       Currently Documented PCP:    Kee Hoffman MD    PCP Phone Number:    [phone]     Follow Up Details:  follow up with PCP one week after discharge from rehab               Time Spent on Discharge:  35 minutes    Electronically signed by Mika Suero MD, 01/08/20, 12:27 PM.

## 2020-01-08 NOTE — PROGRESS NOTES
Case Management Discharge Note      Final Note: Per Lianna, insurance has approved Mrs. Kee's transfer to a skilled facility. An ambulance has been rescheduled to pick her up this afternoon at 15:00. Nurse to call report to [phone].    Provided post acute provider list?: Yes  Post Acute Provider Lists: Nursing Home  Post Acuite Provider List: Delivered  Delivered To: Patient, Support Person  Support Person: Jairo  Method of Delivery: In person    Destination - Selection Complete      Service Provider Request Status Selected Services Address Phone Number Fax Number    [facility] [address] , [place] [address] [phone]      Durable Medical Equipment      No service has been selected for the patient.      Dialysis/Infusion      No service has been selected for the patient.      Home Medical Care      No service has been selected for the patient.      Therapy      No service has been selected for the patient.      Community Resources      No service has been selected for the patient.        Transportation Services  Ambulance: [facility]    Final Discharge Disposition Code: 03 - skilled nursing facility (SNF)

## 2020-01-08 NOTE — PROGRESS NOTES
[facility] Medicine Services  PROGRESS NOTE    Patient Name: Julissa Kee  : 1950  MRN: [id number]    Date of Admission: 2019  Primary Care Physician: Kee Hoffman MD    Subjective     CC: f/u PNA     HPI:   Strength at baseline, eager for transfer to rehab  Review of Systems  Gen- No fevers, chills  CV- No chest pain, palpitations  Resp- No cough, dyspnea  GI- No N/V/D, abd pain            Objective     Vital Signs:   Temp:  [97.7 °F (36.5 °C)-98 °F (36.7 °C)] 97.7 °F (36.5 °C)  Heart Rate:  [66-90] 67  Resp:  [18] 18  BP: (134-156)/(63-85) 145/63     Physical Exam:    Constitutional -no acute distress, non toxic, in bed  HEENT-NCAT, mucous membranes moist  CV-RRR, S1 S2 normal, no m/r/g  Resp-grossly clear bilaterally  Abd-soft, non-tender, non-distended, normo active bowel sounds, morbidly obese  Ext-No lower extremity cyanosis, clubbing or edema bilaterally  Neuro-alert and oriented, speech clear, moves all extremities   Psych-normal affect   Skin- No rash on exposed UE or LE bilaterally      Results Reviewed:    Results from last 7 days   Lab Units 20  2133   WBC 10*3/mm3 7.70   HEMOGLOBIN g/dL 10.5*   HEMATOCRIT % 32.5*   PLATELETS 10*3/mm3 167     Results from last 7 days   Lab Units 20  0926 20  2133   SODIUM mmol/L 141 137   POTASSIUM mmol/L 3.8 3.7   CHLORIDE mmol/L 101 99   CO2 mmol/L 33.0* 30.0*   BUN mg/dL 8 14   CREATININE mg/dL 0.63 0.79   GLUCOSE mg/dL 129* 163*   CALCIUM mg/dL 9.3 9.3   ALT (SGPT) U/L  --  11   AST (SGOT) U/L  --  14   TROPONIN T ng/mL  --  <0.010     Estimated Creatinine Clearance: 102.5 mL/min (by C-G formula based on SCr of 0.63 mg/dL).    Microbiology Results Abnormal     Procedure Component Value - Date/Time    Blood Culture - Blood, Arm, Left [[id number]] Collected:  19 0415    Lab Status:  Final result Specimen:  Blood from Arm, Left Updated:  20 0645     Blood Culture No growth at 5 days    Blood  Culture - Blood, Hand, Left [[id number]] Collected:  12/30/19 0420    Lab Status:  Final result Specimen:  Blood from Hand, Left Updated:  01/04/20 0645     Blood Culture No growth at 5 days    Blood Culture - Blood, Hand, Right [[id number]] Collected:  12/28/19 1920    Lab Status:  Final result Specimen:  Blood from Hand, Right Updated:  01/02/20 2100     Blood Culture No growth at 5 days    Blood Culture - Blood, Hand, Right [[id number]]  (Abnormal) Collected:  12/28/19 2010    Lab Status:  Final result Specimen:  Blood from Hand, Right Updated:  12/31/19 0638     Blood Culture Staphylococcus, coagulase negative     Comment: Probable contaminant requires clinical correlation, susceptibility not performed unless requested by physician.          Gram Stain Aerobic Bottle Gram positive cocci in clusters    Blood Culture ID, PCR - Blood, Hand, Right [[id number]]  (Normal) Collected:  12/28/19 2010    Lab Status:  Final result Specimen:  Blood from Hand, Right Updated:  12/30/19 0503     BCID, PCR No organism detected by BCID PCR.    Urine Culture - Urine, Urine, Catheter [[id number]]  (Normal) Collected:  12/28/19 1908    Lab Status:  Final result Specimen:  Urine, Catheter Updated:  12/30/19 0405     Urine Culture No growth    Respiratory Panel, PCR - Swab, Nasopharynx [[id number]]  (Normal) Collected:  12/29/19 0136    Lab Status:  Final result Specimen:  Swab from Nasopharynx Updated:  12/29/19 0858     ADENOVIRUS, PCR Not Detected     Coronavirus 229E Not Detected     Coronavirus HKU1 Not Detected     Coronavirus NL63 Not Detected     Coronavirus OC43 Not Detected     Human Metapneumovirus Not Detected     Human Rhinovirus/Enterovirus Not Detected     Influenza B PCR Not Detected     Parainfluenza Virus 1 Not Detected     Parainfluenza Virus 2 Not Detected     Parainfluenza Virus 3 Not Detected     Parainfluenza Virus 4 Not Detected     Bordetella pertussis pcr Not Detected     Influenza A H1 2009 PCR Not Detected      Chlamydophila pneumoniae PCR Not Detected     Mycoplasma pneumo by PCR Not Detected     Influenza A PCR Not Detected     Influenza A H3 Not Detected     Influenza A H1 Not Detected     RSV, PCR Not Detected     Bordetella parapertussis PCR Not Detected        Imaging Results (Last 24 Hours)     ** No results found for the last 24 hours. **        I have reviewed the medications:  Scheduled Meds:    aspirin 81 mg Oral Daily   atorvastatin 10 mg Oral Nightly   heparin (porcine) 5,000 Units Subcutaneous Q12H   insulin lispro 0-7 Units Subcutaneous TID With Meals   levothyroxine 100 mcg Oral Q AM   linagliptin 5 mg Oral Daily   melatonin 5 mg Oral Nightly   metoprolol succinate XL 25 mg Oral Q24H   polyethylene glycol 17 g Oral BID   sennosides-docusate 2 tablet Oral BID   sertraline 50 mg Oral Daily   sodium chloride 10 mL Intravenous Q12H   terazosin 2 mg Oral Nightly     Continuous Infusions:     PRN Meds:  •  acetaminophen **OR** [DISCONTINUED] acetaminophen **OR** [DISCONTINUED] acetaminophen  •  dextrose  •  dextrose  •  glucagon (human recombinant)  •  ipratropium-albuterol  •  magnesium sulfate **OR** magnesium sulfate **OR** magnesium sulfate  •  nitroglycerin  •  ondansetron  •  potassium chloride  •  potassium chloride  •  sodium chloride  •  sodium chloride    Assessment & Plan     Active Hospital Problems    Diagnosis  POA   • **Acute respiratory failure with hypoxia (CMS/HCC) [J96.01]  Yes   • Renal insufficiency [N28.9]  Yes   • Sacral wound [S31.000A]  Yes   • CAP (community acquired pneumonia) [J18.9]  Yes   • Pneumonia of right lung due to infectious organism [J18.9]  Yes   • Syncope and collapse [R55]  Yes   • Chronic obstructive pulmonary disease (CMS/HCC) [J44.9]  Yes   • Diabetes mellitus (CMS/HCC) [E11.9]  Yes   • Hyperlipidemia [E78.5]  Yes   • Hypertension [I10]  Yes   • Hypothyroidism [E03.9]  Yes   • Extreme obesity with alveolar hypoventilation (CMS/HCC) [E66.2]  Yes   • Anxiety [F41.9]  Yes  "     Resolved Hospital Problems   No resolved problems to display.     Brief Hospital Course to date:  Julissa Kee is a 69 y.o. female with PMH significant for COPD on supplemental oxygen, DM2, HTN, HLD, renal insuffiency, and morbid obesity. She is largely immobile with profoundly poor hygiene. She has several wounds that including sacral decubitus ulcer. She was living at home and \"not letting her family take care of her.\" Admitted to [facility] 12/28/2019 for a/c respiratory failure and atrial tachycardia.     Acute on chronic respiratory failure with hypoxia  Right middle lobe pneumonia   - in the setting of COPD and obesity hypoventilation syndrome  - secondary to developing pneumonia. Demonstrated on CT imaging.  - Appreciate ID assistance. s/p IV Rocephin/Docycyline. Now monitoring off of antibiotics. ID has signed off.   - Encourage IS, wean O2 as able     Atrial tachycardia without atrial fibrillation  - Appreciate cardiology assistance - cardiology felt that patient may be having short bursts of atrial tachycardia with PACs and not true atrial fibrillation  - Started on Toprol XL 25mg PO daily   - Plan to discharge with event monitor to evaluate for occult afib/other arrhythmia that may predispose her to syncope (she has had syncopal episodes at home).    - Need to call cardiology office on day of dc to have event monitor placed before discharge.   - Follow-up with Dr. Ohara in 6 weeks    CNS bacteremia  - likely skin contaminant.     UTI   - Urine culture from 12/28 with no growth  - Encourage out of bed for toileting     DMII  -A1c 5.2  - Patient was using insulin at home, doesn't know dose and  wasn't checking blood sugars  - 24-hour glucose ranged from 118 to 144  - Started Linagliptin 5mg QAM and monitor on PO therapy    Sacral wound, lower extremity changes, debility:  - ID following - monitoring off of abx   - PT/OT  - case management assisting with rehab options.      Renal insufficiency:  - " appears chronic. Creatinine at baseline      HTN:  - controlled and stable. Continue home meds     Hypothyroidism:  -TSH 2.920    Dysphagia  - Soft texture ground diet ordered    DVT Prophylaxis: Heparin   Disposition: I expect the patient to be discharged to SNF when a bed is available, appears medically ready for transfer anytime    CODE STATUS:   Code Status and Medical Interventions:   Ordered at: 12/28/19 1938     Level Of Support Discussed With:    Patient     Code Status:    CPR     Medical Interventions (Level of Support Prior to Arrest):    Full     Electronically signed by Mika Suero MD, 01/07/20, 9:42 PM.

## 2020-01-08 NOTE — PLAN OF CARE
Problem: Patient Care Overview  Goal: Plan of Care Review  Outcome: Ongoing (interventions implemented as appropriate)  Flowsheets (Taken 1/8/2020 0258)  Progress: no change  Plan of Care Reviewed With: patient  Outcome Summary: Pt seems depressed.  Very quiet, one word answers.  She has refused baths and is very unkempt.  Her VS are WNL, on 2L/nc, sinus arrhythmia, refused bowel meds (no BM since 1/5).  Needing rehab at D/C.  Will continue to monitor.

## 2020-01-14 ENCOUNTER — HOSPITAL ENCOUNTER (INPATIENT)
Facility: HOSPITAL | Age: 70
LOS: 7 days | Discharge: NURSING, REHAB OR LONG-TERM CARE | End: 2020-01-21
Attending: EMERGENCY MEDICINE | Admitting: INTERNAL MEDICINE

## 2020-01-14 ENCOUNTER — APPOINTMENT (OUTPATIENT)
Dept: GENERAL RADIOLOGY | Facility: HOSPITAL | Age: 70
End: 2020-01-14

## 2020-01-14 DIAGNOSIS — F41.9 ANXIETY: ICD-10-CM

## 2020-01-14 DIAGNOSIS — R06.03 RESPIRATORY DISTRESS: ICD-10-CM

## 2020-01-14 DIAGNOSIS — R13.10 DYSPHAGIA, UNSPECIFIED TYPE: ICD-10-CM

## 2020-01-14 DIAGNOSIS — R09.02 HYPOXIA: Primary | ICD-10-CM

## 2020-01-14 DIAGNOSIS — B34.9 VIRAL ILLNESS: ICD-10-CM

## 2020-01-14 DIAGNOSIS — J18.9 PNEUMONIA OF BOTH LUNGS DUE TO INFECTIOUS ORGANISM, UNSPECIFIED PART OF LUNG: ICD-10-CM

## 2020-01-14 PROBLEM — J12.3 HUMAN METAPNEUMOVIRUS (HMPV) PNEUMONIA: Status: ACTIVE | Noted: 2020-01-14

## 2020-01-14 LAB
ALBUMIN SERPL-MCNC: 3 G/DL (ref 3.5–5.2)
ALBUMIN/GLOB SERPL: 1 G/DL
ALP SERPL-CCNC: 93 U/L (ref 39–117)
ALT SERPL W P-5'-P-CCNC: 16 U/L (ref 1–33)
ANION GAP SERPL CALCULATED.3IONS-SCNC: 7 MMOL/L (ref 5–15)
AST SERPL-CCNC: 19 U/L (ref 1–32)
B PARAPERT DNA SPEC QL NAA+PROBE: NOT DETECTED
B PERT DNA SPEC QL NAA+PROBE: NOT DETECTED
BASOPHILS # BLD AUTO: 0.03 10*3/MM3 (ref 0–0.2)
BASOPHILS NFR BLD AUTO: 0.5 % (ref 0–1.5)
BILIRUB SERPL-MCNC: 0.6 MG/DL (ref 0.2–1.2)
BUN BLD-MCNC: 21 MG/DL (ref 8–23)
BUN/CREAT SERPL: 23.6 (ref 7–25)
C PNEUM DNA NPH QL NAA+NON-PROBE: NOT DETECTED
CALCIUM SPEC-SCNC: 9.8 MG/DL (ref 8.6–10.5)
CHLORIDE SERPL-SCNC: 94 MMOL/L (ref 98–107)
CO2 SERPL-SCNC: 39 MMOL/L (ref 22–29)
CREAT BLD-MCNC: 0.89 MG/DL (ref 0.57–1)
D-LACTATE SERPL-SCNC: 1.1 MMOL/L (ref 0.5–2)
DEPRECATED RDW RBC AUTO: 61 FL (ref 37–54)
EOSINOPHIL # BLD AUTO: 0.12 10*3/MM3 (ref 0–0.4)
EOSINOPHIL NFR BLD AUTO: 1.8 % (ref 0.3–6.2)
ERYTHROCYTE [DISTWIDTH] IN BLOOD BY AUTOMATED COUNT: 14.4 % (ref 12.3–15.4)
FLUAV H1 2009 PAND RNA NPH QL NAA+PROBE: NOT DETECTED
FLUAV H1 HA GENE NPH QL NAA+PROBE: NOT DETECTED
FLUAV H3 RNA NPH QL NAA+PROBE: NOT DETECTED
FLUAV SUBTYP SPEC NAA+PROBE: NOT DETECTED
FLUBV RNA ISLT QL NAA+PROBE: NOT DETECTED
GFR SERPL CREATININE-BSD FRML MDRD: 63 ML/MIN/1.73
GLOBULIN UR ELPH-MCNC: 3.1 GM/DL
GLUCOSE BLD-MCNC: 104 MG/DL (ref 65–99)
GLUCOSE BLDC GLUCOMTR-MCNC: 110 MG/DL (ref 70–130)
GLUCOSE BLDC GLUCOMTR-MCNC: 98 MG/DL (ref 70–130)
HADV DNA SPEC NAA+PROBE: NOT DETECTED
HCOV 229E RNA SPEC QL NAA+PROBE: NOT DETECTED
HCOV HKU1 RNA SPEC QL NAA+PROBE: NOT DETECTED
HCOV NL63 RNA SPEC QL NAA+PROBE: NOT DETECTED
HCOV OC43 RNA SPEC QL NAA+PROBE: NOT DETECTED
HCT VFR BLD AUTO: 36.9 % (ref 34–46.6)
HGB BLD-MCNC: 11.2 G/DL (ref 12–15.9)
HMPV RNA NPH QL NAA+NON-PROBE: DETECTED
HOLD SPECIMEN: NORMAL
HOLD SPECIMEN: NORMAL
HPIV1 RNA SPEC QL NAA+PROBE: NOT DETECTED
HPIV2 RNA SPEC QL NAA+PROBE: NOT DETECTED
HPIV3 RNA NPH QL NAA+PROBE: NOT DETECTED
HPIV4 P GENE NPH QL NAA+PROBE: NOT DETECTED
IMM GRANULOCYTES # BLD AUTO: 0.04 10*3/MM3 (ref 0–0.05)
IMM GRANULOCYTES NFR BLD AUTO: 0.6 % (ref 0–0.5)
LYMPHOCYTES # BLD AUTO: 0.93 10*3/MM3 (ref 0.7–3.1)
LYMPHOCYTES NFR BLD AUTO: 14 % (ref 19.6–45.3)
M PNEUMO IGG SER IA-ACNC: NOT DETECTED
MCH RBC QN AUTO: 34.5 PG (ref 26.6–33)
MCHC RBC AUTO-ENTMCNC: 30.4 G/DL (ref 31.5–35.7)
MCV RBC AUTO: 113.5 FL (ref 79–97)
MONOCYTES # BLD AUTO: 0.59 10*3/MM3 (ref 0.1–0.9)
MONOCYTES NFR BLD AUTO: 8.9 % (ref 5–12)
NEUTROPHILS # BLD AUTO: 4.95 10*3/MM3 (ref 1.7–7)
NEUTROPHILS NFR BLD AUTO: 74.2 % (ref 42.7–76)
NRBC BLD AUTO-RTO: 0 /100 WBC (ref 0–0.2)
NT-PROBNP SERPL-MCNC: 270.3 PG/ML (ref 5–900)
PLATELET # BLD AUTO: 167 10*3/MM3 (ref 140–450)
PMV BLD AUTO: 10.3 FL (ref 6–12)
POTASSIUM BLD-SCNC: 4.2 MMOL/L (ref 3.5–5.2)
PROT SERPL-MCNC: 6.1 G/DL (ref 6–8.5)
RBC # BLD AUTO: 3.25 10*6/MM3 (ref 3.77–5.28)
RHINOVIRUS RNA SPEC NAA+PROBE: NOT DETECTED
RSV RNA NPH QL NAA+NON-PROBE: NOT DETECTED
SODIUM BLD-SCNC: 140 MMOL/L (ref 136–145)
TROPONIN T SERPL-MCNC: 0.01 NG/ML (ref 0–0.03)
TROPONIN T SERPL-MCNC: 0.02 NG/ML (ref 0–0.03)
WBC NRBC COR # BLD: 6.66 10*3/MM3 (ref 3.4–10.8)
WHOLE BLOOD HOLD SPECIMEN: NORMAL
WHOLE BLOOD HOLD SPECIMEN: NORMAL

## 2020-01-14 PROCEDURE — 83605 ASSAY OF LACTIC ACID: CPT | Performed by: EMERGENCY MEDICINE

## 2020-01-14 PROCEDURE — 80053 COMPREHEN METABOLIC PANEL: CPT | Performed by: EMERGENCY MEDICINE

## 2020-01-14 PROCEDURE — 99285 EMERGENCY DEPT VISIT HI MDM: CPT

## 2020-01-14 PROCEDURE — 63710000001 INSULIN LISPRO (HUMAN) PER 5 UNITS: Performed by: INTERNAL MEDICINE

## 2020-01-14 PROCEDURE — 87040 BLOOD CULTURE FOR BACTERIA: CPT | Performed by: EMERGENCY MEDICINE

## 2020-01-14 PROCEDURE — 99223 1ST HOSP IP/OBS HIGH 75: CPT | Performed by: INTERNAL MEDICINE

## 2020-01-14 PROCEDURE — 93005 ELECTROCARDIOGRAM TRACING: CPT

## 2020-01-14 PROCEDURE — 84484 ASSAY OF TROPONIN QUANT: CPT | Performed by: EMERGENCY MEDICINE

## 2020-01-14 PROCEDURE — 82962 GLUCOSE BLOOD TEST: CPT

## 2020-01-14 PROCEDURE — 93005 ELECTROCARDIOGRAM TRACING: CPT | Performed by: EMERGENCY MEDICINE

## 2020-01-14 PROCEDURE — 71045 X-RAY EXAM CHEST 1 VIEW: CPT

## 2020-01-14 PROCEDURE — 25010000002 VANCOMYCIN 10 G RECONSTITUTED SOLUTION: Performed by: EMERGENCY MEDICINE

## 2020-01-14 PROCEDURE — 85025 COMPLETE CBC W/AUTO DIFF WBC: CPT | Performed by: EMERGENCY MEDICINE

## 2020-01-14 PROCEDURE — 83880 ASSAY OF NATRIURETIC PEPTIDE: CPT | Performed by: EMERGENCY MEDICINE

## 2020-01-14 PROCEDURE — 0100U HC BIOFIRE FILMARRAY RESP PANEL 2: CPT | Performed by: EMERGENCY MEDICINE

## 2020-01-14 RX ORDER — IPRATROPIUM BROMIDE AND ALBUTEROL SULFATE 2.5; .5 MG/3ML; MG/3ML
3 SOLUTION RESPIRATORY (INHALATION) EVERY 4 HOURS PRN
Status: DISCONTINUED | OUTPATIENT
Start: 2020-01-14 | End: 2020-01-21 | Stop reason: HOSPADM

## 2020-01-14 RX ORDER — SODIUM CHLORIDE 0.9 % (FLUSH) 0.9 %
10 SYRINGE (ML) INJECTION AS NEEDED
Status: DISCONTINUED | OUTPATIENT
Start: 2020-01-14 | End: 2020-01-14 | Stop reason: SDUPTHER

## 2020-01-14 RX ORDER — DEXTROSE MONOHYDRATE 25 G/50ML
25 INJECTION, SOLUTION INTRAVENOUS
Status: DISCONTINUED | OUTPATIENT
Start: 2020-01-14 | End: 2020-01-21 | Stop reason: HOSPADM

## 2020-01-14 RX ORDER — NICOTINE POLACRILEX 4 MG
15 LOZENGE BUCCAL
Status: DISCONTINUED | OUTPATIENT
Start: 2020-01-14 | End: 2020-01-21 | Stop reason: HOSPADM

## 2020-01-14 RX ORDER — ACETAMINOPHEN 325 MG/1
650 TABLET ORAL EVERY 4 HOURS PRN
Status: DISCONTINUED | OUTPATIENT
Start: 2020-01-14 | End: 2020-01-14 | Stop reason: SDUPTHER

## 2020-01-14 RX ORDER — ATORVASTATIN CALCIUM 10 MG/1
10 TABLET, FILM COATED ORAL DAILY
Status: DISCONTINUED | OUTPATIENT
Start: 2020-01-14 | End: 2020-01-21 | Stop reason: HOSPADM

## 2020-01-14 RX ORDER — ACETAMINOPHEN 160 MG/5ML
650 SOLUTION ORAL EVERY 4 HOURS PRN
Status: DISCONTINUED | OUTPATIENT
Start: 2020-01-14 | End: 2020-01-21 | Stop reason: HOSPADM

## 2020-01-14 RX ORDER — CHOLECALCIFEROL (VITAMIN D3) 125 MCG
5 CAPSULE ORAL NIGHTLY
Status: DISCONTINUED | OUTPATIENT
Start: 2020-01-14 | End: 2020-01-21 | Stop reason: HOSPADM

## 2020-01-14 RX ORDER — PREDNISONE 20 MG/1
40 TABLET ORAL
Status: DISCONTINUED | OUTPATIENT
Start: 2020-01-15 | End: 2020-01-15

## 2020-01-14 RX ORDER — ASPIRIN 81 MG/1
81 TABLET ORAL DAILY
Status: DISCONTINUED | OUTPATIENT
Start: 2020-01-14 | End: 2020-01-21 | Stop reason: HOSPADM

## 2020-01-14 RX ORDER — IBUPROFEN 400 MG/1
400 TABLET ORAL ONCE
Status: COMPLETED | OUTPATIENT
Start: 2020-01-14 | End: 2020-01-14

## 2020-01-14 RX ORDER — SODIUM CHLORIDE 9 MG/ML
125 INJECTION, SOLUTION INTRAVENOUS CONTINUOUS
Status: DISCONTINUED | OUTPATIENT
Start: 2020-01-14 | End: 2020-01-14

## 2020-01-14 RX ORDER — ONDANSETRON 2 MG/ML
4 INJECTION INTRAMUSCULAR; INTRAVENOUS EVERY 6 HOURS PRN
Status: DISCONTINUED | OUTPATIENT
Start: 2020-01-14 | End: 2020-01-21 | Stop reason: HOSPADM

## 2020-01-14 RX ORDER — METOPROLOL SUCCINATE 25 MG/1
25 TABLET, EXTENDED RELEASE ORAL
Status: DISCONTINUED | OUTPATIENT
Start: 2020-01-14 | End: 2020-01-21 | Stop reason: HOSPADM

## 2020-01-14 RX ORDER — LEVOTHYROXINE SODIUM 0.1 MG/1
100 TABLET ORAL
Status: DISCONTINUED | OUTPATIENT
Start: 2020-01-15 | End: 2020-01-21 | Stop reason: HOSPADM

## 2020-01-14 RX ORDER — ACETAMINOPHEN 650 MG/1
650 SUPPOSITORY RECTAL EVERY 4 HOURS PRN
Status: DISCONTINUED | OUTPATIENT
Start: 2020-01-14 | End: 2020-01-21 | Stop reason: HOSPADM

## 2020-01-14 RX ORDER — ACETAMINOPHEN 325 MG/1
650 TABLET ORAL EVERY 4 HOURS PRN
Status: DISCONTINUED | OUTPATIENT
Start: 2020-01-14 | End: 2020-01-21 | Stop reason: HOSPADM

## 2020-01-14 RX ORDER — TERAZOSIN 2 MG/1
2 CAPSULE ORAL NIGHTLY
Status: DISCONTINUED | OUTPATIENT
Start: 2020-01-14 | End: 2020-01-21 | Stop reason: HOSPADM

## 2020-01-14 RX ORDER — LINEZOLID 600 MG/1
600 TABLET, FILM COATED ORAL 2 TIMES DAILY
COMMUNITY
Start: 2020-01-12 | End: 2020-01-21 | Stop reason: HOSPADM

## 2020-01-14 RX ORDER — SODIUM CHLORIDE 0.9 % (FLUSH) 0.9 %
10 SYRINGE (ML) INJECTION EVERY 12 HOURS SCHEDULED
Status: DISCONTINUED | OUTPATIENT
Start: 2020-01-14 | End: 2020-01-21 | Stop reason: HOSPADM

## 2020-01-14 RX ORDER — SODIUM CHLORIDE 0.9 % (FLUSH) 0.9 %
10 SYRINGE (ML) INJECTION AS NEEDED
Status: DISCONTINUED | OUTPATIENT
Start: 2020-01-14 | End: 2020-01-21 | Stop reason: HOSPADM

## 2020-01-14 RX ORDER — AMOXICILLIN 250 MG
2 CAPSULE ORAL 2 TIMES DAILY
Status: DISCONTINUED | OUTPATIENT
Start: 2020-01-14 | End: 2020-01-21 | Stop reason: HOSPADM

## 2020-01-14 RX ADMIN — VANCOMYCIN HYDROCHLORIDE 3000 MG: 10 INJECTION, POWDER, LYOPHILIZED, FOR SOLUTION INTRAVENOUS at 16:47

## 2020-01-14 RX ADMIN — IBUPROFEN 400 MG: 400 TABLET ORAL at 20:30

## 2020-01-14 RX ADMIN — SODIUM CHLORIDE, PRESERVATIVE FREE 10 ML: 5 INJECTION INTRAVENOUS at 20:31

## 2020-01-14 RX ADMIN — AZTREONAM 2 G: 2 INJECTION, POWDER, LYOPHILIZED, FOR SOLUTION INTRAMUSCULAR; INTRAVENOUS at 20:28

## 2020-01-14 RX ADMIN — SODIUM CHLORIDE 125 ML/HR: 9 INJECTION, SOLUTION INTRAVENOUS at 12:25

## 2020-01-14 RX ADMIN — TERAZOSIN HYDROCHLORIDE 2 MG: 2 CAPSULE ORAL at 20:32

## 2020-01-14 RX ADMIN — MELATONIN TAB 5 MG 5 MG: 5 TAB at 20:30

## 2020-01-14 NOTE — H&P
[facility] Medicine Services  HISTORY AND PHYSICAL    Patient Name: Julissa Kee  : 1950  MRN: [id number]  Primary Care Physician: Kee Hoffman MD  Date of admission: 2020      Subjective   Subjective     Chief Complaint:  Multifocal PNA, recurrent       HPI:  Julissa Kee is a 69yoF with history of morbid obesity, SARAH BETH, COPD, T2DM, sacral chronic decubitus wounds, HLD, HTN with recent admission and d/c for RML PNA- d/c  to SNF ([facility]), who presents from [facility] for dyspnea, hypoxia, worsening/recurrent right sided PNA.     As above ,patient was recently admitted to our service from - for RML PNA/acute on chronic respiratory failure. During this admission, patient was followed by ID (Dr Austen Tucker) and was placed on rocephin/doxycycline for 7 day course. She was then monitored off abx and did well until her ultimate discharge to [facility] . Hospital course at this time was also complicated by atrial tachycardia for which discharge event monitor placed, as well as UTI.       Most history today is obtained from patient's  who is at bedside and outside records from [facility]/ED provider as patient is very drowsy and reports she did not sleep well.  reports that patient did well intially after her discharge to [facility], however in the last 3-4 days has had increasing difficulty with dyspnea, congestion. Has had to get multiple breathing treatments to break up secretions. Was ultimately transferred to [facility] ED after she was noted to by hypoxic in the 70s on 4 L NC. Upon arrival to ED, was found on imaging to have multifocal PNA (CXR), as well as found to have PCR + metapneumovirus. Patient will be admitted to hospitalist service for further management.       Review of Systems   Gen- No fevers, chills  CV- No chest pain, palpitations  Resp- + cough, + dyspnea  GI- No N/V/D, abd pain      All other systems reviewed and are  negative.     Personal History     Past Medical History:   Diagnosis Date   • Acute bronchitis    • Acute sinusitis    • Anxiety    • Bacterial conjunctivitis    • Chest pain    • COPD (chronic obstructive pulmonary disease) (CMS/HCC)    • Depression    • Diabetes mellitus (CMS/HCC)    • Dyspnea    • Dysuria    • Edema    • Elbow injury    • Elbow pain    • Eye drainage    • Fatigue    • Foot pain, right    • Humerus distal fracture    • Hyperlipidemia    • Hypertension    • Hypothyroidism    • Morbid obesity (CMS/HCC)    • Pickwickian syndrome (CMS/HCC)    • Pressure ulcer stage I    • Psoriasis    • SOB (shortness of breath)    • Symptoms of urinary tract infection    • Urinary frequency    • Urinary tract infection    • Vaginal candidiasis        Past Surgical History:   Procedure Laterality Date   • CERVICAL POLYPECTOMY     • HYSTERECTOMY         Family History: family history includes Diabetes in her father and sister; Pancreatic cancer in her sister. Otherwise pertinent FHx was reviewed and unremarkable.     Social History:  reports that she quit smoking about 14 years ago. Her smoking use included cigarettes. She has never used smokeless tobacco. She reports that she does not drink alcohol or use drugs.  Social History     Social History Narrative   • Not on file       Medications:  Available home medication information reviewed.    (Not in a hospital admission)    Allergies   Allergen Reactions   • Amoxicillin-Pot Clavulanate Other (See Comments)     HEADACHE - has tolerated ceftriaxone 12/2019   • Bactrim [Sulfamethoxazole-Trimethoprim] Other (See Comments)     .       Objective   Objective     Vital Signs:   Temp:  [98.5 °F (36.9 °C)] 98.5 °F (36.9 °C)  Heart Rate:  [63-66] 64  Resp:  [20] 20  BP: (110-148)/(43-81) 141/45        Physical Exam   Constitutional: Awake but drowsy, morbidly obese   Eyes: PERRLA, sclerae anicteric, no conjunctival injection  HENT: NCAT, significant secretions noted throughout  exam , cardiac monitor in place on chest   Neck: Supple, no thyromegaly, no lymphadenopathy, trachea midline  Respiratory: diminished air movement throughout with occ rhonchi, no wheezing heard, limited by body habitus, on 4 L NC   Cardiovascular: RRR, no murmurs, rubs, or gallops, palpable pedal pulses bilaterally  Gastrointestinal: Positive bowel sounds, soft, nontender, nondistended  Musculoskeletal: No bilateral ankle edema, no clubbing or cyanosis to extremities  Psychiatric: Appropriate affect, cooperative  Neurologic: Oriented x 3, but drowsy, moves all extremities  Skin: No rashes, chronic venous stasis with flaking/cracking of skin noted       Results Reviewed:  I have personally reviewed current lab and radiology data.    Results from last 7 days   Lab Units 01/14/20  1038   WBC 10*3/mm3 6.66   HEMOGLOBIN g/dL 11.2*   HEMATOCRIT % 36.9   PLATELETS 10*3/mm3 167     Results from last 7 days   Lab Units 01/14/20  1039 01/14/20  1038   SODIUM mmol/L  --  140   POTASSIUM mmol/L  --  4.2   CHLORIDE mmol/L  --  94*   CO2 mmol/L  --  39.0*   BUN mg/dL  --  21   CREATININE mg/dL  --  0.89   GLUCOSE mg/dL  --  104*   CALCIUM mg/dL  --  9.8   ALT (SGPT) U/L  --  16   AST (SGOT) U/L  --  19   TROPONIN T ng/mL  --  0.023   PROBNP pg/mL  --  270.3   LACTATE mmol/L 1.1  --      Estimated Creatinine Clearance: 90.8 mL/min (by C-G formula based on SCr of 0.89 mg/dL).  Brief Urine Lab Results  (Last result in the past 365 days)      Color   Clarity   Blood   Leuk Est   Nitrite   Protein   CREAT   Urine HCG        12/28/19 1908 Dark Yellow Cloudy Negative Small (1+) Positive >=300 mg/dL (3+)             Imaging Results (Last 24 Hours)     Procedure Component Value Units Date/Time    XR Chest 1 View [[id number]] Collected:  01/14/20 1102     Updated:  01/14/20 1207    Narrative:       EXAMINATION: XR CHEST 1 VW- 01/14/2020     INDICATION: SOA triage protocol      COMPARISON: Chest radiograph 12/28/2019, CT chest 12/28/2019        FINDINGS: Single portable chest radiograph was submitted for review. The  heart is enlarged. Mild pulmonary vascular congestion identified. Hazy  mid and lower lung airspace changes identified, somewhat similar to the  prior exam particularly the CT of the chest performed 12/28/2019. No  large pleural effusion or pneumothorax. Visualized upper abdomen is  unrevealing. No acute osseous abnormality is appreciated.       Impression:       Cardiomegaly mild pulmonary vasculature congestion with  multifocal airspace changes noted similar when compared to 12/28/2019.        D:  01/14/2020  E:  01/14/2020           Results for orders placed during the hospital encounter of 12/28/19   Adult Transthoracic Echo Complete W/ Cont if Necessary Per Protocol    Narrative · Left ventricular systolic function is normal. Estimated EF appears to be   in the range of 61 - 65%.  · Left ventricular wall thickness is consistent with moderate concentric   hypertrophy.  · Normal right ventricular wall thickness, systolic function and septal   motion noted. Right ventricular cavity is borderline dilated.  · Trace tricuspid valve regurgitation is present. Estimated right   ventricular systolic pressure from tricuspid regurgitation is normal (<35   mmHg)  · The valve appears trileaflet. The aortic valve is abnormal in structure.   There is mild calcification of the aortic valve.Trace aortic valve   regurgitation is present. No hemodynamically significant aortic valve   stenosis is present.          Assessment/Plan   Assessment & Plan     Active Hospital Problems    Diagnosis POA   • Recurrent pneumonia [J18.9] Unknown   • Human metapneumovirus (hMPV) pneumonia [J12.3] Unknown   • Sacral wound [S31.000A] Yes   • Chronic obstructive pulmonary disease (CMS/HCC) [J44.9] Yes   • Diabetes mellitus (CMS/HCC) [E11.9] Yes   • Hyperlipidemia [E78.5] Yes   • Hypertension [I10] Yes   • Hypothyroidism [E03.9] Yes   • Extreme obesity with alveolar  hypoventilation (CMS/HCC) [E66.2] Yes       Julissa Kee is a 69yoF with history of morbid obesity, SARAH BETH, COPD, T2DM, sacral chronic decubitus wounds, HLD, HTN with recent admission and d/c for RML PNA- d/c 1/08 to SNF ([facility]), who presents from home for worsening/recurrent right sided PNA.     Recurrent PNA- multifocal  Human Metapneumovirus  Acute on chronic respiratory failure   ---noted plain film imaging this admission with unchanged/slightly worsened multifocal PNA  ---reviewed chart from last admission- patient was followed by ID and placed on rocephin/doxycycline, and this was continued for 7 day course--- given worsening symptoms/recurrent presentation-- will start broad spectrum IV abx- vanc/azactam (PCN allergy noted) and will ask ID to see in AM to assist with abx   ---will follow all cultures     COPD with exacerbation  Chronic respiratory failure on home O2   ---home 2-3 L NC , currently on 4L NC   --some component of patient's presentation may be secondary to COPD exacerbation--- continue abx as above, also will place on 5 day course of PO prednisone   ---nebs/pulmonary toilet / wean oxygen as able     T2DM  ----SSI---titrate and add basal/bolus as needed  ---A1C 5.2 last admission    Sacral Decubitus Wound  ---WOC consult  ---monitor       HLD  ---continue home statin    HTN  ---continue home medications     Hypothyroidism  ---TSH wnl last admission  ---continue synthroid     Atrial Tachycardia  --noted was discharged 1/08 with event monitor, still in place  --follow up scheduled to occur in 6 weeks from d/c 1/08, monitor closely     Recent UTI  --based on MAR at [facility], patient was started on zyvox 1/12 for UTI--- will not continue here in favor of continuing vanc/zosyn, though will need to touch base with [facility] regarding any culture results to tailor our therapy       DVT prophylaxis:  lovenox    CODE STATUS:    Code Status and Medical Interventions:   Ordered at: 01/14/20 4702      Level Of Support Discussed With:    Patient     Code Status:    CPR     Medical Interventions (Level of Support Prior to Arrest):    Full       Admission Status:  I believe this patient meets INPATIENT status due to   I feel patient’s risk for adverse outcomes and need for care warrant INPATIENT evaluation and I predict the patient’s care encounter to likely last beyond 2 midnights.      Electronically signed by Blaire Thomson MD, 01/14/20, 1:58 PM.

## 2020-01-14 NOTE — ED PROVIDER NOTES
Subjective   This patient is a 69-year-old female sent from a nursing home for evaluation of hypoxia and shortness of breath over the last 24 hours.  Oxygen saturations reported to be in the 70% range upon arrival of EMS today.  Patient has a history of COPD and wears 3 L of nasal cannula oxygenation around-the-clock.  She was recently discharged here after stay at the end of the year following a fall and during which she was diagnosed with right lower lobe pneumonia according to old medical documentation.  Patient has been at the nursing home for approximately 7 to 10 days according to family.  She has been without antibiotics for approximately 7 days.   indicates that the patient has continued to have shortness of breath and has not been doing well from a respiratory standpoint since discharge.  He does state that the symptoms have been getting worse over the last couple of days.  Patient at baseline has difficulty breathing and has a history of COPD and tobacco abuse.  Patient reports no objective fevers.  She denies hemoptysis or hematemesis.  Temperature is 98.5 here.  Patient was brought here for evaluation of shortness of breath, respiratory distress, and hypoxia.  Patient is able speak in complete sentences here and had oxygen saturations of 97 to 98% on mask.  Heart rate 64 with respiratory rate 18.  She was feeling much better than at the time of transport according to her and her .  In summary, we have a 6-year-old female with recent pneumonia and hospitalization as well as COPD who is here for increasing shortness of breath and respiratory complaints over the last couple of days worse today with oxygen saturations of 70% upon arrival of EMS today.    Past medical history:  Positive for COPD, HTN, and DM. MCOT applied during hospitalization. Negative for CAD and CHF.     Past family history:  Positive for DM. Negative for cancer, HTN, CAD, and CHF.        History provided by:   Spouse  Shortness of Breath   Severity:  Moderate  Onset quality:  Sudden  Timing:  Constant  Progression:  Worsening  Chronicity:  Chronic  Associated symptoms: cough    Associated symptoms: no chest pain, no ear pain, no fever, no headaches, no neck pain and no vomiting        Review of Systems   Constitutional: Negative for chills, fatigue, fever and unexpected weight change.   HENT: Negative for dental problem, ear pain, hearing loss and sinus pressure.    Eyes: Negative for pain and visual disturbance.   Respiratory: Positive for cough and shortness of breath. Negative for chest tightness.    Cardiovascular: Negative for chest pain, palpitations and leg swelling.   Gastrointestinal: Negative for diarrhea, nausea and vomiting.   Genitourinary: Negative for difficulty urinating, dyspareunia, hematuria and urgency.   Musculoskeletal: Negative for myalgias, neck pain and neck stiffness.   Neurological: Negative for seizures, syncope, speech difficulty, light-headedness and headaches.   Psychiatric/Behavioral: Negative for confusion.   All other systems reviewed and are negative.      Past Medical History:   Diagnosis Date   • Acute bronchitis    • Acute sinusitis    • Anxiety    • Bacterial conjunctivitis    • Chest pain    • COPD (chronic obstructive pulmonary disease) (CMS/[facility])    • Depression    • Diabetes mellitus (CMS/HCC)    • Dyspnea    • Dysuria    • Edema    • Elbow injury    • Elbow pain    • Eye drainage    • Fatigue    • Foot pain, right    • Humerus distal fracture    • Hyperlipidemia    • Hypertension    • Hypothyroidism    • Morbid obesity (CMS/HCC)    • Pickwickian syndrome (CMS/HCC)    • Pressure ulcer stage I    • Psoriasis    • SOB (shortness of breath)    • Symptoms of urinary tract infection    • Urinary frequency    • Urinary tract infection    • Vaginal candidiasis        Allergies   Allergen Reactions   • Amoxicillin-Pot Clavulanate Other (See Comments)     HEADACHE - has tolerated  ceftriaxone 2019   • Bactrim [Sulfamethoxazole-Trimethoprim] Other (See Comments)     .       Past Surgical History:   Procedure Laterality Date   • CERVICAL POLYPECTOMY     • HYSTERECTOMY         Family History   Problem Relation Age of Onset   • Diabetes Father    • Diabetes Sister    • Pancreatic cancer Sister        Social History     Socioeconomic History   • Marital status:      Spouse name: Not on file   • Number of children: Not on file   • Years of education: Not on file   • Highest education level: Not on file   Tobacco Use   • Smoking status: Former Smoker     Types: Cigarettes     Last attempt to quit: 2006     Years since quittin.0   • Smokeless tobacco: Never Used   Substance and Sexual Activity   • Alcohol use: No     Frequency: Never   • Drug use: Never   • Sexual activity: Defer         Objective   Physical Exam   Constitutional: She is oriented to person, place, and time. She appears well-developed. No distress.   HENT:   Head: Normocephalic and atraumatic.   Right Ear: External ear normal.   Left Ear: External ear normal.   Nose: Nose normal.   Mildly dry mucous membranes   Eyes: Pupils are equal, round, and reactive to light. EOM are normal.   Neck: Normal range of motion. Neck supple. No JVD present.   Cardiovascular: Normal rate, regular rhythm and normal heart sounds. Exam reveals no gallop and no friction rub.   2+ pedal edema.   Pulmonary/Chest: She has wheezes. She has no rales. She exhibits no tenderness.   Wheezes and crackles bilaterally.   Abdominal: Soft. Bowel sounds are normal. She exhibits no distension and no mass. There is no tenderness. There is no rebound and no guarding.   No signs of acute abdomen.  No pain at McBurney's point.  No pulsatile abdominal mass.  Morbid obesity   Musculoskeletal: Normal range of motion. She exhibits edema.   Lymphadenopathy:     She has no cervical adenopathy.   Neurological: She is alert and oriented to person, place, and time. No  cranial nerve deficit or sensory deficit. She exhibits normal muscle tone.   5/5 strength bilaterally with flexion and extension of fingers, wrist, elbows, knees and hips as well as plantar and dorsiflexion of the foot.   Skin: Skin is warm and dry. No erythema. No pallor.   Psychiatric: She has a normal mood and affect. Her behavior is normal. Judgment and thought content normal.   Nursing note and vitals reviewed.      Procedures         ED Course  ED Course as of Jan 14 1613 Tue Jan 14, 2020   1156 I had a nice conversation with the patient and her .  I am concerned about incompletely treated pneumonia or potentially new airspace disease.  We will look a chest x-ray and labs and come up with a good plan for her.  I have already told the family that based on the presentation, history of hypoxia, and overall story, I do favor admission for this patient.  Patient and family very appreciative for care and without question or complaint.  I am going to order gentle IV fluid rehydration, watch the labs and diagnostic studies closely, and keep a close eye on the patient.    [MUNIR]   1216 I discussed chest x-ray personally with our radiologist, Tyrone.  He agrees that x-ray today looks worse than admission x-ray from 12/28/2019.    [MUNIR]   1313 Dr. Aparicio is at the bedside reevaluating the patient, updating them on lab and diagnostic results as well as on the plan.    [SK]   1315 Vitals at 13:15:  Heart rate: 57  Oxygen saturation: 96  Blood pressure: 132/43    [SK]   1319 Dr. Aparicio paged the hospitalist.    [SK]   1343 Dr. Aparicio discussed the case with Dr. Thomson, hospitalist.    [SK]   1356 I reexamined the patient and had a good conversation with her.  We talked about her lab results including the respiratory panel results.  Dr. Thomson is going to place Zosyn and vancomycin orders in on the patient.  Patient will be admitted to telemetry with admission diagnosis that includes hypoxia, respiratory distress,  pneumonia, viral illness.  , specifically, has been very appreciative for care and has verbalized his appreciation several times.  Patient will be admitted to Dr. Thomson accordingly.    [MUNIR]   7917 Human Metapneumovirus(!): Detected [MUNIR]      ED Course User Index  [MUNIR] Tahir Aparicio MD  [SK] Carmen Nur     Recent Results (from the past 24 hour(s))   Comprehensive Metabolic Panel    Collection Time: 01/14/20 10:38 AM   Result Value Ref Range    Glucose 104 (H) 65 - 99 mg/dL    BUN 21 8 - 23 mg/dL    Creatinine 0.89 0.57 - 1.00 mg/dL    Sodium 140 136 - 145 mmol/L    Potassium 4.2 3.5 - 5.2 mmol/L    Chloride 94 (L) 98 - 107 mmol/L    CO2 39.0 (H) 22.0 - 29.0 mmol/L    Calcium 9.8 8.6 - 10.5 mg/dL    Total Protein 6.1 6.0 - 8.5 g/dL    Albumin 3.00 (L) 3.50 - 5.20 g/dL    ALT (SGPT) 16 1 - 33 U/L    AST (SGOT) 19 1 - 32 U/L    Alkaline Phosphatase 93 39 - 117 U/L    Total Bilirubin 0.6 0.2 - 1.2 mg/dL    eGFR Non African Amer 63 >60 mL/min/1.73    Globulin 3.1 gm/dL    A/G Ratio 1.0 g/dL    BUN/Creatinine Ratio 23.6 7.0 - 25.0    Anion Gap 7.0 5.0 - 15.0 mmol/L   BNP    Collection Time: 01/14/20 10:38 AM   Result Value Ref Range    proBNP 270.3 5.0 - 900.0 pg/mL   Troponin    Collection Time: 01/14/20 10:38 AM   Result Value Ref Range    Troponin T 0.023 0.000 - 0.030 ng/mL   Light Blue Top    Collection Time: 01/14/20 10:38 AM   Result Value Ref Range    Extra Tube hold for add-on    Green Top (Gel)    Collection Time: 01/14/20 10:38 AM   Result Value Ref Range    Extra Tube Hold for add-ons.    Lavender Top    Collection Time: 01/14/20 10:38 AM   Result Value Ref Range    Extra Tube hold for add-on    Gold Top - SST    Collection Time: 01/14/20 10:38 AM   Result Value Ref Range    Extra Tube Hold for add-ons.    CBC Auto Differential    Collection Time: 01/14/20 10:38 AM   Result Value Ref Range    WBC 6.66 3.40 - 10.80 10*3/mm3    RBC 3.25 (L) 3.77 - 5.28 10*6/mm3    Hemoglobin 11.2 (L) 12.0 - 15.9  g/dL    Hematocrit 36.9 34.0 - 46.6 %    .5 (H) 79.0 - 97.0 fL    MCH 34.5 (H) 26.6 - 33.0 pg    MCHC 30.4 (L) 31.5 - 35.7 g/dL    RDW 14.4 12.3 - 15.4 %    RDW-SD 61.0 (H) 37.0 - 54.0 fl    MPV 10.3 6.0 - 12.0 fL    Platelets 167 140 - 450 10*3/mm3    Neutrophil % 74.2 42.7 - 76.0 %    Lymphocyte % 14.0 (L) 19.6 - 45.3 %    Monocyte % 8.9 5.0 - 12.0 %    Eosinophil % 1.8 0.3 - 6.2 %    Basophil % 0.5 0.0 - 1.5 %    Immature Grans % 0.6 (H) 0.0 - 0.5 %    Neutrophils, Absolute 4.95 1.70 - 7.00 10*3/mm3    Lymphocytes, Absolute 0.93 0.70 - 3.10 10*3/mm3    Monocytes, Absolute 0.59 0.10 - 0.90 10*3/mm3    Eosinophils, Absolute 0.12 0.00 - 0.40 10*3/mm3    Basophils, Absolute 0.03 0.00 - 0.20 10*3/mm3    Immature Grans, Absolute 0.04 0.00 - 0.05 10*3/mm3    nRBC 0.0 0.0 - 0.2 /100 WBC   Respiratory Panel, PCR - Swab, Nasopharynx    Collection Time: 01/14/20 10:39 AM   Result Value Ref Range    ADENOVIRUS, PCR Not Detected Not Detected    Coronavirus 229E Not Detected Not Detected    Coronavirus HKU1 Not Detected Not Detected    Coronavirus NL63 Not Detected Not Detected    Coronavirus OC43 Not Detected Not Detected    Human Metapneumovirus Detected (A) Not Detected    Human Rhinovirus/Enterovirus Not Detected Not Detected    Influenza B PCR Not Detected Not Detected    Parainfluenza Virus 1 Not Detected Not Detected    Parainfluenza Virus 2 Not Detected Not Detected    Parainfluenza Virus 3 Not Detected Not Detected    Parainfluenza Virus 4 Not Detected Not Detected    Bordetella pertussis pcr Not Detected Not Detected    Influenza A H1 2009 PCR Not Detected Not Detected    Chlamydophila pneumoniae PCR Not Detected Not Detected    Mycoplasma pneumo by PCR Not Detected Not Detected    Influenza A PCR Not Detected Not Detected    Influenza A H3 Not Detected Not Detected    Influenza A H1 Not Detected Not Detected    RSV, PCR Not Detected Not Detected    Bordetella parapertussis PCR Not Detected Not Detected    Lactic Acid, Plasma    Collection Time: 01/14/20 10:39 AM   Result Value Ref Range    Lactate 1.1 0.5 - 2.0 mmol/L   Troponin    Collection Time: 01/14/20  3:09 PM   Result Value Ref Range    Troponin T 0.015 0.000 - 0.030 ng/mL     Note: In addition to lab results from this visit, the labs listed above may include labs taken at another facility or during a different encounter within the last 24 hours. Please correlate lab times with ED admission and discharge times for further clarification of the services performed during this visit.    XR Chest 1 View   Preliminary Result   Cardiomegaly mild pulmonary vasculature congestion with   multifocal airspace changes noted similar when compared to 12/28/2019.           D:  01/14/2020   E:  01/14/2020            Vitals:    01/14/20 1400 01/14/20 1430 01/14/20 1500 01/14/20 1530   BP: 139/50 144/46 122/52 133/89   BP Location:       Patient Position:       Pulse: 58 61 59 60   Resp:       Temp:       TempSrc:       SpO2: 95%  94% 96%   Weight:       Height:         Medications   sodium chloride 0.9 % flush 10 mL (has no administration in time range)   dextrose (GLUTOSE) oral gel 15 g (has no administration in time range)   dextrose (D50W) 25 g/ 50mL Intravenous Solution 25 g (has no administration in time range)   glucagon (human recombinant) (GLUCAGEN DIAGNOSTIC) injection 1 mg (has no administration in time range)   insulin lispro (humaLOG) injection 0-7 Units (has no administration in time range)   predniSONE (DELTASONE) tablet 40 mg (has no administration in time range)   vancomycin 3000 mg/500 mL 0.9% NS IVPB ([facility]) (has no administration in time range)     ECG/EMG Results (last 24 hours)     Procedure Component Value Units Date/Time    ECG 12 Lead [[id number]] Collected:  01/14/20 1051     Updated:  01/14/20 1122        ECG 12 Lead         ECG 12 Lead   Final Result   Test Reason : SOA Protocol   Blood Pressure : **/** mmHG   Vent. Rate : 068 BPM     Atrial Rate : 068  BPM      P-R Int : 172 ms          QRS Dur : 096 ms       QT Int : 386 ms       P-R-T Axes : 013 -32 021 degrees      QTc Int : 410 ms      Sinus rhythm with premature atrial complexes   Left axis deviation   Possible Lateral infarct (cited on or before 02-JAN-2020)   Abnormal ECG   When compared with ECG of 04-JAN-2020 07:24,   No significant change was found   Confirmed by TAHIR APARICIO MD ([id number]) on 1/14/2020 12:56:48 PM      Referred By:  [email or web address]           Confirmed By:TAHIR APARICIO MD                          [place] Coma Scale Score: 15                            MDM    Final diagnoses:   Hypoxia   Respiratory distress   Viral illness   Pneumonia of both lungs due to infectious organism, unspecified part of lung       Documentation assistance provided by cindy Nur.  Information recorded by the scribe was done at my direction and has been verified and validated by me.     Carmen Nur  01/14/20 1147       Carmen Nur  01/14/20 1542       Tahir Aparicio MD  01/14/20 3223

## 2020-01-14 NOTE — PROGRESS NOTES
Discharge Planning Assessment  [facility]     Patient Name: Julissa Kee  MRN: [id number]  Today's Date: 1/14/2020    Admit Date: 1/14/2020    Discharge Needs Assessment     Row Name 01/14/20 1435       Living Environment    Lives With  facility resident Pt is currently at [facility]    Unique Family Situation  Pt resides in [facility] with her , Familia. Pt was recently discharged and went to skilled bed at [facility]    Current Living Arrangements  extended care facility    Primary Care Provided by  other (see comments) SNF staff    Provides Primary Care For  no one, unable/limited ability to care for self    Family Caregiver if Needed  spouse;child(ashley), adult    Family Caregiver Names  - Familia  Daughter- Luz Higuera    Quality of Family Relationships  helpful;involved;supportive    Able to Return to Prior Arrangements  yes       Resource/Environmental Concerns    Resource/Environmental Concerns  home accessibility    Home Accessibility Concerns  stairs to access bedroom or bathroom;other (see comments) Split level home       Transition Planning    Patient/Family Anticipates Transition to  home with help/services;home with family    Transportation Anticipated  health plan transportation       Discharge Needs Assessment    Readmission Within the Last 30 Days  previous discharge plan unsuccessful    Concerns to be Addressed  discharge planning;decision making;home safety    Equipment Currently Used at Home  crutches, axillary;oxygen    Anticipated Changes Related to Illness  inability to care for self    Equipment Needed After Discharge  hospital bed    Outpatient/Agency/Support Group Needs  homecare agency    Discharge Facility/Level of Care Needs  home with home health    Provided post acute provider list?  -- List not provided at this time as pt needs to speak with daughter regarding discharge plans    Current Discharge Risk  physical impairment        Discharge Plan     Row Name 01/14/20  1439       Plan    Plan  home with HH services verse return to SNF    Patient/Family in Agreement with Plan  yes    Plan Comments  CM spoke with pt and  Familia at bedside. Pt recently discharged from [facility] to [facility]. Pt used crutches to ambulate prior to previous admission and has home oxygen she uses at 5l/nc and  is undure of DME provider. Pt does not have portale tanks. Familia would like for pt to return home as he feels he is able to care for her with some assistance from his daughter Luz and home health services.  Pt 's home is a split level and Familia reports she could stay in the [facility] area as it is close to a bathroom and has plenty of room. CM discussed care pt  would need at home including toileting, bathing and daily care and he feels he will be able to provide care. CM discussed home health services and he is agreeable and understands home health may not be to see pt however pt would qualify for nursing, physical and occupational therapy and aide services. Pt may also require additional DME at home ( hospital bed, portable oxygen tanks) at time of discharge.  CM also discussed attempting to place pt at a different SNF. Familia is going to discuss options with his daughter Destiney and CM will follow up with him for discharge planning.     Final Discharge Disposition Code  30 - still a patient        Destination      Coordination has not been started for this encounter.      Durable Medical Equipment      Coordination has not been started for this encounter.      Dialysis/Infusion      Coordination has not been started for this encounter.      Home Medical Care      Coordination has not been started for this encounter.      Therapy      Coordination has not been started for this encounter.      Community Resources      Coordination has not been started for this encounter.          Demographic Summary     Row Name 01/14/20 0421       General Information    Referral Source  admission  list    Reason for Consult  discharge planning;decision making    Preferred Language  English    General Information Comments  PCP- Kee Hoffman       Contact Information    Permission Granted to Share Info With          Functional Status     Row Name 01/14/20 1434       Functional Status    Usual Activity Tolerance  fair    Current Activity Tolerance  poor       Functional Status, IADL    Medications  assistive person    Meal Preparation  completely dependent    Housekeeping  completely dependent    Laundry  completely dependent    Shopping  completely dependent       Employment/    Employment/ Comments  Pt's  confirms pt has [employer] Medicare, denies concerns or disruption in coverage. Pt has prescription drug coverage and denies issues obtaining or affording current medications.         Psychosocial    No documentation.       Abuse/Neglect    No documentation.       Legal    No documentation.       Substance Abuse    No documentation.       Patient Forms    No documentation.           Tanisha Rios RN

## 2020-01-15 ENCOUNTER — APPOINTMENT (OUTPATIENT)
Dept: GENERAL RADIOLOGY | Facility: HOSPITAL | Age: 70
End: 2020-01-15

## 2020-01-15 ENCOUNTER — APPOINTMENT (OUTPATIENT)
Dept: CARDIOLOGY | Facility: HOSPITAL | Age: 70
End: 2020-01-15

## 2020-01-15 LAB
ALBUMIN SERPL-MCNC: 2.8 G/DL (ref 3.5–5.2)
ALBUMIN/GLOB SERPL: 0.9 G/DL
ALP SERPL-CCNC: 88 U/L (ref 39–117)
ALT SERPL W P-5'-P-CCNC: 16 U/L (ref 1–33)
ANION GAP SERPL CALCULATED.3IONS-SCNC: 13 MMOL/L (ref 5–15)
ARTERIAL PATENCY WRIST A: ABNORMAL
ARTERIAL PATENCY WRIST A: ABNORMAL
AST SERPL-CCNC: 21 U/L (ref 1–32)
ATMOSPHERIC PRESS: ABNORMAL MM[HG]
ATMOSPHERIC PRESS: ABNORMAL MM[HG]
BACTERIA UR QL AUTO: ABNORMAL /HPF
BASE EXCESS BLDA CALC-SCNC: 11.1 MMOL/L (ref 0–2)
BASE EXCESS BLDA CALC-SCNC: 11.8 MMOL/L (ref 0–2)
BASOPHILS # BLD AUTO: 0.01 10*3/MM3 (ref 0–0.2)
BASOPHILS NFR BLD AUTO: 0.2 % (ref 0–1.5)
BDY SITE: ABNORMAL
BDY SITE: ABNORMAL
BH CV LOWER VASCULAR LEFT COMMON FEMORAL COMPRESS: NORMAL
BH CV LOWER VASCULAR LEFT COMMON FEMORAL PHASIC: NORMAL
BH CV LOWER VASCULAR LEFT COMMON FEMORAL SPONT: NORMAL
BH CV LOWER VASCULAR LEFT DISTAL FEMORAL COMPRESS: NORMAL
BH CV LOWER VASCULAR LEFT DISTAL FEMORAL PHASIC: NORMAL
BH CV LOWER VASCULAR LEFT DISTAL FEMORAL SPONT: NORMAL
BH CV LOWER VASCULAR LEFT GREATER SAPH AK COMPRESS: NORMAL
BH CV LOWER VASCULAR LEFT GREATER SAPH BK COMPRESS: NORMAL
BH CV LOWER VASCULAR LEFT LESSER SAPH COMPRESS: NORMAL
BH CV LOWER VASCULAR LEFT MID FEMORAL COMPRESS: NORMAL
BH CV LOWER VASCULAR LEFT MID FEMORAL PHASIC: NORMAL
BH CV LOWER VASCULAR LEFT MID FEMORAL SPONT: NORMAL
BH CV LOWER VASCULAR LEFT POPLITEAL COMPRESS: NORMAL
BH CV LOWER VASCULAR LEFT POPLITEAL PHASIC: NORMAL
BH CV LOWER VASCULAR LEFT POPLITEAL SPONT: NORMAL
BH CV LOWER VASCULAR LEFT PROFUNDA FEMORAL COMPRESS: NORMAL
BH CV LOWER VASCULAR LEFT PROFUNDA FEMORAL PHASIC: NORMAL
BH CV LOWER VASCULAR LEFT PROFUNDA FEMORAL SPONT: NORMAL
BH CV LOWER VASCULAR LEFT PROXIMAL FEMORAL COMPRESS: NORMAL
BH CV LOWER VASCULAR LEFT PROXIMAL FEMORAL PHASIC: NORMAL
BH CV LOWER VASCULAR LEFT PROXIMAL FEMORAL SPONT: NORMAL
BH CV LOWER VASCULAR LEFT SAPHENOFEMORAL JUNCTION COMPRESS: NORMAL
BH CV LOWER VASCULAR LEFT SAPHENOFEMORAL JUNCTION PHASIC: NORMAL
BH CV LOWER VASCULAR LEFT SAPHENOFEMORAL JUNCTION SPONT: NORMAL
BH CV LOWER VASCULAR RIGHT COMMON FEMORAL COMPRESS: NORMAL
BH CV LOWER VASCULAR RIGHT COMMON FEMORAL PHASIC: NORMAL
BH CV LOWER VASCULAR RIGHT COMMON FEMORAL SPONT: NORMAL
BH CV LOWER VASCULAR RIGHT GREATER SAPH AK COMPRESS: NORMAL
BH CV LOWER VASCULAR RIGHT GREATER SAPH BK COMPRESS: NORMAL
BH CV LOWER VASCULAR RIGHT MID FEMORAL COMPRESS: NORMAL
BH CV LOWER VASCULAR RIGHT MID FEMORAL PHASIC: NORMAL
BH CV LOWER VASCULAR RIGHT MID FEMORAL SPONT: NORMAL
BH CV LOWER VASCULAR RIGHT POPLITEAL COMPRESS: NORMAL
BH CV LOWER VASCULAR RIGHT POPLITEAL PHASIC: NORMAL
BH CV LOWER VASCULAR RIGHT POPLITEAL SPONT: NORMAL
BH CV LOWER VASCULAR RIGHT PROFUNDA FEMORAL COMPRESS: NORMAL
BH CV LOWER VASCULAR RIGHT PROFUNDA FEMORAL PHASIC: NORMAL
BH CV LOWER VASCULAR RIGHT PROFUNDA FEMORAL SPONT: NORMAL
BH CV LOWER VASCULAR RIGHT PROXIMAL FEMORAL COMPRESS: NORMAL
BH CV LOWER VASCULAR RIGHT PROXIMAL FEMORAL PHASIC: NORMAL
BH CV LOWER VASCULAR RIGHT PROXIMAL FEMORAL SPONT: NORMAL
BH CV LOWER VASCULAR RIGHT SAPHENOFEMORAL JUNCTION COMPRESS: NORMAL
BH CV LOWER VASCULAR RIGHT SAPHENOFEMORAL JUNCTION PHASIC: NORMAL
BH CV LOWER VASCULAR RIGHT SAPHENOFEMORAL JUNCTION SPONT: NORMAL
BILIRUB SERPL-MCNC: 0.4 MG/DL (ref 0.2–1.2)
BILIRUB UR QL STRIP: NEGATIVE
BODY TEMPERATURE: 37 C
BODY TEMPERATURE: 37 C
BUN BLD-MCNC: 25 MG/DL (ref 8–23)
BUN/CREAT SERPL: 29.1 (ref 7–25)
CALCIUM SPEC-SCNC: 9.2 MG/DL (ref 8.6–10.5)
CHLORIDE SERPL-SCNC: 96 MMOL/L (ref 98–107)
CLARITY UR: CLEAR
CO2 BLDA-SCNC: 43.2 MMOL/L (ref 22–33)
CO2 BLDA-SCNC: 44.5 MMOL/L (ref 22–33)
CO2 SERPL-SCNC: 31 MMOL/L (ref 22–29)
COHGB MFR BLD: 2.5 % (ref 0–2)
COHGB MFR BLD: 2.7 % (ref 0–2)
COLOR UR: YELLOW
CREAT BLD-MCNC: 0.86 MG/DL (ref 0.57–1)
DEPRECATED RDW RBC AUTO: 61.5 FL (ref 37–54)
EOSINOPHIL # BLD AUTO: 0.16 10*3/MM3 (ref 0–0.4)
EOSINOPHIL NFR BLD AUTO: 2.8 % (ref 0.3–6.2)
EPAP: 0
ERYTHROCYTE [DISTWIDTH] IN BLOOD BY AUTOMATED COUNT: 14.3 % (ref 12.3–15.4)
GFR SERPL CREATININE-BSD FRML MDRD: 65 ML/MIN/1.73
GLOBULIN UR ELPH-MCNC: 3 GM/DL
GLUCOSE BLD-MCNC: 86 MG/DL (ref 65–99)
GLUCOSE BLDC GLUCOMTR-MCNC: 116 MG/DL (ref 70–130)
GLUCOSE BLDC GLUCOMTR-MCNC: 126 MG/DL (ref 70–130)
GLUCOSE BLDC GLUCOMTR-MCNC: 93 MG/DL (ref 70–130)
GLUCOSE BLDC GLUCOMTR-MCNC: 96 MG/DL (ref 70–130)
GLUCOSE BLDC GLUCOMTR-MCNC: 97 MG/DL (ref 70–130)
GLUCOSE UR STRIP-MCNC: NEGATIVE MG/DL
HCO3 BLDA-SCNC: 40.7 MMOL/L (ref 20–26)
HCO3 BLDA-SCNC: 41.6 MMOL/L (ref 20–26)
HCT VFR BLD AUTO: 34.1 % (ref 34–46.6)
HCT VFR BLD CALC: 33 %
HCT VFR BLD CALC: 34.1 %
HGB BLD-MCNC: 10.5 G/DL (ref 12–15.9)
HGB BLDA-MCNC: 10.8 G/DL (ref 14–18)
HGB BLDA-MCNC: 11.1 G/DL (ref 14–18)
HGB UR QL STRIP.AUTO: ABNORMAL
HOROWITZ INDEX BLD+IHG-RTO: 30 %
HOROWITZ INDEX BLD+IHG-RTO: 45 %
HYALINE CASTS UR QL AUTO: ABNORMAL /LPF
IMM GRANULOCYTES # BLD AUTO: 0.04 10*3/MM3 (ref 0–0.05)
IMM GRANULOCYTES NFR BLD AUTO: 0.7 % (ref 0–0.5)
IPAP: 0
KETONES UR QL STRIP: ABNORMAL
LEUKOCYTE ESTERASE UR QL STRIP.AUTO: NEGATIVE
LYMPHOCYTES # BLD AUTO: 1.06 10*3/MM3 (ref 0.7–3.1)
LYMPHOCYTES NFR BLD AUTO: 18.7 % (ref 19.6–45.3)
Lab: ABNORMAL
Lab: ABNORMAL
MCH RBC QN AUTO: 35.6 PG (ref 26.6–33)
MCHC RBC AUTO-ENTMCNC: 30.8 G/DL (ref 31.5–35.7)
MCV RBC AUTO: 115.6 FL (ref 79–97)
METHGB BLD QL: 0.6 % (ref 0–1.5)
METHGB BLD QL: 0.9 % (ref 0–1.5)
MODALITY: ABNORMAL
MODALITY: ABNORMAL
MONOCYTES # BLD AUTO: 0.43 10*3/MM3 (ref 0.1–0.9)
MONOCYTES NFR BLD AUTO: 7.6 % (ref 5–12)
NEUTROPHILS # BLD AUTO: 3.98 10*3/MM3 (ref 1.7–7)
NEUTROPHILS NFR BLD AUTO: 70 % (ref 42.7–76)
NITRITE UR QL STRIP: NEGATIVE
NOTE: ABNORMAL
NOTE: ABNORMAL
NOTIFIED BY: ABNORMAL
NOTIFIED BY: ABNORMAL
NOTIFIED WHO: ABNORMAL
NOTIFIED WHO: ABNORMAL
NRBC BLD AUTO-RTO: 0 /100 WBC (ref 0–0.2)
OXYHGB MFR BLDV: 86.1 % (ref 94–99)
OXYHGB MFR BLDV: 93.6 % (ref 94–99)
PAW @ PEAK INSP FLOW SETTING VENT: 0 CMH2O
PCO2 BLDA: 79.7 MM HG (ref 35–45)
PCO2 BLDA: 96.7 MM HG (ref 35–45)
PCO2 TEMP ADJ BLD: 79.7 MM HG (ref 35–45)
PCO2 TEMP ADJ BLD: 96.7 MM HG (ref 35–45)
PH BLDA: 7.24 PH UNITS (ref 7.35–7.45)
PH BLDA: 7.32 PH UNITS (ref 7.35–7.45)
PH UR STRIP.AUTO: <=5 [PH] (ref 5–8)
PH, TEMP CORRECTED: 7.24 PH UNITS
PH, TEMP CORRECTED: 7.32 PH UNITS
PLATELET # BLD AUTO: 141 10*3/MM3 (ref 140–450)
PMV BLD AUTO: 10.3 FL (ref 6–12)
PO2 BLDA: 53.4 MM HG (ref 83–108)
PO2 BLDA: 81.1 MM HG (ref 83–108)
PO2 TEMP ADJ BLD: 53.4 MM HG (ref 83–108)
PO2 TEMP ADJ BLD: 81.1 MM HG (ref 83–108)
POTASSIUM BLD-SCNC: 4.8 MMOL/L (ref 3.5–5.2)
PROCALCITONIN SERPL-MCNC: 0.12 NG/ML (ref 0.1–0.25)
PROT SERPL-MCNC: 5.8 G/DL (ref 6–8.5)
PROT UR QL STRIP: NEGATIVE
RBC # BLD AUTO: 2.95 10*6/MM3 (ref 3.77–5.28)
RBC # UR: ABNORMAL /HPF
REF LAB TEST METHOD: ABNORMAL
SODIUM BLD-SCNC: 140 MMOL/L (ref 136–145)
SP GR UR STRIP: 1.01 (ref 1–1.03)
SQUAMOUS #/AREA URNS HPF: ABNORMAL /HPF
TOTAL RATE: 0 BREATHS/MINUTE
UROBILINOGEN UR QL STRIP: ABNORMAL
VANCOMYCIN SERPL-MCNC: 23.2 MCG/ML (ref 5–40)
VENTILATOR MODE: ABNORMAL
WBC NRBC COR # BLD: 5.68 10*3/MM3 (ref 3.4–10.8)
WBC UR QL AUTO: ABNORMAL /HPF

## 2020-01-15 PROCEDURE — 94799 UNLISTED PULMONARY SVC/PX: CPT

## 2020-01-15 PROCEDURE — 71045 X-RAY EXAM CHEST 1 VIEW: CPT

## 2020-01-15 PROCEDURE — 80202 ASSAY OF VANCOMYCIN: CPT

## 2020-01-15 PROCEDURE — 85025 COMPLETE CBC W/AUTO DIFF WBC: CPT | Performed by: INTERNAL MEDICINE

## 2020-01-15 PROCEDURE — 82962 GLUCOSE BLOOD TEST: CPT

## 2020-01-15 PROCEDURE — 82805 BLOOD GASES W/O2 SATURATION: CPT

## 2020-01-15 PROCEDURE — 25010000002 METHYLPREDNISOLONE PER 40 MG: Performed by: INTERNAL MEDICINE

## 2020-01-15 PROCEDURE — 99291 CRITICAL CARE FIRST HOUR: CPT | Performed by: INTERNAL MEDICINE

## 2020-01-15 PROCEDURE — 25010000002 FUROSEMIDE PER 20 MG: Performed by: INTERNAL MEDICINE

## 2020-01-15 PROCEDURE — 94660 CPAP INITIATION&MGMT: CPT

## 2020-01-15 PROCEDURE — 94640 AIRWAY INHALATION TREATMENT: CPT

## 2020-01-15 PROCEDURE — 84145 PROCALCITONIN (PCT): CPT

## 2020-01-15 PROCEDURE — 25010000002 ENOXAPARIN PER 10 MG: Performed by: INTERNAL MEDICINE

## 2020-01-15 PROCEDURE — 36600 WITHDRAWAL OF ARTERIAL BLOOD: CPT

## 2020-01-15 PROCEDURE — 93970 EXTREMITY STUDY: CPT

## 2020-01-15 PROCEDURE — 93970 EXTREMITY STUDY: CPT | Performed by: INTERNAL MEDICINE

## 2020-01-15 PROCEDURE — 81001 URINALYSIS AUTO W/SCOPE: CPT | Performed by: INTERNAL MEDICINE

## 2020-01-15 PROCEDURE — 80053 COMPREHEN METABOLIC PANEL: CPT | Performed by: INTERNAL MEDICINE

## 2020-01-15 RX ORDER — METHYLPREDNISOLONE SODIUM SUCCINATE 40 MG/ML
40 INJECTION, POWDER, LYOPHILIZED, FOR SOLUTION INTRAMUSCULAR; INTRAVENOUS EVERY 24 HOURS
Status: DISCONTINUED | OUTPATIENT
Start: 2020-01-14 | End: 2020-01-15

## 2020-01-15 RX ORDER — ALPRAZOLAM 0.25 MG/1
0.25 TABLET ORAL NIGHTLY PRN
Status: DISCONTINUED | OUTPATIENT
Start: 2020-01-15 | End: 2020-01-21 | Stop reason: HOSPADM

## 2020-01-15 RX ORDER — METHYLPREDNISOLONE SODIUM SUCCINATE 40 MG/ML
40 INJECTION, POWDER, LYOPHILIZED, FOR SOLUTION INTRAMUSCULAR; INTRAVENOUS EVERY 12 HOURS
Status: DISCONTINUED | OUTPATIENT
Start: 2020-01-15 | End: 2020-01-17

## 2020-01-15 RX ORDER — FUROSEMIDE 10 MG/ML
40 INJECTION INTRAMUSCULAR; INTRAVENOUS ONCE
Status: COMPLETED | OUTPATIENT
Start: 2020-01-15 | End: 2020-01-15

## 2020-01-15 RX ORDER — PANTOPRAZOLE SODIUM 40 MG/10ML
40 INJECTION, POWDER, LYOPHILIZED, FOR SOLUTION INTRAVENOUS
Status: DISCONTINUED | OUTPATIENT
Start: 2020-01-16 | End: 2020-01-17

## 2020-01-15 RX ORDER — CASTOR OIL AND BALSAM, PERU 788; 87 MG/G; MG/G
OINTMENT TOPICAL EVERY 12 HOURS SCHEDULED
Status: DISCONTINUED | OUTPATIENT
Start: 2020-01-15 | End: 2020-01-21 | Stop reason: HOSPADM

## 2020-01-15 RX ORDER — IPRATROPIUM BROMIDE AND ALBUTEROL SULFATE 2.5; .5 MG/3ML; MG/3ML
3 SOLUTION RESPIRATORY (INHALATION)
Status: DISCONTINUED | OUTPATIENT
Start: 2020-01-15 | End: 2020-01-21 | Stop reason: HOSPADM

## 2020-01-15 RX ADMIN — FUROSEMIDE 40 MG: 10 INJECTION, SOLUTION INTRAMUSCULAR; INTRAVENOUS at 15:10

## 2020-01-15 RX ADMIN — ALPRAZOLAM 0.25 MG: 0.25 TABLET ORAL at 23:34

## 2020-01-15 RX ADMIN — ACETAMINOPHEN 650 MG: 325 TABLET ORAL at 05:05

## 2020-01-15 RX ADMIN — MICONAZOLE NITRATE: 20 POWDER TOPICAL at 16:06

## 2020-01-15 RX ADMIN — ACETAMINOPHEN 650 MG: 325 TABLET ORAL at 23:34

## 2020-01-15 RX ADMIN — MELATONIN TAB 5 MG 5 MG: 5 TAB at 21:13

## 2020-01-15 RX ADMIN — CASTOR OIL AND BALSAM, PERU 1 EACH: 788; 87 OINTMENT TOPICAL at 20:26

## 2020-01-15 RX ADMIN — METHYLPREDNISOLONE SODIUM SUCCINATE 40 MG: 40 INJECTION, POWDER, FOR SOLUTION INTRAMUSCULAR; INTRAVENOUS at 21:13

## 2020-01-15 RX ADMIN — CASTOR OIL AND BALSAM, PERU: 788; 87 OINTMENT TOPICAL at 16:06

## 2020-01-15 RX ADMIN — POLYETHYLENE GLYCOL 3350 17 G: 17 POWDER, FOR SOLUTION ORAL at 21:13

## 2020-01-15 RX ADMIN — LEVOTHYROXINE SODIUM 100 MCG: 100 TABLET ORAL at 05:05

## 2020-01-15 RX ADMIN — ENOXAPARIN SODIUM 40 MG: 40 INJECTION SUBCUTANEOUS at 17:49

## 2020-01-15 RX ADMIN — MICONAZOLE NITRATE 1 APPLICATION: 20 POWDER TOPICAL at 20:26

## 2020-01-15 RX ADMIN — TERAZOSIN HYDROCHLORIDE 2 MG: 2 CAPSULE ORAL at 21:13

## 2020-01-15 RX ADMIN — ENOXAPARIN SODIUM 40 MG: 40 INJECTION SUBCUTANEOUS at 05:05

## 2020-01-15 RX ADMIN — SENNOSIDES AND DOCUSATE SODIUM 2 TABLET: 8.6; 5 TABLET ORAL at 21:13

## 2020-01-15 RX ADMIN — SODIUM CHLORIDE, PRESERVATIVE FREE 10 ML: 5 INJECTION INTRAVENOUS at 20:25

## 2020-01-15 RX ADMIN — METHYLPREDNISOLONE SODIUM SUCCINATE 40 MG: 40 INJECTION, POWDER, FOR SOLUTION INTRAMUSCULAR; INTRAVENOUS at 11:12

## 2020-01-15 RX ADMIN — IPRATROPIUM BROMIDE AND ALBUTEROL SULFATE 3 ML: 2.5; .5 SOLUTION RESPIRATORY (INHALATION) at 15:54

## 2020-01-15 RX ADMIN — AZTREONAM 2 G: 2 INJECTION, POWDER, LYOPHILIZED, FOR SOLUTION INTRAMUSCULAR; INTRAVENOUS at 00:24

## 2020-01-15 NOTE — PROGRESS NOTES
[facility] Medicine Services  CRITICAL CARE NOTE    Patient Name: Julissa Kee  : 1950  MRN: [id number]    Date of Admission: 2020  Length of Stay: 1    Subjective     Event/HPI:  Patient seen this morning. Was unable to be aroused on BIPAP. Just moaning. Not following commands. Poor air movement.    Objective     Vital Signs:   Temp:  [96.2 °F (35.7 °C)-98.5 °F (36.9 °C)] 98.4 °F (36.9 °C)  Heart Rate:  [58-66] 59  Resp:  [18-22] 18  BP: (110-148)/(40-89) 118/42       Pertinent Physical Exam:  Constitutional: drowsy on BIPAP, morbidly obese  Eyes: sclera anicteric  Respiratory: poor air movement bilaterally, limited by body habitus. No wheezing. On BIPAP  Cardiovascular: RRR, no murmurs, rubs, or gallops, palpable pedal pulses bilaterally  Gastrointestinal: Positive bowel sounds, obese abdomen  Musculoskeletal: bilateral LE edema  Psychiatric: Appropriate affect, cooperative  Neurologic: on BIPAP unable to arouse and answer questions. Does not follow commands. Falls right back asleep  Skin: No rashes    Results Reviewed:  I have personally reviewed current lab, radiology, and data and agree.    Results from last 7 days   Lab Units 01/15/20  0736 20  1038   WBC 10*3/mm3 5.68 6.66   HEMOGLOBIN g/dL 10.5* 11.2*   HEMATOCRIT % 34.1 36.9   PLATELETS 10*3/mm3 141 167     Results from last 7 days   Lab Units 01/15/20  0519 20  1038   SODIUM mmol/L 140 140   POTASSIUM mmol/L 4.8 4.2   CHLORIDE mmol/L 96* 94*   CO2 mmol/L 31.0* 39.0*   BUN mg/dL 25* 21   CREATININE mg/dL 0.86 0.89   GLUCOSE mg/dL 86 104*   CALCIUM mg/dL 9.2 9.8   ALT (SGPT) U/L 16 16   AST (SGOT) U/L 21 19     No results found for: BNP  pH, Arterial   Date Value Ref Range Status   01/15/2020 7.241 (L) 7.350 - 7.450 pH units Final     Comment:     84 Value below reference range       Microbiology Results Abnormal     Procedure Component Value - Date/Time    Blood Culture - Blood, Arm, Right [[id number]]  Collected:  01/14/20 1039    Lab Status:  Preliminary result Specimen:  Blood from Arm, Right Updated:  01/14/20 2330     Blood Culture No growth at less than 24 hours    Blood Culture - Blood, Arm, Left [[id number]] Collected:  01/14/20 1039    Lab Status:  Preliminary result Specimen:  Blood from Arm, Left Updated:  01/14/20 2330     Blood Culture No growth at less than 24 hours    Respiratory Panel, PCR - Swab, Nasopharynx [[id number]]  (Abnormal) Collected:  01/14/20 1039    Lab Status:  Final result Specimen:  Swab from Nasopharynx Updated:  01/14/20 1248     ADENOVIRUS, PCR Not Detected     Coronavirus 229E Not Detected     Coronavirus HKU1 Not Detected     Coronavirus NL63 Not Detected     Coronavirus OC43 Not Detected     Human Metapneumovirus Detected     Human Rhinovirus/Enterovirus Not Detected     Influenza B PCR Not Detected     Parainfluenza Virus 1 Not Detected     Parainfluenza Virus 2 Not Detected     Parainfluenza Virus 3 Not Detected     Parainfluenza Virus 4 Not Detected     Bordetella pertussis pcr Not Detected     Influenza A H1 2009 PCR Not Detected     Chlamydophila pneumoniae PCR Not Detected     Mycoplasma pneumo by PCR Not Detected     Influenza A PCR Not Detected     Influenza A H3 Not Detected     Influenza A H1 Not Detected     RSV, PCR Not Detected     Bordetella parapertussis PCR Not Detected          Imaging Results (Last 24 Hours)     Procedure Component Value Units Date/Time    XR Chest 1 View [[id number]] Collected:  01/14/20 1102     Updated:  01/15/20 0830    Narrative:       EXAMINATION: XR CHEST 1 VW- 01/14/2020     INDICATION: SOA triage protocol      COMPARISON: Chest radiograph 12/28/2019, CT chest 12/28/2019     FINDINGS: Single portable chest radiograph was submitted for review. The  heart is enlarged. Mild pulmonary vascular congestion identified. Hazy  mid and lower lung airspace changes identified, somewhat similar to the  prior exam particularly the CT of the chest  performed 12/28/2019. No  large pleural effusion or pneumothorax. Visualized upper abdomen is  unrevealing. No acute osseous abnormality is appreciated.       Impression:       Cardiomegaly mild pulmonary vasculature congestion with  multifocal airspace changes noted similar when compared to 12/28/2019.        D:  01/14/2020  E:  01/14/2020     This report was finalized on 1/15/2020 8:27 AM by Dr. Ivan Miller MD.       XR Chest 1 View [[id number]] Collected:  01/15/20 0602     Updated:  01/15/20 0604    Narrative:       CR Chest 1 Vw    INDICATION:   Hypoxemia this morning. Evaluate for aspiration pneumonia     COMPARISON:    1/14/2020 12/28/2019    FINDINGS:  Single portable AP view(s) of the chest.        Low lung volumes with mild left base atelectasis. No change. Heart size normal there is minimal interstitial prominence as compared with 12/20/2019      Impression:       There may be minimal left base atelectasis. Minimal interstitial prominence. Otherwise no active disease    Signer Name: Norman Martinez MD   Signed: 1/15/2020 6:02 AM   Workstation Name: [facility]-    [facility]        Results for orders placed during the hospital encounter of 12/28/19   Adult Transthoracic Echo Complete W/ Cont if Necessary Per Protocol    Narrative · Left ventricular systolic function is normal. Estimated EF appears to be   in the range of 61 - 65%.  · Left ventricular wall thickness is consistent with moderate concentric   hypertrophy.  · Normal right ventricular wall thickness, systolic function and septal   motion noted. Right ventricular cavity is borderline dilated.  · Trace tricuspid valve regurgitation is present. Estimated right   ventricular systolic pressure from tricuspid regurgitation is normal (<35   mmHg)  · The valve appears trileaflet. The aortic valve is abnormal in structure.   There is mild calcification of the aortic valve.Trace aortic valve   regurgitation is present. No  hemodynamically significant aortic valve   stenosis is present.          I have reviewed the current medications.    Assessment / Plan     Active Hospital Problems    Diagnosis POA   • Recurrent pneumonia [J18.9] Unknown   • Human metapneumovirus (hMPV) pneumonia [J12.3] Unknown   • Hypoxia [R09.02] Yes   • Sacral wound [S31.000A] Yes   • Chronic obstructive pulmonary disease (CMS/HCC) [J44.9] Yes   • Diabetes mellitus (CMS/HCC) [E11.9] Yes   • Hyperlipidemia [E78.5] Yes   • Hypertension [I10] Yes   • Hypothyroidism [E03.9] Yes   • Extreme obesity with alveolar hypoventilation (CMS/HCC) [E66.2] Yes         Pertinent Assessment & Plan:   Julissa Kee is a 68y/o F with history of morbid obesity, SARAH BETH, COPD, T2DM, sacral chronic decubitus wounds, HLD, HTN with recent admission and d/c for RML PNA- d/c 1/08 to [facility] ([facility]), who presents from home for acute on chronic hypercapnic hypoxemic respiratory failure.    More obtunded this morning, unable to arouse.  Stat ABG ordered 7.2/96/81/41  Resp viral panel positive for Metapneuovirus  Infectious disease following  Switch steroids to IV, unable to take any PO meds  Discussed with Dr. Lomas, will move to ICU for higher level of care      CODE STATUS:    Code Status and Medical Interventions:   Ordered at: 01/14/20 1352     Level Of Support Discussed With:    Patient     Code Status:    CPR     Medical Interventions (Level of Support Prior to Arrest):    Full         Critical Care time spent in direct patient care:    35 including high complexity decision making to assess and treat vital organ system failure in this individual who has impairment of one or more vital organ systems such that there is a high probability of imminent or life threatening deterioration in the patient’s condition. Failure to initiate the above interventions on an urgent basis would likely result in sudden, clinically significant or life threatening deterioration in the patient's  condition.      Electronically signed by Atiya Butt DO, 01/15/20, 10:15 AM.

## 2020-01-15 NOTE — PROGRESS NOTES
"Pharmacy Consult-Vancomycin Dosing  Julissa Kee is a  69 y.o. female receiving vancomycin therapy.     Indication: pneumonia  Consulting Provider: hospital medicine  ID Consult: yes  Initial duration of therapy: 5 days  Goal Trough:15-20    Current Antimicrobial Therapy  Anti-Infectives (From admission, onward)      Ordered     Dose/Rate Route Frequency Start Stop    01/14/20 1842  vancomycin (dosing per levels)     Ordering Provider:  Dain Burden RPH     Does not apply Daily 01/15/20 0900 01/20/20 0859    01/14/20 1708  aztreonam (AZACTAM) 2 g/100 mL 0.9% NS (mbp)     Ordering Provider:  Blaire Thomson MD    2 g  over 4 Hours Intravenous Every 8 Hours 01/15/20 0100 01/22/20 0059    01/14/20 1708  aztreonam (AZACTAM) 2 g/100 mL 0.9% NS (mbp)     Ordering Provider:  Blaire Thomson MD    2 g  over 30 Minutes Intravenous Once 01/14/20 1710      01/14/20 1708  Pharmacy to dose vancomycin  Review   Ordering Provider:  Blaire Thomson MD     Does not apply Continuous PRN 01/14/20 1708 01/19/20 1707    01/14/20 1603  vancomycin 3000 mg/500 mL 0.9% NS IVPB ([facility])     Ordering Provider:  Tahir Aparicio MD    20 mg/kg × 166 kg  over 180 Minutes Intravenous Once 01/14/20 1604 01/14/20 1947            Allergies  Allergies as of 01/14/2020 - Reviewed 01/14/2020   Allergen Reaction Noted    Amoxicillin-pot clavulanate Other (See Comments) 05/11/2016    Bactrim [sulfamethoxazole-trimethoprim] Other (See Comments) 12/28/2019       Labs    Results from last 7 days   Lab Units 01/14/20  1038   BUN mg/dL 21   CREATININE mg/dL 0.89       Results from last 7 days   Lab Units 01/14/20  1038   WBC 10*3/mm3 6.66       Evaluation of Dosing     Last Dose Received in the ED/Outside Facility: vancomycin 3000 mg iv given today(1/14) in ed @ 16:47  Is Patient on Dialysis or Renal Replacement: no    Ht - 160 cm (63\")  Wt - (!) 166 kg (366 lb)  Adjusted ideal body weight(aibw): 97.8 kg  Estimated Creatinine Clearance: 90.8 mL/min " (by C-G formula based on SCr of 0.89 mg/dL).    Intake & Output (last 3 days)         01/12 0701 - 01/13 0700 01/13 0701 - 01/14 0700 01/14 0701 - 01/15 0700           Urine Unmeasured Occurrence   1 x    Stool Unmeasured Occurrence   1 x            Microbiology and Radiology  Microbiology Results (last 10 days)       Procedure Component Value - Date/Time    Respiratory Panel, PCR - Swab, Nasopharynx [[id number]]  (Abnormal) Collected:  01/14/20 1039    Lab Status:  Final result Specimen:  Swab from Nasopharynx Updated:  01/14/20 1248     ADENOVIRUS, PCR Not Detected     Coronavirus 229E Not Detected     Coronavirus HKU1 Not Detected     Coronavirus NL63 Not Detected     Coronavirus OC43 Not Detected     Human Metapneumovirus Detected     Human Rhinovirus/Enterovirus Not Detected     Influenza B PCR Not Detected     Parainfluenza Virus 1 Not Detected     Parainfluenza Virus 2 Not Detected     Parainfluenza Virus 3 Not Detected     Parainfluenza Virus 4 Not Detected     Bordetella pertussis pcr Not Detected     Influenza A H1 2009 PCR Not Detected     Chlamydophila pneumoniae PCR Not Detected     Mycoplasma pneumo by PCR Not Detected     Influenza A PCR Not Detected     Influenza A H3 Not Detected     Influenza A H1 Not Detected     RSV, PCR Not Detected     Bordetella parapertussis PCR Not Detected            Evaluation of Level                           Assessment/Plan:  Vancomycin 3000 mg given today in ed; due to extreme weight(patient is 3 times their ideal body weight) and age (69 years old) will check random vancomycin level in am before starting scheduled vancomycin dosing.  Dain Burden [facility]

## 2020-01-15 NOTE — PROGRESS NOTES
Continued Stay Note  [facility]     Patient Name: Julissa Kee  MRN: [id number]  Today's Date: 1/15/2020    Admit Date: 1/14/2020    Discharge Plan     Row Name 01/15/20 0959       Plan    Plan  TBD    Patient/Family in Agreement with Plan  unable to assess    Plan Comments  In to speak to pt and family and pt is on a bipap cont. CM will f/u later today with pt and family regarding plan.         Discharge Codes    No documentation.       Expected Discharge Date and Time     Expected Discharge Date Expected Discharge Time    Jan 17, 2020             Lakesha Bruce RN

## 2020-01-15 NOTE — PROGRESS NOTES
"Pharmacy Consult-Vancomycin Dosing  Julissa Kee is a  69 y.o. female receiving vancomycin therapy.     Indication: pneumonia  Consulting Provider: Blaire Thomson MD  ID Consult: yes    Goal Trough:15-20 mcg/ml    Current Antimicrobial Therapy  Vancomycin day 2  Aztreonam 2gm q8h      Allergies  Allergies as of 01/14/2020 - Reviewed 01/14/2020   Allergen Reaction Noted    Amoxicillin-pot clavulanate Other (See Comments) 05/11/2016    Bactrim [sulfamethoxazole-trimethoprim] Other (See Comments) 12/28/2019       Labs    Results from last 7 days   Lab Units 01/15/20  0519 01/14/20  1038   BUN mg/dL 25* 21   CREATININE mg/dL 0.86 0.89       Results from last 7 days   Lab Units 01/15/20  0736 01/14/20  1038   WBC 10*3/mm3 5.68 6.66       Evaluation of Dosing     Last Dose Received in the ED/Outside Facility: vancomycin 3000 mg iv given today(1/14) in ed @ 16:47  Is Patient on Dialysis or Renal Replacement: no    Ht - 160 cm (63\")  Wt - (!) 166 kg (366 lb)  Adjusted ideal body weight(aibw): 97.8 kg  Estimated Creatinine Clearance: 94 mL/min (by C-G formula based on SCr of 0.86 mg/dL).    Intake & Output (last 3 days)         01/12 0701 - 01/13 0700 01/13 0701 - 01/14 0700 01/14 0701 - 01/15 0700 01/15 0701 - 01/16 0700    IV Piggyback   200     Total Intake(mL/kg)   200 (1.2)     Urine (mL/kg/hr)   150     Total Output   150     Net   +50             Urine Unmeasured Occurrence   2 x     Stool Unmeasured Occurrence   1 x             Microbiology and Radiology  Microbiology Results (last 10 days)       Procedure Component Value - Date/Time    Respiratory Panel, PCR - Swab, Nasopharynx [[id number]]  (Abnormal) Collected:  01/14/20 1039    Lab Status:  Final result Specimen:  Swab from Nasopharynx Updated:  01/14/20 1248     ADENOVIRUS, PCR Not Detected     Coronavirus 229E Not Detected     Coronavirus HKU1 Not Detected     Coronavirus NL63 Not Detected     Coronavirus OC43 Not Detected     Human Metapneumovirus Detected   "     Human Rhinovirus/Enterovirus Not Detected     Influenza B PCR Not Detected     Parainfluenza Virus 1 Not Detected     Parainfluenza Virus 2 Not Detected     Parainfluenza Virus 3 Not Detected     Parainfluenza Virus 4 Not Detected     Bordetella pertussis pcr Not Detected     Influenza A H1 2009 PCR Not Detected     Chlamydophila pneumoniae PCR Not Detected     Mycoplasma pneumo by PCR Not Detected     Influenza A PCR Not Detected     Influenza A H3 Not Detected     Influenza A H1 Not Detected     RSV, PCR Not Detected     Bordetella parapertussis PCR Not Detected    Blood Culture - Blood, Arm, Right [[id number]] Collected:  01/14/20 1039    Lab Status:  Preliminary result Specimen:  Blood from Arm, Right Updated:  01/14/20 2330     Blood Culture No growth at less than 24 hours    Blood Culture - Blood, Arm, Left [[id number]] Collected:  01/14/20 1039    Lab Status:  Preliminary result Specimen:  Blood from Arm, Left Updated:  01/14/20 2330     Blood Culture No growth at less than 24 hours            Evaluation of Level      Results from last 7 days   Lab Units 01/15/20  0519   VANCOMYCIN RM mcg/mL 23.20                      Assessment/Plan:  Vancomycin dosing for PNA  Goal trough: 15-20 mcg/ml  1/15 SCr - 0.86  1/15 WBC - 5.68  24hr tmax - 98.5  1/15 vancomycin random level - 23.2 @0519  Vancomycin level drawn 9.5 hours following completion of loading dose (vancomycin 3000mg)    Will obtain vancomycin random 1/15 @ 1800 to determine further dosing  MRSA PCR ordered  BMP x3 days  Monitor s/s nephrotoxicity, clinical status and infusion related reactions  Will follow    thanks  Simba Montoya RP  1/15/2020  8:51 AM

## 2020-01-15 NOTE — PROGRESS NOTES
Intensive Care Follow-up     Hospital:  LOS: 1 day   Ms. Julissa Kee, 69 y.o. female is followed for:   Acute hypercapnic respiratory failure            History of present illness:   69-year-old female with past medical history significant for COPD with no previous PFTs available, patient apparently is on supplemental oxygen at home as well, diabetes mellitus type 2, hypertension, dyslipidemia, renal insufficiency and morbid obesity.  Patient also has history of sacral decubitus ulcer.  Patient was admitted recently to hospital on December 28 for acute respiratory failure and atrial tachycardia.  Patient was seen by infectious disease and treated with Rocephin and doxycycline at that point.  CT scan showed possible infiltrate in the right lower lobe superior segment.  No other abnormality noted at that point.  Patient recovered well and was discharged home on January 8.  She was brought back from the nursing home yesterday when she was found to be hypoxemic in the nursing home with saturations of 70%.  Patient apparently was feeling more short of breath for last couple of days.  There was no fevers.  There was no history of hemoptysis.  No significant sputum production.  Chest x-ray did not show any definite consolidation.  Patient was given antibiotics and was admitted to the floor.  Infectious disease team evaluated the patient and procalcitonin level was negative.  Viral panel was positive for metapneumovirus.  Antibiotics were stopped and patient was monitored closely.  Today morning patient was obtunded and not able to be aroused.  Arterial blood gas showed acute hypercapnic respiratory failure with pH of 7.24 and PaCO2 of 96.  Decision was made to transfer patient to intensive care unit for closer monitoring.    Patient was seen in ICU on BiPAP.  She is tolerating BiPAP well without any asynchrony noted at this point.  Minute ventilation is around 5 L.  I did switch her to AVAPS mode and she seems to be  tolerating that okay as well.  She is on 30% FiO2 and saturating 92%.  Minute ventilation improved to 6.8 L currently.  No significant leak noted in the mask.  Patient wakes up and her name is called the very lethargic.  She follows commands and moving all extremities.  Hemodynamically stable with systolic blood pressure in 130s.      The patient's past medical, surgical and social history were reviewed and updated in Epic as appropriate.       Objective     Infusions:     Medications:    aspirin 81 mg Oral Daily   atorvastatin 10 mg Oral Daily   castor oil-balsam peru  Topical Q12H   enoxaparin 40 mg Subcutaneous Q24H   folic acid-vit B6-vit B12 1 tablet Oral Daily   insulin lispro 0-7 Units Subcutaneous 4x Daily With Meals & Nightly   ipratropium-albuterol 3 mL Nebulization 4x Daily - RT   levothyroxine 100 mcg Oral Q AM   melatonin 5 mg Oral Nightly   methylPREDNISolone sodium succinate 40 mg Intravenous Q12H   metoprolol succinate XL 25 mg Oral Q24H   miconazole  Topical Q12H   pharmacy consult - [place]  Does not apply Daily   polyethylene glycol 17 g Oral BID   sennosides-docusate 2 tablet Oral BID   sertraline 50 mg Oral Daily   sodium chloride 10 mL Intravenous Q12H   terazosin 2 mg Oral Nightly       Vital Sign Min/Max for last 24 hours  Temp  Min: 96.2 °F (35.7 °C)  Max: 98.4 °F (36.9 °C)   BP  Min: 115/96  Max: 148/52   Pulse  Min: 58  Max: 66   Resp  Min: 18  Max: 26   SpO2  Min: 92 %  Max: 98 %   Flow (L/min)  Min: 3  Max: 5       Input/Output for last 24 hour shift  01/14 0701 - 01/15 0700  In: 200   Out: 150 [Urine:150]      Objective    General Appearance: Lethargic but wakes up, in no acute distress  Head:    Atraumatic, Normocephalic, without obvious abnormality, Pupils reactive & symmetrical B/L.  Neck:   Supple  Lungs:   B/L Breath sounds present with decreased breath sounds bilaterally, no wheezing heard, no crackles.   Heart: S1 and S2 present, no murmur  Abdomen: Obese, Soft, non-tender, no  guarding or rigidity.  Extremities: Atraumatic, no cyanosis or clubbing,  1+ edema, warm to touch.  Pulses: Positive and symmetric.  Neurologic:  Moving all four extremities. Follows simple commands currently.     Results from last 7 days   Lab Units 01/15/20  0736 01/14/20  1038   WBC 10*3/mm3 5.68 6.66   HEMOGLOBIN g/dL 10.5* 11.2*   PLATELETS 10*3/mm3 141 167     Results from last 7 days   Lab Units 01/15/20  0519 01/14/20  1038   SODIUM mmol/L 140 140   POTASSIUM mmol/L 4.8 4.2   CO2 mmol/L 31.0* 39.0*   BUN mg/dL 25* 21   CREATININE mg/dL 0.86 0.89   GLUCOSE mg/dL 86 104*     Estimated Creatinine Clearance: 94 mL/min (by C-G formula based on SCr of 0.86 mg/dL).    Results from last 7 days   Lab Units 01/15/20  0853   PH, ARTERIAL pH units 7.241*   PCO2, ARTERIAL mm Hg 96.7*   PO2 ART mm Hg 81.1*       Recent echocardiogram did not show any acute cardiac pathology.  Ejection fraction 61 to 65%.  Trace tricuspid regurgitation.  Diastolic dysfunction.    Images:   Recent chest x-ray and CT scans reviewed.  Chest x-ray shows basilar atelectasis in the left lung field and platelike atelectasis noted in the upper lobe as well.Cardiomegaly noted.    I reviewed the patient's results and images.     Assessment/Plan   Impression        Chronic obstructive pulmonary disease (CMS/HCC)    Diabetes mellitus (CMS/HCC)    Hyperlipidemia    Hypertension    Hypothyroidism    Extreme obesity with alveolar hypoventilation (CMS/HCC)    Sacral wound    Recurrent pneumonia    Human metapneumovirus (hMPV) pneumonia    Hypoxia       Plan        1.  Patient with acute hypercapnic respiratory failure with recent hospitalization for possible pneumonia.  Clinically does not behave infected.  Will obtain further history from the  when he comes here.  Procalcitonin level is normal.  White count is normal with no bandemia.  Agree with holding antibiotics for now.    2.  We will schedule DuoNeb every 4 hours.  Increase prednisone to 40  mg every 12 hours to treat underlying COPD.  Likely bronchitis flareup with viral disease.  We will try to control the inflammation and see if he can improve her status.  We will get venous Doppler study to rule out any thrombotic disease.  The meantime continue DVT prophylaxis with Lovenox.    3.  We will follow blood gases closely after changing the mode on noninvasive ventilation.  If things do not improve will need intubation and mechanical ventilation.  Will discuss with family regarding goals of care.    4.  Patient does have some edema.  Not overtly fluid overloaded but will give her a dose of diuretic and see if that helps as improve her respiratory status further.    5.  Insulin regimen for hyperglycemia management to continue.    6.  Synthroid to continue for hypothyroidism.    7.  Continue home regimen of antihypertensives, aspirin and statin.    8.  We will add GI prophylaxis with Protonix.    9.  Wound care follow-up for sacral decubitus ulcer.    Plan of care and goals reviewed with mulitdisciplinary/antibiotic stewardship team during rounds.   I discussed the patient's findings and my recommendations with nursing staff     High level of risk due to:  severe exacerbation of chronic illness.    Time spent Critical care 40 min (exclusive of procedure time)  including high complexity decision making to assess, manipulate, and support vital organ system failure in this individual who has impairment of one or more vital organ systems such that there is a high probability of imminent or life threatening deterioration in the patient’s condition.      Tho Quintana MD, [facility]  Pulmonary, Critical care and Sleep Medicine

## 2020-01-15 NOTE — PLAN OF CARE
Problem: Patient Care Overview  Goal: Plan of Care Review  Outcome: Ongoing (interventions implemented as appropriate)  Flowsheets (Taken 1/15/2020 4695)  Progress: improving  Plan of Care Reviewed With: patient; spouse; daughter  Note:   Patient arrived to unit this morning from the floor with BIPAP. Patient was confused, disoriented to situation and place, however followed commands. Urine culture sent. Krishnamurthy placed per MD Quintana. One time Lasix given. Transitioned patient to nasal cannula 5L for a break off BIPAP, patient saturating well >95%, no SOB reported. NPO. Afebrile. Patient on droplet precautions. Patient currently AAOx4. Bradycardic however this is baseline. Very poor skin integrity, patient has wounds, skin tears, and sores all over. Partial bath given, krishnamurthy care given several times, cleaned and provided wound care with venelex and powder ordered per provider as well as applying inter dry under all folds. Educated patient on coughing and deep breathing. Family at bedside.      Problem: Fall Risk (Adult)  Goal: Absence of Fall  Outcome: Ongoing (interventions implemented as appropriate)     Problem: Skin Injury Risk (Adult)  Goal: Skin Health and Integrity  Outcome: Ongoing (interventions implemented as appropriate)     Problem: Wound (Includes Pressure Injury) (Adult)  Goal: Signs and Symptoms of Listed Potential Problems Will be Absent, Minimized or Managed (Wound)  Outcome: Ongoing (interventions implemented as appropriate)     Problem: Infection, Risk/Actual (Adult)  Goal: Infection Prevention/Resolution  Outcome: Ongoing (interventions implemented as appropriate)

## 2020-01-15 NOTE — PLAN OF CARE
Problem: Patient Care Overview  Goal: Plan of Care Review  Outcome: Ongoing (interventions implemented as appropriate)  Flowsheets (Taken 1/15/2020 1000)  Progress: no change  Plan of Care Reviewed With: patient; other (see comments) (Heidi HERNANDEZ)  Note:   WOC nurse consulted for sacral wounds. Pt has stage 2 pressure injury coccyx with extensive shearing and old hypergranulation tissue bilateral buttocks; cleaned with blue skin wipes; Z-guard applied. Venelex ordered. Pt has DTPI's left medial thigh, right medial thigh, left posterior thigh; cleaned with blue skin wipes. PT Wound Care consulted for MIST therapy. Venelex ordered for DTPI's also. Pt has extensive MASD and yeast rash abdominal pannus, under breasts, axillary areas; InterDry applied. Micotin powder ordered. Off loading heel boots in place. Pt being transferred to ICU; may benefit from Trey Dolphin, but none available at this time; will try ICU Isolibrium bed; RN notified if unable to turn, will order Trey CARA bed. See wound/skin orders. WOC nurse will f/u. Please contact WOC nurse as needed for concerns.

## 2020-01-15 NOTE — PLAN OF CARE
Problem: Patient Care Overview  Goal: Plan of Care Review  Outcome: Ongoing (interventions implemented as appropriate)  Flowsheets (Taken 1/15/2020 3699)  Progress: no change  Plan of Care Reviewed With: patient  Note:   VSS. Ibuprofen given once for pain. Purwick applied at beginning of shift per family/pt request due to skin integrity and pain from coccyx. Has been on 3L NC. IV antibiotics given, see MAR. Noted to be sleeping for most of shift. ID and WOCN to see pt today. Will continue to monitor.      Woke pt up this AM for morning meds. After taking meds pt began coughing and O2 sat dropped into 80's. Speech consulted. Chest X-Ray and BIPAP ordered. O2 sat currently 98% on BIPAP.

## 2020-01-15 NOTE — SIGNIFICANT NOTE
01/15/20 0921   SLP Deferred Reason   SLP Deferred Reason Unable to evaluate, medical status change  (Spoke with RN - pt on bipap, resp status not appropriate for swallow eval at this time. Will check back this pm.)

## 2020-01-15 NOTE — CONSULTS
INFECTIOUS DISEASE CONSULT/INITIAL HOSPITAL VISIT    Julissa Kee  1950  [id number]    Date of Consult: 1/15/2020    Admission Date: 1/14/2020      Requesting Provider: No ref. provider found  Evaluating Physician: KALIN Maldonado    Reason for Consultation: Pneumonia    History of present illness:    Patient is a 69 y.o. female , with COPD, Diabetes mellitus, SARAH BETH, seen today for Metapneumoviral pneumonia.  She was discharged from this facility 1/8 off antibiotics after being treated for right middle lobe pneumonia. She has been experiencing worsening hypoxia prompting return to ED.  Chest xray with pulmonary vascular congestion.Oxygen saturations in 70's on admission.  She has been afebrile, without leukocytosis.  She is currently on Vancomycin and Azactam and we were consulted for evaluation and treatment.  She developed worsening respiratory compromise and required transfer to the unit and a BiPAP mask.    Past Medical History:   Diagnosis Date   • Acute bronchitis    • Acute sinusitis    • Anxiety    • Bacterial conjunctivitis    • Chest pain    • COPD (chronic obstructive pulmonary disease) (CMS/HCC)    • Depression    • Diabetes mellitus (CMS/HCC)    • Dyspnea    • Dysuria    • Edema    • Elbow injury    • Elbow pain    • Eye drainage    • Fatigue    • Foot pain, right    • Humerus distal fracture    • Hyperlipidemia    • Hypertension    • Hypothyroidism    • Morbid obesity (CMS/HCC)    • Pickwickian syndrome (CMS/HCC)    • Pressure ulcer stage I    • Psoriasis    • SOB (shortness of breath)    • Symptoms of urinary tract infection    • Urinary frequency    • Urinary tract infection    • Vaginal candidiasis        Past Surgical History:   Procedure Laterality Date   • CERVICAL POLYPECTOMY     • HYSTERECTOMY         Family History   Problem Relation Age of Onset   • Diabetes Father    • Diabetes Sister    • Pancreatic cancer Sister        Social History     Socioeconomic History   • Marital  status:      Spouse name: Not on file   • Number of children: Not on file   • Years of education: Not on file   • Highest education level: Not on file   Tobacco Use   • Smoking status: Former Smoker     Types: Cigarettes     Last attempt to quit: 2006     Years since quittin.0   • Smokeless tobacco: Never Used   Substance and Sexual Activity   • Alcohol use: No     Frequency: Never   • Drug use: Never   • Sexual activity: Defer       Allergies   Allergen Reactions   • Amoxicillin-Pot Clavulanate Other (See Comments)     HEADACHE - has tolerated ceftriaxone 2019   • Bactrim [Sulfamethoxazole-Trimethoprim] Other (See Comments)     .         Medication:    Current Facility-Administered Medications:   •  acetaminophen (TYLENOL) tablet 650 mg, 650 mg, Oral, Q4H PRN, 650 mg at 01/15/20 0505 **OR** acetaminophen (TYLENOL) 160 MG/5ML solution 650 mg, 650 mg, Oral, Q4H PRN **OR** acetaminophen (TYLENOL) suppository 650 mg, 650 mg, Rectal, Q4H PRN, Blaire Thomson MD  •  aspirin EC tablet 81 mg, 81 mg, Oral, Daily, Blaire Thomson MD  •  atorvastatin (LIPITOR) tablet 10 mg, 10 mg, Oral, Daily, Blaire Thomson MD  •  aztreonam (AZACTAM) 2 g/100 mL 0.9% NS (mbp), 2 g, Intravenous, Q8H, Blaire Thomson MD, 2 g at 01/15/20 0024  •  dextrose (D50W) 25 g/ 50mL Intravenous Solution 25 g, 25 g, Intravenous, Q15 Min PRN, Blaire Thomson MD  •  dextrose (GLUTOSE) oral gel 15 g, 15 g, Oral, Q15 Min PRN, Blaire Thomson MD  •  enoxaparin (LOVENOX) syringe 40 mg, 40 mg, Subcutaneous, Q24H, Blaire Thomson MD, 40 mg at 01/15/20 0505  •  folic acid-vit B6-vit B12 (FOLGARD) tablet 1 tablet, 1 tablet, Oral, Daily, Blaire Thomson MD  •  glucagon (human recombinant) (GLUCAGEN DIAGNOSTIC) injection 1 mg, 1 mg, Subcutaneous, Q15 Min PRN, Blaire Thomson MD  •  insulin lispro (humaLOG) injection 0-7 Units, 0-7 Units, Subcutaneous, 4x Daily With Meals & Nightly, Blaire Thomson MD  •  ipratropium-albuterol (DUO-NEB)  nebulizer solution 3 mL, 3 mL, Nebulization, Q4H PRN, Blaire Thomson MD  •  levothyroxine (SYNTHROID, LEVOTHROID) tablet 100 mcg, 100 mcg, Oral, Q AM, Blaire Thomson MD, 100 mcg at 01/15/20 0505  •  melatonin tablet 5 mg, 5 mg, Oral, Nightly, Blaire Thomson MD, 5 mg at 01/14/20 2030  •  metoprolol succinate XL (TOPROL-XL) 24 hr tablet 25 mg, 25 mg, Oral, Q24H, Blaire Thomson MD  •  ondansetron (ZOFRAN) injection 4 mg, 4 mg, Intravenous, Q6H PRN, Blaire Thomson MD  •  Pharmacy Consult - [facility], , Does not apply, Daily, Simba Montoya RP  •  Pharmacy to dose vancomycin, , Does not apply, Continuous PRN, Blaire Thomson MD  •  polyethylene glycol 3350 powder (packet), 17 g, Oral, BID, Blaire Thomson MD  •  predniSONE (DELTASONE) tablet 40 mg, 40 mg, Oral, Daily With Breakfast, Blaire Thomson MD  •  sennosides-docusate (PERICOLACE) 8.6-50 MG per tablet 2 tablet, 2 tablet, Oral, BID, Blaire Thomson MD  •  sertraline (ZOLOFT) tablet 50 mg, 50 mg, Oral, Daily, Blaire Thomson MD  •  sodium chloride 0.9 % flush 10 mL, 10 mL, Intravenous, Q12H, Blaire Thomson MD, 10 mL at 01/14/20 2031  •  sodium chloride 0.9 % flush 10 mL, 10 mL, Intravenous, PRN, Blaire Thomson MD  •  terazosin (HYTRIN) capsule 2 mg, 2 mg, Oral, Nightly, Blaire Thomson MD, 2 mg at 01/14/20 2032  •  vancomycin (dosing per levels), , Does not apply, Daily, Dain Burden RPH    Antibiotics:  Anti-Infectives (From admission, onward)    Ordered     Dose/Rate Route Frequency Start Stop    01/14/20 1842  vancomycin (dosing per levels)     Ordering Provider:  Dain Burden RPH     Does not apply Daily 01/15/20 0900 01/20/20 0859    01/14/20 1708  aztreonam (AZACTAM) 2 g/100 mL 0.9% NS (mbp)  Review   Ordering Provider:  Blaire Thomson MD    2 g  over 4 Hours Intravenous Every 8 Hours 01/15/20 0100 01/22/20 0059    01/14/20 1708  aztreonam (AZACTAM) 2 g/100 mL 0.9% NS (mbp)     Ordering Provider:  Blaire Thomson MD    2  g  over 30 Minutes Intravenous Once 20 1710 20 170  Pharmacy to dose vancomycin  Review   Ordering Provider:  Blaire Thomson MD     Does not apply Continuous PRN 20 17020 1707    20 1603  vancomycin 3000 mg/500 mL 0.9% NS IVPB ([facility])     Ordering Provider:  Tahir Aparicio MD    20 mg/kg × 166 kg  over 180 Minutes Intravenous Once 20 1604 20 1947            Review of Systems:  No reliable ros from patient     Physical Exam:   Vital Signs  Temp (24hrs), Av.3 °F (36.3 °C), Min:96.2 °F (35.7 °C), Max:98.5 °F (36.9 °C)    Temp  Min: 96.2 °F (35.7 °C)  Max: 98.5 °F (36.9 °C)  BP  Min: 110/53  Max: 148/52  Pulse  Min: 58  Max: 66  Resp  Min: 18  Max: 22  SpO2  Min: 92 %  Max: 99 %    GENERAL: Massively obese and in some moderate respiratory distress with a BiPAP mask in place.  HEENT: Normocephalic, atraumatic.  PERRL. EOMI. No conjunctival injection. No icterus. Oropharynx clear without evidence of thrush or exudate.     HEART: RRR; No murmur, rubs, gallops.   LUNGS on Bipap.  Fairly clear but diminished   ABDOMEN: Soft, nontender, nondistended. Positive bowel sounds. No rebound or guarding. Pannus with crusting lesions, woody induration   EXT:  No cyanosis, clubbing or edema. No cord.  : Mccarty catheter is in place  MSK:  Left hallux with ecchymosis,   SKIN: Warm and dry; sacral decubitus, unstageable, excoriated   NEURO: Oriented to person      Laboratory Data    Results from last 7 days   Lab Units 01/15/20  0736 20  1038   WBC 10*3/mm3 5.68 6.66   HEMOGLOBIN g/dL 10.5* 11.2*   HEMATOCRIT % 34.1 36.9   PLATELETS 10*3/mm3 141 167     Results from last 7 days   Lab Units 01/15/20  0519   SODIUM mmol/L 140   POTASSIUM mmol/L 4.8   CHLORIDE mmol/L 96*   CO2 mmol/L 31.0*   BUN mg/dL 25*   CREATININE mg/dL 0.86   GLUCOSE mg/dL 86   CALCIUM mg/dL 9.2     Results from last 7 days   Lab Units 01/15/20  0519   ALK PHOS U/L 88   BILIRUBIN mg/dL 0.4    ALT (SGPT) U/L 16   AST (SGOT) U/L 21             Results from last 7 days   Lab Units 01/14/20  1039   LACTATE mmol/L 1.1         Results from last 7 days   Lab Units 01/15/20  0519   VANCOMYCIN RM mcg/mL 23.20     Estimated Creatinine Clearance: 94 mL/min (by C-G formula based on SCr of 0.86 mg/dL).      Microbiology:  Blood Culture   Date Value Ref Range Status   01/14/2020 No growth at less than 24 hours  Preliminary   01/14/2020 No growth at less than 24 hours  Preliminary           Radiology:  Imaging Results (Last 72 Hours)     Procedure Component Value Units Date/Time    XR Chest 1 View [[id number]] Collected:  01/14/20 1102     Updated:  01/15/20 0830    Narrative:       EXAMINATION: XR CHEST 1 VW- 01/14/2020     INDICATION: SOA triage protocol      COMPARISON: Chest radiograph 12/28/2019, CT chest 12/28/2019     FINDINGS: Single portable chest radiograph was submitted for review. The  heart is enlarged. Mild pulmonary vascular congestion identified. Hazy  mid and lower lung airspace changes identified, somewhat similar to the  prior exam particularly the CT of the chest performed 12/28/2019. No  large pleural effusion or pneumothorax. Visualized upper abdomen is  unrevealing. No acute osseous abnormality is appreciated.       Impression:       Cardiomegaly mild pulmonary vasculature congestion with  multifocal airspace changes noted similar when compared to 12/28/2019.        D:  01/14/2020  E:  01/14/2020     This report was finalized on 1/15/2020 8:27 AM by Dr. Ivan Miller MD.       XR Chest 1 View [[id number]] Collected:  01/15/20 0602     Updated:  01/15/20 0604    Narrative:       CR Chest 1 Vw    INDICATION:   Hypoxemia this morning. Evaluate for aspiration pneumonia     COMPARISON:    1/14/2020 12/28/2019    FINDINGS:  Single portable AP view(s) of the chest.        Low lung volumes with mild left base atelectasis. No change. Heart size normal there is minimal interstitial prominence as  compared with 2019      Impression:       There may be minimal left base atelectasis. Minimal interstitial prominence. Otherwise no active disease    Signer Name: Norman Martinez MD   Signed: 1/15/2020 6:02 AM   Workstation Name: [facility]    [facility]        I read her chest x-ray with the radiologist today    Impression:   1.  Human Metapneumoviral pneumonia-there is no evidence of a superimposed bacterial pneumonia with no fever, no leukocytosis, no new substantial pulmonary infiltrate, and a normal procalcitonin.  I think we can discontinue her antibacterial antibiotic therapy.  2.  COPD  3.  Hypercapnic acute respiratory failure  4.   Sacral decubitus ulcer  5.  Massive obesity   6.  T2 Diabetes Mellitus     PLAN/RECOMMENDATIONS:   Thank you for asking us to see Julissa Kee, I recommend the followin.  Discontinue vancomycin  2.  Discontinue Azactam  3.  Blood cultures-pending    Dr. Redding has obtained the history, performed the physical exam and formulated the above treatment plan.     I discussed her complex situation with Dr. Quintana and he agrees with discontinuation of antibiotic therapy.         Nata Thomas, APRN  1/15/2020  8:57 AM

## 2020-01-16 LAB
ALBUMIN SERPL-MCNC: 2.4 G/DL (ref 3.5–5.2)
ALBUMIN/GLOB SERPL: 0.6 G/DL
ALP SERPL-CCNC: 83 U/L (ref 39–117)
ALT SERPL W P-5'-P-CCNC: 14 U/L (ref 1–33)
ANION GAP SERPL CALCULATED.3IONS-SCNC: 16 MMOL/L (ref 5–15)
AST SERPL-CCNC: 23 U/L (ref 1–32)
BASOPHILS # BLD AUTO: 0.01 10*3/MM3 (ref 0–0.2)
BASOPHILS NFR BLD AUTO: 0.2 % (ref 0–1.5)
BILIRUB SERPL-MCNC: 0.5 MG/DL (ref 0.2–1.2)
BUN BLD-MCNC: 31 MG/DL (ref 8–23)
BUN/CREAT SERPL: 40.8 (ref 7–25)
CALCIUM SPEC-SCNC: 9.8 MG/DL (ref 8.6–10.5)
CHLORIDE SERPL-SCNC: 94 MMOL/L (ref 98–107)
CO2 SERPL-SCNC: 28 MMOL/L (ref 22–29)
CREAT BLD-MCNC: 0.76 MG/DL (ref 0.57–1)
DEPRECATED RDW RBC AUTO: 58.9 FL (ref 37–54)
EOSINOPHIL # BLD AUTO: 0 10*3/MM3 (ref 0–0.4)
EOSINOPHIL NFR BLD AUTO: 0 % (ref 0.3–6.2)
ERYTHROCYTE [DISTWIDTH] IN BLOOD BY AUTOMATED COUNT: 14 % (ref 12.3–15.4)
GFR SERPL CREATININE-BSD FRML MDRD: 75 ML/MIN/1.73
GLOBULIN UR ELPH-MCNC: 3.8 GM/DL
GLUCOSE BLD-MCNC: 119 MG/DL (ref 65–99)
GLUCOSE BLDC GLUCOMTR-MCNC: 109 MG/DL (ref 70–130)
GLUCOSE BLDC GLUCOMTR-MCNC: 133 MG/DL (ref 70–130)
GLUCOSE BLDC GLUCOMTR-MCNC: 143 MG/DL (ref 70–130)
HCT VFR BLD AUTO: 34.3 % (ref 34–46.6)
HGB BLD-MCNC: 10.7 G/DL (ref 12–15.9)
IMM GRANULOCYTES # BLD AUTO: 0.03 10*3/MM3 (ref 0–0.05)
IMM GRANULOCYTES NFR BLD AUTO: 0.6 % (ref 0–0.5)
LYMPHOCYTES # BLD AUTO: 0.82 10*3/MM3 (ref 0.7–3.1)
LYMPHOCYTES NFR BLD AUTO: 15.1 % (ref 19.6–45.3)
MAGNESIUM SERPL-MCNC: 1.8 MG/DL (ref 1.6–2.4)
MCH RBC QN AUTO: 35.5 PG (ref 26.6–33)
MCHC RBC AUTO-ENTMCNC: 31.2 G/DL (ref 31.5–35.7)
MCV RBC AUTO: 114 FL (ref 79–97)
MONOCYTES # BLD AUTO: 0.12 10*3/MM3 (ref 0.1–0.9)
MONOCYTES NFR BLD AUTO: 2.2 % (ref 5–12)
NEUTROPHILS # BLD AUTO: 4.45 10*3/MM3 (ref 1.7–7)
NEUTROPHILS NFR BLD AUTO: 81.9 % (ref 42.7–76)
NRBC BLD AUTO-RTO: 0 /100 WBC (ref 0–0.2)
NT-PROBNP SERPL-MCNC: 645 PG/ML (ref 5–900)
PHOSPHATE SERPL-MCNC: 2.2 MG/DL (ref 2.5–4.5)
PLATELET # BLD AUTO: 159 10*3/MM3 (ref 140–450)
PMV BLD AUTO: 10.8 FL (ref 6–12)
POTASSIUM BLD-SCNC: 4.9 MMOL/L (ref 3.5–5.2)
PROCALCITONIN SERPL-MCNC: 0.1 NG/ML (ref 0.1–0.25)
PROT SERPL-MCNC: 6.2 G/DL (ref 6–8.5)
RBC # BLD AUTO: 3.01 10*6/MM3 (ref 3.77–5.28)
SODIUM BLD-SCNC: 138 MMOL/L (ref 136–145)
WBC NRBC COR # BLD: 5.43 10*3/MM3 (ref 3.4–10.8)

## 2020-01-16 PROCEDURE — 83880 ASSAY OF NATRIURETIC PEPTIDE: CPT | Performed by: INTERNAL MEDICINE

## 2020-01-16 PROCEDURE — 94660 CPAP INITIATION&MGMT: CPT

## 2020-01-16 PROCEDURE — 80053 COMPREHEN METABOLIC PANEL: CPT | Performed by: INTERNAL MEDICINE

## 2020-01-16 PROCEDURE — 83735 ASSAY OF MAGNESIUM: CPT | Performed by: INTERNAL MEDICINE

## 2020-01-16 PROCEDURE — 85025 COMPLETE CBC W/AUTO DIFF WBC: CPT | Performed by: INTERNAL MEDICINE

## 2020-01-16 PROCEDURE — 84100 ASSAY OF PHOSPHORUS: CPT | Performed by: INTERNAL MEDICINE

## 2020-01-16 PROCEDURE — 94799 UNLISTED PULMONARY SVC/PX: CPT

## 2020-01-16 PROCEDURE — 25010000002 ENOXAPARIN PER 10 MG: Performed by: INTERNAL MEDICINE

## 2020-01-16 PROCEDURE — 97162 PT EVAL MOD COMPLEX 30 MIN: CPT | Performed by: PHYSICAL THERAPIST

## 2020-01-16 PROCEDURE — 92610 EVALUATE SWALLOWING FUNCTION: CPT

## 2020-01-16 PROCEDURE — 97610 LOW FREQUENCY NON-THERMAL US: CPT | Performed by: PHYSICAL THERAPIST

## 2020-01-16 PROCEDURE — 99232 SBSQ HOSP IP/OBS MODERATE 35: CPT | Performed by: INTERNAL MEDICINE

## 2020-01-16 PROCEDURE — 85007 BL SMEAR W/DIFF WBC COUNT: CPT | Performed by: INTERNAL MEDICINE

## 2020-01-16 PROCEDURE — 84145 PROCALCITONIN (PCT): CPT

## 2020-01-16 PROCEDURE — 25010000002 METHYLPREDNISOLONE PER 40 MG: Performed by: INTERNAL MEDICINE

## 2020-01-16 PROCEDURE — 82962 GLUCOSE BLOOD TEST: CPT

## 2020-01-16 RX ORDER — MAGNESIUM SULFATE HEPTAHYDRATE 40 MG/ML
2 INJECTION, SOLUTION INTRAVENOUS AS NEEDED
Status: DISCONTINUED | OUTPATIENT
Start: 2020-01-16 | End: 2020-01-21 | Stop reason: HOSPADM

## 2020-01-16 RX ORDER — MAGNESIUM SULFATE HEPTAHYDRATE 40 MG/ML
4 INJECTION, SOLUTION INTRAVENOUS AS NEEDED
Status: DISCONTINUED | OUTPATIENT
Start: 2020-01-16 | End: 2020-01-21 | Stop reason: HOSPADM

## 2020-01-16 RX ADMIN — POLYETHYLENE GLYCOL 3350 17 G: 17 POWDER, FOR SOLUTION ORAL at 20:48

## 2020-01-16 RX ADMIN — SODIUM CHLORIDE, PRESERVATIVE FREE 10 ML: 5 INJECTION INTRAVENOUS at 20:50

## 2020-01-16 RX ADMIN — SERTRALINE HYDROCHLORIDE 50 MG: 50 TABLET ORAL at 08:36

## 2020-01-16 RX ADMIN — LEVOTHYROXINE SODIUM 100 MCG: 100 TABLET ORAL at 05:32

## 2020-01-16 RX ADMIN — METHYLPREDNISOLONE SODIUM SUCCINATE 40 MG: 40 INJECTION, POWDER, FOR SOLUTION INTRAMUSCULAR; INTRAVENOUS at 20:50

## 2020-01-16 RX ADMIN — MELATONIN TAB 5 MG 5 MG: 5 TAB at 20:49

## 2020-01-16 RX ADMIN — METOPROLOL SUCCINATE 25 MG: 25 TABLET, EXTENDED RELEASE ORAL at 08:22

## 2020-01-16 RX ADMIN — IPRATROPIUM BROMIDE AND ALBUTEROL SULFATE 3 ML: 2.5; .5 SOLUTION RESPIRATORY (INHALATION) at 07:22

## 2020-01-16 RX ADMIN — ENOXAPARIN SODIUM 40 MG: 40 INJECTION SUBCUTANEOUS at 19:12

## 2020-01-16 RX ADMIN — POLYETHYLENE GLYCOL 3350 17 G: 17 POWDER, FOR SOLUTION ORAL at 08:32

## 2020-01-16 RX ADMIN — TERAZOSIN HYDROCHLORIDE 2 MG: 2 CAPSULE ORAL at 20:49

## 2020-01-16 RX ADMIN — PANTOPRAZOLE SODIUM 40 MG: 40 INJECTION, POWDER, FOR SOLUTION INTRAVENOUS at 05:32

## 2020-01-16 RX ADMIN — MICONAZOLE NITRATE: 20 POWDER TOPICAL at 08:23

## 2020-01-16 RX ADMIN — POTASSIUM & SODIUM PHOSPHATES POWDER PACK 280-160-250 MG 2 PACKET: 280-160-250 PACK at 20:48

## 2020-01-16 RX ADMIN — MICONAZOLE NITRATE 1 APPLICATION: 20 POWDER TOPICAL at 20:47

## 2020-01-16 RX ADMIN — ASPIRIN 81 MG: 81 TABLET, COATED ORAL at 08:22

## 2020-01-16 RX ADMIN — IPRATROPIUM BROMIDE AND ALBUTEROL SULFATE 3 ML: 2.5; .5 SOLUTION RESPIRATORY (INHALATION) at 20:56

## 2020-01-16 RX ADMIN — Medication 1 TABLET: at 08:22

## 2020-01-16 RX ADMIN — ENOXAPARIN SODIUM 40 MG: 40 INJECTION SUBCUTANEOUS at 05:32

## 2020-01-16 RX ADMIN — CASTOR OIL AND BALSAM, PERU 1 EACH: 788; 87 OINTMENT TOPICAL at 20:47

## 2020-01-16 RX ADMIN — SODIUM CHLORIDE, PRESERVATIVE FREE 10 ML: 5 INJECTION INTRAVENOUS at 05:32

## 2020-01-16 RX ADMIN — IPRATROPIUM BROMIDE AND ALBUTEROL SULFATE 3 ML: 2.5; .5 SOLUTION RESPIRATORY (INHALATION) at 12:42

## 2020-01-16 RX ADMIN — SODIUM CHLORIDE, PRESERVATIVE FREE 10 ML: 5 INJECTION INTRAVENOUS at 08:33

## 2020-01-16 RX ADMIN — SENNOSIDES AND DOCUSATE SODIUM 2 TABLET: 8.6; 5 TABLET ORAL at 08:22

## 2020-01-16 RX ADMIN — METHYLPREDNISOLONE SODIUM SUCCINATE 40 MG: 40 INJECTION, POWDER, FOR SOLUTION INTRAMUSCULAR; INTRAVENOUS at 08:22

## 2020-01-16 RX ADMIN — ATORVASTATIN CALCIUM 10 MG: 10 TABLET, FILM COATED ORAL at 08:22

## 2020-01-16 RX ADMIN — IPRATROPIUM BROMIDE AND ALBUTEROL SULFATE 3 ML: 2.5; .5 SOLUTION RESPIRATORY (INHALATION) at 16:19

## 2020-01-16 RX ADMIN — CASTOR OIL AND BALSAM, PERU: 788; 87 OINTMENT TOPICAL at 08:23

## 2020-01-16 RX ADMIN — SENNOSIDES AND DOCUSATE SODIUM 2 TABLET: 8.6; 5 TABLET ORAL at 20:49

## 2020-01-16 NOTE — PLAN OF CARE
Problem: Patient Care Overview  Goal: Plan of Care Review  Outcome: Ongoing (interventions implemented as appropriate)  Flowsheets (Taken 1/16/2020 1106)  Plan of Care Reviewed With: patient  Note:   SLP evaluation completed. Will continue to address dysphagia. Please see note for further details and recommendations.

## 2020-01-16 NOTE — NURSING NOTE
Mccarty catheter dc'd per Dr Redding. Extensive catheter care done before and after discontinuation. Pt has extensive excoriation, skin breakage and moisture related skin issues. PT Skin therapist Pernell here and mist therapy was completed to prescribed areas. Venelex cream, Interdry and Nystatin powder to affected areas. External urinary catheter to suction placed and tubing padded. Pt lower abdomen and pannis cover genitals and most of thighs. Rolled towel used to elevate pannis and attempt to keep weight off of external catheter tubing

## 2020-01-16 NOTE — THERAPY EVALUATION
Acute Care - Speech Language Pathology   Swallow Initial Evaluation [facility]   Clinical Swallow Evaluation     Patient Name: Julissa Kee  : 1950  MRN: [id number]  Today's Date: 2020               Admit Date: 2020    Visit Dx:     ICD-10-CM ICD-9-CM   1. Hypoxia R09.02 799.02   2. Respiratory distress R06.03 786.09   3. Viral illness B34.9 079.99   4. Pneumonia of both lungs due to infectious organism, unspecified part of lung J18.9 483.8   5. Dysphagia, unspecified type R13.10 787.20     Patient Active Problem List   Diagnosis   • Anxiety   • Chronic obstructive pulmonary disease (CMS/HCC)   • Diabetes mellitus (CMS/HCC)   • Hyperlipidemia   • Hypertension   • Hypothyroidism   • Morbid obesity (CMS/HCC)   • Extreme obesity with alveolar hypoventilation (CMS/HCC)   • Renal insufficiency   • Sacral wound   • Recurrent pneumonia   • Human metapneumovirus (hMPV) pneumonia   • Hypoxia     Past Medical History:   Diagnosis Date   • Acute bronchitis    • Acute sinusitis    • Anxiety    • Bacterial conjunctivitis    • Chest pain    • COPD (chronic obstructive pulmonary disease) (CMS/HCC)    • Depression    • Diabetes mellitus (CMS/HCC)    • Dyspnea    • Dysuria    • Edema    • Elbow injury    • Elbow pain    • Eye drainage    • Fatigue    • Foot pain, right    • Humerus distal fracture    • Hyperlipidemia    • Hypertension    • Hypothyroidism    • Morbid obesity (CMS/HCC)    • Pickwickian syndrome (CMS/HCC)    • Pressure ulcer stage I    • Psoriasis    • SOB (shortness of breath)    • Symptoms of urinary tract infection    • Urinary frequency    • Urinary tract infection    • Vaginal candidiasis      Past Surgical History:   Procedure Laterality Date   • CERVICAL POLYPECTOMY     • HYSTERECTOMY          SWALLOW EVALUATION (last 72 hours)      SLP Adult Swallow Evaluation     Row Name 20 0845                   Rehab Evaluation    Document Type  evaluation  -SM        Subjective Information  no  complaints  -SM        Patient Observations  alert;cooperative  -           General Information    Patient Profile Reviewed  yes  -SM        Pertinent History Of Current Problem  Coughing spell and oxygen desaturation on the floor Transferred to ICU, on Bipap yesterday. Much improved, currently on nasal cannula. Pt with no dysphagia complaints. SLP was consulted last admit, however, pt/family declined assessment as no concerns.  -SM        Current Method of Nutrition  other (see comments) PO trays being held  -SM        Prior Level of Function-Swallowing  safe, efficient swallowing in all situations  -        Plans/Goals Discussed with  patient;agreed upon  -        Barriers to Rehab  none identified  -        Patient's Goals for Discharge  return to PO diet  -           Pain Assessment    Additional Documentation  Pain Scale: FACES Pre/Post-Treatment (Group)  -SM           Pain Scale: FACES Pre/Post-Treatment    Pain: FACES Scale, Pretreatment  4-->hurts little more  -SM        Pain: FACES Scale, Post-Treatment  4-->hurts little more  -SM        Pre/Post Treatment Pain Comment  L ankle  -SM           Oral Musculature and Cranial Nerve Assessment    Oral Motor General Assessment  WFL  -SM           Clinical Swallow Eval    Oral Prep Phase  impaired  -SM        Pharyngeal Phase  no overt signs/symptoms of pharyngeal impairment  -           Oral Prep Concerns    Oral Prep Concerns  prolonged mastication  -SM        Prolonged Mastication  regular consistencies  -SM        Oral Prep Concerns, Comment  edentulous  -           Pharyngeal Phase Concerns    Pharyngeal Phase Concerns  other (see comments)  -SM        Pharyngeal Phase Concerns, Comment  no s/s aspiration with today's trials. Risk factors present, especially given concern from nursing on the floor. Discussed FEES with pt, who prefers to avoid unless any further concerns. Argeeable for general aspiration precautions and to monitor. Will discuss  FEES prn  -           Clinical Impression    SLP Swallowing Diagnosis  oral dysfunction  -        Functional Impact  risk of aspiration/pneumonia  -        Rehab Potential/Prognosis, Swallowing  good, to achieve stated therapy goals  -        Swallow Criteria for Skilled Therapeutic Interventions Met  demonstrates skilled criteria  -           Recommendations    Therapy Frequency (Swallow)  5 days per week  -        Predicted Duration Therapy Intervention (Days)  until discharge  -        SLP Diet Recommendation  soft textures;chopped;thin liquids;other (see comments) can adjust solids as pt prefers - soft 2' edentulous  -        Recommended Precautions and Strategies  upright posture during/after eating;small bites of food and sips of liquid  -        SLP Rec. for Method of Medication Administration  meds whole;with thin liquids;with pudding or applesauce;as tolerated  -        Monitor for Signs of Aspiration  yes;notify SLP if any concerns  -          User Key  (r) = Recorded By, (t) = Taken By, (c) = Cosigned By    Initials Name Effective Dates    Nadia Adams, MS CCC-SLP 06/22/15 -           EDUCATION  The patient has been educated in the following areas:   Dysphagia (Swallowing Impairment) Modified Diet Instruction.    SLP Recommendation and Plan  SLP Swallowing Diagnosis: oral dysfunction  SLP Diet Recommendation: soft textures, chopped, thin liquids, other (see comments)(can adjust solids as pt prefers - soft 2' edentulous)  Recommended Precautions and Strategies: upright posture during/after eating, small bites of food and sips of liquid  SLP Rec. for Method of Medication Administration: meds whole, with thin liquids, with pudding or applesauce, as tolerated     Monitor for Signs of Aspiration: yes, notify SLP if any concerns     Swallow Criteria for Skilled Therapeutic Interventions Met: demonstrates skilled criteria     Rehab Potential/Prognosis, Swallowing: good, to  achieve stated therapy goals  Therapy Frequency (Swallow): 5 days per week  Predicted Duration Therapy Intervention (Days): until discharge       Plan of Care Reviewed With: patient    SLP GOALS     Row Name 01/16/20 0845             Oral Nutrition/Hydration Goal 1 (SLP)    Oral Nutrition/Hydration Goal 1, SLP  LTG: Tolerate soft solids and thin liqudis without s/s aspiration with 100% accuracy and no cues  -SM      Time Frame (Oral Nutrition/Hydration Goal 1, SLP)  by discharge  -         Swallow Management Recall Goal 1 (SLP)    Activity (Swallow Management Recall Goal 1, SLP)  safe diet/liquid level;compensatory swallow strategies/techniques;postural techniques;rationale for use of strategies/techniques  -SM      [place]/Accuracy (Swallow Management Recall Goal 1, SLP)  independently (over 90% accuracy)  -SM      Time Frame (Swallow Management Recall Goal 1, SLP)  short term goal (STG)  -         Swallow Compensatory Strategies Goal 1 (SLP)    Activity (Swallow Compensatory Strategies/Techniques Goal 1, SLP)  postural techniques;during meal intake;during p.o. trials;small bites;small cup sips;small straw sips  -SM      [place]/Accuracy (Swallow Compensatory Strategies/Techniques Goal 1, SLP)  independently (over 90% accuracy)  -SM      Time Frame (Swallow Compensatory Strategies/Techniques Goal 1, SLP)  short term goal (STG)  -        User Key  (r) = Recorded By, (t) = Taken By, (c) = Cosigned By    Initials Name Provider Type    Nadia Adams MS CCC-SLP Speech and Language Pathologist             Time Calculation:   Time Calculation- SLP     Row Name 01/16/20 1108             Time Calculation- SLP    SLP Start Time  0845  -      SLP Received On  01/16/20  -        User Key  (r) = Recorded By, (t) = Taken By, (c) = Cosigned By    Initials Name Provider Type    Nadia Adams MS CCC-SLP Speech and Language Pathologist          Therapy Charges for Today     Code Description  Service Date Service Provider Modifiers Qty    [id number]  ST EVAL ORAL PHARYNG SWALLOW 3 1/16/2020 Nadia De La Garza, MS CCC-SLP GN 1               Nadia De La Garza, MS CCC-SLP  1/16/2020

## 2020-01-16 NOTE — PLAN OF CARE
Pt very anxious. Frequent c/o pain in back, hip, foot, ankle, and toe. Frequent repositioning.  Tolerated xanax dose well. Able to tolerate bipap at HS after prn dose given.   Diuretics given today, monitoring close I/o's.

## 2020-01-16 NOTE — PLAN OF CARE
Problem: Patient Care Overview  Goal: Plan of Care Review  Outcome: Ongoing (interventions implemented as appropriate)   Pt cooperative and trying to participate with turns. O2/4l NC . Mccarty cath dc'd per order . External urinal catheter in place.DIet re-ordered after speech eval and patient drinking protein shakes. PT/OT consult ordered to determine rehab needs

## 2020-01-16 NOTE — SIGNIFICANT NOTE
Notified by bedside nurse that patients family is ready to discuss code status. On arrival patient, her  and her daughter were at the bedside. They are all agreeable to making patient DNR/DNI at this time, full support otherwise.

## 2020-01-16 NOTE — PROGRESS NOTES
Intensive Care Follow-up     Hospital:  LOS: 2 days   Ms. Julissa Kee, 69 y.o. female is followed for:   Acute hypercapnic respiratory failure            History of present illness:   69-year-old female with past medical history significant for COPD with no previous PFTs available, patient apparently is on supplemental oxygen at home as well, diabetes mellitus type 2, hypertension, dyslipidemia, renal insufficiency and morbid obesity.  Patient also has history of sacral decubitus ulcer.  Patient was admitted recently to hospital on December 28 for acute respiratory failure and atrial tachycardia.  Patient was seen by infectious disease and treated with Rocephin and doxycycline at that point.  CT scan showed possible infiltrate in the right lower lobe superior segment.  No other abnormality noted at that point.  Patient recovered well and was discharged home on January 8.  She was brought back from the nursing home yesterday when she was found to be hypoxemic in the nursing home with saturations of 70%.  Patient apparently was feeling more short of breath for last couple of days.  There was no fevers.  There was no history of hemoptysis.  No significant sputum production.  Chest x-ray did not show any definite consolidation.  Patient was given antibiotics and was admitted to the floor.  Infectious disease team evaluated the patient and procalcitonin level was negative.  Viral panel was positive for metapneumovirus.  Antibiotics were stopped and patient was monitored closely.  Today morning patient was obtunded and not able to be aroused.  Arterial blood gas showed acute hypercapnic respiratory failure with pH of 7.24 and PaCO2 of 96.  Decision was made to transfer patient to intensive care unit for closer monitoring.    Patient was seen in ICU on BiPAP.  She is tolerating BiPAP well without any asynchrony noted at this point.  Minute ventilation is around 5 L.   patient was switched over to AVAPS mode and she  "tolerated that better and blood gases improved.  Family discussions held regarding goals of care and patient was changed to DO NOT INTUBATE and DO NOT RESUSCITATE status.    Interval History: Overnight did fair.  Had trouble using the BiPAP but tolerated better after getting dose of Xanax.  Today morning more awake and talking.  Significantly weak.  No new fevers or hemodynamic instability noted.  Currently on nasal cannula oxygen.  Discussed intermittent and nighttime use of BiPAP.    The patient's past medical, surgical and social history were reviewed and updated in Epic as appropriate.       Objective     Infusions:     Medications:    aspirin 81 mg Oral Daily   atorvastatin 10 mg Oral Daily   castor oil-balsam peru  Topical Q12H   enoxaparin 40 mg Subcutaneous Q12H   folic acid-vit B6-vit B12 1 tablet Oral Daily   insulin lispro 0-7 Units Subcutaneous 4x Daily With Meals & Nightly   ipratropium-albuterol 3 mL Nebulization 4x Daily - RT   levothyroxine 100 mcg Oral Q AM   melatonin 5 mg Oral Nightly   methylPREDNISolone sodium succinate 40 mg Intravenous Q12H   metoprolol succinate XL 25 mg Oral Q24H   miconazole  Topical Q12H   pantoprazole 40 mg Intravenous Q AM   pharmacy consult - MTM  Does not apply Daily   polyethylene glycol 17 g Oral BID   sennosides-docusate 2 tablet Oral BID   sertraline 50 mg Oral Daily   sodium chloride 10 mL Intravenous Q12H   terazosin 2 mg Oral Nightly       Vital Sign Min/Max for last 24 hours  Temp  Min: 98.3 °F (36.8 °C)  Max: 98.6 °F (37 °C)   BP  Min: 120/58  Max: 179/57   Pulse  Min: 53  Max: 107   Resp  Min: 20  Max: 27   SpO2  Min: 83 %  Max: 98 %   Flow (L/min)  Min: 4  Max: 5       Input/Output for last 24 hour shift  01/15 0701 - 01/16 0700  In: -   Out: 2030 [Urine:2030]      Objective:  Vital signs: (most recent): Blood pressure 166/43, pulse 72, temperature 98.6 °F (37 °C), temperature source Axillary, resp. rate 22, height 160 cm (63\"), weight (!) 142 kg (313 lb 8 " oz), SpO2 93 %, not currently breastfeeding.              General Appearance: Awake, following commands, in no acute distress  Head:    Atraumatic, Normocephalic, without obvious abnormality, Pupils reactive & symmetrical B/L.  Lungs:   B/L Breath sounds present with decreased breath sounds bilaterally, no wheezing heard, no crackles.   Heart: S1 and S2 present, no murmur  Abdomen: Morbidly obese, Soft, non-tender, no guarding or rigidity.  Extremities: Atraumatic, no cyanosis,  1+ edema, warm to touch.  Pulses: Positive and symmetric.  Neurologic:  Moving all four extremities.  Following simple commands.    Results from last 7 days   Lab Units 01/16/20  0442 01/15/20  0736 01/14/20  1038   WBC 10*3/mm3 5.43 5.68 6.66   HEMOGLOBIN g/dL 10.7* 10.5* 11.2*   PLATELETS 10*3/mm3 159 141 167     Results from last 7 days   Lab Units 01/16/20  0442 01/15/20  0519 01/14/20  1038   SODIUM mmol/L 138 140 140   POTASSIUM mmol/L 4.9 4.8 4.2   CO2 mmol/L 28.0 31.0* 39.0*   BUN mg/dL 31* 25* 21   CREATININE mg/dL 0.76 0.86 0.89   MAGNESIUM mg/dL 1.8  --   --    PHOSPHORUS mg/dL 2.2*  --   --    GLUCOSE mg/dL 119* 86 104*     Estimated Creatinine Clearance: 91.1 mL/min (by C-G formula based on SCr of 0.76 mg/dL).    Results from last 7 days   Lab Units 01/15/20  1332   PH, ARTERIAL pH units 7.317*   PCO2, ARTERIAL mm Hg 79.7*   PO2 ART mm Hg 53.4*       Recent echocardiogram did not show any acute cardiac pathology.  Ejection fraction 61 to 65%.  Trace tricuspid regurgitation.  Diastolic dysfunction.    Images:   Venous Doppler study report reviewed and did not show any obvious DVT.    I reviewed the patient's results and images.     Assessment/Plan   Impression        Chronic obstructive pulmonary disease (CMS/HCC)    Diabetes mellitus (CMS/HCC)    Hyperlipidemia    Hypertension    Hypothyroidism    Extreme obesity with alveolar hypoventilation (CMS/HCC)    Sacral wound    Recurrent pneumonia    Human metapneumovirus (hMPV)  pneumonia    Hypoxia       Plan        1.  Patient with acute hypercapnic respiratory failure with recent hospitalization for possible pneumonia.  Clinically does not behave infected.  Likely viral bronchitis leading to a flareup of underlying COPD.  Continue supportive care at this point.  BiPAP intermittently and at nighttime.  Family has decided DNI/DNR status which is reasonable.  We will continue to provide support at this point and see where we go from here.    2.  We will schedule DuoNeb every 4 hours.  Increase prednisone to 40 mg every 12 hours to treat underlying COPD.  Likely bronchitis flareup with viral disease.  We will try to control the inflammation and see if he can improve her status.  Venous Doppler study was negative for DVT.  Continue DVT prophylaxis.    3.  Speech evaluation to rule out aspiration which could be leading to frequent flareups as well.    4.  Diuresed well yesterday post Lasix.  Will monitor for now.  Urine output is acceptable.    5.  Insulin regimen for hyperglycemia management to continue.    6.  Synthroid to continue for hypothyroidism.    7.  Continue home regimen of antihypertensives, aspirin and statin.    8.  Continue GI prophylaxis.    9.  Wound care follow-up for sacral decubitus ulcer.    Continue to monitor in ICU.    Plan of care and goals reviewed with mulitdisciplinary/antibiotic stewardship team during rounds.   I discussed the patient's findings and my recommendations with nursing staff     High level of risk due to:  severe exacerbation of chronic illness.    Time spent 30 min (exclusive of procedure time)  including high complexity decision making to assess, manipulate, and support vital organ system failure in this individual who has impairment of one or more vital organ systems such that there is a high probability of imminent or life threatening deterioration in the patient’s condition.      Tho Quintana MD, [facility]  Pulmonary, Critical care and Sleep  Medicine

## 2020-01-16 NOTE — PLAN OF CARE
Problem: Patient Care Overview  Goal: Plan of Care Review  Outcome: Ongoing (interventions implemented as appropriate)  Flowsheets  Taken 1/16/2020 0820 by Delisa Casarez, PT  Progress: no change  Outcome Summary: PT wound care: initiated MIST to bilateral medial thigh DTPI areas; Rt medial aspect with denuded area. Posterior thigh DTPI is on Rt not Lt. Very complex skin situation due to large pannus resting on thighs; MASD under folds of pannus, and proximal thighs and into vineet-area folds. RN completing skin care per WOCN recommendations. Cont PT for MIST to DTPI areas to maximize healing potential with increased local blood flow.   Taken 1/16/2020 1108 by Nadia De La Garza MS CCC-SLP  Plan of Care Reviewed With: patient

## 2020-01-16 NOTE — PROGRESS NOTES
"Adult Nutrition  Assessment/PES    Patient Name:  Julissa Kee  YOB: 1950  MRN: [id number]  Admit Date:  1/14/2020    Assessment Date:  1/16/2020    Comments:      Reason for Assessment     Row Name 01/16/20 1224          Reason for Assessment    Reason For Assessment  nurse/nurse practitioner consult RE: MULTIPLE SKIN ISSUES & MOST LIKELY NEED FOR ORAL SUPPLEMENT. MDR/15\"     Diagnosis  -- RECURRENT PNA DUE TO VIRUS, HYPOXIA, EXAC COPD. PMH: COPD, FORMER SMOKER, DM, HLP, HTN, HYPOTHYROID, EXTREME OBESITY, SARAH BETH, SACRAL WOUND, ANXIETY, DEPRESSION, EDEMA, HUMERUS FX, PICKWICKIAN SYNDROME, PSORIASIS. PSH: HYSTERECTOMY         Nutrition/Diet History     Row Name 01/16/20 1228          Nutrition/Diet History    Typical Food/Fluid Intake  PT'S  REPORTS THAT AS LONG AS FOOD IS SOFT ENOUGH, PT WITHOUT FOOD PREFS. HE REPORTS MOST LIKELY WOULD DRINK AN ORAL SUPPLEMENT.          Anthropometrics     Row Name 01/16/20 1229 01/16/20 0500       Anthropometrics    Height  160 cm (63\")  --    Current Weight Method  measured BEDSCALE  --    Weight  (!) 142 kg (313 lb 8 oz)  (!) 142 kg (313 lb 7.9 oz)       Admit Weight    Admit Weight Method  stated  --    Admit Weight  166 kg (366 lb)  --       Ideal Body Weight (IBW)    Ideal Body Weight (IBW) (kg)  52.72  --    % Ideal Body Weight  269.74  --       Body Mass Index (BMI)    BMI (kg/m2)  55.65  --        Labs/Tests/Procedures/Meds     Row Name 01/16/20 1230          Labs/Procedures/Meds    Lab Results Reviewed  reviewed     Lab Results Comments  PHOS        Diagnostic Tests/Procedures    Diagnostic Test/Procedure Reviewed  reviewed     Diagnostic Test/Procedures Comments  SLP        Medications    Pertinent Medications Comments  NOTED SS PHOS REPLACEMENT RX ORDERED         Physical Findings     Row Name 01/16/20 1231          Physical Findings    Gastrointestinal  other (see comments);abdominal distension PER NURSING DOCUMENTATION     Skin  surgical incision " STAGE 2 COCCYX PI, R FOOT WOUND, L TOE DTI, L&R THIGH DTI & B/L MASD PER NURSING DOCUMENTATION                 Nutrition Prescription Ordered     Row Name 01/16/20 1232          Nutrition Prescription PO    Current PO Diet  Soft Texture     Texture  Chopped     Common Modifiers  Cardiac;Consistent Carbohydrate         Evaluation of Received Nutrient/Fluid Intake     Row Name 01/16/20 1232          PO Evaluation    Number of Days PO Intake Evaluated  Insufficient Data NO MEALS RECORDED YET               Problem/Interventions:  Problem 1     Row Name 01/16/20 1232          Nutrition Diagnoses Problem 1    Problem 1  Increased Nutrient Needs     Macronutrient  Protein     Etiology (related to)  Other (comment) WOUND HEALING     Signs/Symptoms (evidenced by)  Other (comment) STAGE 2 P.I., MULT WOUNDS & MASD PER NURSING DOCUMENTATION               Intervention Goal     Row Name 01/16/20 1233          Intervention Goal    General  Nutrition support treatment     PO  Establish PO;Increase intake         Nutrition Intervention     Row Name 01/16/20 1233          Nutrition Intervention    RD/Tech Action  Interview for preference;Encourage intake;Recommend/ordered;Care plan reviewd;Follow Tx progress     Recommended/Ordered  Supplement         Nutrition Prescription     Row Name 01/16/20 1233          Nutrition Prescription PO    PO Prescription  Begin/change supplement     Supplement  Other (comment) PREMIER PROTEIN     Supplement Frequency  3 times a day     New PO Prescription Ordered?  Yes         Education/Evaluation     Row Name 01/16/20 1233          Monitor/Evaluation    Monitor  Per protocol;I&O;PO intake;Supplement intake;Pertinent labs;Weight;Skin status;Symptoms           Electronically signed by:  Mayi Bauman RD  01/16/20 12:34 PM

## 2020-01-16 NOTE — THERAPY WOUND CARE TREATMENT
Acute Care - Wound/Debridement Initial Evaluation  [facility]     Patient Name: Julissa Kee  : 1950  MRN: [id number]  Today's Date: 2020                Admit Date: 2020    Visit Dx:    ICD-10-CM ICD-9-CM   1. Hypoxia R09.02 799.02   2. Respiratory distress R06.03 786.09   3. Viral illness B34.9 079.99   4. Pneumonia of both lungs due to infectious organism, unspecified part of lung J18.9 483.8   5. Dysphagia, unspecified type R13.10 787.20       Patient Active Problem List   Diagnosis   • Anxiety   • Chronic obstructive pulmonary disease (CMS/HCC)   • Diabetes mellitus (CMS/HCC)   • Hyperlipidemia   • Hypertension   • Hypothyroidism   • Morbid obesity (CMS/HCC)   • Extreme obesity with alveolar hypoventilation (CMS/HCC)   • Renal insufficiency   • Sacral wound   • Recurrent pneumonia   • Human metapneumovirus (hMPV) pneumonia   • Hypoxia        Past Medical History:   Diagnosis Date   • Acute bronchitis    • Acute sinusitis    • Anxiety    • Bacterial conjunctivitis    • Chest pain    • COPD (chronic obstructive pulmonary disease) (CMS/HCC)    • Depression    • Diabetes mellitus (CMS/HCC)    • Dyspnea    • Dysuria    • Edema    • Elbow injury    • Elbow pain    • Eye drainage    • Fatigue    • Foot pain, right    • Humerus distal fracture    • Hyperlipidemia    • Hypertension    • Hypothyroidism    • Morbid obesity (CMS/HCC)    • Pickwickian syndrome (CMS/HCC)    • Pressure ulcer stage I    • Psoriasis    • SOB (shortness of breath)    • Symptoms of urinary tract infection    • Urinary frequency    • Urinary tract infection    • Vaginal candidiasis         Past Surgical History:   Procedure Laterality Date   • CERVICAL POLYPECTOMY     • HYSTERECTOMY           LDA Wound     Row Name 20 0820             Wound 20 Left anterior greater trochanter Extravasation    Wound - Properties Group Date first assessed: 20  -ZACHARIAH Time first assessed:   -ZACHARIAH Present on Hospital  Admission: Y  -BR Side: Left  -ZACHARIAH Orientation: anterior  -ZACHARIAH Location: greater trochanter  -ZACHARIAH Primary Wound Type: Extravasatio  -ZACHARIAH       Wound 01/14/20 1909 Right anterior greater trochanter Extravasation    Wound - Properties Group Date first assessed: 01/14/20  -ZACHARIAH Time first assessed: 1909  -ZACHARIAH Present on Hospital Admission: Y  -BR Side: Right  -ZACHARIAH Orientation: anterior  -ZACHARIAH Location: greater trochanter  -ZACHARIAH Primary Wound Type: Extravasatio  -ZACHARIAH       Wound 01/14/20 1910 midline coccyx Pressure Injury    Wound - Properties Group Date first assessed: 01/14/20  -ZACHARIAH Time first assessed: 1910  -ZACHARIAH Present on Hospital Admission: Y  -BR Orientation: midline  -CP Location: coccyx  -ZACHARIAH Primary Wound Type: Pressure inj  -ZACHARIAH Stage, Pressure Injury: Stage 2  -CP       Wound 01/14/20 1912 Right anterior foot Other (comment)    Wound - Properties Group Date first assessed: 01/14/20  -ZACHARIAH Time first assessed: 1912  -ZACHARIAH Side: Right  -ZACHARIAH Orientation: anterior  -ZACHARIAH Location: foot  -ZACHARIAH Primary Wound Type: Other  -ZACHARIAH       Wound 01/14/20 2011 Left anterior toe    Wound - Properties Group Date first assessed: 01/14/20  -BR Time first assessed: 2011  -BR Present on Hospital Admission: Y  -BR Side: Left  -BR Orientation: anterior  -BR Location: toe  -BR, 1st toe  Stage, Pressure Injury: deep tissue injury  -BR       Wound 12/28/19 2155 Left medial gluteal Other (comment)    Wound - Properties Group Date first assessed: 12/28/19  -ZONIA Time first assessed: 2155  -ZONIA Present on Hospital Admission: Y  -ZONIA Side: Left  -ZONIA Orientation: medial  -ZONIA Location: gluteal  -ZONIA Primary Wound Type: Other  -ZONIA, 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        Wound 12/28/19 2251 Bilateral other (see notes) MASD (Moisutre associated skin damage)    Wound - Properties Group Date first assessed: 12/28/19  -ZONIA Time first assessed: 2251  -ZONIA Present on Hospital Admission: Y  -ZONIA Side: Bilateral  -ZONIA Location: other (see notes)   -ZONIA, under Pannus  Primary Wound Type: MASD  -ZONIA, yeast dermatitis  Additional Comments: shearing/hypergranulation  -CP       Wound 01/15/20 1000 Left medial thigh Pressure Injury    Wound - Properties Group Date first assessed: 01/15/20  -CP Time first assessed: 1000  -CP Present on Hospital Admission: Y  -CP Side: Left  -CP Orientation: medial  -CP Location: thigh  -CP Primary Wound Type: Pressure inj  -CP Stage, Pressure Injury: deep tissue injury  -CP       Wound 01/15/20 1000 Right medial thigh Pressure Injury    Wound - Properties Group Date first assessed: 01/15/20  -CP Time first assessed: 1000  -CP Present on Hospital Admission: Y  -CP Side: Right  -CP Orientation: medial  -CP Location: thigh  -CP Primary Wound Type: Pressure inj  -CP Stage, Pressure Injury: deep tissue injury  -CP    Wound Length (cm)  1.9 cm denuded area;   -MW      Wound Width (cm)  2 cm 9.8 DTPI maroon area   -MW      Wound Depth (cm)  0.1 cm  -MW         Wound 01/15/20 1000 Left posterior thigh Pressure Injury    Wound - Properties Group Date first assessed: 01/15/20  -CP Time first assessed: 1000  -CP Present on Hospital Admission: Y  -CP Side: Left  -CP Orientation: posterior  -CP Location: thigh  -CP Primary Wound Type: Pressure inj  -CP Stage, Pressure Injury: deep tissue injury  -CP       Rash 01/15/20 1000 other (see comments) macular;papule    Rash - Properties Group Date first assessed: 01/15/20  -CP Time first assessed: 1000  -CP Location: other (see comments)  -CP, abdominal pannus, under breasts, axillary areas  Type: macular;papule  -CP      User Key  (r) = Recorded By, (t) = Taken By, (c) = Cosigned By    Initials Name Provider Type    CP Sheridan Day APRN Nurse Practitioner    Delisa Leigh, PT Physical Therapist    Travis Aaron RN Registered Nurse    Melissa Hunt RN Registered Nurse    Judie Madera RN Registered Nurse        Rash 01/15/20 1000 other (see comments) macular;papule  (Active)   Distribution generalized 1/16/2020  8:00 AM   Configuration/Shape asymmetric 1/16/2020  8:00 AM   Borders irregular 1/16/2020  8:00 AM   Characteristics moist 1/16/2020  8:00 AM   Color red 1/16/2020  8:00 AM   Lesion Size other (see comments) 1/16/2020  8:00 AM   Care, Rash open to air;other (see comments) 1/16/2020  8:00 AM       Wound 12/28/19 2251 Bilateral other (see notes) MASD (Moisutre associated skin damage) (Active)   Dressing Appearance open to air 1/16/2020  8:00 AM   Closure Open to air 1/16/2020  8:00 AM   Base moist;red 1/16/2020  8:00 AM   Periwound moist 1/16/2020  8:00 AM   Drainage Amount none 1/15/2020 11:30 PM   Care, Wound other (see comments) 1/16/2020  8:00 AM   Dressing Care, Wound open to air 1/16/2020  8:00 AM       Wound 12/28/19 2155 Left medial gluteal Other (comment) (Active)   Base blanchable;moist;clean 1/16/2020  8:00 AM   Care, Wound other (see comments) 1/16/2020  8:00 AM   Dressing Care, Wound open to air 1/16/2020  8:00 AM       Wound 01/14/20 1908 Left anterior greater trochanter Extravasation (Active)   Dressing Appearance open to air 1/16/2020  8:00 AM   Base red 1/16/2020  8:00 AM       Wound 01/14/20 1909 Right anterior greater trochanter Extravasation (Active)   Dressing Appearance open to air 1/16/2020  8:00 AM   Base red 1/16/2020  8:00 AM   Periwound excoriated;redness 1/16/2020  8:00 AM       Wound 01/14/20 1910 midline coccyx Pressure Injury (Active)   Dressing Appearance open to air 1/16/2020  8:00 AM   Closure Open to air 1/16/2020  8:00 AM   Base non-blanchable;red;pink;moist 1/15/2020 11:30 PM   Periwound moist;redness 1/16/2020  8:20 AM   Drainage Characteristics/Odor sanguineous;bleeding controlled 1/16/2020  8:20 AM   Drainage Amount scant 1/16/2020  8:00 AM   Care, Wound barrier applied 1/16/2020  8:20 AM   Dressing Care, Wound open to air 1/15/2020 11:30 AM   Periwound Care, Wound barrier ointment applied 1/16/2020  8:00 AM       Wound 01/14/20  1912 Right anterior foot Other (comment) (Active)   Dressing Appearance open to air 1/16/2020  8:00 AM   Closure Open to air 1/16/2020  8:00 AM       Wound 01/14/20 2011 Left anterior toe (Active)   Dressing Appearance open to air 1/16/2020  8:00 AM   Closure Open to air 1/16/2020  8:00 AM   Base dry;maroon/purple 1/16/2020  8:00 AM   Drainage Amount none 1/15/2020 11:30 PM   Dressing Care, Wound open to air 1/16/2020  8:00 AM       Wound 01/15/20 1000 Left medial thigh Pressure Injury (Active)   Dressing Appearance open to air 1/16/2020  8:20 AM   Base maroon/purple;non-blanchable 1/16/2020  8:20 AM   Periwound intact;dry 1/16/2020  8:20 AM   Periwound Skin Turgor soft 1/16/2020  8:20 AM   Drainage Amount none 1/16/2020  8:20 AM   Care, Wound irrigated with;sterile normal saline;ultrasound therapy, non contact low frequency 1/16/2020  8:20 AM   Dressing Care, Wound open to air 1/16/2020  8:20 AM   Periwound Care, Wound barrier ointment applied 1/16/2020  8:20 AM       Wound 01/15/20 1000 Right medial thigh Pressure Injury (Active)   Dressing Appearance open to air 1/16/2020  8:20 AM   Base maroon/purple;non-blanchable;moist;pink;yellow;slough 1/16/2020  8:20 AM   Periwound intact;dry;pink;blanchable 1/16/2020  8:20 AM   Periwound Skin Turgor soft 1/16/2020  8:20 AM   Edges irregular 1/16/2020  8:20 AM   Drainage Characteristics/Odor serosanguineous 1/16/2020  8:20 AM   Drainage Amount scant 1/16/2020  8:20 AM   Care, Wound irrigated with;sterile normal saline;ultrasound therapy, non contact low frequency 1/16/2020  8:20 AM   Dressing Care, Wound open to air 1/16/2020  8:20 AM   Periwound Care, Wound barrier ointment applied 1/16/2020  8:20 AM       Wound 01/15/20 1000 Left posterior thigh Pressure Injury (Active)   Dressing Appearance open to air 1/16/2020  8:20 AM   Base non-blanchable;maroon/purple 1/16/2020  8:20 AM   Periwound intact;dry;pink 1/16/2020  8:20 AM   Edges irregular 1/16/2020  8:00 AM   Drainage  Amount none 1/16/2020  8:20 AM   Care, Wound other (see comments) 1/16/2020  8:20 AM         WOUND DEBRIDEMENT                        PT ASSESSMENT (last 12 hours)      Physical Therapy Evaluation     Row Name 01/16/20 0820          PT Evaluation Time/Intention    Subjective Information  complains of;pain with movement of pannus to assess skin folds   -     Document Type  evaluation;wound care;therapy note (daily note)  -     Comment  Rn and PCT assisting with positioning/ holding pannus, turning. Rn performing skin care/ pericare, removal of krishnamurthy cath and placement of wick systme; placement of interdry and dusting with medicated powder; venolex to DTPIs.   -     Row Name 01/16/20 0820          General Information    Patient Profile Reviewed?  yes  -MW     Risks Reviewed  patient:;increased discomfort  -MW     Benefits Reviewed  patient:;improve skin integrity  -MW     Barriers to Rehab  medically complex;previous functional deficit;physical barrier  -     Row Name 01/16/20 0820          Cognitive Assessment/Intervention- PT/OT    Orientation Status (Cognition)  oriented to;person;situation others not assessed   -     Row Name 01/16/20 0820          Bed Mobility Assessment/Treatment    Comment (Bed Mobility)  Lift system utilized to faciliate rolling and placement of clean dry flow pads  -     Row Name 01/16/20 0820          Pain Scale: FACES Pre/Post-Treatment    Pain: FACES Scale, Pretreatment  4-->hurts little more  -MW     Pain: FACES Scale, Post-Treatment  4-->hurts little more  -MW     Row Name             Wound 01/14/20 1908 Left anterior greater trochanter Extravasation    Wound - Properties Group Date first assessed: 01/14/20  -ZACHARIAH Time first assessed: 1908  -ZACHARIAH Present on Hospital Admission: Y  -BR Side: Left  -ZACHARIAH Orientation: anterior  -ZACHARIAH Location: greater trochanter  -ZACHARIAH Primary Wound Type: Extravasatio  -ZACHARIAH    Row Name             Wound 01/14/20 1909 Right anterior greater trochanter  Extravasation    Wound - Properties Group Date first assessed: 01/14/20  -ZACHARIAH Time first assessed: 1909  -ZACHARIAH Present on Hospital Admission: Y  -BR Side: Right  -ZACHARIAH Orientation: anterior  -ZACHARIAH Location: greater trochanter  -ZACHARIAH Primary Wound Type: Extravasatio  -ZACHARIAH    Row Name 01/16/20 0820          Wound 01/14/20 1910 midline coccyx Pressure Injury    Wound - Properties Group Date first assessed: 01/14/20  -ZACHARIAH Time first assessed: 1910  -ZACHARIAH Present on Hospital Admission: Y  -BR Orientation: midline  -CP Location: coccyx  -ZACHARIAH Primary Wound Type: Pressure inj  -ZACHARIAH Stage, Pressure Injury: Stage 2  -CP    Periwound  moist;redness tissue ridge   -MW     Drainage Characteristics/Odor  sanguineous;bleeding controlled seeping   -MW     Care, Wound  barrier applied by nsg   -MW     Row Name             Wound 01/14/20 1912 Right anterior foot Other (comment)    Wound - Properties Group Date first assessed: 01/14/20  -ZACHARIAH Time first assessed: 1912  -ZACHARIAH Side: Right  -ZACHARIAH Orientation: anterior  -ZACHARIAH Location: foot  -ZACHARIAH Primary Wound Type: Other  -ZACHARIAH    Row Name             Wound 01/14/20 2011 Left anterior toe    Wound - Properties Group Date first assessed: 01/14/20  -BR Time first assessed: 2011  -BR Present on Hospital Admission: Y  -BR Side: Left  -BR Orientation: anterior  -BR Location: toe  -BR, 1st toe  Stage, Pressure Injury: deep tissue injury  -BR    Row Name             Wound 12/28/19 2155 Left medial gluteal Other (comment)    Wound - Properties Group Date first assessed: 12/28/19  -ZONIA Time first assessed: 2155  -ZONIA Present on Hospital Admission: Y  -ZONIA Side: Left  -ZONIA Orientation: medial  -ZONIA Location: gluteal  -ZONIA Primary Wound Type: Other  -ZONIA, 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     Row Name             Wound 12/28/19 2251 Bilateral other (see notes) MASD (Moisutre associated skin damage)    Wound - Properties Group Date first assessed: 12/28/19  -ZONIA Time first assessed: 2251  -ZONIA  Present on Hospital Admission: Y  -ZONIA Side: Bilateral  -ZONIA Location: other (see notes)  -ZONIA, under Pannus  Primary Wound Type: MASD  -ZONIA, yeast dermatitis  Additional Comments: shearing/hypergranulation  -CP    Row Name 01/16/20 0820          Wound 01/15/20 1000 Left medial thigh Pressure Injury    Wound - Properties Group Date first assessed: 01/15/20  -CP Time first assessed: 1000  -CP Present on Hospital Admission: Y  -CP Side: Left  -CP Orientation: medial  -CP Location: thigh  -CP Primary Wound Type: Pressure inj  -CP Stage, Pressure Injury: deep tissue injury  -CP    Dressing Appearance  open to air  -MW     Base  maroon/purple;non-blanchable  -MW     Periwound  intact;dry  -MW     Periwound Skin Turgor  soft  -MW     Wound Depth (cm)  -- intact skin   -MW     Drainage Amount  none  -MW     Care, Wound  irrigated with;sterile normal saline;ultrasound therapy, non contact low frequency MIST x 4 min   -MW     Dressing Care, Wound  open to air venolex   -MW     Periwound Care, Wound  barrier ointment applied  -MW     Row Name 01/16/20 0820          Wound 01/15/20 1000 Right medial thigh Pressure Injury    Wound - Properties Group Date first assessed: 01/15/20  -CP Time first assessed: 1000  -CP Present on Hospital Admission: Y  -CP Side: Right  -CP Orientation: medial  -CP Location: thigh  -CP Primary Wound Type: Pressure inj  -CP Stage, Pressure Injury: deep tissue injury  -CP    Dressing Appearance  open to air  -MW     Base  maroon/purple;non-blanchable;moist;pink;yellow;slough medial aspect denuded   -MW     Periwound  intact;dry;pink;blanchable  -MW     Periwound Skin Turgor  soft  -MW     Edges  irregular  -MW     Drainage Characteristics/Odor  serosanguineous  -MW     Drainage Amount  scant  -MW     Care, Wound  irrigated with;sterile normal saline;ultrasound therapy, non contact low frequency MIST x 6 min   -MW     Dressing Care, Wound  open to air Venolex   -MW     Periwound Care, Wound  barrier ointment  applied  -MW     Row Name 01/16/20 0820          Wound 01/15/20 1000 Left posterior thigh Pressure Injury    Wound - Properties Group Date first assessed: 01/15/20  -CP Time first assessed: 1000  -CP Present on Hospital Admission: Y  -CP Side: Left  -CP Orientation: posterior  -CP Location: thigh  -CP Primary Wound Type: Pressure inj  -CP Stage, Pressure Injury: deep tissue injury  -CP    Dressing Appearance  open to air  -MW     Base  non-blanchable;maroon/purple  -MW     Periwound  intact;dry;pink  -MW     Drainage Amount  none  -MW     Care, Wound  other (see comments) venolex   -MW     Row Name 01/16/20 0820          Coping    Observed Emotional State  anxious;tearful/crying  -MW     Verbalized Emotional State  anxiety;acceptance  -MW     Row Name 01/16/20 0820          Plan of Care Review    Plan of Care Reviewed With  patient  -MW     Progress  no change  -MW     Outcome Summary  PT wound care: initiated MIST to bilateral medial thigh DTPI areas; Rt medial aspect with denuded area. Posterior thigh DTPI is on Rt not Lt. Very complex skin situation due to large pannus resting on thighs; MASD under folds of pannus, and proximal thighs and into vineet-area folds. RN completing skin care per WOCN recommendations. Cont PT for MIST to DTPI areas to maximize healing potential with increased local blood flow.   -     Row Name 01/16/20 0820          Physical Therapy Clinical Impression    Criteria for Skilled Interventions Met (PT Clinical Impression)  yes;treatment indicated  -     Row Name 01/16/20 0820          Physical Therapy Goals    Wound Care Goal Selection (PT)  wound care, PT goal 1;wound care, PT goal 2  -MW     Additional Documentation  Wound Care Goal Selection (PT) (Row)  -     Row Name 01/16/20 0820          Wound Care Goal 1 (PT)    Wound Care Goal 1 (PT)  Rt medial thigh wound to decrease area 25%; no S/s of infx, scant drainage.   -MW     Time Frame (Wound Care Goal 1, PT)  long term goal (LTG);10  days  -MW     Row Name 01/16/20 0820          Wound Care Goal 2 (PT)    Wound Care Goal 2 (PT)  LT medial thigh DTPI to decrease in area at least 25%; skin to remain intact.   -MW     Time Frame (Wound Care Goal 2, PT)  long term goal (LTG);10 days  -MW     Row Name 01/16/20 0820          Positioning and Restraints    Pre-Treatment Position  in bed  -MW     Post Treatment Position  bed  -MW     In Bed  side lying left;with nsg;R multipodus nsg remained with pt to finish care/ positioning   -       User Key  (r) = Recorded By, (t) = Taken By, (c) = Cosigned By    Initials Name Provider Type    CP Sheridan Day APRN Nurse Practitioner    Delisa Leigh, PT Physical Therapist    Travis Aaron, RN Registered Nurse    Melissa Hunt RN Registered Nurse    Judie Madera RN Registered Nurse            Recommendation and Plan  Anticipated Discharge Disposition (PT): skilled nursing facility, inpatient rehabilitation facility  Planned Therapy Interventions (PT Eval): wound care  Plan of Care Reviewed With: patient   Progress: no change   Outcome Summary/Treatment Plan (PT)  Anticipated Discharge Disposition (PT): skilled nursing facility, inpatient rehabilitation facility   Progress: no change  Outcome Summary: PT wound care: initiated MIST to bilateral medial thigh DTPI areas; Rt medial aspect with denuded area. Posterior thigh DTPI is on Rt not Lt. Very complex skin situation due to large pannus resting on thighs; MASD under folds of pannus, and proximal thighs and into vineet-area folds. RN completing skin care per WOCN recommendations. Cont PT for MIST to DTPI areas to maximize healing potential with increased local blood flow.   Plan of Care Reviewed With: patient            Time Calculation  PT Charges     Row Name 01/16/20 1124             Time Calculation    Start Time  0820  -MW      PT Goal Re-Cert Due Date  01/26/20  -        User Key  (r) = Recorded By, (t) = Taken By, (c) =  Cosigned By    Initials Name Provider Type    MW Delisa Casarez, PT Physical Therapist            Therapy Charges for Today     Code Description Service Date Service Provider Modifiers Qty    09678854939 HC PT EVAL MOD COMPLEXITY 3 1/16/2020 Delisa Casarez, PT GP 1    [id number] HC PT [facility] MIST 1/16/2020 Delisa Casarez, PT GP 1                    Delisa Casarez, PT  1/16/2020

## 2020-01-17 ENCOUNTER — APPOINTMENT (OUTPATIENT)
Dept: GENERAL RADIOLOGY | Facility: HOSPITAL | Age: 70
End: 2020-01-17

## 2020-01-17 LAB
ALBUMIN SERPL-MCNC: 2.7 G/DL (ref 3.5–5.2)
ALBUMIN/GLOB SERPL: 0.9 G/DL
ALP SERPL-CCNC: 73 U/L (ref 39–117)
ALT SERPL W P-5'-P-CCNC: 15 U/L (ref 1–33)
ANION GAP SERPL CALCULATED.3IONS-SCNC: 10 MMOL/L (ref 5–15)
AST SERPL-CCNC: 15 U/L (ref 1–32)
BASOPHILS # BLD AUTO: 0.01 10*3/MM3 (ref 0–0.2)
BASOPHILS NFR BLD AUTO: 0.1 % (ref 0–1.5)
BILIRUB SERPL-MCNC: 0.4 MG/DL (ref 0.2–1.2)
BUN BLD-MCNC: 38 MG/DL (ref 8–23)
BUN/CREAT SERPL: 56.7 (ref 7–25)
CALCIUM SPEC-SCNC: 9.8 MG/DL (ref 8.6–10.5)
CHLORIDE SERPL-SCNC: 96 MMOL/L (ref 98–107)
CO2 SERPL-SCNC: 36 MMOL/L (ref 22–29)
CREAT BLD-MCNC: 0.67 MG/DL (ref 0.57–1)
DEPRECATED RDW RBC AUTO: 58.3 FL (ref 37–54)
EOSINOPHIL # BLD AUTO: 0.01 10*3/MM3 (ref 0–0.4)
EOSINOPHIL NFR BLD AUTO: 0.1 % (ref 0.3–6.2)
ERYTHROCYTE [DISTWIDTH] IN BLOOD BY AUTOMATED COUNT: 13.9 % (ref 12.3–15.4)
GFR SERPL CREATININE-BSD FRML MDRD: 87 ML/MIN/1.73
GLOBULIN UR ELPH-MCNC: 3.1 GM/DL
GLUCOSE BLD-MCNC: 193 MG/DL (ref 65–99)
GLUCOSE BLDC GLUCOMTR-MCNC: 168 MG/DL (ref 70–130)
GLUCOSE BLDC GLUCOMTR-MCNC: 171 MG/DL (ref 70–130)
GLUCOSE BLDC GLUCOMTR-MCNC: 184 MG/DL (ref 70–130)
HCT VFR BLD AUTO: 32.6 % (ref 34–46.6)
HGB BLD-MCNC: 10 G/DL (ref 12–15.9)
IMM GRANULOCYTES # BLD AUTO: 0.04 10*3/MM3 (ref 0–0.05)
IMM GRANULOCYTES NFR BLD AUTO: 0.5 % (ref 0–0.5)
LYMPHOCYTES # BLD AUTO: 0.62 10*3/MM3 (ref 0.7–3.1)
LYMPHOCYTES NFR BLD AUTO: 7.4 % (ref 19.6–45.3)
MAGNESIUM SERPL-MCNC: 1.7 MG/DL (ref 1.6–2.4)
MCH RBC QN AUTO: 35 PG (ref 26.6–33)
MCHC RBC AUTO-ENTMCNC: 30.7 G/DL (ref 31.5–35.7)
MCV RBC AUTO: 114 FL (ref 79–97)
MONOCYTES # BLD AUTO: 0.21 10*3/MM3 (ref 0.1–0.9)
MONOCYTES NFR BLD AUTO: 2.5 % (ref 5–12)
NEUTROPHILS # BLD AUTO: 7.5 10*3/MM3 (ref 1.7–7)
NEUTROPHILS NFR BLD AUTO: 89.4 % (ref 42.7–76)
NRBC BLD AUTO-RTO: 0 /100 WBC (ref 0–0.2)
PHOSPHATE SERPL-MCNC: 2.4 MG/DL (ref 2.5–4.5)
PLATELET # BLD AUTO: 159 10*3/MM3 (ref 140–450)
PMV BLD AUTO: 10.7 FL (ref 6–12)
POTASSIUM BLD-SCNC: 4.4 MMOL/L (ref 3.5–5.2)
PROT SERPL-MCNC: 5.8 G/DL (ref 6–8.5)
RBC # BLD AUTO: 2.86 10*6/MM3 (ref 3.77–5.28)
SODIUM BLD-SCNC: 142 MMOL/L (ref 136–145)
WBC NRBC COR # BLD: 8.39 10*3/MM3 (ref 3.4–10.8)

## 2020-01-17 PROCEDURE — 25010000002 METHYLPREDNISOLONE PER 40 MG: Performed by: INTERNAL MEDICINE

## 2020-01-17 PROCEDURE — 82962 GLUCOSE BLOOD TEST: CPT

## 2020-01-17 PROCEDURE — 85025 COMPLETE CBC W/AUTO DIFF WBC: CPT | Performed by: INTERNAL MEDICINE

## 2020-01-17 PROCEDURE — 97530 THERAPEUTIC ACTIVITIES: CPT

## 2020-01-17 PROCEDURE — 80053 COMPREHEN METABOLIC PANEL: CPT | Performed by: INTERNAL MEDICINE

## 2020-01-17 PROCEDURE — 71045 X-RAY EXAM CHEST 1 VIEW: CPT

## 2020-01-17 PROCEDURE — 94799 UNLISTED PULMONARY SVC/PX: CPT

## 2020-01-17 PROCEDURE — 97535 SELF CARE MNGMENT TRAINING: CPT

## 2020-01-17 PROCEDURE — 84100 ASSAY OF PHOSPHORUS: CPT | Performed by: INTERNAL MEDICINE

## 2020-01-17 PROCEDURE — 83735 ASSAY OF MAGNESIUM: CPT | Performed by: INTERNAL MEDICINE

## 2020-01-17 PROCEDURE — 97610 LOW FREQUENCY NON-THERMAL US: CPT

## 2020-01-17 PROCEDURE — 25010000002 ENOXAPARIN PER 10 MG: Performed by: INTERNAL MEDICINE

## 2020-01-17 PROCEDURE — 97165 OT EVAL LOW COMPLEX 30 MIN: CPT

## 2020-01-17 PROCEDURE — 94660 CPAP INITIATION&MGMT: CPT

## 2020-01-17 PROCEDURE — 99232 SBSQ HOSP IP/OBS MODERATE 35: CPT | Performed by: INTERNAL MEDICINE

## 2020-01-17 PROCEDURE — 97164 PT RE-EVAL EST PLAN CARE: CPT

## 2020-01-17 PROCEDURE — 97110 THERAPEUTIC EXERCISES: CPT

## 2020-01-17 RX ORDER — HYDRALAZINE HYDROCHLORIDE 20 MG/ML
10 INJECTION INTRAMUSCULAR; INTRAVENOUS EVERY 6 HOURS PRN
Status: DISCONTINUED | OUTPATIENT
Start: 2020-01-17 | End: 2020-01-21 | Stop reason: HOSPADM

## 2020-01-17 RX ORDER — METHYLPREDNISOLONE SODIUM SUCCINATE 40 MG/ML
40 INJECTION, POWDER, LYOPHILIZED, FOR SOLUTION INTRAMUSCULAR; INTRAVENOUS DAILY
Status: DISCONTINUED | OUTPATIENT
Start: 2020-01-18 | End: 2020-01-20

## 2020-01-17 RX ORDER — PANTOPRAZOLE SODIUM 40 MG/1
40 TABLET, DELAYED RELEASE ORAL
Status: DISCONTINUED | OUTPATIENT
Start: 2020-01-18 | End: 2020-01-21 | Stop reason: HOSPADM

## 2020-01-17 RX ADMIN — METOPROLOL SUCCINATE 25 MG: 25 TABLET, EXTENDED RELEASE ORAL at 09:10

## 2020-01-17 RX ADMIN — IPRATROPIUM BROMIDE AND ALBUTEROL SULFATE 3 ML: 2.5; .5 SOLUTION RESPIRATORY (INHALATION) at 12:26

## 2020-01-17 RX ADMIN — ACETAMINOPHEN 650 MG: 325 TABLET ORAL at 00:00

## 2020-01-17 RX ADMIN — ALPRAZOLAM 0.25 MG: 0.25 TABLET ORAL at 00:00

## 2020-01-17 RX ADMIN — ALPRAZOLAM 0.25 MG: 0.25 TABLET ORAL at 20:48

## 2020-01-17 RX ADMIN — INSULIN LISPRO 2 UNITS: 100 INJECTION, SOLUTION INTRAVENOUS; SUBCUTANEOUS at 12:46

## 2020-01-17 RX ADMIN — MICONAZOLE NITRATE: 20 POWDER TOPICAL at 20:47

## 2020-01-17 RX ADMIN — SODIUM CHLORIDE, PRESERVATIVE FREE 10 ML: 5 INJECTION INTRAVENOUS at 08:37

## 2020-01-17 RX ADMIN — MELATONIN TAB 5 MG 5 MG: 5 TAB at 20:47

## 2020-01-17 RX ADMIN — ASPIRIN 81 MG: 81 TABLET, COATED ORAL at 09:10

## 2020-01-17 RX ADMIN — INSULIN LISPRO 2 UNITS: 100 INJECTION, SOLUTION INTRAVENOUS; SUBCUTANEOUS at 09:08

## 2020-01-17 RX ADMIN — IPRATROPIUM BROMIDE AND ALBUTEROL SULFATE 3 ML: 2.5; .5 SOLUTION RESPIRATORY (INHALATION) at 07:50

## 2020-01-17 RX ADMIN — SERTRALINE HYDROCHLORIDE 50 MG: 50 TABLET ORAL at 09:10

## 2020-01-17 RX ADMIN — SENNOSIDES AND DOCUSATE SODIUM 2 TABLET: 8.6; 5 TABLET ORAL at 20:47

## 2020-01-17 RX ADMIN — PANTOPRAZOLE SODIUM 40 MG: 40 INJECTION, POWDER, FOR SOLUTION INTRAVENOUS at 05:58

## 2020-01-17 RX ADMIN — Medication 10 ML: at 05:58

## 2020-01-17 RX ADMIN — INSULIN LISPRO 2 UNITS: 100 INJECTION, SOLUTION INTRAVENOUS; SUBCUTANEOUS at 20:47

## 2020-01-17 RX ADMIN — INSULIN LISPRO 2 UNITS: 100 INJECTION, SOLUTION INTRAVENOUS; SUBCUTANEOUS at 17:03

## 2020-01-17 RX ADMIN — POTASSIUM & SODIUM PHOSPHATES POWDER PACK 280-160-250 MG 2 PACKET: 280-160-250 PACK at 12:43

## 2020-01-17 RX ADMIN — ENOXAPARIN SODIUM 40 MG: 40 INJECTION SUBCUTANEOUS at 05:58

## 2020-01-17 RX ADMIN — CASTOR OIL AND BALSAM, PERU: 788; 87 OINTMENT TOPICAL at 20:47

## 2020-01-17 RX ADMIN — CASTOR OIL AND BALSAM, PERU: 788; 87 OINTMENT TOPICAL at 09:11

## 2020-01-17 RX ADMIN — IPRATROPIUM BROMIDE AND ALBUTEROL SULFATE 3 ML: 2.5; .5 SOLUTION RESPIRATORY (INHALATION) at 20:38

## 2020-01-17 RX ADMIN — TERAZOSIN HYDROCHLORIDE 2 MG: 2 CAPSULE ORAL at 20:46

## 2020-01-17 RX ADMIN — LEVOTHYROXINE SODIUM 100 MCG: 100 TABLET ORAL at 05:58

## 2020-01-17 RX ADMIN — Medication 1 TABLET: at 09:10

## 2020-01-17 RX ADMIN — ATORVASTATIN CALCIUM 10 MG: 10 TABLET, FILM COATED ORAL at 09:10

## 2020-01-17 RX ADMIN — ENOXAPARIN SODIUM 40 MG: 40 INJECTION SUBCUTANEOUS at 17:04

## 2020-01-17 RX ADMIN — Medication 10 ML: at 00:00

## 2020-01-17 RX ADMIN — MAGNESIUM SULFATE 4 G: 4 INJECTION INTRAVENOUS at 12:43

## 2020-01-17 RX ADMIN — SENNOSIDES AND DOCUSATE SODIUM 2 TABLET: 8.6; 5 TABLET ORAL at 09:10

## 2020-01-17 RX ADMIN — MICONAZOLE NITRATE: 20 POWDER TOPICAL at 09:11

## 2020-01-17 RX ADMIN — METHYLPREDNISOLONE SODIUM SUCCINATE 40 MG: 40 INJECTION, POWDER, FOR SOLUTION INTRAMUSCULAR; INTRAVENOUS at 09:11

## 2020-01-17 RX ADMIN — IPRATROPIUM BROMIDE AND ALBUTEROL SULFATE 3 ML: 2.5; .5 SOLUTION RESPIRATORY (INHALATION) at 16:57

## 2020-01-17 RX ADMIN — POLYETHYLENE GLYCOL 3350 17 G: 17 POWDER, FOR SOLUTION ORAL at 09:10

## 2020-01-17 RX ADMIN — POTASSIUM & SODIUM PHOSPHATES POWDER PACK 280-160-250 MG 2 PACKET: 280-160-250 PACK at 17:53

## 2020-01-17 NOTE — PLAN OF CARE
Problem: Patient Care Overview  Goal: Plan of Care Review  Outcome: Ongoing (interventions implemented as appropriate)  Flowsheets (Taken 1/17/2020 9618)  Plan of Care Reviewed With: patient  Outcome Summary: PT initial evaluation completed for pt presenting with significant weakness, c/o B LE pain, and decreased functional mobility. Pt tolerated AAROM/PROM B LE ther ex while supine. All OOB activity deferred d/t pt's increased lethargy and limited participation. Pt's decreased independence warrants PT skilled care. Recommend D/C to SNF.

## 2020-01-17 NOTE — PROGRESS NOTES
Intensive Care Follow-up     Hospital:  LOS: 3 days   Ms. Julissa Kee, 69 y.o. female is followed for:   Acute hypercapnic respiratory failure            History of present illness:   69-year-old female with past medical history significant for COPD with no previous PFTs available, patient apparently is on supplemental oxygen at home as well, diabetes mellitus type 2, hypertension, dyslipidemia, renal insufficiency and morbid obesity.  Patient also has history of sacral decubitus ulcer.  Patient was admitted recently to hospital on December 28 for acute respiratory failure and atrial tachycardia.  Patient was seen by infectious disease and treated with Rocephin and doxycycline at that point.  CT scan showed possible infiltrate in the right lower lobe superior segment.  No other abnormality noted at that point.  Patient recovered well and was discharged home on January 8.  She was brought back from the nursing home yesterday when she was found to be hypoxemic in the nursing home with saturations of 70%.  Patient apparently was feeling more short of breath for last couple of days.  There was no fevers.  There was no history of hemoptysis.  No significant sputum production.  Chest x-ray did not show any definite consolidation.  Patient was given antibiotics and was admitted to the floor.  Infectious disease team evaluated the patient and procalcitonin level was negative.  Viral panel was positive for metapneumovirus.  Antibiotics were stopped and patient was monitored closely.  Today morning patient was obtunded and not able to be aroused.  Arterial blood gas showed acute hypercapnic respiratory failure with pH of 7.24 and PaCO2 of 96.  Decision was made to transfer patient to intensive care unit for closer monitoring.    Patient was seen in ICU on BiPAP.  She is tolerating BiPAP well without any asynchrony noted at this point.  Minute ventilation is around 5 L.   patient was switched over to AVAPS mode and she  tolerated that better and blood gases improved.  Family discussions held regarding goals of care and patient was changed to DO NOT INTUBATE and DO NOT RESUSCITATE status.    Interval History: Overnight No acute events.  Patient tolerated BiPAP very well overnight.  Today morning much more awake and following commands.  Feeding herself.  Blood pressure is still intermittently going up.  Will use PRN hydralazine.  Cut down on Solu-Medrol.  No new fevers.  No elevated WBC count either.  Urine output is acceptable.        The patient's past medical, surgical and social history were reviewed and updated in Epic as appropriate.       Objective     Infusions:     Medications:    aspirin 81 mg Oral Daily   atorvastatin 10 mg Oral Daily   castor oil-balsam peru  Topical Q12H   enoxaparin 40 mg Subcutaneous Q12H   folic acid-vit B6-vit B12 1 tablet Oral Daily   insulin lispro 0-7 Units Subcutaneous 4x Daily With Meals & Nightly   ipratropium-albuterol 3 mL Nebulization 4x Daily - RT   levothyroxine 100 mcg Oral Q AM   melatonin 5 mg Oral Nightly   [START ON 1/18/2020] methylPREDNISolone sodium succinate 40 mg Intravenous Daily   metoprolol succinate XL 25 mg Oral Q24H   miconazole  Topical Q12H   [START ON 1/18/2020] pantoprazole 40 mg Oral Q AM   pharmacy consult - MTM  Does not apply Daily   polyethylene glycol 17 g Oral BID   sennosides-docusate 2 tablet Oral BID   sertraline 50 mg Oral Daily   sodium chloride 10 mL Intravenous Q12H   terazosin 2 mg Oral Nightly       Vital Sign Min/Max for last 24 hours  Temp  Min: 97.8 °F (36.6 °C)  Max: 98.5 °F (36.9 °C)   BP  Min: 136/43  Max: 177/69   Pulse  Min: 52  Max: 102   Resp  Min: 2  Max: 27   SpO2  Min: 91 %  Max: 99 %   Flow (L/min)  Min: 4  Max: 5       Input/Output for last 24 hour shift  01/16 0701 - 01/17 0700  In: 805 [P.O.:805]  Out: 975 [Urine:975]      Objective:  Vital signs: (most recent): Blood pressure 163/60, pulse 59, temperature 97.8 °F (36.6 °C), temperature  "source Axillary, resp. rate 20, height 160 cm (63\"), weight (!) 137 kg (301 lb 9.4 oz), SpO2 96 %, not currently breastfeeding.              General Appearance: Awake, alert, in no acute distress  Head:    Atraumatic, Normocephalic, Pupils reactive & symmetrical B/L.  Lungs:   B/L Breath sounds present with decreased breath sounds bilaterally, no wheezing heard, no crackles.   Heart: S1 and S2 present, no murmur  Abdomen: Morbidly obese, Soft, non-tender, no guarding or rigidity.  Extremities: Atraumatic, 1+ edema, warm to touch.  Pulses: Positive and symmetric.  Neurologic:  Moving all four extremities.  Following simple commands.    Results from last 7 days   Lab Units 01/17/20  0511 01/16/20  0442 01/15/20  0736   WBC 10*3/mm3 8.39 5.43 5.68   HEMOGLOBIN g/dL 10.0* 10.7* 10.5*   PLATELETS 10*3/mm3 159 159 141     Results from last 7 days   Lab Units 01/17/20  0511 01/16/20  0442 01/15/20  0519   SODIUM mmol/L 142 138 140   POTASSIUM mmol/L 4.4 4.9 4.8   CO2 mmol/L 36.0* 28.0 31.0*   BUN mg/dL 38* 31* 25*   CREATININE mg/dL 0.67 0.76 0.86   MAGNESIUM mg/dL 1.7 1.8  --    PHOSPHORUS mg/dL 2.4* 2.2*  --    GLUCOSE mg/dL 193* 119* 86     Estimated Creatinine Clearance: 89 mL/min (by C-G formula based on SCr of 0.67 mg/dL).    Results from last 7 days   Lab Units 01/15/20  1332   PH, ARTERIAL pH units 7.317*   PCO2, ARTERIAL mm Hg 79.7*   PO2 ART mm Hg 53.4*       Recent echocardiogram did not show any acute cardiac pathology.  Ejection fraction 61 to 65%.  Trace tricuspid regurgitation.  Diastolic dysfunction.    Images:   Venous Doppler study report reviewed and did not show any obvious DVT.    CXR reviewed personally and basilar atelectasis and small effusion left base. No other infiltrate noted.       I reviewed the patient's results and images.     Assessment/Plan   Impression        Chronic obstructive pulmonary disease (CMS/HCC)    Diabetes mellitus (CMS/HCC)    Hyperlipidemia    Hypertension    " Hypothyroidism    Extreme obesity with alveolar hypoventilation (CMS/HCC)    Sacral wound    Recurrent pneumonia    Human metapneumovirus (hMPV) pneumonia    Hypoxia       Plan        1.  Patient with acute hypercapnic respiratory failure with recent hospitalization for possible pneumonia.  Clinically does not behave infected.  ID team is following.  Likely viral bronchitis leading to a flareup of underlying COPD.  Continue using BiPAP at night.  Tolerating well with PRN Xanax.  Family has decided DNI/DNR status which is reasonable.  Will benefit from BiPAP use at home setting as well.  Patient is not thrilled about that idea.    2.  We will continue DuoNeb every 4 hours.  Cut down on Solu-Medrol to 40 mg every 24 hours.  Likely bronchitis flareup with viral disease. Venous Doppler study was negative for DVT.  Continue DVT prophylaxis.    3.  Tolerating oral diet well.  Continue same no overt aspiration events noted.    4.  Insulin regimen for hyperglycemia management to continue.    5.  Synthroid to continue for hypothyroidism.    6.  Continue home regimen of antihypertensives, aspirin and statin.  Blood pressure is elevated.  Will use PRN hydralazine to try to control that.  Continue metoprolol for now.  Cutting down on steroids hopefully that should help with blood pressure issues as well.    7.  Continue GI prophylaxis.    8.  Wound care follow-up for sacral decubitus ulcer.    Patient improved for the last couple of days.  We will plan on transferring patient out of ICU to regular floor.  Will sign out to hospitalist team.    Plan of care and goals reviewed with mulitdisciplinary/antibiotic stewardship team during rounds.   I discussed the patient's findings and my recommendations with nursing staff     High level of risk due to:  severe exacerbation of chronic illness.    Time spent 25 min (exclusive of procedure time)  including high complexity decision making to assess, manipulate, and support vital organ  system failure in this individual who has impairment of one or more vital organ systems such that there is a high probability of imminent or life threatening deterioration in the patient’s condition.      Tho Quintana MD, [facility]  Pulmonary, Critical care and Sleep Medicine

## 2020-01-17 NOTE — PLAN OF CARE
Problem: Patient Care Overview  Goal: Plan of Care Review  Outcome: Ongoing (interventions implemented as appropriate)  Flowsheets (Taken 1/17/2020 1010)  Progress: improving  Plan of Care Reviewed With: patient  Outcome Summary: OT completed a brief chart review. Pt Max Ax2 for bed mobility and Dep for all LB ADLs. Pt limited d/t significant generalized weakness and anxiety though responds well to encouragement. Pt educated on HEP and encouraged to perform outside of treatment session. Recommend cont skilled IPOT POC. Recommend pt DC to SNF.

## 2020-01-17 NOTE — PLAN OF CARE
Problem: Patient Care Overview  Goal: Plan of Care Review  Outcome: Ongoing (interventions implemented as appropriate)  Flowsheets (Taken 1/17/2020 6815)  Outcome Summary: All DTPI Areas stable from photo comparison.  PT will cont with mist therapy to help improve healing potential.

## 2020-01-17 NOTE — PROGRESS NOTES
INFECTIOUS DISEASE PROGRESS NOTE     Julissa Kee  1950  [id number]      Admission Date: 1/14/2020      Requesting Provider: No ref. provider found  Evaluating Physician: Austen Redding MD    Reason for Consultation: Pneumonia    History of present illness:  1/15/20:   Patient is a 69 y.o. female , with COPD, Diabetes mellitus, SARAH BETH, seen today for Metapneumoviral pneumonia.  She was discharged from this facility 1/8 off antibiotics after being treated for right middle lobe pneumonia. She has been experiencing worsening hypoxia prompting return to ED.  Chest xray with pulmonary vascular congestion.Oxygen saturations in 70's on admission.  She has been afebrile, without leukocytosis.  She is currently on Vancomycin and Azactam and we were consulted for evaluation and treatment.  She developed worsening respiratory compromise and required transfer to the unit and a BiPAP mask.  1/16/20:  She was given Xanax due to anxiety and now resting.  02 per NC.    1/17/2020: She has decreased dyspnea.  She has no increased respiratory secretions.  She has remained afebrile.  She has no new complaints today.    Past Medical History:   Diagnosis Date   • Acute bronchitis    • Acute sinusitis    • Anxiety    • Bacterial conjunctivitis    • Chest pain    • COPD (chronic obstructive pulmonary disease) (CMS/HCC)    • Depression    • Diabetes mellitus (CMS/HCC)    • Dyspnea    • Dysuria    • Edema    • Elbow injury    • Elbow pain    • Eye drainage    • Fatigue    • Foot pain, right    • Humerus distal fracture    • Hyperlipidemia    • Hypertension    • Hypothyroidism    • Morbid obesity (CMS/HCC)    • Pickwickian syndrome (CMS/HCC)    • Pressure ulcer stage I    • Psoriasis    • SOB (shortness of breath)    • Symptoms of urinary tract infection    • Urinary frequency    • Urinary tract infection    • Vaginal candidiasis        Past Surgical History:   Procedure Laterality Date   • CERVICAL POLYPECTOMY     • HYSTERECTOMY          Family History   Problem Relation Age of Onset   • Diabetes Father    • Diabetes Sister    • Pancreatic cancer Sister        Social History     Socioeconomic History   • Marital status:      Spouse name: Not on file   • Number of children: Not on file   • Years of education: Not on file   • Highest education level: Not on file   Tobacco Use   • Smoking status: Former Smoker     Types: Cigarettes     Last attempt to quit: 2006     Years since quittin.0   • Smokeless tobacco: Never Used   Substance and Sexual Activity   • Alcohol use: No     Frequency: Never   • Drug use: Never   • Sexual activity: Defer       Allergies   Allergen Reactions   • Amoxicillin-Pot Clavulanate Other (See Comments)     HEADACHE - has tolerated ceftriaxone 2019   • Bactrim [Sulfamethoxazole-Trimethoprim] Other (See Comments)     .         Medication:    Current Facility-Administered Medications:   •  acetaminophen (TYLENOL) tablet 650 mg, 650 mg, Oral, Q4H PRN, 650 mg at 20 0000 **OR** acetaminophen (TYLENOL) 160 MG/5ML solution 650 mg, 650 mg, Oral, Q4H PRN **OR** acetaminophen (TYLENOL) suppository 650 mg, 650 mg, Rectal, Q4H PRN, Tho Quintana MD  •  ALPRAZolam (XANAX) tablet 0.25 mg, 0.25 mg, Oral, Nightly PRN, Tho Quintana MD, 0.25 mg at 20 0000  •  aspirin EC tablet 81 mg, 81 mg, Oral, Daily, Tho Quintana MD, 81 mg at 20 0910  •  atorvastatin (LIPITOR) tablet 10 mg, 10 mg, Oral, Daily, Tho Quintana MD, 10 mg at 20 0910  •  castor oil-balsam peru (VENELEX) ointment, , Topical, Q12H, Tho Quintana MD  •  dextrose (D50W) 25 g/ 50mL Intravenous Solution 25 g, 25 g, Intravenous, Q15 Min PRN, Tho Quintana MD  •  dextrose (GLUTOSE) oral gel 15 g, 15 g, Oral, Q15 Min PRN, Tho Quintana MD  •  enoxaparin (LOVENOX) syringe 40 mg, 40 mg, Subcutaneous, Q12H, Tho Quintana MD, 40 mg at 20 0558  •  folic acid-vit B6-vit B12 (FOLGARD) tablet 1 tablet, 1 tablet,  Oral, Daily, Tho Quintana MD, 1 tablet at 01/17/20 0910  •  glucagon (human recombinant) (GLUCAGEN DIAGNOSTIC) injection 1 mg, 1 mg, Subcutaneous, Q15 Min PRN, Tho Quintana MD  •  hydrALAZINE (APRESOLINE) injection 10 mg, 10 mg, Intravenous, Q6H PRN, Tho Quintana MD  •  insulin lispro (humaLOG) injection 0-7 Units, 0-7 Units, Subcutaneous, 4x Daily With Meals & Nightly, Tho Quintana MD, 2 Units at 01/17/20 1246  •  ipratropium-albuterol (DUO-NEB) nebulizer solution 3 mL, 3 mL, Nebulization, Q4H PRN, Tho Quintana MD  •  ipratropium-albuterol (DUO-NEB) nebulizer solution 3 mL, 3 mL, Nebulization, 4x Daily - RT, Tho Quintana MD, 3 mL at 01/17/20 1226  •  levothyroxine (SYNTHROID, LEVOTHROID) tablet 100 mcg, 100 mcg, Oral, Q AM, Tho Quintana MD, 100 mcg at 01/17/20 0558  •  magic mouthwash oral supsension 10 mL, 10 mL, Swish & Spit, Q4H PRN, Tho Quintana MD, 10 mL at 01/17/20 0558  •  Magnesium Sulfate 2 gram Bolus, followed by 8 gram infusion (total Mg dose 10 grams)- Mg less than or equal to 1mg/dL, 2 g, Intravenous, PRN **OR** Magnesium Sulfate 2 gram / 50mL Infusion (GIVE X 3 BAGS TO EQUAL 6GM TOTAL DOSE) - Mg 1.1 - 1.5 mg/dl, 2 g, Intravenous, PRN **OR** Magnesium Sulfate 4 gram infusion- Mg 1.6-1.9 mg/dL, 4 g, Intravenous, PRN, Tho Quintana MD, Last Rate: 25 mL/hr at 01/17/20 1243, 4 g at 01/17/20 1243  •  melatonin tablet 5 mg, 5 mg, Oral, Nightly, Tho Quintana MD, 5 mg at 01/16/20 2049  •  [START ON 1/18/2020] methylPREDNISolone sodium succinate (SOLU-Medrol) injection 40 mg, 40 mg, Intravenous, Daily, Tho Quintana MD  •  metoprolol succinate XL (TOPROL-XL) 24 hr tablet 25 mg, 25 mg, Oral, Q24H, Tho Quintana MD, 25 mg at 01/17/20 0910  •  miconazole (MICOTIN) 2 % powder, , Topical, Q12H, Tho Quintana MD  •  ondansetron (ZOFRAN) injection 4 mg, 4 mg, Intravenous, Q6H PRN, Tho Quintana MD  •  [START ON 1/18/2020] pantoprazole (PROTONIX) EC  tablet 40 mg, 40 mg, Oral, Q AM, Tho Quintana MD  •  Pharmacy Consult - MTM, , Does not apply, Daily, Tho Quintana MD  •  polyethylene glycol 3350 powder (packet), 17 g, Oral, BID, Tho Quintana MD, 17 g at 20 0910  •  potassium & sodium phosphates (PHOS-NAK) 280-160-250 MG packet - for Phosphorus less than 1.25 mg/dL, 2 packet, Oral, Q6H PRN **OR** potassium & sodium phosphates (PHOS-NAK) 280-160-250 MG packet - for Phosphorus 1.25 - 2.5 mg/dL, 2 packet, Oral, Q6H PRN, Tho Quintana MD, 2 packet at 20 1243  •  sennosides-docusate (PERICOLACE) 8.6-50 MG per tablet 2 tablet, 2 tablet, Oral, BID, Tho Quintana MD, 2 tablet at 20 0910  •  sertraline (ZOLOFT) tablet 50 mg, 50 mg, Oral, Daily, Tho Quintana MD, 50 mg at 20 0910  •  sodium chloride 0.9 % flush 10 mL, 10 mL, Intravenous, Q12H, Tho Quintana MD, 10 mL at 20 0837  •  sodium chloride 0.9 % flush 10 mL, 10 mL, Intravenous, PRN, Tho Quintana MD, 10 mL at 20 0532  •  terazosin (HYTRIN) capsule 2 mg, 2 mg, Oral, Nightly, Tho Quintana MD, 2 mg at 20 204    Antibiotics:  Anti-Infectives (From admission, onward)    Ordered     Dose/Rate Route Frequency Start Stop    20 1708  aztreonam (AZACTAM) 2 g/100 mL 0.9% NS (mbp)     Ordering Provider:  Blaire Thomson MD    2 g  over 30 Minutes Intravenous Once 20 1710 20 1603  vancomycin 3000 mg/500 mL 0.9% NS IVPB ([facility])     Ordering Provider:  Tahir Aparicio MD    20 mg/kg × 166 kg  over 180 Minutes Intravenous Once 20 1604 20                Physical Exam:   Vital Signs  Temp (24hrs), Av.2 °F (36.8 °C), Min:97.8 °F (36.6 °C), Max:98.5 °F (36.9 °C)    Temp  Min: 97.8 °F (36.6 °C)  Max: 98.5 °F (36.9 °C)  BP  Min: 132/56  Max: 177/69  Pulse  Min: 52  Max: 102  Resp  Min: 2  Max: 27  SpO2  Min: 91 %  Max: 99 %    GENERAL: Massively obese NAD.  HEENT: Normocephalic, atraumatic.  PERRL.  EOMI. No conjunctival injection. No icterus. Oropharynx clear without evidence of thrush or exudate.     HEART: RRR; No murmur, rubs, gallops.   LUNGS: Decreased expiratory wheezes   ABDOMEN: Soft, nontender, nondistended. Positive bowel sounds. No rebound or guarding. Pannus with crusting lesions, woody induration   EXT:  No cyanosis, clubbing or edema. No cord.  : Mccarty catheter is in place  MSK:  Left hallux with ecchymosis,   SKIN: Warm and dry; sacral decubitus, unstageable, excoriated   NEURO: Oriented to person      Laboratory Data    Results from last 7 days   Lab Units 01/17/20  0511 01/16/20  0442 01/15/20  0736   WBC 10*3/mm3 8.39 5.43 5.68   HEMOGLOBIN g/dL 10.0* 10.7* 10.5*   HEMATOCRIT % 32.6* 34.3 34.1   PLATELETS 10*3/mm3 159 159 141     Results from last 7 days   Lab Units 01/17/20  0511   SODIUM mmol/L 142   POTASSIUM mmol/L 4.4   CHLORIDE mmol/L 96*   CO2 mmol/L 36.0*   BUN mg/dL 38*   CREATININE mg/dL 0.67   GLUCOSE mg/dL 193*   CALCIUM mg/dL 9.8     Results from last 7 days   Lab Units 01/17/20  0511   ALK PHOS U/L 73   BILIRUBIN mg/dL 0.4   ALT (SGPT) U/L 15   AST (SGOT) U/L 15             Results from last 7 days   Lab Units 01/14/20  1039   LACTATE mmol/L 1.1         Results from last 7 days   Lab Units 01/15/20  0519   VANCOMYCIN RM mcg/mL 23.20     Estimated Creatinine Clearance: 89 mL/min (by C-G formula based on SCr of 0.67 mg/dL).      Microbiology:  Blood Culture   Date Value Ref Range Status   01/14/2020 No growth at less than 24 hours  Preliminary   01/14/2020 No growth at less than 24 hours  Preliminary           Radiology:  Imaging Results (Last 72 Hours)     Procedure Component Value Units Date/Time    XR Chest 1 View [[id number]] Collected:  01/17/20 0811     Updated:  01/17/20 0835    Narrative:       EXAMINATION: XR CHEST 1 VW-01/17/2020:      INDICATION: Respiratory failure; R09.02-Hypoxemia; R06.03-Acute  respiratory distress; B34.9-Viral infection,  unspecified;  J18.9-Pneumonia, unspecified organism; R13.10-Dysphagia, unspecified.      COMPARISON: Chest radiograph 01/15/2020.     FINDINGS: The heart is top normal in size, similar to prior. Hazy left  lower lobe airspace changes with likely a trace left pleural effusion  identified. The right lung appears clear. No acute osseous abnormality.  The visualized upper abdomen is unrevealing.           Impression:       1.  Similar examination when compared to yesterday's study with mild  left lower lobe airspace disease and likely a trace left pleural  effusion.     2.  Similar cardiomegaly.     D:  01/17/2020  E:  01/17/2020          XR Chest 1 View [[id number]] Collected:  01/14/20 1102     Updated:  01/15/20 0830    Narrative:       EXAMINATION: XR CHEST 1 VW- 01/14/2020     INDICATION: SOA triage protocol      COMPARISON: Chest radiograph 12/28/2019, CT chest 12/28/2019     FINDINGS: Single portable chest radiograph was submitted for review. The  heart is enlarged. Mild pulmonary vascular congestion identified. Hazy  mid and lower lung airspace changes identified, somewhat similar to the  prior exam particularly the CT of the chest performed 12/28/2019. No  large pleural effusion or pneumothorax. Visualized upper abdomen is  unrevealing. No acute osseous abnormality is appreciated.       Impression:       Cardiomegaly mild pulmonary vasculature congestion with  multifocal airspace changes noted similar when compared to 12/28/2019.        D:  01/14/2020  E:  01/14/2020     This report was finalized on 1/15/2020 8:27 AM by Dr. Ivan Miller MD.       XR Chest 1 View [[id number]] Collected:  01/15/20 0602     Updated:  01/15/20 0604    Narrative:       CR Chest 1 Vw    INDICATION:   Hypoxemia this morning. Evaluate for aspiration pneumonia     COMPARISON:    1/14/2020 12/28/2019    FINDINGS:  Single portable AP view(s) of the chest.        Low lung volumes with mild left base atelectasis. No change. Heart size  normal there is minimal interstitial prominence as compared with 12/20/2019      Impression:       There may be minimal left base atelectasis. Minimal interstitial prominence. Otherwise no active disease    Signer Name: Norman Martinez MD   Signed: 1/15/2020 6:02 AM   Workstation Name: [email or web address]    [facility]        I read her chest x-ray with the radiologist today    Impression:   1.  Human Metapneumovirus pneumonia-there is no evidence of a superimposed bacterial pneumonia with no fever, no leukocytosis, no new substantial pulmonary infiltrate, and a normal procalcitonin.  She is stable off of antibiotic therapy.  2.  COPD-in exacerbation secondary to the metapneumovirus infection  3.  Hypercapnic acute respiratory failure  4.   Sacral decubitus ulcer  5.  Massive obesity   6.  T2 Diabetes Mellitus     PLAN/RECOMMENDATIONS:  1.  Continue off of antibiotic therapy  2.  Therapy for COPD exacerbation per pulmonary medicine           Austen Redding MD  1/17/2020  3:00 PM

## 2020-01-17 NOTE — PLAN OF CARE
Pt transferred from 2BICU today. Pt has been resting in between care, however; easily aroused. Pt reports discomforts in lower back with repositioning. 3+ people to turn and repositioning. Wound care completed per orders. VSS on 3LNC. Will cont to monitor.

## 2020-01-17 NOTE — THERAPY EVALUATION
Patient Name: Julissa Kee  : 1950    MRN: [id number]                              Today's Date: 2020       Admit Date: 2020    Visit Dx:     ICD-10-CM ICD-9-CM   1. Hypoxia R09.02 799.02   2. Respiratory distress R06.03 786.09   3. Viral illness B34.9 079.99   4. Pneumonia of both lungs due to infectious organism, unspecified part of lung J18.9 483.8   5. Dysphagia, unspecified type R13.10 787.20     Patient Active Problem List   Diagnosis   • Anxiety   • Chronic obstructive pulmonary disease (CMS/HCC)   • Diabetes mellitus (CMS/HCC)   • Hyperlipidemia   • Hypertension   • Hypothyroidism   • Morbid obesity (CMS/HCC)   • Extreme obesity with alveolar hypoventilation (CMS/HCC)   • Renal insufficiency   • Sacral wound   • Recurrent pneumonia   • Human metapneumovirus (hMPV) pneumonia   • Hypoxia     Past Medical History:   Diagnosis Date   • Acute bronchitis    • Acute sinusitis    • Anxiety    • Bacterial conjunctivitis    • Chest pain    • COPD (chronic obstructive pulmonary disease) (CMS/HCC)    • Depression    • Diabetes mellitus (CMS/HCC)    • Dyspnea    • Dysuria    • Edema    • Elbow injury    • Elbow pain    • Eye drainage    • Fatigue    • Foot pain, right    • Humerus distal fracture    • Hyperlipidemia    • Hypertension    • Hypothyroidism    • Morbid obesity (CMS/HCC)    • Pickwickian syndrome (CMS/HCC)    • Pressure ulcer stage I    • Psoriasis    • SOB (shortness of breath)    • Symptoms of urinary tract infection    • Urinary frequency    • Urinary tract infection    • Vaginal candidiasis      Past Surgical History:   Procedure Laterality Date   • CERVICAL POLYPECTOMY     • HYSTERECTOMY       General Information     Row Name 20 1102          PT Evaluation Time/Intention    Document Type  evaluation  -KR     Mode of Treatment  physical therapy  -KR     Row Name 20 1102          General Information    Patient Profile Reviewed?  yes  -KR     Prior Level of Function  max  assist:;transfer;bed mobility;ADL's;dressing;bathing  -KR     Existing Precautions/Restrictions  fall;oxygen therapy device and L/min  -KR     Barriers to Rehab  medically complex;previous functional deficit  -KR     Row Name 01/17/20 1102          Relationship/Environment    Lives With  facility resident  -KR     Row Name 01/17/20 1102          Resource/Environmental Concerns    Current Living Arrangements  extended care facility  -KR     Row Name 01/17/20 1102          Cognitive Assessment/Intervention- PT/OT    Orientation Status (Cognition)  oriented to;person  -KR     Cognitive Assessment/Intervention Comment  pt very lethargic throughout evaluation  -KR     Row Name 01/17/20 1102          Safety Issues, Functional Mobility    Safety Issues Affecting Function (Mobility)  insight into deficits/self awareness;safety precautions follow-through/compliance  -KR     Impairments Affecting Function (Mobility)  endurance/activity tolerance;range of motion (ROM);shortness of breath;strength  -KR       User Key  (r) = Recorded By, (t) = Taken By, (c) = Cosigned By    Initials Name Provider Type    Danni Doherty, PT Physical Therapist        Mobility     Row Name 01/17/20 1105          Bed Mobility Assessment/Treatment    Comment (Bed Mobility)  All EOB activity deferred this session; pt with very limited participation.   -KR     Row Name 01/17/20 1105          Transfer Assessment/Treatment    Comment (Transfers)  Transfers deferred. Not appropriate to assess.   -KR     Row Name 01/17/20 1105          Sit-Stand Transfer    Sit-Stand [place] (Transfers)  unable to assess  -KR     Row Name 01/17/20 1105          Gait/Stairs Assessment/Training    [place] Level (Gait)  unable to assess  -KR     Comment (Gait/Stairs)  Ambulation deferred. Not appropriate to assess.   -KR       User Key  (r) = Recorded By, (t) = Taken By, (c) = Cosigned By    Initials Name Provider Type    Danni Doherty, PT Physical  Therapist        Obj/Interventions     Row Name 01/17/20 1105          General ROM    GENERAL ROM COMMENTS  leyla hips and knees limited 25% due to c/o pain; WFL at leyla ankles  -KR     Row Name 01/17/20 1105          MMT (Manual Muscle Testing)    General MMT Comments  unable to formally assess   -KR     Row Name 01/17/20 1105          Therapeutic Exercise    Lower Extremity (Therapeutic Exercise)  SAQ (short arc quad), bilateral;SLR (straight leg raise), bilateral;heel slides, bilateral  -KR     Lower Extremity Range of Motion (Therapeutic Exercise)  hip abduction/adduction, bilateral;hip internal/external rotation, bilateral;ankle dorsiflexion/plantar flexion, bilateral  -KR     Exercise Type (Therapeutic Exercise)  AAROM (active assistive range of motion);PROM (passive range of motion)  -KR     Position (Therapeutic Exercise)  supine  -KR     Sets/Reps (Therapeutic Exercise)  1/10  -KR     Row Name 01/17/20 1105          Sensory Assessment/Intervention    Sensory General Assessment  -- unable to formally assess   -KR       User Key  (r) = Recorded By, (t) = Taken By, (c) = Cosigned By    Initials Name Provider Type    KR Danni Banegas, PT Physical Therapist        Goals/Plan     Row Name 01/17/20 1108          Bed Mobility Goal 1 (PT)    Activity/Assistive Device (Bed Mobility Goal 1, PT)  sit to supine;supine to sit  -KR     [place] Level/Cues Needed (Bed Mobility Goal 1, PT)  maximum assist (25-49% patient effort)  -KR     Time Frame (Bed Mobility Goal 1, PT)  2 weeks  -KR     Progress/Outcomes (Bed Mobility Goal 1, PT)  goal ongoing  -KR     Row Name 01/17/20 1108          Transfer Goal 1 (PT)    Activity/Assistive Device (Transfer Goal 1, PT)  sit-to-stand/stand-to-sit;bed-to-chair/chair-to-bed  -KR     [place] Level/Cues Needed (Transfer Goal 1, PT)  maximum assist (25-49% patient effort)  -KR     Time Frame (Transfer Goal 1, PT)  2 weeks  -KR     Progress/Outcome (Transfer Goal 1, PT)  goal  ongoing  -KR       User Key  (r) = Recorded By, (t) = Taken By, (c) = Cosigned By    Initials Name Provider Type    KR Danni Banegas, PT Physical Therapist        Clinical Impression     Row Name 01/17/20 1107          Pain Assessment    Additional Documentation  Pain Scale: FACES Pre/Post-Treatment (Group)  -KR     Row Name 01/17/20 1107          Pain Scale: Numbers Pre/Post-Treatment    Pain Location - Orientation  generalized  -KR     Pain Intervention(s)  Repositioned  -KR     Row Name 01/17/20 1107          Pain Scale: FACES Pre/Post-Treatment    Pain: FACES Scale, Pretreatment  2-->hurts little bit  -KR     Pain: FACES Scale, Post-Treatment  4-->hurts little more  -KR     Row Name 01/17/20 1107          Physical Therapy Clinical Impression    Patient/Family Goals Statement (PT Clinical Impression)  pt unable to state  -KR     Criteria for Skilled Interventions Met (PT Clinical Impression)  yes;treatment indicated  -KR     Rehab Potential (PT Clinical Summary)  fair, will monitor progress closely  -KR     Row Name 01/17/20 1107          Vital Signs    Pre Systolic BP Rehab  149  -KR     Pre Treatment Diastolic BP  74  -KR     Post Systolic BP Rehab  164  -KR     Post Treatment Diastolic BP  59  -KR     Pretreatment Heart Rate (beats/min)  63  -KR     Posttreatment Heart Rate (beats/min)  62  -KR     Pre SpO2 (%)  97  -KR     O2 Delivery Pre Treatment  supplemental O2  -KR     Post SpO2 (%)  96  -KR     O2 Delivery Post Treatment  supplemental O2  -KR     Pre Patient Position  Supine  -KR     Intra Patient Position  Supine  -KR     Post Patient Position  Supine  -KR     Row Name 01/17/20 1107          Positioning and Restraints    Pre-Treatment Position  in bed  -KR     Post Treatment Position  bed  -KR     In Bed  notified nsg;supine;call light within reach;encouraged to call for assist;side rails up x3;RUE elevated;LUE elevated;waffle boots/both  -KR       User Key  (r) = Recorded By, (t) = Taken By, (c) =  Cosigned By    Initials Name Provider Type    Danni Doherty, PT Physical Therapist        Outcome Measures     Row Name 01/17/20 1108          How much help from another person do you currently need...    Turning from your back to your side while in flat bed without using bedrails?  2  -KR     Moving from lying on back to sitting on the side of a flat bed without bedrails?  1  -KR     Moving to and from a bed to a chair (including a wheelchair)?  1  -KR     Standing up from a chair using your arms (e.g., wheelchair, bedside chair)?  1  -KR     Climbing 3-5 steps with a railing?  1  -KR     To walk in hospital room?  1  -KR     AM-PAC 6 Clicks Score (PT)  7  -KR     Row Name 01/17/20 1108          Functional Assessment    Outcome Measure Options  AM-PAC 6 Clicks Basic Mobility (PT)  -KR       User Key  (r) = Recorded By, (t) = Taken By, (c) = Cosigned By    Initials Name Provider Type    Danni Doherty PT Physical Therapist          PT Recommendation and Plan  Planned Therapy Interventions (PT Eval): balance training, bed mobility training, strengthening, transfer training  Outcome Summary/Treatment Plan (PT)  Anticipated Discharge Disposition (PT): skilled nursing facility  Plan of Care Reviewed With: patient  Outcome Summary: PT initial evaluation completed for pt presenting with significant weakness, c/o B LE pain, and decreased functional mobility. Pt tolerated AAROM/PROM B LE ther ex while supine. All OOB activity deferred d/t pt's increased lethargy and limited participation. Pt's decreased independence warrants PT skilled care. Recommend D/C to SNF.     Time Calculation:   PT Charges     Row Name 01/17/20 0829             Time Calculation    Start Time  0829  -KR      PT Received On  01/17/20  -LONNIE      PT Goal Re-Cert Due Date  01/27/20  -KR        User Key  (r) = Recorded By, (t) = Taken By, (c) = Cosigned By    Initials Name Provider Type    Danni Doherty, PT Physical Therapist        Therapy  Charges for Today     Code Description Service Date Service Provider Modifiers Qty    18489709045 HC PT RE-EVAL ESTABLISHED PLAN 2 1/17/2020 Danni Banegas, PT GP 1    67079114726 HC PT THER PROC EA 15 MIN 1/17/2020 Danni Banegas, PT GP 1          PT G-Codes  Outcome Measure Options: AM-PAC 6 Clicks Basic Mobility (PT)  AM-PAC 6 Clicks Score (PT): 7    Lisa Banegas, PT  1/17/2020

## 2020-01-17 NOTE — DISCHARGE PLACEMENT REQUEST
"Julissa Haynes (69 y.o. Female)   aMry Garcia [phone]    Date of Birth Social Security Number Address Home Phone MRN    1950  [facility] [id number] [phone] [id number]    Jehovah's witness Marital Status          None        Admission Date Admission Type Admitting Provider Attending Provider Department, Room/Bed    20 Emergency Tho Quintana MD Bansal, Arvind K, MD [facility] 2B ICU, N231/1    Discharge Date Discharge Disposition Discharge Destination                       Attending Provider:  Tho Quintana MD    Allergies:  Amoxicillin-pot Clavulanate, Bactrim [Sulfamethoxazole-trimethoprim]    Isolation:  Contact Drop   Infection:  Human Metapneumovirus  (20)   Code Status:  No CPR    Ht:  160 cm (63\")   Wt:  137 kg (301 lb 9.4 oz)    Admission Cmt:  None   Principal Problem:  None                Active Insurance as of 2020     Primary Coverage     Payor Plan Insurance Group Employer/Plan Group    HUMANA MEDICARE REPLACEMENT HUMANA MEDICARE REPLACEMENT [id number]     Payor Plan Address Payor Plan Phone Number Payor Plan Fax Number Effective Dates    [address] [address] [phone]  2014 - None Entered    [facility] [address]       Subscriber Name Subscriber Birth Date Member ID       JULISSA HAYNES 1950 [id number]                 Emergency Contacts      (Rel.) Home Phone Work Phone Mobile Phone    Familia Haynes (Spouse) [phone] -- [phone]            Insurance Information                HUMANA MEDICARE REPLACEMENT/HUMANA MEDICARE REPLACEMENT Phone: [phone]    Subscriber: Julissa Haynes Subscriber#: [id number]    Group#: [id number] Precert#: [id number]             History & Physical      Blaire Thomson MD at 20 [address] [place] [address] Medicine Services  HISTORY AND PHYSICAL    Patient Name: Julissa Haynes  : 1950  MRN: [id number]  Primary Care Physician: Dalton" Kee DUMONT MD  Date of admission: 1/14/2020      Subjective   Subjective     Chief Complaint:  Multifocal PNA, recurrent       HPI:  Julissa Kee is a 69yoF with history of morbid obesity, SARAH BETH, COPD, T2DM, sacral chronic decubitus wounds, HLD, HTN with recent admission and d/c for RML PNA- d/c 1/08 to SNF ([facility]), who presents from [facility] for dyspnea, hypoxia, worsening/recurrent right sided PNA.     As above ,patient was recently admitted to our service from 12/28-1/08 for RML PNA/acute on chronic respiratory failure. During this admission, patient was followed by ID (Dr Austen Tucker) and was placed on rocephin/doxycycline for 7 day course. She was then monitored off abx and did well until her ultimate discharge to [facility] 1/08. Hospital course at this time was also complicated by atrial tachycardia for which discharge event monitor placed, as well as UTI.       Most history today is obtained from patient's  who is at bedside and outside records from [facility]/ED provider as patient is very drowsy and reports she did not sleep well.  reports that patient did well intially after her discharge to [facility], however in the last 3-4 days has had increasing difficulty with dyspnea, congestion. Has had to get multiple breathing treatments to break up secretions. Was ultimately transferred to [facility] ED after she was noted to by hypoxic in the 70s on 4 L NC. Upon arrival to ED, was found on imaging to have multifocal PNA (CXR), as well as found to have PCR + metapneumovirus. Patient will be admitted to hospitalist service for further management.       Review of Systems   Gen- No fevers, chills  CV- No chest pain, palpitations  Resp- + cough, + dyspnea  GI- No N/V/D, abd pain      All other systems reviewed and are negative.     Personal History     Past Medical History:   Diagnosis Date   • Acute bronchitis    • Acute sinusitis    • Anxiety    • Bacterial conjunctivitis    • Chest pain       • COPD (chronic obstructive pulmonary disease) (CMS/HCC)    • Depression    • Diabetes mellitus (CMS/HCC)    • Dyspnea    • Dysuria    • Edema    • Elbow injury    • Elbow pain    • Eye drainage    • Fatigue    • Foot pain, right    • Humerus distal fracture    • Hyperlipidemia    • Hypertension    • Hypothyroidism    • Morbid obesity (CMS/HCC)    • Pickwickian syndrome (CMS/HCC)    • Pressure ulcer stage I    • Psoriasis    • SOB (shortness of breath)    • Symptoms of urinary tract infection    • Urinary frequency    • Urinary tract infection    • Vaginal candidiasis        Past Surgical History:   Procedure Laterality Date   • CERVICAL POLYPECTOMY     • HYSTERECTOMY         Family History: family history includes Diabetes in her father and sister; Pancreatic cancer in her sister. Otherwise pertinent FHx was reviewed and unremarkable.     Social History:  reports that she quit smoking about 14 years ago. Her smoking use included cigarettes. She has never used smokeless tobacco. She reports that she does not drink alcohol or use drugs.  Social History     Social History Narrative   • Not on file       Medications:  Available home medication information reviewed.    (Not in a hospital admission)    Allergies   Allergen Reactions   • Amoxicillin-Pot Clavulanate Other (See Comments)     HEADACHE - has tolerated ceftriaxone 12/2019   • Bactrim [Sulfamethoxazole-Trimethoprim] Other (See Comments)     .       Objective   Objective     Vital Signs:   Temp:  [98.5 °F (36.9 °C)] 98.5 °F (36.9 °C)  Heart Rate:  [63-66] 64  Resp:  [20] 20  BP: (110-148)/(43-81) 141/45        Physical Exam   Constitutional: Awake but drowsy, morbidly obese   Eyes: PERRLA, sclerae anicteric, no conjunctival injection  HENT: NCAT, significant secretions noted throughout exam , cardiac monitor in place on chest   Neck: Supple, no thyromegaly, no lymphadenopathy, trachea midline  Respiratory: diminished air movement throughout with occ rhonchi,  no wheezing heard, limited by body habitus, on 4 L NC   Cardiovascular: RRR, no murmurs, rubs, or gallops, palpable pedal pulses bilaterally  Gastrointestinal: Positive bowel sounds, soft, nontender, nondistended  Musculoskeletal: No bilateral ankle edema, no clubbing or cyanosis to extremities  Psychiatric: Appropriate affect, cooperative  Neurologic: Oriented x 3, but drowsy, moves all extremities  Skin: No rashes, chronic venous stasis with flaking/cracking of skin noted       Results Reviewed:  I have personally reviewed current lab and radiology data.    Results from last 7 days   Lab Units 01/14/20  1038   WBC 10*3/mm3 6.66   HEMOGLOBIN g/dL 11.2*   HEMATOCRIT % 36.9   PLATELETS 10*3/mm3 167     Results from last 7 days   Lab Units 01/14/20  1039 01/14/20  1038   SODIUM mmol/L  --  140   POTASSIUM mmol/L  --  4.2   CHLORIDE mmol/L  --  94*   CO2 mmol/L  --  39.0*   BUN mg/dL  --  21   CREATININE mg/dL  --  0.89   GLUCOSE mg/dL  --  104*   CALCIUM mg/dL  --  9.8   ALT (SGPT) U/L  --  16   AST (SGOT) U/L  --  19   TROPONIN T ng/mL  --  0.023   PROBNP pg/mL  --  270.3   LACTATE mmol/L 1.1  --      Estimated Creatinine Clearance: 90.8 mL/min (by C-G formula based on SCr of 0.89 mg/dL).  Brief Urine Lab Results  (Last result in the past 365 days)      Color   Clarity   Blood   Leuk Est   Nitrite   Protein   CREAT   Urine HCG        12/28/19 1908 Dark Yellow Cloudy Negative Small (1+) Positive >=300 mg/dL (3+)             Imaging Results (Last 24 Hours)     Procedure Component Value Units Date/Time    XR Chest 1 View [[id number]] Collected:  01/14/20 1102     Updated:  01/14/20 1207    Narrative:       EXAMINATION: XR CHEST 1 VW- 01/14/2020     INDICATION: SOA triage protocol      COMPARISON: Chest radiograph 12/28/2019, CT chest 12/28/2019     FINDINGS: Single portable chest radiograph was submitted for review. The  heart is enlarged. Mild pulmonary vascular congestion identified. Hazy  mid and lower lung airspace  changes identified, somewhat similar to the  prior exam particularly the CT of the chest performed 12/28/2019. No  large pleural effusion or pneumothorax. Visualized upper abdomen is  unrevealing. No acute osseous abnormality is appreciated.       Impression:       Cardiomegaly mild pulmonary vasculature congestion with  multifocal airspace changes noted similar when compared to 12/28/2019.        D:  01/14/2020  E:  01/14/2020           Results for orders placed during the hospital encounter of 12/28/19   Adult Transthoracic Echo Complete W/ Cont if Necessary Per Protocol    Narrative · Left ventricular systolic function is normal. Estimated EF appears to be   in the range of 61 - 65%.  · Left ventricular wall thickness is consistent with moderate concentric   hypertrophy.  · Normal right ventricular wall thickness, systolic function and septal   motion noted. Right ventricular cavity is borderline dilated.  · Trace tricuspid valve regurgitation is present. Estimated right   ventricular systolic pressure from tricuspid regurgitation is normal (<35   mmHg)  · The valve appears trileaflet. The aortic valve is abnormal in structure.   There is mild calcification of the aortic valve.Trace aortic valve   regurgitation is present. No hemodynamically significant aortic valve   stenosis is present.          Assessment/Plan   Assessment & Plan     Active Hospital Problems    Diagnosis POA   • Recurrent pneumonia [J18.9] Unknown   • Human metapneumovirus (hMPV) pneumonia [J12.3] Unknown   • Sacral wound [S31.000A] Yes   • Chronic obstructive pulmonary disease (CMS/HCC) [J44.9] Yes   • Diabetes mellitus (CMS/HCC) [E11.9] Yes   • Hyperlipidemia [E78.5] Yes   • Hypertension [I10] Yes   • Hypothyroidism [E03.9] Yes   • Extreme obesity with alveolar hypoventilation (CMS/HCC) [E66.2] Yes       uJlissa Kee is a 69yoF with history of morbid obesity, SARAH BETH, COPD, T2DM, sacral chronic decubitus wounds, HLD, HTN with recent admission and  d/c for RML PNA- d/c 1/08 to SNF ([facility]), who presents from home for worsening/recurrent right sided PNA.     Recurrent PNA- multifocal  Human Metapneumovirus  Acute on chronic respiratory failure   ---noted plain film imaging this admission with unchanged/slightly worsened multifocal PNA  ---reviewed chart from last admission- patient was followed by ID and placed on rocephin/doxycycline, and this was continued for 7 day course--- given worsening symptoms/recurrent presentation-- will start broad spectrum IV abx- vanc/azactam (PCN allergy noted) and will ask ID to see in AM to assist with abx   ---will follow all cultures     COPD with exacerbation  Chronic respiratory failure on home O2   ---home 2-3 L NC , currently on 4L NC   --some component of patient's presentation may be secondary to COPD exacerbation--- continue abx as above, also will place on 5 day course of PO prednisone   ---nebs/pulmonary toilet / wean oxygen as able     T2DM  ----SSI---titrate and add basal/bolus as needed  ---A1C 5.2 last admission    Sacral Decubitus Wound  ---WOC consult  ---monitor       HLD  ---continue home statin    HTN  ---continue home medications     Hypothyroidism  ---TSH wnl last admission  ---continue synthroid     Atrial Tachycardia  --noted was discharged 1/08 with event monitor, still in place  --follow up scheduled to occur in 6 weeks from d/c 1/08, monitor closely     Recent UTI  --based on MAR at [facility], patient was started on zyvox 1/12 for UTI--- will not continue here in favor of continuing vanc/zosyn, though will need to touch base with [facility] regarding any culture results to tailor our therapy       DVT prophylaxis:  lovenox    CODE STATUS:    Code Status and Medical Interventions:   Ordered at: 01/14/20 1352     Level Of Support Discussed With:    Patient     Code Status:    CPR     Medical Interventions (Level of Support Prior to Arrest):    Full       Admission Status:  I believe this  patient meets INPATIENT status due to   I feel patient’s risk for adverse outcomes and need for care warrant INPATIENT evaluation and I predict the patient’s care encounter to likely last beyond 2 midnights.      Electronically signed by Blaire Thomson MD, 20, 1:58 PM.      Electronically signed by Blaire Thomson MD at 20 1445          Physical Therapy Notes (most recent note)      Delisa Casarez, PT at 20 1124  Version 1 of 1         Acute Care - Wound/Debridement Initial Evaluation  [facility]     Patient Name: Julissa Kee  : 1950  MRN: [id number]  Today's Date: 2020                Admit Date: 2020    Visit Dx:    ICD-10-CM ICD-9-CM   1. Hypoxia R09.02 799.02   2. Respiratory distress R06.03 786.09   3. Viral illness B34.9 079.99   4. Pneumonia of both lungs due to infectious organism, unspecified part of lung J18.9 483.8   5. Dysphagia, unspecified type R13.10 787.20       Patient Active Problem List   Diagnosis   • Anxiety   • Chronic obstructive pulmonary disease (CMS/HCC)   • Diabetes mellitus (CMS/HCC)   • Hyperlipidemia   • Hypertension   • Hypothyroidism   • Morbid obesity (CMS/HCC)   • Extreme obesity with alveolar hypoventilation (CMS/HCC)   • Renal insufficiency   • Sacral wound   • Recurrent pneumonia   • Human metapneumovirus (hMPV) pneumonia   • Hypoxia        Past Medical History:   Diagnosis Date   • Acute bronchitis    • Acute sinusitis    • Anxiety    • Bacterial conjunctivitis    • Chest pain    • COPD (chronic obstructive pulmonary disease) (CMS/HCC)    • Depression    • Diabetes mellitus (CMS/HCC)    • Dyspnea    • Dysuria    • Edema    • Elbow injury    • Elbow pain    • Eye drainage    • Fatigue    • Foot pain, right    • Humerus distal fracture    • Hyperlipidemia    • Hypertension    • Hypothyroidism    • Morbid obesity (CMS/HCC)    • Pickwickian syndrome (CMS/HCC)    • Pressure ulcer stage I    • Psoriasis    • SOB (shortness of breath)    •  Symptoms of urinary tract infection    • Urinary frequency    • Urinary tract infection    • Vaginal candidiasis         Past Surgical History:   Procedure Laterality Date   • CERVICAL POLYPECTOMY     • HYSTERECTOMY           LDA Wound     Row Name 01/16/20 0820             Wound 01/14/20 1908 Left anterior greater trochanter Extravasation    Wound - Properties Group Date first assessed: 01/14/20  -ZACHARIAH Time first assessed: 1908  -ZACHARIAH Present on Hospital Admission: Y  -BR Side: Left  -ZACHARIAH Orientation: anterior  -ZACHARIAH Location: greater trochanter  -ZACHARIAH Primary Wound Type: Extravasatio  -ZACHARIAH       Wound 01/14/20 1909 Right anterior greater trochanter Extravasation    Wound - Properties Group Date first assessed: 01/14/20  -ZACHARIAH Time first assessed: 1909  -ZACHARIAH Present on Hospital Admission: Y  -BR Side: Right  -ZACHARIAH Orientation: anterior  -ZACHARIAH Location: greater trochanter  -ZACHARIAH Primary Wound Type: Extravasatio  -ZACHARIAH       Wound 01/14/20 1910 midline coccyx Pressure Injury    Wound - Properties Group Date first assessed: 01/14/20  -ZACHARIAH Time first assessed: 1910  -ZACHARIAH Present on Hospital Admission: Y  -BR Orientation: midline  -CP Location: coccyx  -ZACHARIAH Primary Wound Type: Pressure inj  -ZACHARIAH Stage, Pressure Injury: Stage 2  -CP       Wound 01/14/20 1912 Right anterior foot Other (comment)    Wound - Properties Group Date first assessed: 01/14/20  -ZACHARIAH Time first assessed: 1912  -ZACHARIAH Side: Right  -ZACHARIAH Orientation: anterior  -ZACHARIAH Location: foot  -ZACHARIAH Primary Wound Type: Other  -ZACHARIAH       Wound 01/14/20 2011 Left anterior toe    Wound - Properties Group Date first assessed: 01/14/20  -BR Time first assessed: 2011  -BR Present on Hospital Admission: Y  -BR Side: Left  -BR Orientation: anterior  -BR Location: toe  -BR, 1st toe  Stage, Pressure Injury: deep tissue injury  -BR       Wound 12/28/19 2155 Left medial gluteal Other (comment)    Wound - Properties Group Date first assessed: 12/28/19  -ZONIA Time first assessed: 2155  -ZONIA Present on Hospital  Admission: Y  -ZONIA Side: Left  -ZONIA Orientation: medial  -ZONIA Location: gluteal  -ZONIA Primary Wound Type: Other  -ZONIA, 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        Wound 12/28/19 2251 Bilateral other (see notes) MASD (Moisutre associated skin damage)    Wound - Properties Group Date first assessed: 12/28/19  -ZONIA Time first assessed: 2251  -ZONIA Present on Hospital Admission: Y  -ZONIA Side: Bilateral  -ZONIA Location: other (see notes)  -ZONIA, under Pannus  Primary Wound Type: MASD  -ZONIA, yeast dermatitis  Additional Comments: shearing/hypergranulation  -CP       Wound 01/15/20 1000 Left medial thigh Pressure Injury    Wound - Properties Group Date first assessed: 01/15/20  -CP Time first assessed: 1000  -CP Present on Hospital Admission: Y  -CP Side: Left  -CP Orientation: medial  -CP Location: thigh  -CP Primary Wound Type: Pressure inj  -CP Stage, Pressure Injury: deep tissue injury  -CP       Wound 01/15/20 1000 Right medial thigh Pressure Injury    Wound - Properties Group Date first assessed: 01/15/20  -CP Time first assessed: 1000  -CP Present on Hospital Admission: Y  -CP Side: Right  -CP Orientation: medial  -CP Location: thigh  -CP Primary Wound Type: Pressure inj  -CP Stage, Pressure Injury: deep tissue injury  -CP    Wound Length (cm)  1.9 cm denuded area;   -MW      Wound Width (cm)  2 cm 9.8 DTPI maroon area   -MW      Wound Depth (cm)  0.1 cm  -MW         Wound 01/15/20 1000 Left posterior thigh Pressure Injury    Wound - Properties Group Date first assessed: 01/15/20  -CP Time first assessed: 1000  -CP Present on Hospital Admission: Y  -CP Side: Left  -CP Orientation: posterior  -CP Location: thigh  -CP Primary Wound Type: Pressure inj  -CP Stage, Pressure Injury: deep tissue injury  -CP       Rash 01/15/20 1000 other (see comments) macular;papule    Rash - Properties Group Date first assessed: 01/15/20  -CP Time first assessed: 1000  -CP Location: other (see comments)  -CP, abdominal  pannus, under breasts, axillary areas  Type: macular;papule  -CP      User Key  (r) = Recorded By, (t) = Taken By, (c) = Cosigned By    Initials Name Provider Type    CP Sheridan Day APRN Nurse Practitioner    Delisa Leigh, PT Physical Therapist    Travis Aaron RN Registered Nurse    Melissa Hunt RN Registered Nurse    Judie Madera RN Registered Nurse        Rash 01/15/20 1000 other (see comments) macular;papule (Active)   Distribution generalized 1/16/2020  8:00 AM   Configuration/Shape asymmetric 1/16/2020  8:00 AM   Borders irregular 1/16/2020  8:00 AM   Characteristics moist 1/16/2020  8:00 AM   Color red 1/16/2020  8:00 AM   Lesion Size other (see comments) 1/16/2020  8:00 AM   Care, Rash open to air;other (see comments) 1/16/2020  8:00 AM       Wound 12/28/19 2251 Bilateral other (see notes) MASD (Moisutre associated skin damage) (Active)   Dressing Appearance open to air 1/16/2020  8:00 AM   Closure Open to air 1/16/2020  8:00 AM   Base moist;red 1/16/2020  8:00 AM   Periwound moist 1/16/2020  8:00 AM   Drainage Amount none 1/15/2020 11:30 PM   Care, Wound other (see comments) 1/16/2020  8:00 AM   Dressing Care, Wound open to air 1/16/2020  8:00 AM       Wound 12/28/19 2155 Left medial gluteal Other (comment) (Active)   Base blanchable;moist;clean 1/16/2020  8:00 AM   Care, Wound other (see comments) 1/16/2020  8:00 AM   Dressing Care, Wound open to air 1/16/2020  8:00 AM       Wound 01/14/20 1908 Left anterior greater trochanter Extravasation (Active)   Dressing Appearance open to air 1/16/2020  8:00 AM   Base red 1/16/2020  8:00 AM       Wound 01/14/20 1909 Right anterior greater trochanter Extravasation (Active)   Dressing Appearance open to air 1/16/2020  8:00 AM   Base red 1/16/2020  8:00 AM   Periwound excoriated;redness 1/16/2020  8:00 AM       Wound 01/14/20 1910 midline coccyx Pressure Injury (Active)   Dressing Appearance open to air 1/16/2020  8:00 AM      Closure Open to air 1/16/2020  8:00 AM   Base non-blanchable;red;pink;moist 1/15/2020 11:30 PM   Periwound moist;redness 1/16/2020  8:20 AM   Drainage Characteristics/Odor sanguineous;bleeding controlled 1/16/2020  8:20 AM   Drainage Amount scant 1/16/2020  8:00 AM   Care, Wound barrier applied 1/16/2020  8:20 AM   Dressing Care, Wound open to air 1/15/2020 11:30 AM   Periwound Care, Wound barrier ointment applied 1/16/2020  8:00 AM       Wound 01/14/20 1912 Right anterior foot Other (comment) (Active)   Dressing Appearance open to air 1/16/2020  8:00 AM   Closure Open to air 1/16/2020  8:00 AM       Wound 01/14/20 2011 Left anterior toe (Active)   Dressing Appearance open to air 1/16/2020  8:00 AM   Closure Open to air 1/16/2020  8:00 AM   Base dry;maroon/purple 1/16/2020  8:00 AM   Drainage Amount none 1/15/2020 11:30 PM   Dressing Care, Wound open to air 1/16/2020  8:00 AM       Wound 01/15/20 1000 Left medial thigh Pressure Injury (Active)   Dressing Appearance open to air 1/16/2020  8:20 AM   Base maroon/purple;non-blanchable 1/16/2020  8:20 AM   Periwound intact;dry 1/16/2020  8:20 AM   Periwound Skin Turgor soft 1/16/2020  8:20 AM   Drainage Amount none 1/16/2020  8:20 AM   Care, Wound irrigated with;sterile normal saline;ultrasound therapy, non contact low frequency 1/16/2020  8:20 AM   Dressing Care, Wound open to air 1/16/2020  8:20 AM   Periwound Care, Wound barrier ointment applied 1/16/2020  8:20 AM       Wound 01/15/20 1000 Right medial thigh Pressure Injury (Active)   Dressing Appearance open to air 1/16/2020  8:20 AM   Base maroon/purple;non-blanchable;moist;pink;yellow;slough 1/16/2020  8:20 AM   Periwound intact;dry;pink;blanchable 1/16/2020  8:20 AM   Periwound Skin Turgor soft 1/16/2020  8:20 AM   Edges irregular 1/16/2020  8:20 AM   Drainage Characteristics/Odor serosanguineous 1/16/2020  8:20 AM   Drainage Amount scant 1/16/2020  8:20 AM   Care, Wound irrigated with;sterile normal  saline;ultrasound therapy, non contact low frequency 1/16/2020  8:20 AM   Dressing Care, Wound open to air 1/16/2020  8:20 AM   Periwound Care, Wound barrier ointment applied 1/16/2020  8:20 AM       Wound 01/15/20 1000 Left posterior thigh Pressure Injury (Active)   Dressing Appearance open to air 1/16/2020  8:20 AM   Base non-blanchable;maroon/purple 1/16/2020  8:20 AM   Periwound intact;dry;pink 1/16/2020  8:20 AM   Edges irregular 1/16/2020  8:00 AM   Drainage Amount none 1/16/2020  8:20 AM   Care, Wound other (see comments) 1/16/2020  8:20 AM         WOUND DEBRIDEMENT                        PT ASSESSMENT (last 12 hours)      Physical Therapy Evaluation     Row Name 01/16/20 0820          PT Evaluation Time/Intention    Subjective Information  complains of;pain with movement of pannus to assess skin folds   -MW     Document Type  evaluation;wound care;therapy note (daily note)  -MW     Comment  Rn and PCT assisting with positioning/ holding pannus, turning. Rn performing skin care/ pericare, removal of krishnamurthy cath and placement of wick systme; placement of interdry and dusting with medicated powder; venolex to DTPIs.   -MW     Row Name 01/16/20 0820          General Information    Patient Profile Reviewed?  yes  -MW     Risks Reviewed  patient:;increased discomfort  -MW     Benefits Reviewed  patient:;improve skin integrity  -MW     Barriers to Rehab  medically complex;previous functional deficit;physical barrier  -MW     Row Name 01/16/20 0820          Cognitive Assessment/Intervention- PT/OT    Orientation Status (Cognition)  oriented to;person;situation others not assessed   -MW     Row Name 01/16/20 0820          Bed Mobility Assessment/Treatment    Comment (Bed Mobility)  Lift system utilized to faciliate rolling and placement of clean dry flow pads  -MW     Row Name 01/16/20 0820          Pain Scale: FACES Pre/Post-Treatment    Pain: FACES Scale, Pretreatment  4-->hurts little more  -MW     Pain: FACES  Scale, Post-Treatment  4-->hurts little more  -MW     Row Name             Wound 01/14/20 1908 Left anterior greater trochanter Extravasation    Wound - Properties Group Date first assessed: 01/14/20  -ZACHARIAH Time first assessed: 1908  -ZACHARIAH Present on Hospital Admission: Y  -BR Side: Left  -ZACHARIAH Orientation: anterior  -ZACHARIAH Location: greater trochanter  -ZACHARIAH Primary Wound Type: Extravasatio  -ZACHARIAH    Row Name             Wound 01/14/20 1909 Right anterior greater trochanter Extravasation    Wound - Properties Group Date first assessed: 01/14/20  -ZACHARIAH Time first assessed: 1909  -ZACHARIAH Present on Hospital Admission: Y  -BR Side: Right  -ZACHARIAH Orientation: anterior  -ZACHARIAH Location: greater trochanter  -ZACHARIAH Primary Wound Type: Extravasatio  -ZACHARIAH    Row Name 01/16/20 0820          Wound 01/14/20 1910 midline coccyx Pressure Injury    Wound - Properties Group Date first assessed: 01/14/20  -ZACHARIAH Time first assessed: 1910  -ZACHARIAH Present on Hospital Admission: Y  -BR Orientation: midline  -CP Location: coccyx  -ZACHARIAH Primary Wound Type: Pressure inj  -ZACHARIAH Stage, Pressure Injury: Stage 2  -CP    Periwound  moist;redness tissue ridge   -MW     Drainage Characteristics/Odor  sanguineous;bleeding controlled seeping   -MW     Care, Wound  barrier applied by nsg   -MW     Row Name             Wound 01/14/20 1912 Right anterior foot Other (comment)    Wound - Properties Group Date first assessed: 01/14/20  -ZACHARIAH Time first assessed: 1912  -ZACHARIAH Side: Right  -ZACHARIAH Orientation: anterior  -ZACHARIAH Location: foot  -ZACHARIAH Primary Wound Type: Other  -ZACHARIAH    Row Name             Wound 01/14/20 2011 Left anterior toe    Wound - Properties Group Date first assessed: 01/14/20  -BR Time first assessed: 2011  -BR Present on Hospital Admission: Y  -BR Side: Left  -BR Orientation: anterior  -BR Location: toe  -BR, 1st toe  Stage, Pressure Injury: deep tissue injury  -BR    Row Name             Wound 12/28/19 2155 Left medial gluteal Other (comment)    Wound - Properties Group Date first  assessed: 12/28/19  -ZONIA Time first assessed: 2155  -ZONIA Present on Hospital Admission: Y  -ZONIA Side: Left  -ZONIA Orientation: medial  -ZONIA Location: gluteal  -ZONIA Primary Wound Type: Other  -ZONIA, 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     Row Name             Wound 12/28/19 2251 Bilateral other (see notes) MASD (Moisutre associated skin damage)    Wound - Properties Group Date first assessed: 12/28/19  -ZONIA Time first assessed: 2251  -ZONIA Present on Hospital Admission: Y  -ZONIA Side: Bilateral  -ZONIA Location: other (see notes)  -ZONIA, under Pannus  Primary Wound Type: MASD  -ZONIA, yeast dermatitis  Additional Comments: shearing/hypergranulation  -CP    Row Name 01/16/20 0820          Wound 01/15/20 1000 Left medial thigh Pressure Injury    Wound - Properties Group Date first assessed: 01/15/20  -CP Time first assessed: 1000  -CP Present on Hospital Admission: Y  -CP Side: Left  -CP Orientation: medial  -CP Location: thigh  -CP Primary Wound Type: Pressure inj  -CP Stage, Pressure Injury: deep tissue injury  -CP    Dressing Appearance  open to air  -MW     Base  maroon/purple;non-blanchable  -MW     Periwound  intact;dry  -MW     Periwound Skin Turgor  soft  -MW     Wound Depth (cm)  -- intact skin   -MW     Drainage Amount  none  -MW     Care, Wound  irrigated with;sterile normal saline;ultrasound therapy, non contact low frequency MIST x 4 min   -MW     Dressing Care, Wound  open to air venolex   -MW     Periwound Care, Wound  barrier ointment applied  -MW     Row Name 01/16/20 0820          Wound 01/15/20 1000 Right medial thigh Pressure Injury    Wound - Properties Group Date first assessed: 01/15/20  -CP Time first assessed: 1000  -CP Present on Hospital Admission: Y  -CP Side: Right  -CP Orientation: medial  -CP Location: thigh  -CP Primary Wound Type: Pressure inj  -CP Stage, Pressure Injury: deep tissue injury  -CP    Dressing Appearance  open to air  -MW     Base   maroon/purple;non-blanchable;moist;pink;yellow;slough medial aspect denuded   -MW     Periwound  intact;dry;pink;blanchable  -MW     Periwound Skin Turgor  soft  -MW     Edges  irregular  -MW     Drainage Characteristics/Odor  serosanguineous  -MW     Drainage Amount  scant  -MW     Care, Wound  irrigated with;sterile normal saline;ultrasound therapy, non contact low frequency MIST x 6 min   -MW     Dressing Care, Wound  open to air Venolex   -MW     Periwound Care, Wound  barrier ointment applied  -MW     Row Name 01/16/20 0820          Wound 01/15/20 1000 Left posterior thigh Pressure Injury    Wound - Properties Group Date first assessed: 01/15/20  -CP Time first assessed: 1000  -CP Present on Hospital Admission: Y  -CP Side: Left  -CP Orientation: posterior  -CP Location: thigh  -CP Primary Wound Type: Pressure inj  -CP Stage, Pressure Injury: deep tissue injury  -CP    Dressing Appearance  open to air  -MW     Base  non-blanchable;maroon/purple  -MW     Periwound  intact;dry;pink  -MW     Drainage Amount  none  -MW     Care, Wound  other (see comments) venolex   -MW     Row Name 01/16/20 0820          Coping    Observed Emotional State  anxious;tearful/crying  -MW     Verbalized Emotional State  anxiety;acceptance  -MW     Row Name 01/16/20 0820          Plan of Care Review    Plan of Care Reviewed With  patient  -MW     Progress  no change  -MW     Outcome Summary  PT wound care: initiated MIST to bilateral medial thigh DTPI areas; Rt medial aspect with denuded area. Posterior thigh DTPI is on Rt not Lt. Very complex skin situation due to large pannus resting on thighs; MASD under folds of pannus, and proximal thighs and into vineet-area folds. RN completing skin care per WOCN recommendations. Cont PT for MIST to DTPI areas to maximize healing potential with increased local blood flow.   -MW     Row Name 01/16/20 0820          Physical Therapy Clinical Impression    Criteria for Skilled Interventions Met (PT  Clinical Impression)  yes;treatment indicated  -     Row Name 01/16/20 0820          Physical Therapy Goals    Wound Care Goal Selection (PT)  wound care, PT goal 1;wound care, PT goal 2  -     Additional Documentation  Wound Care Goal Selection (PT) (Row)  -     Row Name 01/16/20 0820          Wound Care Goal 1 (PT)    Wound Care Goal 1 (PT)  Rt medial thigh wound to decrease area 25%; no S/s of infx, scant drainage.   -     Time Frame (Wound Care Goal 1, PT)  long term goal (LTG);10 days  -     Row Name 01/16/20 0820          Wound Care Goal 2 (PT)    Wound Care Goal 2 (PT)  LT medial thigh DTPI to decrease in area at least 25%; skin to remain intact.   -     Time Frame (Wound Care Goal 2, PT)  long term goal (LTG);10 days  -     Row Name 01/16/20 0820          Positioning and Restraints    Pre-Treatment Position  in bed  -MW     Post Treatment Position  bed  -MW     In Bed  side lying left;with nsg;R multipodus nsg remained with pt to finish care/ positioning   -       User Key  (r) = Recorded By, (t) = Taken By, (c) = Cosigned By    Initials Name Provider Type    Sheridan Dumas APRN Nurse Practitioner    Delisa Leigh, PT Physical Therapist    Travis Aaron RN Registered Nurse    Melissa Hunt RN Registered Nurse    Judie Madera RN Registered Nurse            Recommendation and Plan  Anticipated Discharge Disposition (PT): skilled nursing facility, inpatient rehabilitation facility  Planned Therapy Interventions (PT Eval): wound care  Plan of Care Reviewed With: patient   Progress: no change   Outcome Summary/Treatment Plan (PT)  Anticipated Discharge Disposition (PT): skilled nursing facility, inpatient rehabilitation facility   Progress: no change  Outcome Summary: PT wound care: initiated MIST to bilateral medial thigh DTPI areas; Rt medial aspect with denuded area. Posterior thigh DTPI is on Rt not Lt. Very complex skin situation due to large pannus  resting on thighs; MASD under folds of pannus, and proximal thighs and into vineet-area folds. RN completing skin care per WOCN recommendations. Cont PT for MIST to DTPI areas to maximize healing potential with increased local blood flow.   Plan of Care Reviewed With: patient            Time Calculation  PT Charges     Row Name 01/16/20 1124             Time Calculation    Start Time  0820  -MW      PT Goal Re-Cert Due Date  01/26/20  -MW        User Key  (r) = Recorded By, (t) = Taken By, (c) = Cosigned By    Initials Name Provider Type    Delisa Leigh, PT Physical Therapist            Therapy Charges for Today     Code Description Service Date Service Provider Modifiers Qty    97916605270 HC PT EVAL MOD COMPLEXITY 3 1/16/2020 Delisa Casarez, PT GP 1    [id number] HC PT NLFU MIST 1/16/2020 Delisa Casarez, PT GP 1                    Delisa Casarez PT  1/16/2020         Electronically signed by Delisa Casarez PT at 01/16/20 1127          Occupational Therapy Notes (most recent note)      Kasandra Martinez, OT at 01/17/20 0935        ICU Rounds: OT/PT consults pending this date.     Electronically signed by Kasandra Martinez OT at 01/17/20 0975

## 2020-01-17 NOTE — THERAPY WOUND CARE TREATMENT
Acute Care - Wound/Debridement Treatment Note  [facility]     Patient Name: Julissa Kee  : 1950  MRN: [id number]  Today's Date: 2020                  Admit Date: 2020    Visit Dx:    ICD-10-CM ICD-9-CM   1. Hypoxia R09.02 799.02   2. Respiratory distress R06.03 786.09   3. Viral illness B34.9 079.99   4. Pneumonia of both lungs due to infectious organism, unspecified part of lung J18.9 483.8   5. Dysphagia, unspecified type R13.10 787.20       Patient Active Problem List   Diagnosis   • Anxiety   • Chronic obstructive pulmonary disease (CMS/HCC)   • Diabetes mellitus (CMS/HCC)   • Hyperlipidemia   • Hypertension   • Hypothyroidism   • Morbid obesity (CMS/HCC)   • Extreme obesity with alveolar hypoventilation (CMS/HCC)   • Renal insufficiency   • Sacral wound   • Recurrent pneumonia   • Human metapneumovirus (hMPV) pneumonia   • Hypoxia         LDA Wound     Row Name               Wound 20 Left anterior greater trochanter Extravasation    Wound - Properties Group Date first assessed: 20  -ZACHARIAH Time first assessed:   -ZACHARIAH Present on Hospital Admission: Y  -BR Side: Left  -ZACHARIAH Orientation: anterior  -ZACHARIAH Location: greater trochanter  -ZACHARIAH Primary Wound Type: Extravasatio  -ZACHARIAH       Wound 20 Right anterior greater trochanter Extravasation    Wound - Properties Group Date first assessed: 20  -ZACHARIAH Time first assessed:   -ZACHARIAH Present on Hospital Admission: Y  -BR Side: Right  -ZACHARIAH Orientation: anterior  -ZACHARIAH Location: greater trochanter  -ZACHARIAH Primary Wound Type: Extravasatio  -ZACHARIAH       Wound 20 midline coccyx Pressure Injury    Wound - Properties Group Date first assessed: 20  -ZACHARIAH Time first assessed:   -ZACHARIAH Present on Hospital Admission: Y  -BR Orientation: midline  -CP Location: coccyx  -ZACHARIAH Primary Wound Type: Pressure inj  -ZACHARIAH Stage, Pressure Injury: Stage 2  -CP       Wound 20 Right anterior foot Other (comment)    Wound - Properties  Group Date first assessed: 01/14/20  -ZACHARIAH Time first assessed: 1912  -ZACHARIAH Side: Right  -ZACHARIAH Orientation: anterior  -ZACHARIAH Location: foot  -ZACHARIAH Primary Wound Type: Other  -ZACHARIAH       Wound 01/14/20 2011 Left anterior toe    Wound - Properties Group Date first assessed: 01/14/20  -BR Time first assessed: 2011  -BR Present on Hospital Admission: Y  -BR Side: Left  -BR Orientation: anterior  -BR Location: toe  -BR, 1st toe  Stage, Pressure Injury: deep tissue injury  -BR       Wound 12/28/19 2155 Left medial gluteal Other (comment)    Wound - Properties Group Date first assessed: 12/28/19  -ZONIA Time first assessed: 2155  -ZONIA Present on Hospital Admission: Y  -ZONIA Side: Left  -ZONIA Orientation: medial  -ZONIA Location: gluteal  -ZONIA Primary Wound Type: Other  -ZONIA, 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        Wound 12/28/19 2251 Bilateral other (see notes) MASD (Moisutre associated skin damage)    Wound - Properties Group Date first assessed: 12/28/19  -ZONIA Time first assessed: 2251  -ZONIA Present on Hospital Admission: Y  -ZONIA Side: Bilateral  -ZONIA Location: other (see notes)  -ZONIA, under Pannus  Primary Wound Type: MASD  -ZONIA, yeast dermatitis  Additional Comments: shearing/hypergranulation  -CP       Wound 01/15/20 1000 Left medial thigh Pressure Injury    Wound - Properties Group Date first assessed: 01/15/20  -CP Time first assessed: 1000  -CP Present on Hospital Admission: Y  -CP Side: Left  -CP Orientation: medial  -CP Location: thigh  -CP Primary Wound Type: Pressure inj  -CP Stage, Pressure Injury: deep tissue injury  -CP       Wound 01/15/20 1000 Right medial thigh Pressure Injury    Wound - Properties Group Date first assessed: 01/15/20  -CP Time first assessed: 1000  -CP Present on Hospital Admission: Y  -CP Side: Right  -CP Orientation: medial  -CP Location: thigh  -CP Primary Wound Type: Pressure inj  -CP Stage, Pressure Injury: deep tissue injury  -CP       Wound 01/15/20 1000 Left posterior thigh  Pressure Injury    Wound - Properties Group Date first assessed: 01/15/20  -CP Time first assessed: 1000  -CP Present on Hospital Admission: Y  -CP Side: Left  -CP Orientation: posterior  -CP Location: thigh  -CP Primary Wound Type: Pressure inj  -CP Stage, Pressure Injury: deep tissue injury  -CP       Rash 01/15/20 1000 other (see comments) macular;papule    Rash - Properties Group Date first assessed: 01/15/20  -CP Time first assessed: 1000  -CP Location: other (see comments)  -CP, abdominal pannus, under breasts, axillary areas  Type: macular;papule  -CP      User Key  (r) = Recorded By, (t) = Taken By, (c) = Cosigned By    Initials Name Provider Type    CP Sheridan Day APRN Nurse Practitioner    Travis Aaron RN Registered Nurse    Melissa Hunt RN Registered Nurse    Judie Madera RN Registered Nurse        Rash 01/15/20 1000 other (see comments) macular;papule (Active)   Distribution generalized 1/17/2020 12:00 PM   Configuration/Shape asymmetric 1/17/2020 12:00 PM   Borders irregular 1/17/2020 12:00 PM   Characteristics moist 1/17/2020 12:00 PM   Color red 1/17/2020 12:00 PM   Lesion Size other (see comments) 1/17/2020 12:00 PM       Wound 12/28/19 2251 Bilateral other (see notes) MASD (Moisutre associated skin damage) (Active)   Dressing Appearance open to air 1/17/2020 12:00 PM   Closure Open to air 1/17/2020 12:00 PM   Base moist;red 1/17/2020 12:00 PM   Periwound moist 1/17/2020 12:00 PM   Drainage Amount none 1/16/2020  6:00 PM   Dressing Care, Wound open to air 1/16/2020  6:00 PM       Wound 12/28/19 2155 Left medial gluteal Other (comment) (Active)   Base blanchable;moist;clean 1/17/2020 12:00 PM   Drainage Amount none 1/17/2020 12:00 PM   Dressing Care, Wound open to air 1/16/2020  4:00 PM       Wound 01/14/20 1908 Left anterior greater trochanter Extravasation (Active)   Dressing Appearance open to air 1/17/2020 12:00 PM   Base red 1/17/2020 12:00 PM       Wound  01/14/20 1909 Right anterior greater trochanter Extravasation (Active)   Dressing Appearance open to air 1/17/2020 12:00 PM   Base red 1/17/2020 12:00 PM   Periwound excoriated;redness 1/17/2020 12:00 PM       Wound 01/14/20 1910 midline coccyx Pressure Injury (Active)   Dressing Appearance open to air 1/17/2020 12:00 PM   Closure Open to air 1/17/2020 12:00 PM   Periwound redness;moist 1/17/2020 12:00 PM   Drainage Characteristics/Odor bleeding controlled 1/17/2020 12:00 PM   Drainage Amount scant 1/17/2020 12:00 PM       Wound 01/14/20 1912 Right anterior foot Other (comment) (Active)   Dressing Appearance open to air 1/17/2020 12:00 PM   Closure Open to air 1/17/2020 12:00 PM       Wound 01/14/20 2011 Left anterior toe (Active)   Dressing Appearance open to air 1/17/2020 12:00 PM   Closure Open to air 1/17/2020 12:00 PM   Base dry;maroon/purple 1/17/2020 12:00 PM   Drainage Amount none 1/16/2020  6:00 PM       Wound 01/15/20 1000 Left medial thigh Pressure Injury (Active)   Dressing Appearance open to air 1/17/2020 12:00 PM   Base non-blanchable 1/17/2020 12:00 PM   Periwound intact;dry 1/17/2020 12:00 PM   Drainage Amount none 1/17/2020  9:45 AM   Care, Wound irrigated with;sterile normal saline;ultrasound therapy, non contact low frequency 1/17/2020  9:45 AM   Periwound Care, Wound barrier ointment applied 1/17/2020  9:45 AM       Wound 01/15/20 1000 Right medial thigh Pressure Injury (Active)   Dressing Appearance open to air 1/17/2020 12:00 PM   Base maroon/purple;non-blanchable 1/17/2020 12:00 PM   Periwound intact;dry 1/17/2020 12:00 PM   Drainage Characteristics/Odor serosanguineous 1/17/2020  9:45 AM   Drainage Amount scant 1/17/2020  9:45 AM   Care, Wound irrigated with;sterile normal saline;ultrasound therapy, non contact low frequency 1/17/2020  9:45 AM   Periwound Care, Wound barrier ointment applied 1/17/2020  9:45 AM       Wound 01/15/20 1000 Left posterior thigh Pressure Injury (Active)   Dressing  Appearance open to air 1/17/2020 12:00 PM   Base non-blanchable 1/17/2020 12:00 PM   Periwound intact;dry 1/17/2020 12:00 PM   Edges irregular 1/17/2020 12:00 PM   Drainage Amount none 1/17/2020  9:45 AM   Care, Wound other (see comments) 1/17/2020  9:45 AM   Periwound Care, Wound topical treatment applied 1/17/2020  9:45 AM         WOUND DEBRIDEMENT                  Therapy Treatment    Rehabilitation Treatment Summary     Row Name 01/17/20 0945             Treatment Time/Intention    Discipline  physical therapist  -      Document Type  evaluation;wound care;therapy note (daily note)  -      Subjective Information  complains of;weakness;fatigue;pain  -      Mode of Treatment  individual therapy;physical therapy  -      Recorded by [] Kee Dahl, PT 01/17/20 1325      Row Name 01/17/20 0945             Positioning and Restraints    Pre-Treatment Position  in bed  -      Post Treatment Position  bed  -      In Bed  supine;call light within reach  -      Recorded by [MF] Kee Dahl, PT 01/17/20 1325      Row Name 01/17/20 0945             Pain Scale: Numbers Pre/Post-Treatment    Pain Location - Orientation  generalized  -      Recorded by [MF] Kee Dahl, PT 01/17/20 1325      Row Name 01/17/20 0945             Pain Scale 2: FACES Pre/Post-Treatment    Pain 2: FACES Scale, Pretreatment  4-->hurts little more  -      Pain 2: FACES Scale, Post-Treatment  4-->hurts little more  -      Recorded by [MF] Kee Dahl, PT 01/17/20 1325      Row Name                Wound 01/14/20 1908 Left anterior greater trochanter Extravasation    Wound - Properties Group Date first assessed: 01/14/20 [ZACHARIAH] Time first assessed: 1908 [ZACHARIAH] Present on Hospital Admission: Y [BR] Side: Left [ZACHARIAH] Orientation: anterior [ZACHARIAH] Location: greater trochanter [ZACHARIAH] Primary Wound Type: Extravasatio [ZACHARIAH] Recorded by:  [BR] Judie Michael, RN 01/14/20 8615 [ZACHARIAH] Melissa Luther RN 01/14/20 3709    Vanessa  Name                Wound 01/14/20 1909 Right anterior greater trochanter Extravasation    Wound - Properties Group Date first assessed: 01/14/20 [ZACHARIAH] Time first assessed: 1909 [ZACHARIAH] Present on Hospital Admission: Y [BR] Side: Right [ZACHARIAH] Orientation: anterior [ZACHARIAH] Location: greater trochanter [ZACHARIAH] Primary Wound Type: Extravasatio [ZACHARIAH] Recorded by:  [BR] Judie Michael RN 01/14/20 2335 [ZACHARIAH] Melissa Luther RN 01/14/20 1909    Row Name                Wound 01/14/20 1910 midline coccyx Pressure Injury    Wound - Properties Group Date first assessed: 01/14/20 [ZACHARIAH] Time first assessed: 1910 [ZACHARIAH] Present on Hospital Admission: Y [BR] Orientation: midline [CP] Location: coccyx [ZACHARIAH] Primary Wound Type: Pressure inj [ZACHARIAH] Stage, Pressure Injury: Stage 2 [CP] Recorded by:  [BR] Judie Michael RN 01/14/20 2335 [CP] Sheridan Day APRN 01/15/20 1148 [ZACHARIAH] Melissa Luther RN 01/14/20 1911    Row Name                Wound 01/14/20 1912 Right anterior foot Other (comment)    Wound - Properties Group Date first assessed: 01/14/20 [ZACHARIAH] Time first assessed: 1912 [ZACHARIAH] Side: Right [ZACHARIAH] Orientation: anterior [ZACHARIAH] Location: foot [ZACHARIAH] Primary Wound Type: Other [ZACHARIAH] Recorded by:  [ZACHARIAH] Melissa Luther RN 01/14/20 1915    Row Name                Wound 01/14/20 2011 Left anterior toe    Wound - Properties Group Date first assessed: 01/14/20 [BR] Time first assessed: 2011 [BR] Present on Hospital Admission: Y [BR] Side: Left [BR] Orientation: anterior [BR] Location: toe [BR], 1st toe  Stage, Pressure Injury: deep tissue injury [BR] Recorded by:  [BR] Judie Michael RN 01/14/20 2011    Row Name                Wound 12/28/19 2155 Left medial gluteal Other (comment)    Wound - Properties Group Date first assessed: 12/28/19 [ZONIA] Time first assessed: 2155 [ZONIA] Present on Hospital Admission: Y [ZONIA] Side: Left [ZONIA] Orientation: medial [ZONIA] Location: gluteal [ZONIA] Primary Wound Type: Other [ZONIA], Hypergranulation tissue from chronic  friction/moisture and shearing  Recorded by:  [ZONIA] Travis Cobb RN 12/29/19 1256    Row Name                Wound 12/28/19 2251 Bilateral other (see notes) MASD (Moisutre associated skin damage)    Wound - Properties Group Date first assessed: 12/28/19 [ZONIA] Time first assessed: 2251 [ZONIA] Present on Hospital Admission: Y [ZONIA] Side: Bilateral [ZONIA] Location: other (see notes) [ZONIA], under Pannus  Primary Wound Type: MASD [ZONIA], yeast dermatitis  Additional Comments: shearing/hypergranulation [CP] Recorded by:  [CP] Sheridan Day APRN 01/15/20 1152 [ZONIA] Travis Cobb RN 12/29/19 1252    Row Name 01/17/20 0945             Wound 01/15/20 1000 Left medial thigh Pressure Injury    Wound - Properties Group Date first assessed: 01/15/20 [CP] Time first assessed: 1000 [CP] Present on Hospital Admission: Y [CP] Side: Left [CP] Orientation: medial [CP] Location: thigh [CP] Primary Wound Type: Pressure inj [CP] Stage, Pressure Injury: deep tissue injury [CP] Recorded by:  [CP] Sheridan Day APRN 01/15/20 1155    Dressing Appearance  open to air  -MF      Base  non-blanchable  -MF      Periwound  intact;dry  -MF      Drainage Amount  none  -MF      Care, Wound  irrigated with;sterile normal saline;ultrasound therapy, non contact low frequency mist x 4 min   -MF      Periwound Care, Wound  barrier ointment applied  -MF      Recorded by [MF] Kee Dahl, PT 01/17/20 1325      Row Name 01/17/20 0945             Wound 01/15/20 1000 Right medial thigh Pressure Injury    Wound - Properties Group Date first assessed: 01/15/20 [CP] Time first assessed: 1000 [CP] Present on Hospital Admission: Y [CP] Side: Right [CP] Orientation: medial [CP] Location: thigh [CP] Primary Wound Type: Pressure inj [CP] Stage, Pressure Injury: deep tissue injury [CP] Recorded by:  [CP] Sheridan Day APRN 01/15/20 1157    Dressing Appearance  open to air  -MF      Base  maroon/purple;non-blanchable  -MF      Periwound   intact;dry  -MF      Drainage Characteristics/Odor  serosanguineous  -MF      Drainage Amount  scant  -MF      Care, Wound  irrigated with;sterile normal saline;ultrasound therapy, non contact low frequency MIST x 6 min   -MF      Periwound Care, Wound  barrier ointment applied  -MF      Recorded by [] Kee Dahl, PT 01/17/20 1325      Row Name 01/17/20 0945             Wound 01/15/20 1000 Left posterior thigh Pressure Injury    Wound - Properties Group Date first assessed: 01/15/20 [CP] Time first assessed: 1000 [CP] Present on Hospital Admission: Y [CP] Side: Left [CP] Orientation: posterior [CP] Location: thigh [CP] Primary Wound Type: Pressure inj [CP] Stage, Pressure Injury: deep tissue injury [CP] Recorded by:  [CP] Sheridan Day, KALIN 01/15/20 1159    Dressing Appearance  open to air  -MF      Base  non-blanchable  -MF      Periwound  intact;dry  -MF      Edges  irregular  -MF      Drainage Amount  none  -MF      Care, Wound  other (see comments)  -MF      Periwound Care, Wound  topical treatment applied  -MF      Recorded by [] Kee Dahl, PT 01/17/20 1325      Row Name 01/17/20 0945             Coping    Observed Emotional State  cooperative;calm  -MF      Verbalized Emotional State  acceptance  -MF      Recorded by [] Kee Dahl, PT 01/17/20 1325      Row Name 01/17/20 0945             Plan of Care Review    Plan of Care Reviewed With  patient  -MF      Recorded by [] Kee Dahl, PT 01/17/20 1325        User Key  (r) = Recorded By, (t) = Taken By, (c) = Cosigned By    Initials Name Effective Dates Discipline     Kee Dahl, PT 06/19/15 -  PT    CP Sheridan Day, APRN 07/24/19 -  Nurse    Travis Aaron RN 06/16/16 -  Nurse    Melissa Hunt RN 06/16/16 -  Nurse    Judie Madera RN 12/23/16 -  Nurse        Rehab Goal Summary     Row Name 01/17/20 1108 01/17/20 1010          Bed Mobility Goal 1 (PT)    Activity/Assistive  Device (Bed Mobility Goal 1, PT)  sit to supine;supine to sit  -KR  --     [place] Level/Cues Needed (Bed Mobility Goal 1, PT)  maximum assist (25-49% patient effort)  -KR  --     Time Frame (Bed Mobility Goal 1, PT)  2 weeks  -KR  --     Progress/Outcomes (Bed Mobility Goal 1, PT)  goal ongoing  -KR  --        Transfer Goal 1 (PT)    Activity/Assistive Device (Transfer Goal 1, PT)  sit-to-stand/stand-to-sit;bed-to-chair/chair-to-bed  -KR  --     [place] Level/Cues Needed (Transfer Goal 1, PT)  maximum assist (25-49% patient effort)  -KR  --     Time Frame (Transfer Goal 1, PT)  2 weeks  -KR  --     Progress/Outcome (Transfer Goal 1, PT)  goal ongoing  -KR  --        Occupational Therapy Goals    Transfer Goal Selection (OT)  --  transfer, OT goal 1  -CL     Grooming Goal Selection (OT)  --  grooming, OT goal 1  -CL     Strength Goal Selection (OT)  --  strength, OT goal 1  -CL     Balance Goal Selection (OT)  --  balance, OT goal 1  -CL        Transfer Goal 1 (OT)    Activity/Assistive Device (Transfer Goal 1, OT)  --  sit-to-stand/stand-to-sit;toilet  -CL     [place] Level/Cues Needed (Transfer Goal 1, OT)  --  maximum assist (25-49% patient effort);2 person assist;verbal cues required  -CL     Time Frame (Transfer Goal 1, OT)  --  long term goal (LTG);by discharge  -CL     Progress/Outcome (Transfer Goal 1, OT)  --  goal ongoing  -CL        Grooming Goal 1 (OT)    Activity/Device (Grooming Goal 1, OT)  --  hair care;wash face, hands  -CL     [place] (Grooming Goal 1, OT)  --  minimum assist (75% or more patient effort);verbal cues required  -CL     Time Frame (Grooming Goal 1, OT)  --  long term goal (LTG);by discharge  -CL     Progress/Outcome (Grooming Goal 1, OT)  --  goal ongoing  -CL        Strength Goal 1 (OT)    Strength Goal 1 (OT)  --  Pt will tolerate BUE AROM HEP x15 reps to promote ADL performance.   -CL     Time Frame (Strength Goal 1, OT)  --  long term goal (LTG);by discharge   -CL     Progress/Outcome (Strength Goal 1, OT)  --  goal ongoing  -CL        Balance Goal 1 (OT)    Activity/Assistive Device (Balance Goal 1, OT)  --  sitting, static  -CL     [place] Level/Cues Needed (Balance Goal 1, OT)  --  minimum assist (75% or more patient effort);verbal cues required  -CL     Time Frame (Balance Goal 1, OT)  --  long term goal (LTG);by discharge  -CL     Progress/Outcomes (Balance Goal 1, OT)  --  goal ongoing  -CL       User Key  (r) = Recorded By, (t) = Taken By, (c) = Cosigned By    Initials Name Provider Type Discipline    CL Kasandra Martinez, OT Occupational Therapist OT    Danni Doherty, PT Physical Therapist PT              PT Recommendation and Plan  Anticipated Discharge Disposition (PT): skilled nursing facility  Planned Therapy Interventions (PT Eval): balance training, bed mobility training, strengthening, transfer training  Therapy Frequency (PT Clinical Impression): 3 times/wk               Outcome Summary: All DTPI Areas stable from photo comparison.  PT will cont with mist therapy to help improve healing potential.  Plan of Care Reviewed With: patient    Outcome Measures     Row Name 01/17/20 1010             How much help from another is currently needed...    Putting on and taking off regular lower body clothing?  1  -CL      Bathing (including washing, rinsing, and drying)  1  -CL      Toileting (which includes using toilet bed pan or urinal)  1  -CL      Putting on and taking off regular upper body clothing  2  -CL      Taking care of personal grooming (such as brushing teeth)  2  -CL      Eating meals  3  -CL      AM-PAC 6 Clicks Score (OT)  10  -CL         Functional Assessment    Outcome Measure Options  AM-PAC 6 Clicks Daily Activity (OT)  -CL        User Key  (r) = Recorded By, (t) = Taken By, (c) = Cosigned By    Initials Name Provider Type    CL Kasandra Martinez OT Occupational Therapist              Time Calculation  PT Charges     Row Name 01/17/20 8268  01/17/20 0829          Time Calculation    Start Time  0945  -  0829  -KR     PT Received On  --  01/17/20  -KR     PT Goal Re-Cert Due Date  01/27/20  -  01/27/20  -KR        Time Calculation- PT    Total Timed Code Minutes- PT  45 minute(s)  -  --       User Key  (r) = Recorded By, (t) = Taken By, (c) = Cosigned By    Initials Name Provider Type    Kee Tse, PT Physical Therapist    Danni Doherty, PT Physical Therapist           Therapy Charges for Today     Code Description Service Date Service Provider Modifiers Qty    30565412236 HC PT NLFU MIST 1/17/2020 Kee Dahl, PT GP 1    [id number] HC PT THER PROC EA 15 MIN 1/17/2020 Kee Dahl, PT GP 1            PT G-Codes  Outcome Measure Options: AM-PAC 6 Clicks Basic Mobility (PT)  AM-PAC 6 Clicks Score (PT): 7  AM-PAC 6 Clicks Score (OT): 10        Kee Dahl, PT  1/17/2020

## 2020-01-17 NOTE — THERAPY EVALUATION
Acute Care - Occupational Therapy Initial Evaluation  [facility]     Patient Name: Julissa Kee  : 1950  MRN: [id number]  Today's Date: 2020     Date of Referral to OT: 20       Admit Date: 2020       ICD-10-CM ICD-9-CM   1. Hypoxia R09.02 799.02   2. Respiratory distress R06.03 786.09   3. Viral illness B34.9 079.99   4. Pneumonia of both lungs due to infectious organism, unspecified part of lung J18.9 483.8   5. Dysphagia, unspecified type R13.10 787.20     Patient Active Problem List   Diagnosis   • Anxiety   • Chronic obstructive pulmonary disease (CMS/HCC)   • Diabetes mellitus (CMS/HCC)   • Hyperlipidemia   • Hypertension   • Hypothyroidism   • Morbid obesity (CMS/HCC)   • Extreme obesity with alveolar hypoventilation (CMS/HCC)   • Renal insufficiency   • Sacral wound   • Recurrent pneumonia   • Human metapneumovirus (hMPV) pneumonia   • Hypoxia     Past Medical History:   Diagnosis Date   • Acute bronchitis    • Acute sinusitis    • Anxiety    • Bacterial conjunctivitis    • Chest pain    • COPD (chronic obstructive pulmonary disease) (CMS/HCC)    • Depression    • Diabetes mellitus (CMS/HCC)    • Dyspnea    • Dysuria    • Edema    • Elbow injury    • Elbow pain    • Eye drainage    • Fatigue    • Foot pain, right    • Humerus distal fracture    • Hyperlipidemia    • Hypertension    • Hypothyroidism    • Morbid obesity (CMS/HCC)    • Pickwickian syndrome (CMS/HCC)    • Pressure ulcer stage I    • Psoriasis    • SOB (shortness of breath)    • Symptoms of urinary tract infection    • Urinary frequency    • Urinary tract infection    • Vaginal candidiasis      Past Surgical History:   Procedure Laterality Date   • CERVICAL POLYPECTOMY     • HYSTERECTOMY            OT ASSESSMENT FLOWSHEET (last 12 hours)      Occupational Therapy Evaluation     Row Name 20 1010                   OT Evaluation Time/Intention    Subjective Information  complains of;weakness;fatigue;pain  -CL         Document Type  evaluation  -CL        Mode of Treatment  occupational therapy  -CL        Patient Effort  good  -CL        Symptoms Noted During/After Treatment  fatigue  -CL           General Information    Patient Profile Reviewed?  yes  -CL        Prior Level of Function  max assist:;all household mobility;transfer;ADL's;dressing;bathing  -CL        Equipment Currently Used at Home  commode, bedside;power chair, (recliner lift)  -CL        Existing Precautions/Restrictions  fall;oxygen therapy device and L/min  -CL        Limitations/Impairments  safety/cognitive  -CL        Risks Reviewed  patient:;LOB;nausea/vomiting;dizziness;increased discomfort;change in vital signs;lines disloged  -CL        Benefits Reviewed  patient:;improve function;increase independence;increase balance;increase strength;decrease pain;increase knowledge  -CL        Barriers to Rehab  medically complex;previous functional deficit  -CL           Relationship/Environment    Lives With  -- from SNF for rehab, pt poor historian  -CL           Cognitive Assessment/Interventions    Additional Documentation  Cognitive Assessment/Intervention (Group)  -CL           Cognitive Assessment/Intervention- PT/OT    Affect/Mental Status (Cognitive)  confused;anxious  -CL        Orientation Status (Cognition)  oriented to;person  -CL        Follows Commands (Cognition)  follows one step commands;over 90% accuracy;repetition of directions required;verbal cues/prompting required  -CL        Cognitive Function (Cognitive)  safety deficit  -CL        Safety Deficit (Cognitive)  mild deficit;awareness of need for assistance;safety precautions awareness  -CL           Bed Mobility Assessment/Treatment    Bed Mobility Assessment/Treatment  rolling left;rolling right  -CL        Rolling Left [place] (Bed Mobility)  maximum assist (25% patient effort);2 person assist;verbal cues  -CL        Rolling Right [place] (Bed Mobility)  maximum assist (25%  patient effort);2 person assist;verbal cues  -CL        Assistive Device (Bed Mobility)  bed rails;draw sheet;head of bed elevated  -CL           Transfer Assessment/Treatment    Comment (Transfers)  Deferred, awaiting josefina chair.   -CL           ADL Assessment/Intervention    72874 - OT Self Care/Mgmt Minutes  15  -CL        BADL Assessment/Intervention  toileting;grooming  -CL           Grooming Assessment/Training    [place] Level (Grooming)  oral care regimen;minimum assist (75% patient effort);verbal cues  -CL        Assistive Devices (Grooming)  suction brush  -CL        Grooming Position  sitting up in bed  -CL           Toileting Assessment/Training    [place] Level (Toileting)  adjust/manage clothing;perform perineal hygiene;change pad/brief;dependent (less than 25% patient effort);two person assist required;verbal cues  -CL        Toileting Position  supine  -CL           General ROM    GENERAL ROM COMMENTS  BUE grossly WFL.   -CL           MMT (Manual Muscle Testing)    General MMT Comments  BUE shldr F 3-/5, bicep/tricep 3+/5,  3+/5.   -CL           Motor Assessment/Interventions    Additional Documentation  Balance (Group);Therapeutic Exercise (Group)  -CL           Therapeutic Exercise    Therapeutic Exercise  seated, upper extremities  -CL        Additional Documentation  Therapeutic Exercise (Row)  -CL        08903 - OT Therapeutic Activity Minutes  10  -CL           Upper Extremity Seated Therapeutic Exercise    Performed, Seated Upper Extremity (Therapeutic Exercise)  shoulder flexion/extension;elbow flexion/extension;shoulder abduction/adduction;shoulder horizontal abduction/adduction;digit flexion/extension  -CL        Exercise Type, Seated Upper Extremity (Therapeutic Exercise)  AAROM (active assistive range of motion)  -CL        Sets/Reps Detail, Seated Upper Extremity (Therapeutic Exercise)  1/10  -CL        Comment, Seated Upper Extremity (Therapeutic Exercise)  BUE  -CL            Sensory Assessment/Intervention    Sensory General Assessment  no sensation deficits identified  -CL           Positioning and Restraints    Pre-Treatment Position  in bed  -CL        Post Treatment Position  bed  -CL        In Bed  notified nsg;fowlers;call light within reach;encouraged to call for assist;exit alarm on;RUE elevated;LUE elevated;side lying right;legs elevated;waffle boots/both  -CL           Pain Assessment    Additional Documentation  Pain Scale 2: FACES Pre/Post-Treatment (Group)  -CL           Pain Scale 2: FACES Pre/Post-Treatment    Pain 2: FACES Scale, Pretreatment  2-->hurts little bit  -CL        Pain 2: FACES Scale, Post-Treatment  2-->hurts little bit  -CL        Pain Location 2 - Orientation  generalized  -CL        Pre/Post Treatment Pain 2 Comment  tolerated  -CL        Pain Intervention(s) 2  Repositioned;Ambulation/increased activity  -CL           Wound 01/14/20 1908 Left anterior greater trochanter Extravasation    Wound - Properties Group Date first assessed: 01/14/20  -ZACHARIAH Time first assessed: 1908  -ZACHARIAH Present on Hospital Admission: Y  -BR Side: Left  -ZACHARIAH Orientation: anterior  -ZACHARIAH Location: greater trochanter  -ZACHARIAH Primary Wound Type: Extravasatio  -ZACHARIAH       Wound 01/14/20 1909 Right anterior greater trochanter Extravasation    Wound - Properties Group Date first assessed: 01/14/20  -ZACHARIAH Time first assessed: 1909  -ZACHARIAH Present on Hospital Admission: Y  -BR Side: Right  -ZACHARIAH Orientation: anterior  -ZACHARIAH Location: greater trochanter  -ZACHARIAH Primary Wound Type: Extravasatio  -ZACHARIAH       Wound 01/14/20 1910 midline coccyx Pressure Injury    Wound - Properties Group Date first assessed: 01/14/20  -ZACHARIAH Time first assessed: 1910  -ZACHARIAH Present on Hospital Admission: Y  -BR Orientation: midline  -CP Location: coccyx  -ZACHARIAH Primary Wound Type: Pressure inj  -ZACHARIAH Stage, Pressure Injury: Stage 2  -CP       Wound 01/14/20 1912 Right anterior foot Other (comment)    Wound - Properties Group Date first  assessed: 01/14/20  -ZACHARIAH Time first assessed: 1912  -ZACHARIAH Side: Right  -ZACHARIAH Orientation: anterior  -ZACHARIAH Location: foot  -ZACHARIAH Primary Wound Type: Other  -ZACHARIAH       Wound 01/14/20 2011 Left anterior toe    Wound - Properties Group Date first assessed: 01/14/20  -BR Time first assessed: 2011  -BR Present on Hospital Admission: Y  -BR Side: Left  -BR Orientation: anterior  -BR Location: toe  -BR, 1st toe  Stage, Pressure Injury: deep tissue injury  -BR       Wound 12/28/19 2155 Left medial gluteal Other (comment)    Wound - Properties Group Date first assessed: 12/28/19  -ZONIA Time first assessed: 2155  -ZONIA Present on Hospital Admission: Y  -ZONIA Side: Left  -ZONIA Orientation: medial  -ZONIA Location: gluteal  -ZONIA Primary Wound Type: Other  -ZONIA, 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        Wound 12/28/19 2251 Bilateral other (see notes) MASD ([place] associated skin damage)    Wound - Properties Group Date first assessed: 12/28/19  -ZONIA Time first assessed: 2251  -ZONIA Present on Hospital Admission: Y  -ZONIA Side: Bilateral  -ZONIA Location: other (see notes)  -ZONIA, under [place]  Primary Wound Type: MASD  -ZONIA, yeast dermatitis  Additional Comments: shearing/hypergranulation  -CP       Wound 01/15/20 1000 Left medial thigh Pressure Injury    Wound - Properties Group Date first assessed: 01/15/20  -CP Time first assessed: 1000  -CP Present on Hospital Admission: Y  -CP Side: Left  -CP Orientation: medial  -CP Location: thigh  -CP Primary Wound Type: Pressure inj  -CP Stage, Pressure Injury: deep tissue injury  -CP       Wound 01/15/20 1000 Right medial thigh Pressure Injury    Wound - Properties Group Date first assessed: 01/15/20  -CP Time first assessed: 1000  -CP Present on Hospital Admission: Y  -CP Side: Right  -CP Orientation: medial  -CP Location: thigh  -CP Primary Wound Type: Pressure inj  -CP Stage, Pressure Injury: deep tissue injury  -CP       Wound 01/15/20 1000 Left posterior thigh Pressure Injury     Wound - Properties Group Date first assessed: 01/15/20  -CP Time first assessed: 1000  -CP Present on Hospital Admission: Y  -CP Side: Left  -CP Orientation: posterior  -CP Location: thigh  -CP Primary Wound Type: Pressure inj  -CP Stage, Pressure Injury: deep tissue injury  -CP       Clinical Impression (OT)    Date of Referral to OT  01/16/20  -CL        OT Diagnosis  Decreased independence in ADLs.   -CL        Patient/Family Goals Statement (OT Eval)  Return to PLOF.   -CL        Criteria for Skilled Therapeutic Interventions Met (OT Eval)  yes;treatment indicated  -CL        Rehab Potential (OT Eval)  good, to achieve stated therapy goals  -CL        Therapy Frequency (OT Eval)  3 times/wk  -CL        Anticipated Equipment Needs at Discharge (OT)  -- TBA further  -CL        Anticipated Discharge Disposition (OT)  skilled nursing facility  -CL           Vital Signs    Pre Systolic BP Rehab  -- VSS  -CL           OT Goals    Transfer Goal Selection (OT)  transfer, OT goal 1  -CL        Grooming Goal Selection (OT)  grooming, OT goal 1  -CL        Strength Goal Selection (OT)  strength, OT goal 1  -CL        Balance Goal Selection (OT)  balance, OT goal 1  -CL        Additional Documentation  Balance Goal Selection (OT) (Row);Strength Goal Selection (OT) (Row);Grooming Goal Selection (OT) (Row)  -CL           Transfer Goal 1 (OT)    Activity/Assistive Device (Transfer Goal 1, OT)  sit-to-stand/stand-to-sit;toilet  -CL        [place] Level/Cues Needed (Transfer Goal 1, OT)  maximum assist (25-49% patient effort);2 person assist;verbal cues required  -CL        Time Frame (Transfer Goal 1, OT)  long term goal (LTG);by discharge  -CL        Progress/Outcome (Transfer Goal 1, OT)  goal ongoing  -CL           Grooming Goal 1 (OT)    Activity/Device (Grooming Goal 1, OT)  hair care;wash face, hands  -CL        [place] (Grooming Goal 1, OT)  minimum assist (75% or more patient effort);verbal cues required  -CL         Time Frame (Grooming Goal 1, OT)  long term goal (LTG);by discharge  -CL        Progress/Outcome (Grooming Goal 1, OT)  goal ongoing  -CL           Strength Goal 1 (OT)    Strength Goal 1 (OT)  Pt will tolerate BUE AROM HEP x15 reps to promote ADL performance.   -CL        Time Frame (Strength Goal 1, OT)  long term goal (LTG);by discharge  -CL        Progress/Outcome (Strength Goal 1, OT)  goal ongoing  -CL           Balance Goal 1 (OT)    Activity/Assistive Device (Balance Goal 1, OT)  sitting, static  -CL        [place] Level/Cues Needed (Balance Goal 1, OT)  minimum assist (75% or more patient effort);verbal cues required  -CL        Time Frame (Balance Goal 1, OT)  long term goal (LTG);by discharge  -CL        Progress/Outcomes (Balance Goal 1, OT)  goal ongoing  -CL          User Key  (r) = Recorded By, (t) = Taken By, (c) = Cosigned By    Initials Name Effective Dates    Sheridan Dumas APRN 07/24/19 -     Travis Aaron RN 06/16/16 -     CL Kasandra Martinez, OT 04/03/18 -     Melissa Hunt RN 06/16/16 -     Judie Madera RN 12/23/16 -                OT Recommendation and Plan  Outcome Summary/Treatment Plan (OT)  Anticipated Equipment Needs at Discharge (OT): (TBA further)  Anticipated Discharge Disposition (OT): skilled nursing facility  Therapy Frequency (OT Eval): 3 times/wk  Plan of Care Review  Plan of Care Reviewed With: patient  Plan of Care Reviewed With: patient  Outcome Summary: OT completed a brief chart review. Pt Max Ax2 for bed mobility and Dep for all LB ADLs. Pt limited d/t significant generalized weakness and anxiety though responds well to encouragement. Pt educated on HEP and encouraged to perform outside of treatment session. Recommend cont skilled IPOT POC. Recommend pt DC to SNF.    Outcome Measures     Row Name 01/17/20 1010             How much help from another is currently needed...    Putting on and taking off regular lower body clothing?  1  -CL       Bathing (including washing, rinsing, and drying)  1  -CL      Toileting (which includes using toilet bed pan or urinal)  1  -CL      Putting on and taking off regular upper body clothing  2  -CL      Taking care of personal grooming (such as brushing teeth)  2  -CL      Eating meals  3  -CL      AM-PAC 6 Clicks Score (OT)  10  -CL         Functional Assessment    Outcome Measure Options  AM-PAC 6 Clicks Daily Activity (OT)  -CL        User Key  (r) = Recorded By, (t) = Taken By, (c) = Cosigned By    Initials Name Provider Type    Kasandra Roberson OT Occupational Therapist          Time Calculation:   Time Calculation- OT     Row Name 01/17/20 1010             Time Calculation- OT    OT Start Time  1010  -CL      OT Received On  01/17/20  -CL      OT Goal Re-Cert Due Date  01/27/20  -CL         Timed Charges    93426 - OT Therapeutic Activity Minutes  10  -CL      67103 - OT Self Care/Mgmt Minutes  15  -CL        User Key  (r) = Recorded By, (t) = Taken By, (c) = Cosigned By    Initials Name Provider Type    Kasandra Roberson OT Occupational Therapist        Therapy Charges for Today     Code Description Service Date Service Provider Modifiers Qty    83710427734 HC OT THERAPEUTIC ACT EA 15 MIN 1/17/2020 Kasandra Martinez OT GO 1    21377774215 HC OT SELF CARE/MGMT/TRAIN EA 15 MIN 1/17/2020 Kasandra Martinez OT GO 1    85379077256 HC OT EVAL LOW COMPLEXITY 3 1/17/2020 Kasandra Martinez OT GO 1    74395323893 HC OT THER SUPP EA 15 MIN 1/17/2020 Kasandra Martinez OT GO 3               Kasandra Martinez OT  1/17/2020

## 2020-01-17 NOTE — PLAN OF CARE
Pt appears to be resting more comfortably tonight.  Tolerating bipap well after premedicating with 0.25 mg xanax.  Continues to complain of pain with movement.  Self care encouraged.

## 2020-01-17 NOTE — PROGRESS NOTES
Clinical Nutrition     Multidisciplinary Rounds      Patient Name: Julissa Kee  Date of Encounter: 01/17/20 10:07 AM  MRN: [id number]  Admission date: 1/14/2020    DYAN. SWETHA to continue to follow per protocol.       Current diet: Diet Soft Texture; Chopped; Cardiac, Consistent Carbohydrate  Premier Protein x3    Intervention:  Follow treatment plan  Care plan reviewed    Follow up:   Per protocol      Delisa Bryant RD  10:07 AM  Time: 10min

## 2020-01-17 NOTE — PROGRESS NOTES
Continued Stay Note  [facility]     Patient Name: Julissa Kee  MRN: [id number]  Today's Date: 1/17/2020    Admit Date: 1/14/2020    Discharge Plan     Row Name 01/17/20 1009       Plan    Plan  Update    Plan Comments  Met with pt and spouse , they do not want to go back to [facility] they now want to transfer to [facility] or [facility]. I will call and make referral to both. Pt will need ambulance to transport. CM will follow.    Sadie at [facility] is going to start precert with pt's insurance today.    Final Discharge Disposition Code  03 - skilled nursing facility (SNF)        Discharge Codes    No documentation.       Expected Discharge Date and Time     Expected Discharge Date Expected Discharge Time    Jan 17, 2020             Mary Garcia RN

## 2020-01-18 LAB
ANION GAP SERPL CALCULATED.3IONS-SCNC: 7 MMOL/L (ref 5–15)
BASOPHILS # BLD AUTO: 0.01 10*3/MM3 (ref 0–0.2)
BASOPHILS NFR BLD AUTO: 0.2 % (ref 0–1.5)
BUN BLD-MCNC: 41 MG/DL (ref 8–23)
BUN/CREAT SERPL: 74.5 (ref 7–25)
CALCIUM SPEC-SCNC: 10 MG/DL (ref 8.6–10.5)
CHLORIDE SERPL-SCNC: 96 MMOL/L (ref 98–107)
CO2 SERPL-SCNC: 40 MMOL/L (ref 22–29)
CREAT BLD-MCNC: 0.55 MG/DL (ref 0.57–1)
DEPRECATED RDW RBC AUTO: 57.6 FL (ref 37–54)
EOSINOPHIL # BLD AUTO: 0.04 10*3/MM3 (ref 0–0.4)
EOSINOPHIL NFR BLD AUTO: 0.7 % (ref 0.3–6.2)
ERYTHROCYTE [DISTWIDTH] IN BLOOD BY AUTOMATED COUNT: 13.6 % (ref 12.3–15.4)
GFR SERPL CREATININE-BSD FRML MDRD: 109 ML/MIN/1.73
GLUCOSE BLD-MCNC: 128 MG/DL (ref 65–99)
GLUCOSE BLDC GLUCOMTR-MCNC: 119 MG/DL (ref 70–130)
GLUCOSE BLDC GLUCOMTR-MCNC: 149 MG/DL (ref 70–130)
GLUCOSE BLDC GLUCOMTR-MCNC: 158 MG/DL (ref 70–130)
GLUCOSE BLDC GLUCOMTR-MCNC: 193 MG/DL (ref 70–130)
HCT VFR BLD AUTO: 31.3 % (ref 34–46.6)
HGB BLD-MCNC: 9.7 G/DL (ref 12–15.9)
IMM GRANULOCYTES # BLD AUTO: 0.04 10*3/MM3 (ref 0–0.05)
IMM GRANULOCYTES NFR BLD AUTO: 0.7 % (ref 0–0.5)
LYMPHOCYTES # BLD AUTO: 1.22 10*3/MM3 (ref 0.7–3.1)
LYMPHOCYTES NFR BLD AUTO: 21.9 % (ref 19.6–45.3)
MAGNESIUM SERPL-MCNC: 2.2 MG/DL (ref 1.6–2.4)
MCH RBC QN AUTO: 35 PG (ref 26.6–33)
MCHC RBC AUTO-ENTMCNC: 31 G/DL (ref 31.5–35.7)
MCV RBC AUTO: 113 FL (ref 79–97)
MONOCYTES # BLD AUTO: 0.37 10*3/MM3 (ref 0.1–0.9)
MONOCYTES NFR BLD AUTO: 6.7 % (ref 5–12)
NEUTROPHILS # BLD AUTO: 3.88 10*3/MM3 (ref 1.7–7)
NEUTROPHILS NFR BLD AUTO: 69.8 % (ref 42.7–76)
NRBC BLD AUTO-RTO: 0 /100 WBC (ref 0–0.2)
PHOSPHATE SERPL-MCNC: 2.8 MG/DL (ref 2.5–4.5)
PLATELET # BLD AUTO: 146 10*3/MM3 (ref 140–450)
PMV BLD AUTO: 10.8 FL (ref 6–12)
POTASSIUM BLD-SCNC: 4.3 MMOL/L (ref 3.5–5.2)
RBC # BLD AUTO: 2.77 10*6/MM3 (ref 3.77–5.28)
SODIUM BLD-SCNC: 143 MMOL/L (ref 136–145)
WBC NRBC COR # BLD: 5.56 10*3/MM3 (ref 3.4–10.8)

## 2020-01-18 PROCEDURE — 80048 BASIC METABOLIC PNL TOTAL CA: CPT | Performed by: INTERNAL MEDICINE

## 2020-01-18 PROCEDURE — 25010000002 METHYLPREDNISOLONE PER 40 MG: Performed by: INTERNAL MEDICINE

## 2020-01-18 PROCEDURE — 84100 ASSAY OF PHOSPHORUS: CPT | Performed by: INTERNAL MEDICINE

## 2020-01-18 PROCEDURE — 94660 CPAP INITIATION&MGMT: CPT

## 2020-01-18 PROCEDURE — 94799 UNLISTED PULMONARY SVC/PX: CPT

## 2020-01-18 PROCEDURE — 99232 SBSQ HOSP IP/OBS MODERATE 35: CPT | Performed by: FAMILY MEDICINE

## 2020-01-18 PROCEDURE — 82962 GLUCOSE BLOOD TEST: CPT

## 2020-01-18 PROCEDURE — 83735 ASSAY OF MAGNESIUM: CPT | Performed by: INTERNAL MEDICINE

## 2020-01-18 PROCEDURE — 25010000002 ENOXAPARIN PER 10 MG: Performed by: INTERNAL MEDICINE

## 2020-01-18 PROCEDURE — 85025 COMPLETE CBC W/AUTO DIFF WBC: CPT | Performed by: INTERNAL MEDICINE

## 2020-01-18 RX ORDER — GUAIFENESIN 600 MG/1
600 TABLET, EXTENDED RELEASE ORAL EVERY 12 HOURS SCHEDULED
Status: DISCONTINUED | OUTPATIENT
Start: 2020-01-18 | End: 2020-01-21 | Stop reason: HOSPADM

## 2020-01-18 RX ADMIN — GUAIFENESIN 600 MG: 600 TABLET, EXTENDED RELEASE ORAL at 22:16

## 2020-01-18 RX ADMIN — LEVOTHYROXINE SODIUM 100 MCG: 100 TABLET ORAL at 05:44

## 2020-01-18 RX ADMIN — INSULIN LISPRO 2 UNITS: 100 INJECTION, SOLUTION INTRAVENOUS; SUBCUTANEOUS at 11:47

## 2020-01-18 RX ADMIN — MICONAZOLE NITRATE: 20 POWDER TOPICAL at 21:02

## 2020-01-18 RX ADMIN — ATORVASTATIN CALCIUM 10 MG: 10 TABLET, FILM COATED ORAL at 08:45

## 2020-01-18 RX ADMIN — MELATONIN TAB 5 MG 5 MG: 5 TAB at 21:02

## 2020-01-18 RX ADMIN — ASPIRIN 81 MG: 81 TABLET, COATED ORAL at 08:45

## 2020-01-18 RX ADMIN — METOPROLOL SUCCINATE 25 MG: 25 TABLET, EXTENDED RELEASE ORAL at 08:52

## 2020-01-18 RX ADMIN — Medication 1 TABLET: at 08:45

## 2020-01-18 RX ADMIN — SERTRALINE HYDROCHLORIDE 50 MG: 50 TABLET ORAL at 08:45

## 2020-01-18 RX ADMIN — SODIUM CHLORIDE, PRESERVATIVE FREE 10 ML: 5 INJECTION INTRAVENOUS at 21:04

## 2020-01-18 RX ADMIN — INSULIN LISPRO 2 UNITS: 100 INJECTION, SOLUTION INTRAVENOUS; SUBCUTANEOUS at 17:00

## 2020-01-18 RX ADMIN — SODIUM CHLORIDE, PRESERVATIVE FREE 10 ML: 5 INJECTION INTRAVENOUS at 08:52

## 2020-01-18 RX ADMIN — MICONAZOLE NITRATE: 20 POWDER TOPICAL at 08:52

## 2020-01-18 RX ADMIN — ENOXAPARIN SODIUM 40 MG: 40 INJECTION SUBCUTANEOUS at 17:00

## 2020-01-18 RX ADMIN — SENNOSIDES AND DOCUSATE SODIUM 2 TABLET: 8.6; 5 TABLET ORAL at 08:44

## 2020-01-18 RX ADMIN — IPRATROPIUM BROMIDE AND ALBUTEROL SULFATE 3 ML: 2.5; .5 SOLUTION RESPIRATORY (INHALATION) at 08:32

## 2020-01-18 RX ADMIN — CASTOR OIL AND BALSAM, PERU: 788; 87 OINTMENT TOPICAL at 08:52

## 2020-01-18 RX ADMIN — ENOXAPARIN SODIUM 40 MG: 40 INJECTION SUBCUTANEOUS at 05:44

## 2020-01-18 RX ADMIN — PANTOPRAZOLE SODIUM 40 MG: 40 TABLET, DELAYED RELEASE ORAL at 05:44

## 2020-01-18 RX ADMIN — TERAZOSIN HYDROCHLORIDE 2 MG: 2 CAPSULE ORAL at 21:02

## 2020-01-18 RX ADMIN — IPRATROPIUM BROMIDE AND ALBUTEROL SULFATE 3 ML: 2.5; .5 SOLUTION RESPIRATORY (INHALATION) at 17:07

## 2020-01-18 RX ADMIN — ALPRAZOLAM 0.25 MG: 0.25 TABLET ORAL at 21:02

## 2020-01-18 RX ADMIN — METHYLPREDNISOLONE SODIUM SUCCINATE 40 MG: 40 INJECTION, POWDER, FOR SOLUTION INTRAMUSCULAR; INTRAVENOUS at 08:44

## 2020-01-18 RX ADMIN — CASTOR OIL AND BALSAM, PERU: 788; 87 OINTMENT TOPICAL at 21:03

## 2020-01-18 RX ADMIN — IPRATROPIUM BROMIDE AND ALBUTEROL SULFATE 3 ML: 2.5; .5 SOLUTION RESPIRATORY (INHALATION) at 20:26

## 2020-01-18 NOTE — PLAN OF CARE
Problem: Patient Care Overview  Goal: Plan of Care Review  Outcome: Ongoing (interventions implemented as appropriate)  Flowsheets (Taken 1/18/2020 0317)  Progress: improving  Plan of Care Reviewed With: patient  Outcome Summary: VSS, 3L NC. Pt did not want to wear bipap toinght, O2 93-95% with NC. Rested well, no complaints. Will continue to monitor.

## 2020-01-18 NOTE — PROGRESS NOTES
[facility] Medicine Services  PROGRESS NOTE    Patient Name: Julissa Kee  : 1950  MRN: [id number]    Date of Admission: 2020  Primary Care Physician: Kee Hoffman MD    Subjective   Subjective     CC:  Pneumonia    HPI:  Patient is a 69-year-old who was admitted initially to the ICU for acute respiratory failure secondary to Metapneumo viral pneumonia.  She is followed by Dr. Randolph with infectious disease.    She was transferred to the floor on 2020.    2020-patient reports she is feeling a little better with respect to her breathing.  She has poor respiratory effort.  She is morbidly obese and has been in the bed for several days.  She is not able to get up and move around on her own.  She is tolerating p.o. without nausea or vomiting.    Review of Systems  General: No fever, chills, positive fatigue  ENT: No sore throat, trouble swallowing or changes in vision  Respiratory: Positive shortness of breath, cough, occasional wheezing, denies  fast breathing  Cardiovascular: No chest pain, palpitations, positive dyspnea with exertion  Gastrointestinal: No nausea vomiting abdominal pain  Musculoskeletal: Positive difficulty walking, positive weakness  Vascular: No cyanosis or clubbing  Neurologic: No headache, confusion, positive dizziness  Psychiatric: Positive depression and anxiety.      Objective   Objective     Vital Signs:   Temp:  [97.6 °F (36.4 °C)-98.4 °F (36.9 °C)] 97.7 °F (36.5 °C)  Heart Rate:  [59-82] 62  Resp:  [18-20] 18  BP: (132-171)/(56-83) 160/63        Physical Exam:  Constitutional: No acute distress, awake, alert, nontoxic, morbidly obese body habitus  Eyes: Pupils equal, sclerae anicteric, no conjunctival injection  HENT: NCAT, mucous membranes moist  Neck: Supple, no thyromegaly, no lymphadenopathy  Respiratory: Decreased breath sounds bilaterally, poor effort, nonlabored respirations   Cardiovascular: RRR  Gastrointestinal: Positive  bowel sounds, soft, nontender, morbidly obese   Musculoskeletal: Trace bilateral lower extremity peripheral edema, generally weak, unable to get out of bed independently  Psychiatric: Flat affect, poor insight and judgement, cooperative  Neurologic: Oriented x 3, movements symmetric BUE and BLE, Cranial Nerves grossly intact, speech clear and fluent      Results Reviewed:  Results from last 7 days   Lab Units 01/18/20 0448 01/17/20  0511 01/16/20  0442  01/15/20  0519   WBC 10*3/mm3 5.56 8.39 5.43   < >  --    HEMOGLOBIN g/dL 9.7* 10.0* 10.7*   < >  --    HEMATOCRIT % 31.3* 32.6* 34.3   < >  --    PLATELETS 10*3/mm3 146 159 159   < >  --    PROCALCITONIN ng/mL  --   --  0.10  --  0.12    < > = values in this interval not displayed.     Results from last 7 days   Lab Units 01/18/20 0448 01/17/20  0511 01/16/20  0442 01/15/20  0519 01/14/20  1509 01/14/20  1038   SODIUM mmol/L 143 142 138 140  --  140   POTASSIUM mmol/L 4.3 4.4 4.9 4.8  --  4.2   CHLORIDE mmol/L 96* 96* 94* 96*  --  94*   CO2 mmol/L 40.0* 36.0* 28.0 31.0*  --  39.0*   BUN mg/dL 41* 38* 31* 25*  --  21   CREATININE mg/dL 0.55* 0.67 0.76 0.86  --  0.89   GLUCOSE mg/dL 128* 193* 119* 86  --  104*   CALCIUM mg/dL 10.0 9.8 9.8 9.2  --  9.8   ALT (SGPT) U/L  --  15 14 16  --  16   AST (SGOT) U/L  --  15 23 21  --  19   TROPONIN T ng/mL  --   --   --   --  0.015 0.023   PROBNP pg/mL  --   --  645.0  --   --  270.3     Estimated Creatinine Clearance: 89 mL/min (A) (by C-G formula based on SCr of 0.55 mg/dL (L)).    Microbiology Results Abnormal     Procedure Component Value - Date/Time    Blood Culture - Blood, Arm, Right [[id number]] Collected:  01/14/20 1039    Lab Status:  Preliminary result Specimen:  Blood from Arm, Right Updated:  01/17/20 1130     Blood Culture No growth at 3 days    Blood Culture - Blood, Arm, Left [[id number]] Collected:  01/14/20 1039    Lab Status:  Preliminary result Specimen:  Blood from Arm, Left Updated:  01/17/20 1133      Blood Culture No growth at 3 days    Respiratory Panel, PCR - Swab, Nasopharynx [[id number]]  (Abnormal) Collected:  01/14/20 1039    Lab Status:  Final result Specimen:  Swab from Nasopharynx Updated:  01/14/20 1248     ADENOVIRUS, PCR Not Detected     Coronavirus 229E Not Detected     Coronavirus HKU1 Not Detected     Coronavirus NL63 Not Detected     Coronavirus OC43 Not Detected     Human Metapneumovirus Detected     Human Rhinovirus/Enterovirus Not Detected     Influenza B PCR Not Detected     Parainfluenza Virus 1 Not Detected     Parainfluenza Virus 2 Not Detected     Parainfluenza Virus 3 Not Detected     Parainfluenza Virus 4 Not Detected     Bordetella pertussis pcr Not Detected     Influenza A H1 2009 PCR Not Detected     Chlamydophila pneumoniae PCR Not Detected     Mycoplasma pneumo by PCR Not Detected     Influenza A PCR Not Detected     Influenza A H3 Not Detected     Influenza A H1 Not Detected     RSV, PCR Not Detected     Bordetella parapertussis PCR Not Detected          Imaging Results (Last 24 Hours)     Procedure Component Value Units Date/Time    XR Chest 1 View [[id number]] Collected:  01/17/20 0811     Updated:  01/17/20 1635    Narrative:       EXAMINATION: XR CHEST 1 VW-01/17/2020:      INDICATION: Respiratory failure; R09.02-Hypoxemia; R06.03-Acute  respiratory distress; B34.9-Viral infection, unspecified;  J18.9-Pneumonia, unspecified organism; R13.10-Dysphagia, unspecified.      COMPARISON: Chest radiograph 01/15/2020.     FINDINGS: The heart is top normal in size, similar to prior. Hazy left  lower lobe airspace changes with likely a trace left pleural effusion  identified. The right lung appears clear. No acute osseous abnormality.  The visualized upper abdomen is unrevealing.           Impression:       1.  Similar examination when compared to yesterday's study with mild  left lower lobe airspace disease and likely a trace left pleural  effusion.     2.  Similar cardiomegaly.     D:   01/17/2020  E:  01/17/2020     This report was finalized on 1/17/2020 4:32 PM by Dr. Ivan Miller MD.             Results for orders placed during the hospital encounter of 12/28/19   Adult Transthoracic Echo Complete W/ Cont if Necessary Per Protocol    Narrative · Left ventricular systolic function is normal. Estimated EF appears to be   in the range of 61 - 65%.  · Left ventricular wall thickness is consistent with moderate concentric   hypertrophy.  · Normal right ventricular wall thickness, systolic function and septal   motion noted. Right ventricular cavity is borderline dilated.  · Trace tricuspid valve regurgitation is present. Estimated right   ventricular systolic pressure from tricuspid regurgitation is normal (<35   mmHg)  · The valve appears trileaflet. The aortic valve is abnormal in structure.   There is mild calcification of the aortic valve.Trace aortic valve   regurgitation is present. No hemodynamically significant aortic valve   stenosis is present.          I have reviewed the medications:  Scheduled Meds:    aspirin 81 mg Oral Daily   atorvastatin 10 mg Oral Daily   castor oil-balsam peru  Topical Q12H   enoxaparin 40 mg Subcutaneous Q12H   folic acid-vit B6-vit B12 1 tablet Oral Daily   insulin lispro 0-7 Units Subcutaneous 4x Daily With Meals & Nightly   ipratropium-albuterol 3 mL Nebulization 4x Daily - RT   levothyroxine 100 mcg Oral Q AM   melatonin 5 mg Oral Nightly   methylPREDNISolone sodium succinate 40 mg Intravenous Daily   metoprolol succinate XL 25 mg Oral Q24H   miconazole  Topical Q12H   pantoprazole 40 mg Oral Q AM   pharmacy consult - MTM  Does not apply Daily   polyethylene glycol 17 g Oral BID   sennosides-docusate 2 tablet Oral BID   sertraline 50 mg Oral Daily   sodium chloride 10 mL Intravenous Q12H   terazosin 2 mg Oral Nightly     Continuous Infusions:   PRN Meds:.•  acetaminophen **OR** acetaminophen **OR** acetaminophen  •  ALPRAZolam  •  dextrose  •  dextrose  •   glucagon (human recombinant)  •  hydrALAZINE  •  ipratropium-albuterol  •  magic mouthwash  •  magnesium sulfate **OR** magnesium sulfate **OR** magnesium sulfate  •  ondansetron  •  potassium & sodium phosphates **OR** potassium & sodium phosphates  •  sodium chloride    Assessment/Plan   Assessment & Plan     Active Hospital Problems    Diagnosis  POA   • **Acute respiratory failure with hypoxia (CMS/HCC) [J96.01]  Yes   • Recurrent pneumonia [J18.9]  Yes   • Human metapneumovirus (hMPV) pneumonia [J12.3]  Yes   • Hypoxia [R09.02]  Yes   • Sacral wound [S31.000A]  Yes   • Chronic obstructive pulmonary disease (CMS/HCC) [J44.9]  Yes   • Diabetes mellitus (CMS/HCC) [E11.9]  Yes   • Hyperlipidemia [E78.5]  Yes   • Hypertension [I10]  Yes   • Hypothyroidism [E03.9]  Yes   • 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        Brief Hospital Course to date:  Julissa Kee is a 69 y.o. female admitted with acute respiratory failure secondary to Metapneumo viral pneumonia.    Acute respiratory failure with hypercapnia and hypoxia secondary to Metapneumo viral pneumonia  -Followed by infectious disease, continuing supportive treatment  -Monitoring off antibiotics and clinically improving    Recent bacterial pneumonia status post completion of treatment with antibiotics    Morbid obesity  -Complicates all aspects of her care and increases her risk of mortality    General decline and physical debility  -PT and OT consults  -Will be referred for skilled nursing facility for rehab  -Goal would be to return home with her   -Encouraged mobilization    Sacral wound    Diabetes mellitus    Hypertension    Hyperlipidemia    COPD with chronic respiratory failure with hypercapnia and hypoxia  -She required BiPAP initially in the ICU    DVT Prophylaxis: Lovenox    CODE STATUS:   Code Status and Medical Interventions:   Ordered at:  01/15/20 1922     Limited Support to NOT Include:    Intubation    Cardioversion/Defibrillation     Level Of Support Discussed With:    Patient     Code Status:    No CPR     Medical Interventions (Level of Support Prior to Arrest):    Limited         Electronically signed by Jerica Carter MD, 01/18/20, 8:42 AM.

## 2020-01-18 NOTE — PROGRESS NOTES
INFECTIOUS DISEASE PROGRESS NOTE     Julissa Kee  1950  [id number]      Admission Date: 1/14/2020      Requesting Provider: No ref. provider found  Evaluating Physician: Austen Redding MD    Reason for Consultation: Pneumonia    History of present illness:  1/15/20:   Patient is a 69 y.o. female , with COPD, Diabetes mellitus, SARAH BETH, seen today for Metapneumoviral pneumonia.  She was discharged from this facility 1/8 off antibiotics after being treated for right middle lobe pneumonia. She has been experiencing worsening hypoxia prompting return to ED.  Chest xray with pulmonary vascular congestion.Oxygen saturations in 70's on admission.  She has been afebrile, without leukocytosis.  She is currently on Vancomycin and Azactam and we were consulted for evaluation and treatment.  She developed worsening respiratory compromise and required transfer to the unit and a BiPAP mask.  1/16/20:  She was given Xanax due to anxiety and now resting.  02 per NC.    1/17/2020: She has decreased dyspnea.  She has no increased respiratory secretions.  She has remained afebrile.  She has no new complaints today.  1/18/2020: She denies increased cough and dyspnea.  She has remained afebrile.  She has no new complaints today.    Past Medical History:   Diagnosis Date   • Acute bronchitis    • Acute sinusitis    • Anxiety    • Bacterial conjunctivitis    • Chest pain    • COPD (chronic obstructive pulmonary disease) (CMS/[facility])    • Depression    • Diabetes mellitus (CMS/HCC)    • Dyspnea    • Dysuria    • Edema    • Elbow injury    • Elbow pain    • Eye drainage    • Fatigue    • Foot pain, right    • Humerus distal fracture    • Hyperlipidemia    • Hypertension    • Hypothyroidism    • Morbid obesity (CMS/HCC)    • Pickwickian syndrome (CMS/HCC)    • Pressure ulcer stage I    • Psoriasis    • SOB (shortness of breath)    • Symptoms of urinary tract infection    • Urinary frequency    • Urinary tract infection    • Vaginal  candidiasis        Past Surgical History:   Procedure Laterality Date   • CERVICAL POLYPECTOMY     • HYSTERECTOMY         Family History   Problem Relation Age of Onset   • Diabetes Father    • Diabetes Sister    • Pancreatic cancer Sister        Social History     Socioeconomic History   • Marital status:      Spouse name: Not on file   • Number of children: Not on file   • Years of education: Not on file   • Highest education level: Not on file   Tobacco Use   • Smoking status: Former Smoker     Types: Cigarettes     Last attempt to quit: 2006     Years since quittin.0   • Smokeless tobacco: Never Used   Substance and Sexual Activity   • Alcohol use: No     Frequency: Never   • Drug use: Never   • Sexual activity: Defer       Allergies   Allergen Reactions   • Amoxicillin-Pot Clavulanate Other (See Comments)     HEADACHE - has tolerated ceftriaxone 2019   • Bactrim [Sulfamethoxazole-Trimethoprim] Other (See Comments)     .         Medication:    Current Facility-Administered Medications:   •  acetaminophen (TYLENOL) tablet 650 mg, 650 mg, Oral, Q4H PRN, 650 mg at 20 0000 **OR** acetaminophen (TYLENOL) 160 MG/5ML solution 650 mg, 650 mg, Oral, Q4H PRN **OR** acetaminophen (TYLENOL) suppository 650 mg, 650 mg, Rectal, Q4H PRN, Tho Quintana MD  •  ALPRAZolam (XANAX) tablet 0.25 mg, 0.25 mg, Oral, Nightly PRN, Tho Quintana MD, 0.25 mg at 20  •  aspirin EC tablet 81 mg, 81 mg, Oral, Daily, Tho Quintana MD, 81 mg at 20 0910  •  atorvastatin (LIPITOR) tablet 10 mg, 10 mg, Oral, Daily, Tho Quintana MD, 10 mg at 20 0910  •  castor oil-balsam peru (VENELEX) ointment, , Topical, Q12H, Tho Quintana MD  •  dextrose (D50W) 25 g/ 50mL Intravenous Solution 25 g, 25 g, Intravenous, Q15 Min PRN, Tho Quintana MD  •  dextrose (GLUTOSE) oral gel 15 g, 15 g, Oral, Q15 Min PRN, Tho Quintana MD  •  enoxaparin (LOVENOX) syringe 40 mg, 40 mg, Subcutaneous, Q12H,  Tho Quintana MD, 40 mg at 01/18/20 0544  •  folic acid-vit B6-vit B12 (FOLGARD) tablet 1 tablet, 1 tablet, Oral, Daily, Tho Quintana MD, 1 tablet at 01/17/20 0910  •  glucagon (human recombinant) (GLUCAGEN DIAGNOSTIC) injection 1 mg, 1 mg, Subcutaneous, Q15 Min PRN, Tho Quintana MD  •  hydrALAZINE (APRESOLINE) injection 10 mg, 10 mg, Intravenous, Q6H PRN, Tho Quintana MD  •  insulin lispro (humaLOG) injection 0-7 Units, 0-7 Units, Subcutaneous, 4x Daily With Meals & Nightly, Tho Quintana MD, 2 Units at 01/17/20 2047  •  ipratropium-albuterol (DUO-NEB) nebulizer solution 3 mL, 3 mL, Nebulization, Q4H PRN, Tho Qunitana MD  •  ipratropium-albuterol (DUO-NEB) nebulizer solution 3 mL, 3 mL, Nebulization, 4x Daily - RT, Tho Quintana MD, 3 mL at 01/17/20 2038  •  levothyroxine (SYNTHROID, LEVOTHROID) tablet 100 mcg, 100 mcg, Oral, Q AM, Tho Quintana MD, 100 mcg at 01/18/20 0544  •  magic mouthwash oral supsension 10 mL, 10 mL, Swish & Spit, Q4H PRN, Tho Quintana MD, 10 mL at 01/17/20 0558  •  Magnesium Sulfate 2 gram Bolus, followed by 8 gram infusion (total Mg dose 10 grams)- Mg less than or equal to 1mg/dL, 2 g, Intravenous, PRN **OR** Magnesium Sulfate 2 gram / 50mL Infusion (GIVE X 3 BAGS TO EQUAL 6GM TOTAL DOSE) - Mg 1.1 - 1.5 mg/dl, 2 g, Intravenous, PRN **OR** Magnesium Sulfate 4 gram infusion- Mg 1.6-1.9 mg/dL, 4 g, Intravenous, PRN, Tho Quintana MD, Last Rate: 25 mL/hr at 01/17/20 1243, 4 g at 01/17/20 1243  •  melatonin tablet 5 mg, 5 mg, Oral, Nightly, Tho Quintana MD, 5 mg at 01/17/20 2047  •  methylPREDNISolone sodium succinate (SOLU-Medrol) injection 40 mg, 40 mg, Intravenous, Daily, Tho Quintana MD  •  metoprolol succinate XL (TOPROL-XL) 24 hr tablet 25 mg, 25 mg, Oral, Q24H, Tho Quintana MD, 25 mg at 01/17/20 0910  •  miconazole (MICOTIN) 2 % powder, , Topical, Q12H, Tho Quintana MD  •  ondansetron (ZOFRAN) injection 4 mg, 4 mg,  Intravenous, Q6H PRN, Tho Quintana MD  •  pantoprazole (PROTONIX) EC tablet 40 mg, 40 mg, Oral, Q AM, Tho Quintana MD, 40 mg at 20 0544  •  Pharmacy Consult - [facility], , Does not apply, Daily, Tho Quintana MD  •  polyethylene glycol 3350 powder (packet), 17 g, Oral, BID, Tho Quintana MD, 17 g at 20 0910  •  potassium & sodium phosphates (PHOS-NAK) 280-160-250 MG packet - for Phosphorus less than 1.25 mg/dL, 2 packet, Oral, Q6H PRN **OR** potassium & sodium phosphates (PHOS-NAK) 280-160-250 MG packet - for Phosphorus 1.25 - 2.5 mg/dL, 2 packet, Oral, Q6H PRN, Tho Quintana MD, 2 packet at 20 1753  •  sennosides-docusate (PERICOLACE) 8.6-50 MG per tablet 2 tablet, 2 tablet, Oral, BID, Tho Quintana MD, 2 tablet at 20  •  sertraline (ZOLOFT) tablet 50 mg, 50 mg, Oral, Daily, Tho Quintana MD, 50 mg at 20 0910  •  sodium chloride 0.9 % flush 10 mL, 10 mL, Intravenous, Q12H, Tho Quintana MD, 10 mL at 20 0837  •  sodium chloride 0.9 % flush 10 mL, 10 mL, Intravenous, PRN, Tho Quintana MD, 10 mL at 20 0532  •  terazosin (HYTRIN) capsule 2 mg, 2 mg, Oral, Nightly, Tho Quintana MD, 2 mg at 20    Antibiotics:  Anti-Infectives (From admission, onward)    Ordered     Dose/Rate Route Frequency Start Stop    20 170  aztreonam (AZACTAM) 2 g/100 mL 0.9% NS (mbp)     Ordering Provider:  Blaire Thomson MD    2 g  over 30 Minutes Intravenous Once 20 1710 20 1603  vancomycin 3000 mg/500 mL 0.9% NS IVPB ([facility])     Ordering Provider:  Tahir Aparicio MD    20 mg/kg × 166 kg  over 180 Minutes Intravenous Once 20 1604 20 194                Physical Exam:   Vital Signs  Temp (24hrs), Av.1 °F (36.7 °C), Min:97.6 °F (36.4 °C), Max:98.5 °F (36.9 °C)    Temp  Min: 97.6 °F (36.4 °C)  Max: 98.5 °F (36.9 °C)  BP  Min: 132/56  Max: 171/83  Pulse  Min: 56  Max: 82  Resp  Min: 18  Max: 22  SpO2   Min: 90 %  Max: 97 %    GENERAL: Massively obese NAD.  HEENT: Normocephalic, atraumatic.  PERRL. EOMI. No conjunctival injection. No icterus. Oropharynx clear without evidence of thrush or exudate.     HEART: RRR; No murmur, rubs, gallops.   LUNGS: Decreased expiratory wheezes   ABDOMEN: Soft, nontender, nondistended. Positive bowel sounds. No rebound or guarding. Pannus with crusting lesions, woody induration   EXT:  No cyanosis, clubbing or edema. No cord.  : Mccarty catheter is in place  MSK:  Left hallux with ecchymosis,   SKIN: Warm and dry; sacral decubitus, unstageable, excoriated   NEURO: Oriented to person      Laboratory Data    Results from last 7 days   Lab Units 01/18/20  0448 01/17/20  0511 01/16/20  0442   WBC 10*3/mm3 5.56 8.39 5.43   HEMOGLOBIN g/dL 9.7* 10.0* 10.7*   HEMATOCRIT % 31.3* 32.6* 34.3   PLATELETS 10*3/mm3 146 159 159     Results from last 7 days   Lab Units 01/18/20  0448   SODIUM mmol/L 143   POTASSIUM mmol/L 4.3   CHLORIDE mmol/L 96*   CO2 mmol/L 40.0*   BUN mg/dL 41*   CREATININE mg/dL 0.55*   GLUCOSE mg/dL 128*   CALCIUM mg/dL 10.0     Results from last 7 days   Lab Units 01/17/20  0511   ALK PHOS U/L 73   BILIRUBIN mg/dL 0.4   ALT (SGPT) U/L 15   AST (SGOT) U/L 15             Results from last 7 days   Lab Units 01/14/20  1039   LACTATE mmol/L 1.1         Results from last 7 days   Lab Units 01/15/20  0519   VANCOMYCIN RM mcg/mL 23.20     Estimated Creatinine Clearance: 89 mL/min (A) (by C-G formula based on SCr of 0.55 mg/dL (L)).      Microbiology:  Blood Culture   Date Value Ref Range Status   01/14/2020 No growth at less than 24 hours  Preliminary   01/14/2020 No growth at less than 24 hours  Preliminary           Radiology:  Imaging Results (Last 72 Hours)     Procedure Component Value Units Date/Time    XR Chest 1 View [[id number]] Collected:  01/17/20 0811     Updated:  01/17/20 1635    Narrative:       EXAMINATION: XR CHEST 1 VW-01/17/2020:      INDICATION: Respiratory  failure; R09.02-Hypoxemia; R06.03-Acute  respiratory distress; B34.9-Viral infection, unspecified;  J18.9-Pneumonia, unspecified organism; R13.10-Dysphagia, unspecified.      COMPARISON: Chest radiograph 01/15/2020.     FINDINGS: The heart is top normal in size, similar to prior. Hazy left  lower lobe airspace changes with likely a trace left pleural effusion  identified. The right lung appears clear. No acute osseous abnormality.  The visualized upper abdomen is unrevealing.           Impression:       1.  Similar examination when compared to yesterday's study with mild  left lower lobe airspace disease and likely a trace left pleural  effusion.     2.  Similar cardiomegaly.     D:  01/17/2020  E:  01/17/2020     This report was finalized on 1/17/2020 4:32 PM by Dr. Ivan Miller MD.       XR Chest 1 View [[id number]] Collected:  01/14/20 1102     Updated:  01/15/20 0830    Narrative:       EXAMINATION: XR CHEST 1 VW- 01/14/2020     INDICATION: SOA triage protocol      COMPARISON: Chest radiograph 12/28/2019, CT chest 12/28/2019     FINDINGS: Single portable chest radiograph was submitted for review. The  heart is enlarged. Mild pulmonary vascular congestion identified. Hazy  mid and lower lung airspace changes identified, somewhat similar to the  prior exam particularly the CT of the chest performed 12/28/2019. No  large pleural effusion or pneumothorax. Visualized upper abdomen is  unrevealing. No acute osseous abnormality is appreciated.       Impression:       Cardiomegaly mild pulmonary vasculature congestion with  multifocal airspace changes noted similar when compared to 12/28/2019.        D:  01/14/2020  E:  01/14/2020     This report was finalized on 1/15/2020 8:27 AM by Dr. Ivan Miller MD.           I read her chest x-ray with the radiologist today    Impression:   1.  Human Metapneumovirus pneumonia-there is no evidence of a superimposed bacterial pneumonia with no fever, no leukocytosis, no new  substantial pulmonary infiltrate, and a normal procalcitonin.  She is stable off of antibiotic therapy.  2.  COPD-in exacerbation secondary to the metapneumovirus infection  3.  Hypercapnic acute respiratory failure  4.   Sacral decubitus ulcer  5.  Massive obesity   6.  T2 Diabetes Mellitus     PLAN/RECOMMENDATIONS:  1.  Continue off of antibiotic therapy  2.  Therapy for COPD exacerbation          Austen Redding MD  1/18/2020  7:20 AM

## 2020-01-18 NOTE — PLAN OF CARE
Problem: Patient Care Overview  Goal: Plan of Care Review  Outcome: Ongoing (interventions implemented as appropriate)  Flowsheets (Taken 1/18/2020 1510)  Progress: no change  Outcome Summary: Pt receiving IV steroids. Pt refused repositioning by nursing staff multiple times today; educated on the need for weight redistribution. VSS on 2L O2. Will continue to monitor.

## 2020-01-19 ENCOUNTER — APPOINTMENT (OUTPATIENT)
Dept: GENERAL RADIOLOGY | Facility: HOSPITAL | Age: 70
End: 2020-01-19

## 2020-01-19 PROBLEM — M79.672 ACUTE FOOT PAIN, LEFT: Status: ACTIVE | Noted: 2020-01-19

## 2020-01-19 LAB
BACTERIA SPEC AEROBE CULT: NORMAL
BACTERIA SPEC AEROBE CULT: NORMAL
GLUCOSE BLDC GLUCOMTR-MCNC: 138 MG/DL (ref 70–130)
GLUCOSE BLDC GLUCOMTR-MCNC: 168 MG/DL (ref 70–130)
GLUCOSE BLDC GLUCOMTR-MCNC: 174 MG/DL (ref 70–130)
GLUCOSE BLDC GLUCOMTR-MCNC: 189 MG/DL (ref 70–130)

## 2020-01-19 PROCEDURE — 25010000002 METHYLPREDNISOLONE PER 40 MG: Performed by: INTERNAL MEDICINE

## 2020-01-19 PROCEDURE — 99232 SBSQ HOSP IP/OBS MODERATE 35: CPT | Performed by: FAMILY MEDICINE

## 2020-01-19 PROCEDURE — 82962 GLUCOSE BLOOD TEST: CPT

## 2020-01-19 PROCEDURE — 94799 UNLISTED PULMONARY SVC/PX: CPT

## 2020-01-19 PROCEDURE — 25010000002 ENOXAPARIN PER 10 MG: Performed by: INTERNAL MEDICINE

## 2020-01-19 PROCEDURE — 73630 X-RAY EXAM OF FOOT: CPT

## 2020-01-19 RX ADMIN — SENNOSIDES AND DOCUSATE SODIUM 2 TABLET: 8.6; 5 TABLET ORAL at 22:09

## 2020-01-19 RX ADMIN — SODIUM CHLORIDE, PRESERVATIVE FREE 10 ML: 5 INJECTION INTRAVENOUS at 22:11

## 2020-01-19 RX ADMIN — IPRATROPIUM BROMIDE AND ALBUTEROL SULFATE 3 ML: 2.5; .5 SOLUTION RESPIRATORY (INHALATION) at 07:31

## 2020-01-19 RX ADMIN — ENOXAPARIN SODIUM 40 MG: 40 INJECTION SUBCUTANEOUS at 17:41

## 2020-01-19 RX ADMIN — GUAIFENESIN 600 MG: 600 TABLET, EXTENDED RELEASE ORAL at 08:32

## 2020-01-19 RX ADMIN — SODIUM CHLORIDE, PRESERVATIVE FREE 10 ML: 5 INJECTION INTRAVENOUS at 08:33

## 2020-01-19 RX ADMIN — GUAIFENESIN 600 MG: 600 TABLET, EXTENDED RELEASE ORAL at 22:10

## 2020-01-19 RX ADMIN — TERAZOSIN HYDROCHLORIDE 2 MG: 2 CAPSULE ORAL at 22:23

## 2020-01-19 RX ADMIN — POLYETHYLENE GLYCOL 3350 17 G: 17 POWDER, FOR SOLUTION ORAL at 22:08

## 2020-01-19 RX ADMIN — LEVOTHYROXINE SODIUM 100 MCG: 100 TABLET ORAL at 06:09

## 2020-01-19 RX ADMIN — ATORVASTATIN CALCIUM 10 MG: 10 TABLET, FILM COATED ORAL at 08:32

## 2020-01-19 RX ADMIN — IPRATROPIUM BROMIDE AND ALBUTEROL SULFATE 3 ML: 2.5; .5 SOLUTION RESPIRATORY (INHALATION) at 20:05

## 2020-01-19 RX ADMIN — MICONAZOLE NITRATE 1 APPLICATION: 20 POWDER TOPICAL at 08:34

## 2020-01-19 RX ADMIN — INSULIN LISPRO 2 UNITS: 100 INJECTION, SOLUTION INTRAVENOUS; SUBCUTANEOUS at 17:40

## 2020-01-19 RX ADMIN — ASPIRIN 81 MG: 81 TABLET, COATED ORAL at 08:32

## 2020-01-19 RX ADMIN — ENOXAPARIN SODIUM 40 MG: 40 INJECTION SUBCUTANEOUS at 06:09

## 2020-01-19 RX ADMIN — INSULIN LISPRO 2 UNITS: 100 INJECTION, SOLUTION INTRAVENOUS; SUBCUTANEOUS at 11:35

## 2020-01-19 RX ADMIN — CASTOR OIL AND BALSAM, PERU: 788; 87 OINTMENT TOPICAL at 22:14

## 2020-01-19 RX ADMIN — IPRATROPIUM BROMIDE AND ALBUTEROL SULFATE 3 ML: 2.5; .5 SOLUTION RESPIRATORY (INHALATION) at 12:33

## 2020-01-19 RX ADMIN — MELATONIN TAB 5 MG 5 MG: 5 TAB at 22:10

## 2020-01-19 RX ADMIN — IPRATROPIUM BROMIDE AND ALBUTEROL SULFATE 3 ML: 2.5; .5 SOLUTION RESPIRATORY (INHALATION) at 16:20

## 2020-01-19 RX ADMIN — METHYLPREDNISOLONE SODIUM SUCCINATE 40 MG: 40 INJECTION, POWDER, FOR SOLUTION INTRAMUSCULAR; INTRAVENOUS at 08:33

## 2020-01-19 RX ADMIN — Medication 1 TABLET: at 08:31

## 2020-01-19 RX ADMIN — INSULIN LISPRO 2 UNITS: 100 INJECTION, SOLUTION INTRAVENOUS; SUBCUTANEOUS at 22:11

## 2020-01-19 RX ADMIN — ALPRAZOLAM 0.25 MG: 0.25 TABLET ORAL at 22:10

## 2020-01-19 RX ADMIN — METOPROLOL SUCCINATE 25 MG: 25 TABLET, EXTENDED RELEASE ORAL at 08:33

## 2020-01-19 RX ADMIN — Medication 10 ML: at 22:10

## 2020-01-19 RX ADMIN — PANTOPRAZOLE SODIUM 40 MG: 40 TABLET, DELAYED RELEASE ORAL at 06:09

## 2020-01-19 RX ADMIN — CASTOR OIL AND BALSAM, PERU: 788; 87 OINTMENT TOPICAL at 08:31

## 2020-01-19 RX ADMIN — SERTRALINE HYDROCHLORIDE 50 MG: 50 TABLET ORAL at 08:32

## 2020-01-19 NOTE — PROGRESS NOTES
[facility]  PROGRESS NOTE    Patient Name: Julissa Kee  : 1950  MRN: [id number]    Date of Admission: 2020  Primary Care Physician: Kee Hoffman MD    Subjective   Subjective     CC:  Pneumonia    HPI:  Patient is a 69-year-old who was admitted initially to the ICU for acute respiratory failure secondary to Metapneumo viral pneumonia.  She is followed by Dr. Randolph with infectious disease.    She was transferred to the floor on 2020.    2020-patient reports she is feeling a little better with respect to her breathing.  She has poor respiratory effort.  She is morbidly obese and has been in the bed for several days.  She is not able to get up and move around on her own.  She is tolerating p.o. without nausea or vomiting.    2020-patient complains of pain in her left foot.  She states she injured it when she fell prior to being admitted to the hospital.  She has not been out of bed yet so unsure if she can bear weight on it.  We will get x-rays.  She is tolerating p.o. well.  No nausea or vomiting.  She states her breathing is improved.    Review of Systems  General: No fever, chills, positive fatigue  ENT: No sore throat, trouble swallowing or changes in vision  Respiratory: Positive shortness of breath, cough, occasional wheezing, denies  fast breathing  Cardiovascular: No chest pain, palpitations, positive dyspnea with exertion  Gastrointestinal: No nausea vomiting abdominal pain  Musculoskeletal: Positive difficulty walking, positive weakness, positive left foot pain  Vascular: No cyanosis or clubbing  Neurologic: No headache, confusion, positive dizziness  Psychiatric: Positive depression and anxiety.      Objective   Objective     Vital Signs:   Temp:  [97.9 °F (36.6 °C)-98.2 °F (36.8 °C)] 98.1 °F (36.7 °C)  Heart Rate:  [] 83  Resp:  [18] 18  BP: (143-160)/(55-61) 158/61        Physical Exam:  Constitutional: No acute  distress, awake, alert, nontoxic, morbidly obese body habitus  Eyes: Pupils equal, sclerae anicteric, no conjunctival injection  HENT: NCAT, mucous membranes moist  Neck: Supple, no thyromegaly, no lymphadenopathy  Respiratory: Decreased breath sounds bilaterally, poor effort, nonlabored respirations   Cardiovascular: RRR  Gastrointestinal: Positive bowel sounds, soft, nontender, morbidly obese   Musculoskeletal: Trace bilateral lower extremity peripheral edema, generally weak, unable to get out of bed independently; extensive bruising over the left foot (metatarsals and tarsals as well as the first toe) pain with palpation  Psychiatric: Flat affect, poor insight and judgement, cooperative  Neurologic: Oriented x 3, movements symmetric BUE and BLE, Cranial Nerves grossly intact, speech clear and fluent      Results Reviewed:  Results from last 7 days   Lab Units 01/18/20  0448 01/17/20  0511 01/16/20  0442  01/15/20  0519   WBC 10*3/mm3 5.56 8.39 5.43   < >  --    HEMOGLOBIN g/dL 9.7* 10.0* 10.7*   < >  --    HEMATOCRIT % 31.3* 32.6* 34.3   < >  --    PLATELETS 10*3/mm3 146 159 159   < >  --    PROCALCITONIN ng/mL  --   --  0.10  --  0.12    < > = values in this interval not displayed.     Results from last 7 days   Lab Units 01/18/20 0448 01/17/20 0511 01/16/20  0442 01/15/20  0519 01/14/20  1509 01/14/20  1038   SODIUM mmol/L 143 142 138 140  --  140   POTASSIUM mmol/L 4.3 4.4 4.9 4.8  --  4.2   CHLORIDE mmol/L 96* 96* 94* 96*  --  94*   CO2 mmol/L 40.0* 36.0* 28.0 31.0*  --  39.0*   BUN mg/dL 41* 38* 31* 25*  --  21   CREATININE mg/dL 0.55* 0.67 0.76 0.86  --  0.89   GLUCOSE mg/dL 128* 193* 119* 86  --  104*   CALCIUM mg/dL 10.0 9.8 9.8 9.2  --  9.8   ALT (SGPT) U/L  --  15 14 16  --  16   AST (SGOT) U/L  --  15 23 21  --  19   TROPONIN T ng/mL  --   --   --   --  0.015 0.023   PROBNP pg/mL  --   --  645.0  --   --  270.3     Estimated Creatinine Clearance: 89 mL/min (A) (by C-G formula based on SCr of 0.55  mg/dL (L)).    Microbiology Results Abnormal     Procedure Component Value - Date/Time    Blood Culture - Blood, Arm, Right [[id number]] Collected:  01/14/20 1039    Lab Status:  Preliminary result Specimen:  Blood from Arm, Right Updated:  01/18/20 1130     Blood Culture No growth at 4 days    Blood Culture - Blood, Arm, Left [[id number]] Collected:  01/14/20 1039    Lab Status:  Preliminary result Specimen:  Blood from Arm, Left Updated:  01/18/20 1130     Blood Culture No growth at 4 days    Respiratory Panel, PCR - Swab, Nasopharynx [[id number]]  (Abnormal) Collected:  01/14/20 1039    Lab Status:  Final result Specimen:  Swab from Nasopharynx Updated:  01/14/20 1248     ADENOVIRUS, PCR Not Detected     Coronavirus 229E Not Detected     Coronavirus HKU1 Not Detected     Coronavirus NL63 Not Detected     Coronavirus OC43 Not Detected     Human Metapneumovirus Detected     Human Rhinovirus/Enterovirus Not Detected     Influenza B PCR Not Detected     Parainfluenza Virus 1 Not Detected     Parainfluenza Virus 2 Not Detected     Parainfluenza Virus 3 Not Detected     Parainfluenza Virus 4 Not Detected     Bordetella pertussis pcr Not Detected     Influenza A H1 2009 PCR Not Detected     Chlamydophila pneumoniae PCR Not Detected     Mycoplasma pneumo by PCR Not Detected     Influenza A PCR Not Detected     Influenza A H3 Not Detected     Influenza A H1 Not Detected     RSV, PCR Not Detected     Bordetella parapertussis PCR Not Detected          Imaging Results (Last 24 Hours)     ** No results found for the last 24 hours. **          Results for orders placed during the hospital encounter of 12/28/19   Adult Transthoracic Echo Complete W/ Cont if Necessary Per Protocol    Narrative · Left ventricular systolic function is normal. Estimated EF appears to be   in the range of 61 - 65%.  · Left ventricular wall thickness is consistent with moderate concentric   hypertrophy.  · Normal right ventricular wall thickness,  systolic function and septal   motion noted. Right ventricular cavity is borderline dilated.  · Trace tricuspid valve regurgitation is present. Estimated right   ventricular systolic pressure from tricuspid regurgitation is normal (<35   mmHg)  · The valve appears trileaflet. The aortic valve is abnormal in structure.   There is mild calcification of the aortic valve.Trace aortic valve   regurgitation is present. No hemodynamically significant aortic valve   stenosis is present.          I have reviewed the medications:  Scheduled Meds:    aspirin 81 mg Oral Daily   atorvastatin 10 mg Oral Daily   castor oil-balsam peru  Topical Q12H   enoxaparin 40 mg Subcutaneous Q12H   folic acid-vit B6-vit B12 1 tablet Oral Daily   guaiFENesin 600 mg Oral Q12H   insulin lispro 0-7 Units Subcutaneous 4x Daily With Meals & Nightly   ipratropium-albuterol 3 mL Nebulization 4x Daily - RT   levothyroxine 100 mcg Oral Q AM   melatonin 5 mg Oral Nightly   methylPREDNISolone sodium succinate 40 mg Intravenous Daily   metoprolol succinate XL 25 mg Oral Q24H   miconazole  Topical Q12H   pantoprazole 40 mg Oral Q AM   pharmacy consult - MTM  Does not apply Daily   polyethylene glycol 17 g Oral BID   sennosides-docusate 2 tablet Oral BID   sertraline 50 mg Oral Daily   sodium chloride 10 mL Intravenous Q12H   terazosin 2 mg Oral Nightly     Continuous Infusions:   PRN Meds:.•  acetaminophen **OR** acetaminophen **OR** acetaminophen  •  ALPRAZolam  •  dextrose  •  dextrose  •  glucagon (human recombinant)  •  hydrALAZINE  •  ipratropium-albuterol  •  magic mouthwash  •  magnesium sulfate **OR** magnesium sulfate **OR** magnesium sulfate  •  ondansetron  •  potassium & sodium phosphates **OR** potassium & sodium phosphates  •  sodium chloride    Assessment/Plan   Assessment & Plan     Active Hospital Problems    Diagnosis  POA   • **Acute respiratory failure with hypoxia (CMS/LT[facility], located within [facility]) [J96.01]  Yes   • Acute foot pain, left [M79.672]  Yes   •  Recurrent pneumonia [J18.9]  Yes   • Human metapneumovirus (hMPV) pneumonia [J12.3]  Yes   • Hypoxia [R09.02]  Yes   • Sacral wound [S31.000A]  Yes   • Chronic obstructive pulmonary disease (CMS/HCC) [J44.9]  Yes   • Diabetes mellitus (CMS/HCC) [E11.9]  Yes   • Hyperlipidemia [E78.5]  Yes   • Hypertension [I10]  Yes   • Hypothyroidism [E03.9]  Yes   • 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        Brief Hospital Course to date:  Julissa Kee is a 69 y.o. female admitted with acute respiratory failure secondary to Metapneumo viral pneumonia.    Acute respiratory failure with hypercapnia and hypoxia secondary to Metapneumo viral pneumonia  -Followed by infectious disease, continuing supportive treatment  -Monitoring off antibiotics and clinically improving    Recent bacterial pneumonia status post completion of treatment with antibiotics    Morbid obesity  -Complicates all aspects of her care and increases her risk of mortality    General decline and physical debility  -PT and OT consults  -Will be referred for skilled nursing facility for rehab  -Goal would be to return home with her   -Encouraged mobilization (once cleared with x-ray of the left foot)    Trauma to the left foot with extensive ecchymoses over the metatarsal and tarsal region as well as the first digit  -Requested x-rays 1/19/2020    Sacral wound    Diabetes mellitus    Hypertension    Hyperlipidemia    COPD with chronic respiratory failure with hypercapnia and hypoxia  -She required BiPAP initially in the ICU    DVT Prophylaxis: Lovenox    CODE STATUS:   Code Status and Medical Interventions:   Ordered at: 01/15/20 1922     Limited Support to NOT Include:    Intubation    Cardioversion/Defibrillation     Level Of Support Discussed With:    Patient     Code Status:    No CPR     Medical Interventions (Level of Support Prior to Arrest):    Limited          Electronically signed by Jerica Carter MD, 01/19/20, 8:56 AM.

## 2020-01-19 NOTE — PLAN OF CARE
Problem: Patient Care Overview  Goal: Plan of Care Review  Outcome: Ongoing (interventions implemented as appropriate)  Flowsheets (Taken 2020)  Progress: no change  Plan of Care Reviewed With: patient; daughter  Outcome Summary: Pt more willing to be repositioned today; flat affect. Pt IV  but still in working condition; unable to place new IV r/t poor venous access. Notified MD; ok to leave in place per Dr. Carter; to switch to PO steroids tomorrow. VSS on 3L NC, will continue to monitor.

## 2020-01-19 NOTE — PROGRESS NOTES
INFECTIOUS DISEASE PROGRESS NOTE     Julissa Kee  1950  [id number]      Admission Date: 1/14/2020      Requesting Provider: No ref. provider found  Evaluating Physician: Austen Redding MD    Reason for Consultation: Pneumonia    History of present illness:  1/15/20:   Patient is a 69 y.o. female , with COPD, Diabetes mellitus, SARAH BETH, seen today for Metapneumoviral pneumonia.  She was discharged from this facility 1/8 off antibiotics after being treated for right middle lobe pneumonia. She has been experiencing worsening hypoxia prompting return to ED.  Chest xray with pulmonary vascular congestion.Oxygen saturations in 70's on admission.  She has been afebrile, without leukocytosis.  She is currently on Vancomycin and Azactam and we were consulted for evaluation and treatment.  She developed worsening respiratory compromise and required transfer to the unit and a BiPAP mask.  1/16/20:  She was given Xanax due to anxiety and now resting.  02 per NC.    1/17/2020: She has decreased dyspnea.  She has no increased respiratory secretions.  She has remained afebrile.  She has no new complaints today.  1/18/2020: She denies increased cough and dyspnea.  She has remained afebrile.  She has no new complaints today.  1/19/2028: She has decreased cough and dyspnea.  She has remained afebrile.    Past Medical History:   Diagnosis Date   • Acute bronchitis    • Acute sinusitis    • Anxiety    • Bacterial conjunctivitis    • Chest pain    • COPD (chronic obstructive pulmonary disease) (CMS/HCC)    • Depression    • Diabetes mellitus (CMS/HCC)    • Dyspnea    • Dysuria    • Edema    • Elbow injury    • Elbow pain    • Eye drainage    • Fatigue    • Foot pain, right    • Humerus distal fracture    • Hyperlipidemia    • Hypertension    • Hypothyroidism    • Morbid obesity (CMS/HCC)    • Pickwickian syndrome (CMS/HCC)    • Pressure ulcer stage I    • Psoriasis    • SOB (shortness of breath)    • Symptoms of urinary tract  infection    • Urinary frequency    • Urinary tract infection    • Vaginal candidiasis        Past Surgical History:   Procedure Laterality Date   • CERVICAL POLYPECTOMY     • HYSTERECTOMY         Family History   Problem Relation Age of Onset   • Diabetes Father    • Diabetes Sister    • Pancreatic cancer Sister        Social History     Socioeconomic History   • Marital status:      Spouse name: Not on file   • Number of children: Not on file   • Years of education: Not on file   • Highest education level: Not on file   Tobacco Use   • Smoking status: Former Smoker     Types: Cigarettes     Last attempt to quit: 2006     Years since quittin.0   • Smokeless tobacco: Never Used   Substance and Sexual Activity   • Alcohol use: No     Frequency: Never   • Drug use: Never   • Sexual activity: Defer       Allergies   Allergen Reactions   • Amoxicillin-Pot Clavulanate Other (See Comments)     HEADACHE - has tolerated ceftriaxone 2019   • Bactrim [Sulfamethoxazole-Trimethoprim] Other (See Comments)     .         Medication:    Current Facility-Administered Medications:   •  acetaminophen (TYLENOL) tablet 650 mg, 650 mg, Oral, Q4H PRN, 650 mg at 20 0000 **OR** acetaminophen (TYLENOL) 160 MG/5ML solution 650 mg, 650 mg, Oral, Q4H PRN **OR** acetaminophen (TYLENOL) suppository 650 mg, 650 mg, Rectal, Q4H PRN, Tho Quintana MD  •  ALPRAZolam (XANAX) tablet 0.25 mg, 0.25 mg, Oral, Nightly PRN, Tho Quintana MD, 0.25 mg at 20 2102  •  aspirin EC tablet 81 mg, 81 mg, Oral, Daily, Tho Quintana MD, 81 mg at 20 0832  •  atorvastatin (LIPITOR) tablet 10 mg, 10 mg, Oral, Daily, Tho Quintana MD, 10 mg at 20 0832  •  castor oil-balsam peru (VENELEX) ointment, , Topical, Q12H, Tho Quintana MD  •  dextrose (D50W) 25 g/ 50mL Intravenous Solution 25 g, 25 g, Intravenous, Q15 Min PRN, Tho Quintana MD  •  dextrose (GLUTOSE) oral gel 15 g, 15 g, Oral, Q15 Min PRN, Lilian  Tho ELLIS MD  •  enoxaparin (LOVENOX) syringe 40 mg, 40 mg, Subcutaneous, Q12H, Tho Quintana MD, 40 mg at 01/19/20 0609  •  folic acid-vit B6-vit B12 (FOLGARD) tablet 1 tablet, 1 tablet, Oral, Daily, Tho Quintana MD, 1 tablet at 01/19/20 0831  •  glucagon (human recombinant) (GLUCAGEN DIAGNOSTIC) injection 1 mg, 1 mg, Subcutaneous, Q15 Min PRN, Tho Quintana MD  •  guaiFENesin (MUCINEX) 12 hr tablet 600 mg, 600 mg, Oral, Q12H, Joanna Maya PA-C, 600 mg at 01/19/20 0832  •  hydrALAZINE (APRESOLINE) injection 10 mg, 10 mg, Intravenous, Q6H PRN, Tho Quintana MD  •  insulin lispro (humaLOG) injection 0-7 Units, 0-7 Units, Subcutaneous, 4x Daily With Meals & Nightly, Tho Quintana MD, 2 Units at 01/18/20 1700  •  ipratropium-albuterol (DUO-NEB) nebulizer solution 3 mL, 3 mL, Nebulization, Q4H PRN, Tho Quintana MD  •  ipratropium-albuterol (DUO-NEB) nebulizer solution 3 mL, 3 mL, Nebulization, 4x Daily - RT, Tho Quintana MD, 3 mL at 01/19/20 0731  •  levothyroxine (SYNTHROID, LEVOTHROID) tablet 100 mcg, 100 mcg, Oral, Q AM, Tho Quintana MD, 100 mcg at 01/19/20 0609  •  magic mouthwash oral supsension 10 mL, 10 mL, Swish & Spit, Q4H PRN, Tho Quintana MD, 10 mL at 01/17/20 0558  •  Magnesium Sulfate 2 gram Bolus, followed by 8 gram infusion (total Mg dose 10 grams)- Mg less than or equal to 1mg/dL, 2 g, Intravenous, PRN **OR** Magnesium Sulfate 2 gram / 50mL Infusion (GIVE X 3 BAGS TO EQUAL 6GM TOTAL DOSE) - Mg 1.1 - 1.5 mg/dl, 2 g, Intravenous, PRN **OR** Magnesium Sulfate 4 gram infusion- Mg 1.6-1.9 mg/dL, 4 g, Intravenous, PRN, Tho Quintana MD, Last Rate: 25 mL/hr at 01/17/20 1243, 4 g at 01/17/20 1243  •  melatonin tablet 5 mg, 5 mg, Oral, Nightly, Tho Quintana MD, 5 mg at 01/18/20 2102  •  methylPREDNISolone sodium succinate (SOLU-Medrol) injection 40 mg, 40 mg, Intravenous, Daily, Tho Quintana MD, 40 mg at 01/19/20 0848  •  metoprolol succinate XL  (TOPROL-XL) 24 hr tablet 25 mg, 25 mg, Oral, Q24H, Tho Quintana MD, 25 mg at 01/19/20 0833  •  miconazole (MICOTIN) 2 % powder, , Topical, Q12H, Tho Quintana MD, 1 application at 01/19/20 0834  •  ondansetron (ZOFRAN) injection 4 mg, 4 mg, Intravenous, Q6H PRN, Tho Quintana MD  •  pantoprazole (PROTONIX) EC tablet 40 mg, 40 mg, Oral, Q AM, Tho Quintana MD, 40 mg at 01/19/20 0609  •  Pharmacy Consult - [facility] [place], , Does not apply, Daily, Tho Quintana MD  •  polyethylene glycol 3350 powder (packet), 17 g, Oral, BID, Tho Quintana MD, 17 g at 01/17/20 0910  •  potassium & sodium phosphates (PHOS-NAK) 280-160-250 MG packet - for Phosphorus less than 1.25 mg/dL, 2 packet, Oral, Q6H PRN **OR** potassium & sodium phosphates (PHOS-NAK) 280-160-250 MG packet - for Phosphorus 1.25 - 2.5 mg/dL, 2 packet, Oral, Q6H PRN, Tho Quintana MD, 2 packet at 01/17/20 1753  •  sennosides-docusate (PERICOLACE) 8.6-50 MG per tablet 2 tablet, 2 tablet, Oral, BID, Tho Quintana MD, 2 tablet at 01/18/20 0844  •  sertraline (ZOLOFT) tablet 50 mg, 50 mg, Oral, Daily, Tho Quintana MD, 50 mg at 01/19/20 0832  •  sodium chloride 0.9 % flush 10 mL, 10 mL, Intravenous, Q12H, Tho Quintana MD, 10 mL at 01/19/20 0833  •  sodium chloride 0.9 % flush 10 mL, 10 mL, Intravenous, PRN, Tho Quintana MD, 10 mL at 01/16/20 0532  •  terazosin (HYTRIN) capsule 2 mg, 2 mg, Oral, Nightly, Tho Quintana MD, 2 mg at 01/18/20 2102    Antibiotics:  Anti-Infectives (From admission, onward)    Ordered     Dose/Rate Route Frequency Start Stop    01/14/20 1708  aztreonam (AZACTAM) 2 g/100 mL 0.9% NS (mbp)     Ordering Provider:  Blaire Thomson MD    2 g  over 30 Minutes Intravenous Once 01/14/20 1710 01/14/20 2058    01/14/20 1603  vancomycin 3000 mg/500 mL 0.9% NS IVPB ([facility])     Ordering Provider:  Tahir Aparicio MD    20 mg/kg × 166 kg  over 180 Minutes Intravenous Once 01/14/20 1604 01/14/20 1947                 Physical Exam:   Vital Signs  Temp (24hrs), Av.1 °F (36.7 °C), Min:97.9 °F (36.6 °C), Max:98.2 °F (36.8 °C)    Temp  Min: 97.9 °F (36.6 °C)  Max: 98.2 °F (36.8 °C)  BP  Min: 143/59  Max: 160/55  Pulse  Min: 55  Max: 122  Resp  Min: 18  Max: 18  SpO2  Min: 88 %  Max: 96 %    GENERAL: Massively obese NAD.  HEENT: Normocephalic, atraumatic.  PERRL. EOMI. No conjunctival injection. No icterus. Oropharynx clear without evidence of thrush or exudate.     HEART: RRR; No murmur, rubs, gallops.   LUNGS: Decreased expiratory wheezes   ABDOMEN: Soft, nontender, nondistended. Positive bowel sounds. No rebound or guarding. Pannus with crusting lesions, woody induration   EXT:  No cyanosis, clubbing or edema. No cord.  : Mccarty catheter is in place  MSK:  Left hallux with ecchymosis,   SKIN: Warm and dry; sacral decubitus, unstageable, excoriated   NEURO: Oriented to person      Laboratory Data    Results from last 7 days   Lab Units 20  0448 20  0511 20  0442   WBC 10*3/mm3 5.56 8.39 5.43   HEMOGLOBIN g/dL 9.7* 10.0* 10.7*   HEMATOCRIT % 31.3* 32.6* 34.3   PLATELETS 10*3/mm3 146 159 159     Results from last 7 days   Lab Units 20  0448   SODIUM mmol/L 143   POTASSIUM mmol/L 4.3   CHLORIDE mmol/L 96*   CO2 mmol/L 40.0*   BUN mg/dL 41*   CREATININE mg/dL 0.55*   GLUCOSE mg/dL 128*   CALCIUM mg/dL 10.0     Results from last 7 days   Lab Units 20  0511   ALK PHOS U/L 73   BILIRUBIN mg/dL 0.4   ALT (SGPT) U/L 15   AST (SGOT) U/L 15             Results from last 7 days   Lab Units 20  1039   LACTATE mmol/L 1.1         Results from last 7 days   Lab Units 01/15/20  0519   VANCOMYCIN RM mcg/mL 23.20     Estimated Creatinine Clearance: 89 mL/min (A) (by C-G formula based on SCr of 0.55 mg/dL (L)).      Microbiology:  Blood Culture   Date Value Ref Range Status   2020 No growth at less than 24 hours  Preliminary   2020 No growth at less than 24 hours  Preliminary            Radiology:  Imaging Results (Last 72 Hours)     Procedure Component Value Units Date/Time    XR Foot 3+ View Left [[id number]] Resulted:  01/19/20 1029     Updated:  01/19/20 1029    XR Chest 1 View [[id number]] Collected:  01/17/20 0811     Updated:  01/17/20 1635    Narrative:       EXAMINATION: XR CHEST 1 VW-01/17/2020:      INDICATION: Respiratory failure; R09.02-Hypoxemia; R06.03-Acute  respiratory distress; B34.9-Viral infection, unspecified;  J18.9-Pneumonia, unspecified organism; R13.10-Dysphagia, unspecified.      COMPARISON: Chest radiograph 01/15/2020.     FINDINGS: The heart is top normal in size, similar to prior. Hazy left  lower lobe airspace changes with likely a trace left pleural effusion  identified. The right lung appears clear. No acute osseous abnormality.  The visualized upper abdomen is unrevealing.           Impression:       1.  Similar examination when compared to yesterday's study with mild  left lower lobe airspace disease and likely a trace left pleural  effusion.     2.  Similar cardiomegaly.     D:  01/17/2020  E:  01/17/2020     This report was finalized on 1/17/2020 4:32 PM by Dr. Ivan Miller MD.           I read her chest x-ray with the radiologist today    Impression:   1.  Human Metapneumovirus pneumonia-there is no evidence of a superimposed bacterial pneumonia with no fever, no leukocytosis, no new substantial pulmonary infiltrate, and a normal procalcitonin.  She is stable off of antibiotic therapy.  2.  COPD-in exacerbation secondary to the metapneumovirus infection  3.  Hypoxic and hypercapnic acute respiratory failure  4.  Sacral decubitus ulcer  5.  Massive obesity   6.  T2 Diabetes Mellitus     PLAN/RECOMMENDATIONS:  1.  Continue off of antibiotic therapy  2.  Therapy for COPD exacerbation          Austen Redding MD  1/19/2020  10:43 AM

## 2020-01-19 NOTE — PLAN OF CARE
Problem: Patient Care Overview  Goal: Plan of Care Review  Outcome: Ongoing (interventions implemented as appropriate)  Flowsheets (Taken 1/19/2020 0308)  Progress: no change  Plan of Care Reviewed With: patient  Outcome Summary: VSS, 3L NC while awake, bipap while sleeping. Mucinex ordered to help thin secretions. Pt currently resting, will continue to monitor.

## 2020-01-20 LAB
ANION GAP SERPL CALCULATED.3IONS-SCNC: 6 MMOL/L (ref 5–15)
BASOPHILS # BLD AUTO: 0.01 10*3/MM3 (ref 0–0.2)
BASOPHILS NFR BLD AUTO: 0.2 % (ref 0–1.5)
BUN BLD-MCNC: 26 MG/DL (ref 8–23)
BUN/CREAT SERPL: 43.3 (ref 7–25)
CALCIUM SPEC-SCNC: 9.9 MG/DL (ref 8.6–10.5)
CHLORIDE SERPL-SCNC: 95 MMOL/L (ref 98–107)
CO2 SERPL-SCNC: 38 MMOL/L (ref 22–29)
CREAT BLD-MCNC: 0.6 MG/DL (ref 0.57–1)
DEPRECATED RDW RBC AUTO: 55.2 FL (ref 37–54)
EOSINOPHIL # BLD AUTO: 0.09 10*3/MM3 (ref 0–0.4)
EOSINOPHIL NFR BLD AUTO: 1.6 % (ref 0.3–6.2)
ERYTHROCYTE [DISTWIDTH] IN BLOOD BY AUTOMATED COUNT: 13.6 % (ref 12.3–15.4)
GFR SERPL CREATININE-BSD FRML MDRD: 99 ML/MIN/1.73
GLUCOSE BLD-MCNC: 120 MG/DL (ref 65–99)
GLUCOSE BLDC GLUCOMTR-MCNC: 123 MG/DL (ref 70–130)
GLUCOSE BLDC GLUCOMTR-MCNC: 145 MG/DL (ref 70–130)
GLUCOSE BLDC GLUCOMTR-MCNC: 170 MG/DL (ref 70–130)
GLUCOSE BLDC GLUCOMTR-MCNC: 209 MG/DL (ref 70–130)
HCT VFR BLD AUTO: 32.2 % (ref 34–46.6)
HGB BLD-MCNC: 10.3 G/DL (ref 12–15.9)
IMM GRANULOCYTES # BLD AUTO: 0.1 10*3/MM3 (ref 0–0.05)
IMM GRANULOCYTES NFR BLD AUTO: 1.7 % (ref 0–0.5)
LYMPHOCYTES # BLD AUTO: 1.47 10*3/MM3 (ref 0.7–3.1)
LYMPHOCYTES NFR BLD AUTO: 25.4 % (ref 19.6–45.3)
MCH RBC QN AUTO: 34.9 PG (ref 26.6–33)
MCHC RBC AUTO-ENTMCNC: 32 G/DL (ref 31.5–35.7)
MCV RBC AUTO: 109.2 FL (ref 79–97)
MONOCYTES # BLD AUTO: 0.46 10*3/MM3 (ref 0.1–0.9)
MONOCYTES NFR BLD AUTO: 7.9 % (ref 5–12)
NEUTROPHILS # BLD AUTO: 3.66 10*3/MM3 (ref 1.7–7)
NEUTROPHILS NFR BLD AUTO: 63.2 % (ref 42.7–76)
NRBC BLD AUTO-RTO: 0 /100 WBC (ref 0–0.2)
PLATELET # BLD AUTO: 164 10*3/MM3 (ref 140–450)
PMV BLD AUTO: 10.6 FL (ref 6–12)
POTASSIUM BLD-SCNC: 4.4 MMOL/L (ref 3.5–5.2)
RBC # BLD AUTO: 2.95 10*6/MM3 (ref 3.77–5.28)
SODIUM BLD-SCNC: 139 MMOL/L (ref 136–145)
WBC NRBC COR # BLD: 5.79 10*3/MM3 (ref 3.4–10.8)

## 2020-01-20 PROCEDURE — 99232 SBSQ HOSP IP/OBS MODERATE 35: CPT | Performed by: INTERNAL MEDICINE

## 2020-01-20 PROCEDURE — 82962 GLUCOSE BLOOD TEST: CPT

## 2020-01-20 PROCEDURE — 85025 COMPLETE CBC W/AUTO DIFF WBC: CPT | Performed by: FAMILY MEDICINE

## 2020-01-20 PROCEDURE — 94799 UNLISTED PULMONARY SVC/PX: CPT

## 2020-01-20 PROCEDURE — 80048 BASIC METABOLIC PNL TOTAL CA: CPT | Performed by: FAMILY MEDICINE

## 2020-01-20 PROCEDURE — 25010000002 HYDRALAZINE PER 20 MG: Performed by: INTERNAL MEDICINE

## 2020-01-20 PROCEDURE — 63710000001 PREDNISONE PER 1 MG: Performed by: INTERNAL MEDICINE

## 2020-01-20 PROCEDURE — 97610 LOW FREQUENCY NON-THERMAL US: CPT | Performed by: PHYSICAL THERAPIST

## 2020-01-20 PROCEDURE — 94660 CPAP INITIATION&MGMT: CPT

## 2020-01-20 PROCEDURE — 97110 THERAPEUTIC EXERCISES: CPT

## 2020-01-20 PROCEDURE — 97530 THERAPEUTIC ACTIVITIES: CPT

## 2020-01-20 PROCEDURE — 25010000002 ENOXAPARIN PER 10 MG: Performed by: INTERNAL MEDICINE

## 2020-01-20 RX ORDER — PREDNISONE 20 MG/1
40 TABLET ORAL
Status: DISCONTINUED | OUTPATIENT
Start: 2020-01-20 | End: 2020-01-21 | Stop reason: HOSPADM

## 2020-01-20 RX ORDER — LISINOPRIL 5 MG/1
5 TABLET ORAL
Status: DISCONTINUED | OUTPATIENT
Start: 2020-01-20 | End: 2020-01-21 | Stop reason: HOSPADM

## 2020-01-20 RX ORDER — HYDROCHLOROTHIAZIDE 25 MG/1
TABLET ORAL
Qty: 90 TABLET | Refills: 0 | OUTPATIENT
Start: 2020-01-20 | End: 2020-01-21 | Stop reason: HOSPADM

## 2020-01-20 RX ORDER — LOSARTAN POTASSIUM 100 MG/1
TABLET ORAL
Qty: 90 TABLET | Refills: 1 | OUTPATIENT
Start: 2020-01-20 | End: 2020-01-21 | Stop reason: HOSPADM

## 2020-01-20 RX ADMIN — HYDRALAZINE HYDROCHLORIDE 10 MG: 20 INJECTION INTRAMUSCULAR; INTRAVENOUS at 16:18

## 2020-01-20 RX ADMIN — MICONAZOLE NITRATE: 20 POWDER TOPICAL at 21:07

## 2020-01-20 RX ADMIN — PREDNISONE 40 MG: 20 TABLET ORAL at 08:37

## 2020-01-20 RX ADMIN — ATORVASTATIN CALCIUM 10 MG: 10 TABLET, FILM COATED ORAL at 08:37

## 2020-01-20 RX ADMIN — MELATONIN TAB 5 MG 5 MG: 5 TAB at 21:04

## 2020-01-20 RX ADMIN — GUAIFENESIN 600 MG: 600 TABLET, EXTENDED RELEASE ORAL at 21:04

## 2020-01-20 RX ADMIN — SODIUM CHLORIDE, PRESERVATIVE FREE 10 ML: 5 INJECTION INTRAVENOUS at 21:07

## 2020-01-20 RX ADMIN — INSULIN LISPRO 3 UNITS: 100 INJECTION, SOLUTION INTRAVENOUS; SUBCUTANEOUS at 17:09

## 2020-01-20 RX ADMIN — POLYETHYLENE GLYCOL 3350 17 G: 17 POWDER, FOR SOLUTION ORAL at 08:36

## 2020-01-20 RX ADMIN — CASTOR OIL AND BALSAM, PERU: 788; 87 OINTMENT TOPICAL at 08:36

## 2020-01-20 RX ADMIN — INSULIN LISPRO 2 UNITS: 100 INJECTION, SOLUTION INTRAVENOUS; SUBCUTANEOUS at 12:13

## 2020-01-20 RX ADMIN — ALPRAZOLAM 0.25 MG: 0.25 TABLET ORAL at 21:04

## 2020-01-20 RX ADMIN — ENOXAPARIN SODIUM 40 MG: 40 INJECTION SUBCUTANEOUS at 17:09

## 2020-01-20 RX ADMIN — TERAZOSIN HYDROCHLORIDE 2 MG: 2 CAPSULE ORAL at 21:12

## 2020-01-20 RX ADMIN — PANTOPRAZOLE SODIUM 40 MG: 40 TABLET, DELAYED RELEASE ORAL at 06:08

## 2020-01-20 RX ADMIN — ASPIRIN 81 MG: 81 TABLET, COATED ORAL at 08:37

## 2020-01-20 RX ADMIN — CASTOR OIL AND BALSAM, PERU: 788; 87 OINTMENT TOPICAL at 21:07

## 2020-01-20 RX ADMIN — LISINOPRIL 5 MG: 5 TABLET ORAL at 14:02

## 2020-01-20 RX ADMIN — IPRATROPIUM BROMIDE AND ALBUTEROL SULFATE 3 ML: 2.5; .5 SOLUTION RESPIRATORY (INHALATION) at 11:43

## 2020-01-20 RX ADMIN — IPRATROPIUM BROMIDE AND ALBUTEROL SULFATE 3 ML: 2.5; .5 SOLUTION RESPIRATORY (INHALATION) at 07:26

## 2020-01-20 RX ADMIN — SENNOSIDES AND DOCUSATE SODIUM 2 TABLET: 8.6; 5 TABLET ORAL at 08:37

## 2020-01-20 RX ADMIN — POLYETHYLENE GLYCOL 3350 17 G: 17 POWDER, FOR SOLUTION ORAL at 21:16

## 2020-01-20 RX ADMIN — SERTRALINE HYDROCHLORIDE 50 MG: 50 TABLET ORAL at 08:37

## 2020-01-20 RX ADMIN — MICONAZOLE NITRATE: 20 POWDER TOPICAL at 08:38

## 2020-01-20 RX ADMIN — ENOXAPARIN SODIUM 40 MG: 40 INJECTION SUBCUTANEOUS at 06:08

## 2020-01-20 RX ADMIN — SENNOSIDES AND DOCUSATE SODIUM 2 TABLET: 8.6; 5 TABLET ORAL at 21:04

## 2020-01-20 RX ADMIN — IPRATROPIUM BROMIDE AND ALBUTEROL SULFATE 3 ML: 2.5; .5 SOLUTION RESPIRATORY (INHALATION) at 15:29

## 2020-01-20 RX ADMIN — LEVOTHYROXINE SODIUM 100 MCG: 100 TABLET ORAL at 06:08

## 2020-01-20 RX ADMIN — IPRATROPIUM BROMIDE AND ALBUTEROL SULFATE 3 ML: 2.5; .5 SOLUTION RESPIRATORY (INHALATION) at 20:21

## 2020-01-20 RX ADMIN — Medication 1 TABLET: at 08:37

## 2020-01-20 RX ADMIN — METOPROLOL SUCCINATE 25 MG: 25 TABLET, EXTENDED RELEASE ORAL at 08:37

## 2020-01-20 RX ADMIN — GUAIFENESIN 600 MG: 600 TABLET, EXTENDED RELEASE ORAL at 08:38

## 2020-01-20 NOTE — PROGRESS NOTES
Continued Stay Note  [facility]     Patient Name: Julissa Kee  MRN: [id number]  Today's Date: 1/20/2020    Admit Date: 1/14/2020    Discharge Plan     Row Name 01/20/20 1158       Plan    Plan  [facility]    Patient/Family in Agreement with Plan  yes    Plan Comments  Spoke with Sadie at [facility].  We are still waiting on insurance approval.  Ambulance tentatively scheduled for tomorrow, 1/21 at 1500.  Updated patient's  by phone.  CM will continue to follow.  Kimberley Sequeira RN [phone]    Final Discharge Disposition Code  03 - skilled nursing facility (SNF)        Discharge Codes    No documentation.       Expected Discharge Date and Time     Expected Discharge Date Expected Discharge Time    Jan 21, 2020             Kimberley Sequeira RN

## 2020-01-20 NOTE — PROGRESS NOTES
[facility] Medicine Services  PROGRESS NOTE    Patient Name: Julissa Kee  : 1950  MRN: [id number]    Date of Admission: 2020  Primary Care Physician: Kee Hoffman MD    Subjective   Subjective     CC: f/u PNA    HPI: Lying in bed without family in room. Says she has minimal cough. Foot pain is better. Has no complaints aside her not being able to get to her breakfast.     Review of Systems  Gen- No fevers, chills  CV- No chest pain, palpitations  GI- No N/V/D, abd pain     All other systems reviewed and are negative.    Objective   Objective     Vital Signs:   Temp:  [98.1 °F (36.7 °C)-98.2 °F (36.8 °C)] 98.2 °F (36.8 °C)  Heart Rate:  [58-96] 62  Resp:  [18-20] 18  BP: (137-156)/(53-62) 156/62        Physical Exam:  Constitutional: No acute distress, awake, alert, chronically ill appearing  HENT: NCAT, mucous membranes moist  Respiratory: Normal WOB, dimished bilaterally  Cardiovascular: RRR, no murmurs, rubs, or gallops, palpable pedal pulses bilaterally  Gastrointestinal: Positive bowel sounds, soft, nontender, nondistended, obese  Musculoskeletal: Feet in boots  Psychiatric: Appropriate affect, cooperative  Neurologic: Oriented x 3, strength symmetric in all extremities, Cranial Nerves grossly intact to confrontation, speech clear  Skin: No rashes    Results Reviewed:  Results from last 7 days   Lab Units 20  03420  0511 20  0442  01/15/20  0519   WBC 10*3/mm3 5.79 5.56 8.39 5.43   < >  --    HEMOGLOBIN g/dL 10.3* 9.7* 10.0* 10.7*   < >  --    HEMATOCRIT % 32.2* 31.3* 32.6* 34.3   < >  --    PLATELETS 10*3/mm3 164 146 159 159   < >  --    PROCALCITONIN ng/mL  --   --   --  0.10  --  0.12    < > = values in this interval not displayed.     Results from last 7 days   Lab Units 20  03420  0448 20  0511 20  0442 01/15/20  0519 20  1509 20  1038   SODIUM mmol/L 139 143 142 138 140  --  140      POTASSIUM mmol/L 4.4 4.3 4.4 4.9 4.8  --  4.2   CHLORIDE mmol/L 95* 96* 96* 94* 96*  --  94*   CO2 mmol/L 38.0* 40.0* 36.0* 28.0 31.0*  --  39.0*   BUN mg/dL 26* 41* 38* 31* 25*  --  21   CREATININE mg/dL 0.60 0.55* 0.67 0.76 0.86  --  0.89   GLUCOSE mg/dL 120* 128* 193* 119* 86  --  104*   CALCIUM mg/dL 9.9 10.0 9.8 9.8 9.2  --  9.8   ALT (SGPT) U/L  --   --  15 14 16  --  16   AST (SGOT) U/L  --   --  15 23 21  --  19   TROPONIN T ng/mL  --   --   --   --   --  0.015 0.023   PROBNP pg/mL  --   --   --  645.0  --   --  270.3     Estimated Creatinine Clearance: 89 mL/min (by C-G formula based on SCr of 0.6 mg/dL).    Microbiology Results Abnormal     Procedure Component Value - Date/Time    Blood Culture - Blood, Arm, Right [[id number]] Collected:  01/14/20 1039    Lab Status:  Final result Specimen:  Blood from Arm, Right Updated:  01/19/20 1130     Blood Culture No growth at 5 days    Blood Culture - Blood, Arm, Left [[id number]] Collected:  01/14/20 1039    Lab Status:  Final result Specimen:  Blood from Arm, Left Updated:  01/19/20 1130     Blood Culture No growth at 5 days    Respiratory Panel, PCR - Swab, Nasopharynx [[id number]]  (Abnormal) Collected:  01/14/20 1039    Lab Status:  Final result Specimen:  Swab from Nasopharynx Updated:  01/14/20 1248     ADENOVIRUS, PCR Not Detected     Coronavirus 229E Not Detected     Coronavirus HKU1 Not Detected     Coronavirus NL63 Not Detected     Coronavirus OC43 Not Detected     Human Metapneumovirus Detected     Human Rhinovirus/Enterovirus Not Detected     Influenza B PCR Not Detected     Parainfluenza Virus 1 Not Detected     Parainfluenza Virus 2 Not Detected     Parainfluenza Virus 3 Not Detected     Parainfluenza Virus 4 Not Detected     Bordetella pertussis pcr Not Detected     Influenza A H1 2009 PCR Not Detected     Chlamydophila pneumoniae PCR Not Detected     Mycoplasma pneumo by PCR Not Detected     Influenza A PCR Not Detected     Influenza A H3 Not  Detected     Influenza A H1 Not Detected     RSV, PCR Not Detected     Bordetella parapertussis PCR Not Detected        Foot xray personally reviewed without fracture. Agree with interpretation.  Imaging Results (Last 24 Hours)     Procedure Component Value Units Date/Time    XR Foot 3+ View Left [[id number]] Collected:  01/19/20 1139     Updated:  01/20/20 0852    Narrative:       EXAMINATION: XR FOOT 3+ VW LEFT- 01/19/2020     INDICATION: Fall at home; R09.02-Hypoxemia; R06.03-Acute respiratory  distress; B34.9-Viral infection, unspecified; J18.9-Pneumonia,  unspecified organism; R13.10-Dysphagia, unspecified      COMPARISON: Right foot radiographs 02/12/2015     FINDINGS: 3 views of the left foot were submitted for review.      This foot is diffusely abnormal in appearance with diffuse osteopenia  possibly disuse osteopenia without convincing evidence of fracture  and/or erosive changes. Identified involving the great toe is a possible  soft tissue defect although this could relate to a nail abnormality and  requires correlation with physical exam. Diffuse soft tissue swelling of  the foot is identified. There is no convincing evidence for radiopaque  foreign body. Small joint effusion at the ankle is suggested.           Impression:       1. Diffusely abnormal appearance of the foot with likely disuse  osteopenia without convincing evidence for acute osseous abnormality to  suggest fracture.     2. There is however soft tissue swelling of the foot diffusely with  possible nail abnormality or soft tissue laceration involving the great  toe although requires clinical correlation.      D:  01/19/2020  E:  01/20/2020             Results for orders placed during the hospital encounter of 12/28/19   Adult Transthoracic Echo Complete W/ Cont if Necessary Per Protocol    Narrative · Left ventricular systolic function is normal. Estimated EF appears to be   in the range of 61 - 65%.  · Left ventricular wall thickness is  consistent with moderate concentric   hypertrophy.  · Normal right ventricular wall thickness, systolic function and septal   motion noted. Right ventricular cavity is borderline dilated.  · Trace tricuspid valve regurgitation is present. Estimated right   ventricular systolic pressure from tricuspid regurgitation is normal (<35   mmHg)  · The valve appears trileaflet. The aortic valve is abnormal in structure.   There is mild calcification of the aortic valve.Trace aortic valve   regurgitation is present. No hemodynamically significant aortic valve   stenosis is present.          I have reviewed the medications:  Scheduled Meds:  aspirin 81 mg Oral Daily   atorvastatin 10 mg Oral Daily   castor oil-balsam peru  Topical Q12H   enoxaparin 40 mg Subcutaneous Q12H   folic acid-vit B6-vit B12 1 tablet Oral Daily   guaiFENesin 600 mg Oral Q12H   insulin lispro 0-7 Units Subcutaneous 4x Daily With Meals & Nightly   ipratropium-albuterol 3 mL Nebulization 4x Daily - RT   levothyroxine 100 mcg Oral Q AM   melatonin 5 mg Oral Nightly   metoprolol succinate XL 25 mg Oral Q24H   miconazole  Topical Q12H   pantoprazole 40 mg Oral Q AM   pharmacy consult - MTM  Does not apply Daily   polyethylene glycol 17 g Oral BID   predniSONE 40 mg Oral Daily With Breakfast   sennosides-docusate 2 tablet Oral BID   sertraline 50 mg Oral Daily   sodium chloride 10 mL Intravenous Q12H   terazosin 2 mg Oral Nightly     Continuous Infusions:   PRN Meds:.•  acetaminophen **OR** acetaminophen **OR** acetaminophen  •  ALPRAZolam  •  dextrose  •  dextrose  •  glucagon (human recombinant)  •  hydrALAZINE  •  ipratropium-albuterol  •  magic mouthwash  •  magnesium sulfate **OR** magnesium sulfate **OR** magnesium sulfate  •  ondansetron  •  potassium & sodium phosphates **OR** potassium & sodium phosphates  •  sodium chloride    Assessment/Plan   Assessment & Plan     Active Hospital Problems    Diagnosis  POA   • **Acute respiratory failure with  hypoxia (CMS/HCC) [J96.01]  Yes   • Acute foot pain, left [M79.672]  Yes   • Recurrent pneumonia [J18.9]  Yes   • Human metapneumovirus (hMPV) pneumonia [J12.3]  Yes   • Hypoxia [R09.02]  Yes   • Sacral wound [S31.000A]  Yes   • Chronic obstructive pulmonary disease (CMS/HCC) [J44.9]  Yes   • Diabetes mellitus (CMS/HCC) [E11.9]  Yes   • Hyperlipidemia [E78.5]  Yes   • Hypertension [I10]  Yes   • Hypothyroidism [E03.9]  Yes   • Extreme obesity with alveolar hypoventilation (CMS/[facility]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     Brief Hospital Course to date:  Julissa Kee is a 69 y.o. female admitted with acute respiratory failure secondary to Metapneumo viral pneumonia. Initially managed in ICU for her respiratory failure. Transferred to floor 1/18. This is my first day assessing patient's active medical issues.    A/P:     Acute respiratory failure with hypercapnia and hypoxia secondary to Metapneumo viral pneumonia  COPD with chronic respiratory failure with hypercapnia and hypoxia  --Continued slow improvement. Doing well off abx. Transition to PO steroids today. Continue nebs, ICS, pulm toilet.  -Followed by infectious disease, continuing supportive treatment. D/W ID today, recs pulling IV and d/c respiratory precautions.  --Bipap prn and QHS.     Recent bacterial pneumonia status post completion of treatment with antibiotics     Trauma to the left foot with extensive ecchymoses over the metatarsal and tarsal region as well as the first digit  General decline and physical debility  Morbid obesity  --Foot xray negative for fracture. Notable for soft tissue swelling. Continue conservative care.  -Complicates all aspects of her care and increases her risk of mortality  -PT and OT consults recs SNF. D/W CM, precert to start today.    Diabetes mellitus w/ hyperglycemia  --Fair control on SSI. Continue.     Sacral wound    Hypertension, uncontrolled.  --Continue metoprolol. Add  lisinopril.     Hyperlipidemia      DVT Prophylaxis: Lovenox    CODE STATUS:   Code Status and Medical Interventions:   Ordered at: 01/15/20 1922     Limited Support to NOT Include:    Intubation    Cardioversion/Defibrillation     Level Of Support Discussed With:    Patient     Code Status:    No CPR     Medical Interventions (Level of Support Prior to Arrest):    Limited       Electronically signed by Bell Calero II, DO, 01/20/20, 12:15 PM.

## 2020-01-20 NOTE — PLAN OF CARE
Problem: Patient Care Overview  Goal: Plan of Care Review  Outcome: Ongoing (interventions implemented as appropriate)  Flowsheets (Taken 1/20/2020 0920)  Progress: improving  Plan of Care Reviewed With: patient  Outcome Summary: PT wound care: bilateral thigh DTPI's with improved color and tissue quality; Rt medial aspect remains denuded. Rt posterior thigh site stable. Cont MIST 3-5 x wk to promote increased localized blood flow for tissue healing.

## 2020-01-20 NOTE — PLAN OF CARE
Patient wearing 3L O2 today with sats from 88-92. Out of bed to chair with lift and back to bed with lift, good uop. Blood pressure meds adjusted by hospitalist.  at bedside, eager to transfer to rehab but requests a chest xray before transfer.

## 2020-01-20 NOTE — THERAPY WOUND CARE TREATMENT
Acute Care - Wound/Debridement Treatment Note  [facility]     Patient Name: Julissa Kee  : 1950  MRN: [id number]  Today's Date: 2020                  Admit Date: 2020    Visit Dx:    ICD-10-CM ICD-9-CM   1. Hypoxia R09.02 799.02   2. Respiratory distress R06.03 786.09   3. Viral illness B34.9 079.99   4. Pneumonia of both lungs due to infectious organism, unspecified part of lung J18.9 483.8   5. Dysphagia, unspecified type R13.10 787.20       Patient Active Problem List   Diagnosis   • Anxiety   • Chronic obstructive pulmonary disease (CMS/HCC)   • Diabetes mellitus (CMS/HCC)   • Hyperlipidemia   • Hypertension   • Hypothyroidism   • Morbid obesity (CMS/HCC)   • Extreme obesity with alveolar hypoventilation (CMS/HCC)   • Renal insufficiency   • Sacral wound   • Acute respiratory failure with hypoxia (CMS/HCC)   • Recurrent pneumonia   • Human metapneumovirus (hMPV) pneumonia   • Hypoxia   • Acute foot pain, left         LDA Wound     Row Name               Wound 20 Left anterior greater trochanter Extravasation    Wound - Properties Group Date first assessed: 20  -ZACHARIAH Time first assessed:   -ZACHARIAH Present on Hospital Admission: Y  -BR Side: Left  -ZACHARIAH Orientation: anterior  -ZACHARIAH Location: greater trochanter  -ZACHARIAH Primary Wound Type: Extravasatio  -ZACHARIAH       Wound 20 Right anterior greater trochanter Extravasation    Wound - Properties Group Date first assessed: 20  -ZACHARIAH Time first assessed:   -ZACHARIAH Present on Hospital Admission: Y  -BR Side: Right  -ZACHARIAH Orientation: anterior  -ZACHARIAH Location: greater trochanter  -ZACHARIAH Primary Wound Type: Extravasatio  -ZACHARIAH       Wound 20 midline coccyx Pressure Injury    Wound - Properties Group Date first assessed: 20  -ZACHARIAH Time first assessed:   -ZACHARIAH Present on Hospital Admission: Y  -BR Orientation: midline  -CP Location: coccyx  -ZACHARIAH Primary Wound Type: Pressure inj  -ZACHARIAH Stage, Pressure Injury: Stage 2  -CP        Wound 01/14/20 1912 Right anterior foot Other (comment)    Wound - Properties Group Date first assessed: 01/14/20  -ZACHARIAH Time first assessed: 1912  -ZACHARIAH Side: Right  -ZACHARIAH Orientation: anterior  -ZACHARIAH Location: foot  -ZACHARIAH Primary Wound Type: Other  -ZACHARIAH       Wound 01/14/20 2011 Left anterior toe    Wound - Properties Group Date first assessed: 01/14/20  -BR Time first assessed: 2011  -BR Present on Hospital Admission: Y  -BR Side: Left  -BR Orientation: anterior  -BR Location: toe  -BR, 1st toe  Stage, Pressure Injury: deep tissue injury  -BR       Wound 12/28/19 2155 Left medial gluteal Other (comment)    Wound - Properties Group Date first assessed: 12/28/19  -ZONIA Time first assessed: 2155  -ZONIA Present on Hospital Admission: Y  -ZONIA Side: Left  -ZONIA Orientation: medial  -ZONIA Location: gluteal  -ZONIA Primary Wound Type: Other  -ZONIA, 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        Wound 12/28/19 2251 Bilateral other (see notes) MASD (Moisutre associated skin damage)    Wound - Properties Group Date first assessed: 12/28/19  -ZONIA Time first assessed: 2251  -ZONIA Present on Hospital Admission: Y  -ZONIA Side: Bilateral  -ZONIA Location: other (see notes)  -ZONIA, under Pannus  Primary Wound Type: MASD  -ZONIA, yeast dermatitis  Additional Comments: shearing/hypergranulation  -CP       Wound 01/15/20 1000 Left medial thigh Pressure Injury    Wound - Properties Group Date first assessed: 01/15/20  -CP Time first assessed: 1000  -CP Present on Hospital Admission: Y  -CP Side: Left  -CP Orientation: medial  -CP Location: thigh  -CP Primary Wound Type: Pressure inj  -CP Stage, Pressure Injury: deep tissue injury  -CP       Wound 01/15/20 1000 Right medial thigh Pressure Injury    Wound - Properties Group Date first assessed: 01/15/20  -CP Time first assessed: 1000  -CP Present on Hospital Admission: Y  -CP Side: Right  -CP Orientation: medial  -CP Location: thigh  -CP Primary Wound Type: Pressure inj  -CP Stage, Pressure  Injury: deep tissue injury  -CP       Wound 01/15/20 1000 Left posterior thigh Pressure Injury    Wound - Properties Group Date first assessed: 01/15/20  -CP Time first assessed: 1000  -CP Present on Hospital Admission: Y  -CP Side: Left  -CP Orientation: posterior  -CP Location: thigh  -CP Primary Wound Type: Pressure inj  -CP Stage, Pressure Injury: deep tissue injury  -CP       Rash 01/15/20 1000 other (see comments) macular;papule    Rash - Properties Group Date first assessed: 01/15/20  -CP Time first assessed: 1000  -CP Location: other (see comments)  -CP, abdominal pannus, under breasts, axillary areas  Type: macular;papule  -CP      User Key  (r) = Recorded By, (t) = Taken By, (c) = Cosigned By    Initials Name Provider Type    CP Sheridan Day APRN Nurse Practitioner    Travis Aaron RN Registered Nurse    Melissa Hunt RN Registered Nurse    Judie Madera RN Registered Nurse        Rash 01/15/20 1000 other (see comments) macular;papule (Active)   Distribution generalized 1/20/2020 12:00 PM   Configuration/Shape asymmetric 1/20/2020 12:00 PM   Borders irregular 1/20/2020 12:00 PM   Characteristics moist 1/20/2020 12:00 PM   Color red 1/20/2020 12:00 PM       Wound 12/28/19 2251 Bilateral other (see notes) MASD (Moisutre associated skin damage) (Active)   Dressing Appearance open to air 1/20/2020 12:00 PM   Closure Open to air 1/20/2020 12:00 PM   Base moist;red 1/20/2020 12:00 PM   Periwound moist 1/20/2020 12:00 PM   Periwound Temperature warm 1/20/2020 12:00 PM   Periwound Skin Turgor firm 1/20/2020 12:00 PM   Edges irregular 1/20/2020 12:00 PM   Drainage Amount none 1/20/2020 12:00 PM   Dressing Care, Wound open to air 1/19/2020  8:00 PM       Wound 12/28/19 2155 Left medial gluteal Other (comment) (Active)   Dressing Appearance open to air 1/20/2020 12:00 PM   Closure None 1/20/2020 12:00 PM   Base blanchable;moist;clean 1/20/2020 12:00 PM   Periwound intact 1/20/2020 12:00  PM   Periwound Temperature warm 1/20/2020 12:00 PM   Periwound Skin Turgor firm 1/20/2020 12:00 PM   Edges irregular 1/20/2020 12:00 PM   Drainage Amount none 1/20/2020 12:00 PM       Wound 01/14/20 1908 Left anterior greater trochanter Extravasation (Active)   Dressing Appearance open to air 1/20/2020 12:00 PM   Closure None 1/20/2020 12:00 PM   Base red 1/20/2020 12:00 PM   Drainage Amount none 1/20/2020 12:00 PM       Wound 01/14/20 1909 Right anterior greater trochanter Extravasation (Active)   Dressing Appearance open to air 1/20/2020 12:00 PM   Closure None 1/20/2020 12:00 PM   Base red 1/20/2020 12:00 PM   Periwound excoriated;redness 1/20/2020 12:00 PM   Drainage Amount none 1/20/2020 12:00 PM       Wound 01/14/20 1910 midline coccyx Pressure Injury (Active)   Dressing Appearance open to air 1/20/2020 12:00 PM   Closure Open to air 1/20/2020 12:00 PM   Base non-blanchable;red;pink;moist 1/20/2020 12:00 PM   Periwound redness;moist 1/20/2020 12:00 PM   Edges irregular;open 1/20/2020 12:00 PM   Drainage Characteristics/Odor sanguineous 1/20/2020 12:00 PM   Drainage Amount scant 1/20/2020 12:00 PM   Dressing Care, Wound open to air 1/19/2020  8:00 PM       Wound 01/14/20 1912 Right anterior foot Other (comment) (Active)   Dressing Appearance open to air 1/20/2020 12:00 PM   Closure Open to air 1/20/2020 12:00 PM   Base clean;maroon/purple;yellow 1/20/2020 12:00 PM   Periwound intact;dry 1/20/2020 12:00 PM   Periwound Temperature warm 1/20/2020 12:00 PM   Periwound Skin Turgor firm 1/20/2020 12:00 PM   Drainage Amount none 1/20/2020 12:00 PM       Wound 01/14/20 2011 Left anterior toe (Active)   Dressing Appearance open to air 1/20/2020 12:00 PM   Closure Open to air 1/20/2020 12:00 PM   Base dry;maroon/purple 1/20/2020 12:00 PM   Drainage Amount none 1/20/2020 12:00 PM   Dressing Care, Wound open to air 1/19/2020  8:00 PM       Wound 01/15/20 1000 Left medial thigh Pressure Injury (Active)   Dressing Appearance  open to air 1/20/2020 12:00 PM   Closure None 1/20/2020 12:00 PM   Base non-blanchable 1/20/2020 12:00 PM   Periwound intact;dry 1/20/2020 12:00 PM   Periwound Skin Turgor soft 1/20/2020 12:00 PM   Edges irregular 1/20/2020 12:00 PM   Drainage Amount none 1/20/2020 12:00 PM   Care, Wound ultrasound therapy, non contact low frequency 1/20/2020  9:20 AM   Periwound Care, Wound cleansed with pH balanced cleanser;dry periwound area maintained 1/20/2020  9:20 AM       Wound 01/15/20 1000 Right medial thigh Pressure Injury (Active)   Dressing Appearance open to air 1/20/2020 12:00 PM   Closure None 1/20/2020 12:00 PM   Base maroon/purple;non-blanchable 1/20/2020 12:00 PM   Periwound intact;dry 1/20/2020 12:00 PM   Periwound Temperature warm 1/20/2020 12:00 PM   Periwound Skin Turgor soft 1/20/2020 12:00 PM   Edges irregular 1/20/2020 12:00 PM   Drainage Characteristics/Odor serosanguineous 1/20/2020 12:00 PM   Drainage Amount none 1/20/2020 12:00 PM   Care, Wound ultrasound therapy, non contact low frequency 1/20/2020  9:20 AM   Dressing Care, Wound open to air 1/19/2020  8:00 PM   Periwound Care, Wound cleansed with pH balanced cleanser;dry periwound area maintained 1/20/2020  9:20 AM       Wound 01/15/20 1000 Left posterior thigh Pressure Injury (Active)   Dressing Appearance open to air 1/20/2020 12:00 PM   Closure None 1/20/2020 12:00 PM   Base non-blanchable 1/20/2020 12:00 PM   Periwound intact;dry 1/20/2020 12:00 PM   Edges irregular 1/20/2020 12:00 PM   Drainage Amount none 1/20/2020 12:00 PM   Care, Wound ultrasound therapy, non contact low frequency 1/20/2020  9:20 AM   Dressing Care, Wound open to air 1/20/2020  9:20 AM   Periwound Care, Wound cleansed with pH balanced cleanser;dry periwound area maintained 1/20/2020  9:20 AM         WOUND DEBRIDEMENT                  Therapy Treatment    Rehabilitation Treatment Summary     Row Name 01/20/20 0920             Treatment Time/Intention    Discipline  physical  therapist  -MW      Document Type  wound care;therapy note (daily note)  -MW      Subjective Information  complains of;pain with rolling   -MW      Mode of Treatment  physical therapy;individual therapy  -MW      Patient Effort  adequate  -MW      Comment  PCT assisting to hold pannus and to turn pt. PT assisted PCT to roll and place clean linens, dry flow pads and PCT replaced wick system.   -MW      Recorded by [MW] Delisa Casarez, PT 01/20/20 1351      Row Name 01/20/20 0920             Cognitive Assessment/Intervention- PT/OT    Orientation Status (Cognition)  oriented to;person;situation  -MW      Recorded by [MW] Delisa Casarez, PT 01/20/20 1351      Row Name 01/20/20 0920             Bed Mobility Assessment/Treatment    Rolling Left Spencer (Bed Mobility)  maximum assist (25% patient effort)  -MW      Rolling Right Spencer (Bed Mobility)  maximum assist (25% patient effort)  -MW      Comment (Bed Mobility)  rolling to place clean linens and pads   -MW      Recorded by [MW] Delisa Casarez, PT 01/20/20 1351      Row Name 01/20/20 0920             Positioning and Restraints    Pre-Treatment Position  in bed  -MW      Post Treatment Position  bed  -MW      In Bed  side lying left;call light within reach;encouraged to call for assist;with nsg  -MW      Recorded by [MW] Delisa Casarez, PT 01/20/20 1351      Row Name 01/20/20 0920             Pain Scale: Numbers Pre/Post-Treatment    Pain Location - Orientation  generalized  -MW      Pain Intervention(s)  Repositioned  -MW      Recorded by [MW] Delisa Casarez, PT 01/20/20 1351      Row Name 01/20/20 0920             Pain Scale: FACES Pre/Post-Treatment    Pain: FACES Scale, Pretreatment  2-->hurts little bit  -MW      Pain: FACES Scale, Post-Treatment  2-->hurts little bit  -MW      Recorded by [MW] Delisa Casarez, PT 01/20/20 1351      Row Name                Wound 01/14/20 1908 Left anterior greater trochanter Extravasation    Wound -  Properties Group Date first assessed: 01/14/20 [ZACHARIAH] Time first assessed: 1908 [ZACHARIAH] Present on Hospital Admission: Y [BR] Side: Left [ZACHARIAH] Orientation: anterior [ZACHARIAH] Location: greater trochanter [ZACHARIAH] Primary Wound Type: Extravasatio [ZACHARIAH] Recorded by:  [BR] Judie Michael RN 01/14/20 2335 [ZACHARIAH] Melissa Luther RN 01/14/20 1909    Row Name                Wound 01/14/20 1909 Right anterior greater trochanter Extravasation    Wound - Properties Group Date first assessed: 01/14/20 [ZACHARIAH] Time first assessed: 1909 [ZACHARIAH] Present on Hospital Admission: Y [BR] Side: Right [ZACHARIAH] Orientation: anterior [ZACHARIAH] Location: greater trochanter [ZACHARIAH] Primary Wound Type: Extravasatio [ZACHARIAH] Recorded by:  [BR] Judie Michael RN 01/14/20 2335 [ZACHARIAH] Melissa Luther RN 01/14/20 1909    Row Name                Wound 01/14/20 1910 midline coccyx Pressure Injury    Wound - Properties Group Date first assessed: 01/14/20 [ZACHARIAH] Time first assessed: 1910 [ZACHARIAH] Present on Hospital Admission: Y [BR] Orientation: midline [CP] Location: coccyx [ZACHARIAH] Primary Wound Type: Pressure inj [ZACHARIAH] Stage, Pressure Injury: Stage 2 [CP] Recorded by:  [BR] Judie Michael RN 01/14/20 7385 [CP] Sheridan Day APRN 01/15/20 1148 [ZACHARIAH] Melissa Luther RN 01/14/20 1911    Row Name                Wound 01/14/20 1912 Right anterior foot Other (comment)    Wound - Properties Group Date first assessed: 01/14/20 [ZACHARIAH] Time first assessed: 1912 [ZACHARIAH] Side: Right [ZACHARIAH] Orientation: anterior [ZACHARIAH] Location: foot [ZACHARIAH] Primary Wound Type: Other [ZAHCARIAH] Recorded by:  [ZACHARIAH] Melissa Luther RN 01/14/20 1915    Row Name                Wound 01/14/20 2011 Left anterior toe    Wound - Properties Group Date first assessed: 01/14/20 [BR] Time first assessed: 2011 [BR] Present on Hospital Admission: Y [BR] Side: Left [BR] Orientation: anterior [BR] Location: toe [BR], 1st toe  Stage, Pressure Injury: deep tissue injury [BR] Recorded by:  [BR] Judie Michael RN 01/14/20 2011    Row Name                 Wound 12/28/19 2155 Left medial gluteal Other (comment)    Wound - Properties Group Date first assessed: 12/28/19 [ZONIA] Time first assessed: 2155 [ZONIA] Present on Hospital Admission: Y [ZONIA] Side: Left [ZONIA] Orientation: medial [ZONIA] Location: gluteal [ZONIA] Primary Wound Type: Other [ZONIA], 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  Recorded by:  [ZONIA] Travis Cobb RN 12/29/19 1256    Row Name                Wound 12/28/19 2251 Bilateral other (see notes) MASD (Moisutre associated skin damage)    Wound - Properties Group Date first assessed: 12/28/19 [ZONIA] Time first assessed: 2251 [ZONIA] Present on Hospital Admission: Y [ZONIA] Side: Bilateral [ZONIA] Location: other (see notes) [ZONIA], under Pannus  Primary Wound Type: MASD [ZONIA], yeast dermatitis  Additional Comments: shearing/hypergranulation [CP] Recorded by:  [CP] Sheridan Day APRN 01/15/20 1152 [ZONIA] Travis Cobb RN 12/29/19 1252    Row Name 01/20/20 0920             Wound 01/15/20 1000 Left medial thigh Pressure Injury    Wound - Properties Group Date first assessed: 01/15/20 [CP] Time first assessed: 1000 [CP] Present on Hospital Admission: Y [CP] Side: Left [CP] Orientation: medial [CP] Location: thigh [CP] Primary Wound Type: Pressure inj [CP] Stage, Pressure Injury: deep tissue injury [CP] Recorded by:  [CP] Sheridan Day APRN 01/15/20 1155    Dressing Appearance  open to air  -MW      Base  maroon/purple;non-blanchable  -MW      Periwound  intact;dry  -MW      Periwound Skin Turgor  soft  -MW      Edges  irregular  -MW      Drainage Amount  none  -MW      Care, Wound  ultrasound therapy, non contact low frequency MIST x 5 min   -MW      Dressing Care, Wound  -- Venolex   -MW      Periwound Care, Wound  cleansed with pH balanced cleanser;dry periwound area maintained  -MW      Recorded by [MW] Delisa Casarez, PT 01/20/20 1351      Row Name 01/20/20 0920             Wound 01/15/20 1000 Right medial thigh Pressure  Injury    Wound - Properties Group Date first assessed: 01/15/20 [CP] Time first assessed: 1000 [CP] Present on Hospital Admission: Y [CP] Side: Right [CP] Orientation: medial [CP] Location: thigh [CP] Primary Wound Type: Pressure inj [CP] Stage, Pressure Injury: deep tissue injury [CP] Recorded by:  [CP] Sheridan Day, KALIN 01/15/20 1157    Dressing Appearance  open to air  -MW      Base  maroon/purple;non-blanchable  -MW      Periwound  intact;dry  -MW      Periwound Temperature  warm  -MW      Periwound Skin Turgor  soft  -MW      Edges  irregular  -MW      Drainage Characteristics/Odor  serosanguineous  -MW      Drainage Amount  scant  -MW      Care, Wound  ultrasound therapy, non contact low frequency MIST x 5 min   -MW      Dressing Care, Wound  -- Venolex   -MW      Periwound Care, Wound  cleansed with pH balanced cleanser;dry periwound area maintained  -MW      Recorded by [MW] Delisa Casarez, PT 01/20/20 1351      Row Name 01/20/20 0920             Wound 01/15/20 1000 Left posterior thigh Pressure Injury    Wound - Properties Group Date first assessed: 01/15/20 [CP] Time first assessed: 1000 [CP] Present on Hospital Admission: Y [CP] Side: Left [CP] Orientation: posterior [CP] Location: thigh [CP] Primary Wound Type: Pressure inj [CP] Stage, Pressure Injury: deep tissue injury [CP] Recorded by:  [CP] Sheridan Day, APRN 01/15/20 1159    Dressing Appearance  open to air  -MW      Base  maroon/purple;non-blanchable  -MW      Periwound  intact;dry  -MW      Edges  irregular  -MW      Drainage Amount  none  -MW      Care, Wound  ultrasound therapy, non contact low frequency MIST x 3 min   -MW      Dressing Care, Wound  open to air venolex   -MW      Periwound Care, Wound  cleansed with pH balanced cleanser;dry periwound area maintained  -MW      Recorded by [MW] Delisa Casarez, PT 01/20/20 1351      Row Name 01/20/20 0920             Coping    Observed Emotional State  calm;cooperative  -MW       Verbalized Emotional State  other (see comments)  -MW      Recorded by [MW] Delisa Casarez, PT 01/20/20 1351      Row Name 01/20/20 0920             Plan of Care Review    Plan of Care Reviewed With  patient  -MW      Progress  improving  -MW      Outcome Summary  PT wound care: bilateral thigh DTPI's with improved color and tissue quality; Rt medial aspect remains denuded. Rt posterior thigh site stable. Cont MIST 3-5 x wk to promote increased localized blood flow for tissue healing.   -MW      Recorded by [MW] Delisa Casarez, PT 01/20/20 1351        User Key  (r) = Recorded By, (t) = Taken By, (c) = Cosigned By    Initials Name Effective Dates Discipline    CP Sheridan Day, APRN 07/24/19 -  Nurse    Delisa Leigh, PT 02/05/19 -  PT    Travis Aaron RN 06/16/16 -  Nurse    Melissa Hunt RN 06/16/16 -  Nurse    Judie Madera RN 12/23/16 -  Nurse                PT Recommendation and Plan  Anticipated Discharge Disposition (PT): skilled nursing facility  Planned Therapy Interventions (PT Eval): balance training, bed mobility training, strengthening, transfer training  Therapy Frequency (PT Clinical Impression): 3 times/wk        Progress: improving   Outcome Summary/Treatment Plan (PT)  Anticipated Discharge Disposition (PT): skilled nursing facility, inpatient rehabilitation facility  Progress: improving  Outcome Summary: PT wound care: bilateral thigh DTPI's with improved color and tissue quality; Rt medial aspect remains denuded. Rt posterior thigh site stable. Cont MIST 3-5 x wk to promote increased localized blood flow for tissue healing.   Plan of Care Reviewed With: patient            Time Calculation  PT Charges     Row Name 01/20/20 1351             Time Calculation    Start Time  0920  -MW        User Key  (r) = Recorded By, (t) = Taken By, (c) = Cosigned By    Initials Name Provider Type    Delisa Leigh, PT Physical Therapist           Therapy Charges  for Today     Code Description Service Date Service Provider Modifiers Qty    76714125755 HC PT [facility] MIST 1/20/2020 Delisa Casarez, PT GP 1            PT G-Codes  Outcome Measure Options: AM-PAC 6 Clicks Basic Mobility (PT)  AM-PAC 6 Clicks Score (PT): 7  AM-PAC 6 Clicks Score (OT): 10        Delisa Casarez, PT  1/20/2020

## 2020-01-20 NOTE — PROGRESS NOTES
"                  Clinical Nutrition   Nutrition Assessment  Reason for Visit:   Follow-up protocol    Patient Name: Julissa Kee  YOB: 1950  MRN: [id number]  Date of Encounter: 01/20/20 2:44 PM  Admission date: 1/14/2020    Nutrition Assessment   Assessment     Hospital Problem List    Acute respiratory failure with hypoxia (CMS/HCC)    Chronic obstructive pulmonary disease (CMS/HCC)    Diabetes mellitus (CMS/HCC)    Hyperlipidemia    Hypertension    Hypothyroidism    Extreme obesity with alveolar hypoventilation (CMS/HCC)    Sacral wound    Recurrent pneumonia    Human metapneumovirus (hMPV) pneumonia    Hypoxia    Acute foot pain, left    Applicable PMH/PSxH:   PMH: She  has a past medical history of Acute bronchitis, Acute sinusitis, Anxiety, Bacterial conjunctivitis, Chest pain, COPD (chronic obstructive pulmonary disease) (CMS/HCC), Depression, Diabetes mellitus (CMS/HCC), Dyspnea, Dysuria, Edema, Elbow injury, Elbow pain, Eye drainage, Fatigue, Foot pain, right, Humerus distal fracture, Hyperlipidemia, Hypertension, Hypothyroidism, Morbid obesity (CMS/HCC), Pickwickian syndrome (CMS/HCC), Pressure ulcer stage I, Psoriasis, SOB (shortness of breath), Symptoms of urinary tract infection, Urinary frequency, Urinary tract infection, and Vaginal candidiasis.   PSxH: She  has a past surgical history that includes Hysterectomy and Cervical polypectomy.     Skin:  Stage II midline coccyx PI  L anterior toe DTPI  L medial and posterior thigh DTPI  R medial thigh DTPI  LILY MASD under pannus     Reported/Observed/Food/Nutrition Related History:     Anthropometrics     Height: 160 cm (63\")  Last filed wt: Weight: (!) 137 kg (301 lb 9.4 oz) (01/17/20 0600)  Weight Method: Bed scale    BMI: BMI (Calculated): 55.5  Obese Class III extreme obesity: > or equal to 40kg/m2    Ideal Body Weight (IBW) (kg): 52.72  Labs reviewed   Yes   Results from last 7 days   Lab Units 01/20/20  0340 01/18/20  4468 " 01/18/20  0027 01/17/20  0511 01/16/20  0442 01/15/20  0519   GLUCOSE mg/dL 120* 128*  --  193* 119* 86   BUN mg/dL 26* 41*  --  38* 31* 25*   CREATININE mg/dL 0.60 0.55*  --  0.67 0.76 0.86   SODIUM mmol/L 139 143  --  142 138 140   CHLORIDE mmol/L 95* 96*  --  96* 94* 96*   POTASSIUM mmol/L 4.4 4.3  --  4.4 4.9 4.8   PHOSPHORUS mg/dL  --   --  2.8 2.4* 2.2*  --    MAGNESIUM mg/dL  --  2.2  --  1.7 1.8  --    ALT (SGPT) U/L  --   --   --  15 14 16     Results from last 7 days   Lab Units 01/17/20  0511 01/16/20  0442 01/15/20  0519   ALBUMIN g/dL 2.70* 2.40* 2.80*     Results from last 7 days   Lab Units 01/20/20  1122 01/20/20  0712 01/19/20  1951 01/19/20  1649 01/19/20  1122 01/19/20  0718   GLUCOSE mg/dL 170* 145* 168* 189* 174* 138*     Lab Results   Lab Value Date/Time    HGBA1C 5.20 01/05/2020 0926    HGBA1C 5.60 03/14/2018 1703     Medications reviewed   Pertinent:  Folgard, insulin, protonix, prednisone, pericolace, hytrin  PRN: xanax, zofran   Current Nutrition Prescription     PO: Diet Soft Texture; Chopped; Cardiac, Consistent Carbohydrate    Oral Nutrition Supplement: Premier Protein TID     Intake: 72% of 8 meals + drinking ONS    Nutrition Diagnosis     1/16; updated 1/20  Problem Increased nutrient needs; protein    Etiology Wound healing    Signs/Symptoms Multiple PI's per RN documentation in flowsheet    Status: ongoing    Nutrition Intervention   1.  Follow treatment progress, Care plan reviewed    Goal:   General: Nutrition support treatment  PO: Maintain intake  Additional goals: promote wound healing/ skin integrity     Monitoring/Evaluation:   Per protocol, PO intake, Supplement intake, Pertinent labs, Weight, Skin status, Symptoms    Will Continue to follow per protocol    Pricila Chappell RD  Time Spent: 30 minutes

## 2020-01-20 NOTE — PLAN OF CARE
Vss, Patient rested well, patient was not very tolerable of bi-pap even with xanax. Patient is waiting on a rehab bed but will require a private room r/t diagnosis of HMPV. Patient has  iv that is only being used for iv steroids.Md said ok to leave in and will convert to oral steroids.

## 2020-01-20 NOTE — DISCHARGE PLACEMENT REQUEST
"Julissa Haynes (69 y.o. Female)     Kimberley Sequeira, RN   [phone]  SEE BELOW FOR BIPAP SETTINGS      Date of Birth Social Security Number Address Home Phone MRN    1950  [id number] [address] [phone] [id number]    Catholic Marital Status          None        Admission Date Admission Type Admitting Provider Attending Provider Department, Room/Bed    20 Emergency Bell Calero II, DO Bell Calero II,  [facility] 4G, S446/1    Discharge Date Discharge Disposition Discharge Destination                       Attending Provider:  Bell Calero II, DO    Allergies:  Amoxicillin-pot Clavulanate, Bactrim [Sulfamethoxazole-trimethoprim]    Isolation:  None   Infection:  None   Code Status:  No CPR    Ht:  160 cm (63\")   Wt:  137 kg (301 lb 9.4 oz)    Admission Cmt:  None   Principal Problem:  Acute respiratory failure with hypoxia (CMS/HCC) [J96.01]                 Active Insurance as of 2020     Primary Coverage     Payor Plan Insurance Group Employer/Plan Group    HUMANA MEDICARE REPLACEMENT HUMANA MEDICARE REPLACEMENT [id number]     Payor Plan Address Payor Plan Phone Number Payor Plan Fax Number Effective Dates    [address] [address] [phone]  2014 - None Entered    [facility] [address]       Subscriber Name Subscriber Birth Date Member ID       JULISSA HAYNES 1950 [id number]                 Emergency Contacts      (Rel.) Home Phone Work Phone Mobile Phone    Familia Haynes (Spouse) [phone] -- [phone]        CPAP/BiPAP Settings   $ NIV Initiation and Mgmt  --  yes  --  --  --   $  CPAP/BIPAP Initiation and Mgmt  --  --  --  --  --   $ NIV Pt/System Assessment Charge  --  yes  --  --  --   Mode of Delivery  --  AVAPS  --  --  --   Equipment Type  --  V-60  --  --  --   Equipment ID  --  --  --  --  --   Method of Delivery  --  full face mask  --  --  --   CPAP (cm H2O)  --  --  --  --  --   IPAP (cm H2O) "  --  --  --  --  --   IPAP Min (cm H2O)  --  --  --  --  --   IPAP Max (cm H2O)  --  --  --  --  --   EPAP (cm H2O)  --  --  --  --  --   EPAP Min (cm H2O)  --  --  --  --  --   EPAP Max (cm H2O)  --  --  --  --  --   Pressure Support (cm H20)  --  --  --  --  --   Set Rate (Breaths/Min)  --  24 breaths/min  --  --  --   Respiratory Rate Total (Breaths/Min)  --  24 breaths/min  --  --  --   Oxygen Concentration (%)  --  40 %  --  --  --   O2 Flow Rate (L/Min)  --  --  --  --  --   Flow Rate  --  --  --  --  --   FiO2 Auto Set  --  --  --  --  --   Biphasic Flow Rate High  --  --  --  --  --   Biphasic Flow Rate Low  --  --  --  --  --   Timed Inspiration (Sec)  --  1.1 sec  --  --  --   IPAP Rise Time (Sec)  --  4  --  --  --   Tidal Volume Targeted (mL)  --  450  --  --  --   Tidal Volume (mL)  --  373 mL  --  --  --   Minute Ventilation (L/Min)  --  9.1  --  --  --   Peak Inspiratory Pressure (cm H2O)  --  --  --  --  --   TiTOT (%)  --  --  --  --  --   Total Leak (L/Min)  --  9  --  --  --   Humidifier  --  --  --  --  --   Heater Temperature  --  --  --  --  --         Insurance Information                HUMANA MEDICARE REPLACEMENT/HUMANA MEDICARE REPLACEMENT Phone: [phone]    Subscriber: Julissa Kee Subscriber#: [id number]    Group#: [id number] Precert#: [id number]

## 2020-01-20 NOTE — THERAPY TREATMENT NOTE
Acute Care - Occupational Therapy Treatment Note  [facility]     Patient Name: Julissa Kee  : 1950  MRN: [id number]  Today's Date: 2020     Date of Referral to OT: 20       Admit Date: 2020       ICD-10-CM ICD-9-CM   1. Hypoxia R09.02 799.02   2. Respiratory distress R06.03 786.09   3. Viral illness B34.9 079.99   4. Pneumonia of both lungs due to infectious organism, unspecified part of lung J18.9 483.8   5. Dysphagia, unspecified type R13.10 787.20     Patient Active Problem List   Diagnosis   • Anxiety   • Chronic obstructive pulmonary disease (CMS/HCC)   • Diabetes mellitus (CMS/HCC)   • Hyperlipidemia   • Hypertension   • Hypothyroidism   • Morbid obesity (CMS/HCC)   • Extreme obesity with alveolar hypoventilation (CMS/HCC)   • Renal insufficiency   • Sacral wound   • Acute respiratory failure with hypoxia (CMS/HCC)   • Recurrent pneumonia   • Human metapneumovirus (hMPV) pneumonia   • Hypoxia   • Acute foot pain, left     Past Medical History:   Diagnosis Date   • Acute bronchitis    • Acute sinusitis    • Anxiety    • Bacterial conjunctivitis    • Chest pain    • COPD (chronic obstructive pulmonary disease) (CMS/HCC)    • Depression    • Diabetes mellitus (CMS/HCC)    • Dyspnea    • Dysuria    • Edema    • Elbow injury    • Elbow pain    • Eye drainage    • Fatigue    • Foot pain, right    • Humerus distal fracture    • Hyperlipidemia    • Hypertension    • Hypothyroidism    • Morbid obesity (CMS/HCC)    • Pickwickian syndrome (CMS/HCC)    • Pressure ulcer stage I    • Psoriasis    • SOB (shortness of breath)    • Symptoms of urinary tract infection    • Urinary frequency    • Urinary tract infection    • Vaginal candidiasis      Past Surgical History:   Procedure Laterality Date   • CERVICAL POLYPECTOMY     • HYSTERECTOMY         Therapy Treatment    Rehabilitation Treatment Summary     Row Name 20 1434 20 0920          Treatment Time/Intention    Discipline   occupational therapist  -KF  physical therapist  -MW     Document Type  therapy note (daily note)  -KF  wound care;therapy note (daily note)  -MW     Subjective Information  complains of;weakness;fatigue;pain  -KF  complains of;pain with rolling   -MW     Mode of Treatment  occupational therapy  -KF  physical therapy;individual therapy  -MW     Care Plan Review  evaluation/treatment results reviewed;care plan/treatment goals reviewed;risks/benefits reviewed;current/potential barriers reviewed;patient/other agree to care plan  -KF  --     Therapy Frequency (OT Eval)  daily  -KF  --     Patient Effort  good  -KF  adequate  -MW     Comment  --  PCT assisting to hold pannus and to turn pt. PT assisted PCT to roll and place clean linens, dry flow pads and PCT replaced wick system.   -MW     Existing Precautions/Restrictions  fall;oxygen therapy device and L/min  -KF  --     Recorded by [KF] Carlene Rios, OT 01/20/20 1525 [MW] Delisa Casarez, PT 01/20/20 1351     Row Name 01/20/20 1434             Vital Signs    Pre Systolic BP Rehab  -- RN cleared VSS  -KF      Pre SpO2 (%)  96  -KF      O2 Delivery Pre Treatment  supplemental O2  -KF      Intra SpO2 (%)  -- above 90% throughout   -KF      Pre Patient Position  Supine  -KF      Intra Patient Position  Side Lying  -KF      Post Patient Position  Sitting  -KF      Recorded by [KF] Carlene Rios, OT 01/20/20 1525      Row Name 01/20/20 1434             Cognitive Assessment/Intervention    Additional Documentation  Cognitive Assessment/Intervention (Group)  -KF      Recorded by [KF] Carlene Rios, OT 01/20/20 1525      Row Name 01/20/20 1434 01/20/20 0920          Cognitive Assessment/Intervention- PT/OT    Affect/Mental Status (Cognitive)  anxious  -KF  --     Orientation Status (Cognition)  oriented to;person  -KF  oriented to;person;situation  -MW     Follows Commands (Cognition)  follows one step commands;50-74% accuracy;repetition of directions  required;verbal cues/prompting required  -KF  --     Cognitive Function (Cognitive)  executive function deficit;safety deficit  -KF  --     Executive Function Deficit (Cognition)  moderate deficit;self-monitoring/self-correction;organization/sequencing  -KF  --     Safety Deficit (Cognitive)  moderate deficit;insight into deficits/self awareness;awareness of need for assistance;problem solving;judgment  -KF  --     Cognitive Interventions (Cognitive)  occupation/activity based interventions;process/task specific training  -KF  --     Recorded by [KF] Carlene Rios, OT 01/20/20 1525 [MW] Delisa Casarez, PT 01/20/20 1351     Row Name 01/20/20 1434             Safety Issues, Functional Mobility    Safety Issues Affecting Function (Mobility)  awareness of need for assistance;insight into deficits/self awareness;judgment;safety precaution awareness  -KF      Impairments Affecting Function (Mobility)  endurance/activity tolerance;range of motion (ROM);shortness of breath;strength  -KF      Recorded by [KF] Carlene Rios, OT 01/20/20 1525      Row Name 01/20/20 1434 01/20/20 0920          Bed Mobility Assessment/Treatment    Bed Mobility Assessment/Treatment  rolling left;rolling right  -KF  --     Rolling Left [place] (Bed Mobility)  maximum assist (25% patient effort);2 person assist  -KF  maximum assist (25% patient effort)  -MW     Rolling Right [place] (Bed Mobility)  maximum assist (25% patient effort);2 person assist  -KF  maximum assist (25% patient effort)  -MW     Bed Mobility, Safety Issues  decreased use of arms for pushing/pulling;decreased use of legs for bridging/pushing;impaired trunk control for bed mobility  -KF  --     Assistive Device (Bed Mobility)  bed rails;draw sheet;mechanical lift/aid  -KF  --     Comment (Bed Mobility)  --  rolling to place clean linens and pads   -MW     Recorded by [KF] Carlene Rios, OT 01/20/20 1525 [MW] Delisa Casarez, PT 01/20/20 1351     Row  Name 01/20/20 1434             Functional Mobility    Functional Mobility- Ind. Level  unable to perform;not tested  -KF      Recorded by [KF] Carlene Rios, OT 01/20/20 1525      Row Name 01/20/20 1434             Transfer Assessment/Treatment    Transfer Assessment/Treatment  bed-chair transfer  -KF      Comment (Transfers)  deferred STS to PT lift in order to reposition and allow for improved partiicpation in seated ADLs and TE  -KF      Recorded by [KF] Carlene Rios, OT 01/20/20 1525      Row Name 01/20/20 1434             Bed-Chair Transfer    Bed-Chair [place] (Transfers)  dependent (less than 25% patient effort);2 person assist  -KF      Assistive Device (Bed-Chair Transfers)  mechanical lift/aid  -KF      Recorded by [KF] Carlene Rios, OT 01/20/20 1525      Row Name 01/20/20 1434             ADL Assessment/Intervention    54489 - OT Self Care/Mgmt Minutes  4  -KF      BADL Assessment/Intervention  lower body dressing  -KF      Recorded by [KF] Carlene Rios, OT 01/20/20 1525      Row Name 01/20/20 1434             Lower Body Dressing Assessment/Training    Lower Body Dressing [place] Level  don;socks;dependent (less than 25% patient effort)  -KF      Lower Body Dressing Position  supported sitting  -KF      Recorded by [KF] Carlene Rios, OT 01/20/20 1525      Row Name 01/20/20 1434             BADL Safety/Performance    Impairments, BADL Safety/Performance  balance;endurance/activity tolerance;cognition;strength;trunk/postural control;shortness of breath;range of motion  -KF      Cognitive Impairments, BADL Safety/Performance  awareness, need for assistance;insight into deficits/self awareness;judgment;safety precaution awareness  -KF      Skilled BADL Treatment/Intervention  BADL process/adaptation training;cognitive/safety deficit modifications  -KF      Recorded by [KF] Carlene Rios OT 01/20/20 1525      Row Name 01/20/20 1434             Motor Skills  Assessment/Interventions    Additional Documentation  Balance (Group);Balance Interventions (Group);Therapeutic Exercise (Group);Therapeutic Exercise Interventions (Group)  -KF      Recorded by [KF] Carlene Rios, OT 01/20/20 1525      Row Name 01/20/20 1434             Therapeutic Exercise    Therapeutic Exercise  seated, upper extremities  -KF      Additional Documentation  Therapeutic Exercise (Row)  -KF      38458 - OT Therapeutic Exercise Minutes  5  -KF      76799 - OT Therapeutic Activity Minutes  5  -KF      Recorded by [KF] Carlene Rios, OT 01/20/20 1525      Row Name 01/20/20 1434             Upper Extremity Seated Therapeutic Exercise    Performed, Seated Upper Extremity (Therapeutic Exercise)  shoulder flexion/extension;elbow flexion/extension;shoulder horizontal abduction/adduction  -KF      Exercise Type, Seated Upper Extremity (Therapeutic Exercise)  AROM (active range of motion)  -KF      Expected Outcomes, Seated Upper Extremity (Therapeutic Exercise)  improve functional tolerance, self-care activity;improve performance, BADLs;improve functional stability  -KF      Sets/Reps Detail, Seated Upper Extremity (Therapeutic Exercise)  1/15  -KF      Transfers Skills, Training to Functional Activity, Seated Upper Extremity (Therapeutic Exercise)  beginning to transfer skills to functional activity  -KF      Comment, Seated Upper Extremity (Therapeutic Exercise)  BUE   -KF      Recorded by [KF] Carlene Rios, OT 01/20/20 1525      Row Name 01/20/20 1434             Balance    Balance  static sitting balance  -KF      Recorded by [KF] Carlene Rios, OT 01/20/20 1525      Row Name 01/20/20 1434             Static Sitting Balance    Level of [place] (Unsupported Sitting, Static Balance)  moderate assist, 50 to 74% patient effort  -KF      Sitting Position (Unsupported Sitting, Static Balance)  sitting in chair  -KF      Time Able to Maintain Position (Unsupported Sitting, Static  Balance)  45 to 60 seconds  -KF      Level of [place] (Supported Sitting, Static Balance)  supervision  -KF      Sitting Position (Supported Sitting, Static Balance)  sitting in chair  -KF      Recorded by [KF] Carlene Rios, OT 01/20/20 1525      Row Name 01/20/20 1434 01/20/20 0920          Positioning and Restraints    Pre-Treatment Position  in bed  -KF  in bed  -MW     Post Treatment Position  chair  -KF  bed  -MW     In Bed  --  side lying left;call light within reach;encouraged to call for assist;with nsg  -MW     In Chair  notified nsg;reclined;sitting;encouraged to call for assist;call light within reach;exit alarm on;with family/caregiver;with PT  -KF  --     Recorded by [KF] Carlene Rios, OT 01/20/20 1525 [MW] Delisa Casarez, PT 01/20/20 1351     Row Name 01/20/20 1434             Pain Assessment    Additional Documentation  Pain Scale: Numbers Pre/Post-Treatment (Group)  -KF      Recorded by [KF] Carlene Rios, OT 01/20/20 1525      Row Name 01/20/20 1434 01/20/20 0920          Pain Scale: Numbers Pre/Post-Treatment    Pain Scale: Numbers, Pretreatment  0/10 - no pain  -KF  --     Pain Scale: Numbers, Post-Treatment  0/10 - no pain  -KF  --     Pain Location - Orientation  --  generalized  -MW     Pain Intervention(s)  Repositioned;Ambulation/increased activity  -KF  Repositioned  -MW     Recorded by [KF] Carlene Rios, OT 01/20/20 1525 [MW] Delisa Caasrez, PT 01/20/20 1351     Row Name 01/20/20 0920             Pain Scale: FACES Pre/Post-Treatment    Pain: FACES Scale, Pretreatment  2-->hurts little bit  -MW      Pain: FACES Scale, Post-Treatment  2-->hurts little bit  -MW      Recorded by [MW] Delisa Casarez, PT 01/20/20 1351      Row Name                Wound 01/14/20 1908 Left anterior greater trochanter Extravasation    Wound - Properties Group Date first assessed: 01/14/20 [ZACHARIAH] Time first assessed: 1908 [ZACHARIAH] Present on Hospital Admission: Y [BR] Side: Left [ZACHARIAH]  Orientation: anterior [ZACHARIAH] Location: greater trochanter [ZACHARIAH] Primary Wound Type: Extravasatio [ZACHARIAH] Recorded by:  [BR] Judie Michael RN 01/14/20 2335 [ZACHARIAH] Melissa Luther RN 01/14/20 1909    Row Name                Wound 01/14/20 1909 Right anterior greater trochanter Extravasation    Wound - Properties Group Date first assessed: 01/14/20 [ZACHARIAH] Time first assessed: 1909 [ZACHARIAH] Present on Hospital Admission: Y [BR] Side: Right [ZACHARIAH] Orientation: anterior [ZACHARIAH] Location: greater trochanter [ZACHARIAH] Primary Wound Type: Extravasatio [ZACHARIAH] Recorded by:  [BR] Judie Michael RN 01/14/20 2335 [ZACHARIAH] Melissa Luther RN 01/14/20 1909    Row Name                Wound 01/14/20 1910 midline coccyx Pressure Injury    Wound - Properties Group Date first assessed: 01/14/20 [ZACHARIAH] Time first assessed: 1910 [ZACHARIAH] Present on Hospital Admission: Y [BR] Orientation: midline [CP] Location: coccyx [ZACHARIAH] Primary Wound Type: Pressure inj [ZACHARIAH] Stage, Pressure Injury: Stage 2 [CP] Recorded by:  [BR] Judie Michael RN 01/14/20 0715 [CP] Sheridan Day APRN 01/15/20 1148 [ZACHARIAH] Melissa Luther RN 01/14/20 1911    Row Name                Wound 01/14/20 1912 Right anterior foot Other (comment)    Wound - Properties Group Date first assessed: 01/14/20 [ZACHARIAH] Time first assessed: 1912 [ZACHARIAH] Side: Right [ZACHARIAH] Orientation: anterior [ZACHARIAH] Location: foot [ZACHARIAH] Primary Wound Type: Other [ZACHARIAH] Recorded by:  [ZACHARIAH] Melissa Luther RN 01/14/20 1915    Row Name                Wound 01/14/20 2011 Left anterior toe    Wound - Properties Group Date first assessed: 01/14/20 [BR] Time first assessed: 2011 [BR] Present on Hospital Admission: Y [BR] Side: Left [BR] Orientation: anterior [BR] Location: toe [BR], 1st toe  Stage, Pressure Injury: deep tissue injury [BR] Recorded by:  [BR] Judie Michael RN 01/14/20 2011    Row Name                Wound 12/28/19 2590 Left medial gluteal Other (comment)    Wound - Properties Group Date first assessed: 12/28/19 [ZONIA] Time first  assessed: 2155 [ZONIA] Present on Hospital Admission: Y [ZONIA] Side: Left [ZONIA] Orientation: medial [ZONIA] Location: gluteal [ZONIA] Primary Wound Type: Other [ZONIA], 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  Recorded by:  [ZONIA] Travis Cobb RN 12/29/19 1256    Row Name                Wound 12/28/19 2251 Bilateral other (see notes) MASD (Moisutre associated skin damage)    Wound - Properties Group Date first assessed: 12/28/19 [ZONIA] Time first assessed: 2251 [ZONIA] Present on Hospital Admission: Y [ZONIA] Side: Bilateral [ZONIA] Location: other (see notes) [ZONIA], under Pannus  Primary Wound Type: MASD [ZONIA], yeast dermatitis  Additional Comments: shearing/hypergranulation [CP] Recorded by:  [CP] Sheridan Day APRN 01/15/20 1152 [ZONIA] Travis Cobb RN 12/29/19 1252    Row Name 01/20/20 0920             Wound 01/15/20 1000 Left medial thigh Pressure Injury    Wound - Properties Group Date first assessed: 01/15/20 [CP] Time first assessed: 1000 [CP] Present on Hospital Admission: Y [CP] Side: Left [CP] Orientation: medial [CP] Location: thigh [CP] Primary Wound Type: Pressure inj [CP] Stage, Pressure Injury: deep tissue injury [CP] Recorded by:  [CP] Sheridan Day, KALIN 01/15/20 1155    Dressing Appearance  open to air  -MW      Base  maroon/purple;non-blanchable  -MW      Periwound  intact;dry  -MW      Periwound Skin Turgor  soft  -MW      Edges  irregular  -MW      Drainage Amount  none  -MW      Care, Wound  ultrasound therapy, non contact low frequency MIST x 5 min   -MW      Dressing Care, Wound  -- Venolex   -MW      Periwound Care, Wound  cleansed with pH balanced cleanser;dry periwound area maintained  -MW      Recorded by [MW] Delisa Casarez, PT 01/20/20 1351      Row Name 01/20/20 0920             Wound 01/15/20 1000 Right medial thigh Pressure Injury    Wound - Properties Group Date first assessed: 01/15/20 [CP] Time first assessed: 1000 [CP] Present on Hospital Admission:  Y [CP] Side: Right [CP] Orientation: medial [CP] Location: thigh [CP] Primary Wound Type: Pressure inj [CP] Stage, Pressure Injury: deep tissue injury [CP] Recorded by:  [CP] Sheridan Day APRN 01/15/20 1157    Dressing Appearance  open to air  -MW      Base  maroon/purple;non-blanchable  -MW      Periwound  intact;dry  -MW      Periwound Temperature  warm  -MW      Periwound Skin Turgor  soft  -MW      Edges  irregular  -MW      Drainage Characteristics/Odor  serosanguineous  -MW      Drainage Amount  scant  -MW      Care, Wound  ultrasound therapy, non contact low frequency MIST x 5 min   -MW      Dressing Care, Wound  -- Venolex   -MW      Periwound Care, Wound  cleansed with pH balanced cleanser;dry periwound area maintained  -MW      Recorded by [MW] Delisa Casarez, PT 01/20/20 1351      Row Name 01/20/20 0920             Wound 01/15/20 1000 Left posterior thigh Pressure Injury    Wound - Properties Group Date first assessed: 01/15/20 [CP] Time first assessed: 1000 [CP] Present on Hospital Admission: Y [CP] Side: Left [CP] Orientation: posterior [CP] Location: thigh [CP] Primary Wound Type: Pressure inj [CP] Stage, Pressure Injury: deep tissue injury [CP] Recorded by:  [CP] Sheridan Day APRN 01/15/20 1159    Dressing Appearance  open to air  -MW      Base  maroon/purple;non-blanchable  -MW      Periwound  intact;dry  -MW      Edges  irregular  -MW      Drainage Amount  none  -MW      Care, Wound  ultrasound therapy, non contact low frequency MIST x 3 min   -MW      Dressing Care, Wound  open to air venolex   -MW      Periwound Care, Wound  cleansed with pH balanced cleanser;dry periwound area maintained  -MW      Recorded by [MW] Delisa Casarez, PT 01/20/20 1351      Row Name 01/20/20 0920             Coping    Observed Emotional State  calm;cooperative  -MW      Verbalized Emotional State  other (see comments)  -MW      Recorded by [MW] Delisa Casarez, PT 01/20/20 1351      Row Name  01/20/20 1434 01/20/20 0920          Plan of Care Review    Plan of Care Reviewed With  patient;spouse  -KF  patient  -MW     Progress  improving  -KF  improving  -MW     Outcome Summary  --  PT wound care: bilateral thigh DTPI's with improved color and tissue quality; Rt medial aspect remains denuded. Rt posterior thigh site stable. Cont MIST 3-5 x wk to promote increased localized blood flow for tissue healing.   -MW     Recorded by [KF] Carlene Rios, OT 01/20/20 1525 [MW] Delisa Casarez, PT 01/20/20 1351     Row Name 01/20/20 1434             Outcome Summary/Treatment Plan (OT)    Daily Summary of Progress (OT)  progress toward functional goals is gradual  -KF      Recorded by [KF] Carlene Rios, OT 01/20/20 1525        User Key  (r) = Recorded By, (t) = Taken By, (c) = Cosigned By    Initials Name Effective Dates Discipline    CP Sheridan Day, APRN 07/24/19 -  Nurse    Delisa Leigh, PT 02/05/19 -  PT    Travis Aaron RN 06/16/16 -  Nurse    Melissa Hunt RN 06/16/16 -  Nurse    Judie Madera RN 12/23/16 -  Nurse    Carlene Zimmer, OT 04/03/18 -  OT        Rash 01/15/20 1000 other (see comments) macular;papule (Active)   Distribution generalized 1/20/2020  2:00 PM   Configuration/Shape asymmetric 1/20/2020  2:00 PM   Borders irregular 1/20/2020  2:00 PM   Characteristics moist 1/20/2020  2:00 PM   Color red 1/20/2020  2:00 PM       Wound 12/28/19 2251 Bilateral other (see notes) MASD (Moisutre associated skin damage) (Active)   Dressing Appearance open to air 1/20/2020  2:00 PM   Closure Open to air 1/20/2020  2:00 PM   Base moist;red 1/20/2020  2:00 PM   Periwound moist 1/20/2020  2:00 PM   Periwound Temperature warm 1/20/2020  2:00 PM   Periwound Skin Turgor firm 1/20/2020  2:00 PM   Edges irregular 1/20/2020  2:00 PM   Drainage Amount none 1/20/2020  2:00 PM   Dressing Care, Wound open to air 1/19/2020  8:00 PM       Wound 12/28/19 9652 Left medial  gluteal Other (comment) (Active)   Dressing Appearance open to air 1/20/2020  2:00 PM   Closure None 1/20/2020  2:00 PM   Base blanchable;moist;clean 1/20/2020  2:00 PM   Periwound intact 1/20/2020  2:00 PM   Periwound Temperature warm 1/20/2020  2:00 PM   Periwound Skin Turgor firm 1/20/2020  2:00 PM   Edges irregular 1/20/2020  2:00 PM   Drainage Amount none 1/20/2020  2:00 PM       Wound 01/14/20 1908 Left anterior greater trochanter Extravasation (Active)   Dressing Appearance open to air 1/20/2020  2:00 PM   Closure None 1/20/2020  2:00 PM   Base red 1/20/2020  2:00 PM   Drainage Amount none 1/20/2020  2:00 PM       Wound 01/14/20 1909 Right anterior greater trochanter Extravasation (Active)   Dressing Appearance open to air 1/20/2020  2:00 PM   Closure None 1/20/2020  2:00 PM   Base red 1/20/2020  2:00 PM   Periwound excoriated;redness 1/20/2020  2:00 PM   Drainage Amount none 1/20/2020  2:00 PM       Wound 01/14/20 1910 midline coccyx Pressure Injury (Active)   Dressing Appearance open to air 1/20/2020  2:00 PM   Closure Open to air 1/20/2020  2:00 PM   Base non-blanchable;red;pink;moist 1/20/2020  2:00 PM   Periwound redness;moist 1/20/2020  2:00 PM   Edges irregular;open 1/20/2020  2:00 PM   Drainage Characteristics/Odor sanguineous 1/20/2020  2:00 PM   Drainage Amount scant 1/20/2020  2:00 PM   Dressing Care, Wound open to air 1/19/2020  8:00 PM       Wound 01/14/20 1912 Right anterior foot Other (comment) (Active)   Dressing Appearance open to air 1/20/2020  2:00 PM   Closure Open to air 1/20/2020  2:00 PM   Base clean;maroon/purple;yellow 1/20/2020  2:00 PM   Periwound intact;dry 1/20/2020  2:00 PM   Periwound Temperature warm 1/20/2020  2:00 PM   Periwound Skin Turgor firm 1/20/2020  2:00 PM   Drainage Amount none 1/20/2020  2:00 PM       Wound 01/14/20 2011 Left anterior toe (Active)   Dressing Appearance open to air 1/20/2020  2:00 PM   Closure Open to air 1/20/2020  2:00 PM   Base dry;maroon/purple  1/20/2020  2:00 PM   Drainage Amount none 1/20/2020  2:00 PM   Dressing Care, Wound open to air 1/19/2020  8:00 PM       Wound 01/15/20 1000 Left medial thigh Pressure Injury (Active)   Dressing Appearance open to air 1/20/2020  2:00 PM   Closure None 1/20/2020  2:00 PM   Base non-blanchable 1/20/2020  2:00 PM   Periwound intact;dry 1/20/2020  2:00 PM   Periwound Skin Turgor soft 1/20/2020  2:00 PM   Edges irregular 1/20/2020  2:00 PM   Drainage Amount none 1/20/2020  2:00 PM   Care, Wound ultrasound therapy, non contact low frequency 1/20/2020  9:20 AM   Periwound Care, Wound cleansed with pH balanced cleanser;dry periwound area maintained 1/20/2020  9:20 AM       Wound 01/15/20 1000 Right medial thigh Pressure Injury (Active)   Dressing Appearance open to air 1/20/2020  2:00 PM   Closure None 1/20/2020  2:00 PM   Base maroon/purple;non-blanchable 1/20/2020  2:00 PM   Periwound intact;dry 1/20/2020  2:00 PM   Periwound Temperature warm 1/20/2020  2:00 PM   Periwound Skin Turgor soft 1/20/2020  2:00 PM   Edges irregular 1/20/2020  2:00 PM   Drainage Characteristics/Odor serosanguineous 1/20/2020  2:00 PM   Drainage Amount none 1/20/2020  2:00 PM   Care, Wound ultrasound therapy, non contact low frequency 1/20/2020  9:20 AM   Dressing Care, Wound open to air 1/19/2020  8:00 PM   Periwound Care, Wound cleansed with pH balanced cleanser;dry periwound area maintained 1/20/2020  9:20 AM       Wound 01/15/20 1000 Left posterior thigh Pressure Injury (Active)   Dressing Appearance open to air 1/20/2020  2:00 PM   Closure None 1/20/2020  2:00 PM   Base non-blanchable 1/20/2020  2:00 PM   Periwound intact;dry 1/20/2020  2:00 PM   Edges irregular 1/20/2020  2:00 PM   Drainage Amount none 1/20/2020  2:00 PM   Care, Wound ultrasound therapy, non contact low frequency 1/20/2020  9:20 AM   Dressing Care, Wound open to air 1/20/2020  9:20 AM   Periwound Care, Wound cleansed with pH balanced cleanser;dry periwound area maintained  1/20/2020  9:20 AM           OT Recommendation and Plan  Outcome Summary/Treatment Plan (OT)  Daily Summary of Progress (OT): progress toward functional goals is gradual  Therapy Frequency (OT Eval): daily  Daily Summary of Progress (OT): progress toward functional goals is gradual  Plan of Care Review  Plan of Care Reviewed With: patient, spouse  Plan of Care Reviewed With: patient, spouse  Outcome Summary: Pt dependent transfer bed to chair via lift, max A x2 rolling R and L, unable to stand at this date, dep donning socks, set up for self-feeding liquids, tolerated BUE HEP, cont IPOT per POC  Outcome Measures     Row Name 01/20/20 1434             How much help from another is currently needed...    Putting on and taking off regular lower body clothing?  1  -KF      Bathing (including washing, rinsing, and drying)  2  -KF      Toileting (which includes using toilet bed pan or urinal)  1  -KF      Putting on and taking off regular upper body clothing  2  -KF      Taking care of personal grooming (such as brushing teeth)  3  -KF      Eating meals  3  -KF      AM-PAC 6 Clicks Score (OT)  12  -KF         Functional Assessment    Outcome Measure Options  AM-PAC 6 Clicks Daily Activity (OT)  -KF        User Key  (r) = Recorded By, (t) = Taken By, (c) = Cosigned By    Initials Name Provider Type    Carlene Zimmer OT Occupational Therapist           Time Calculation:   Time Calculation- OT     Row Name 01/20/20 1434             Time Calculation- OT    OT Start Time  1434  -KF      Total Timed Code Minutes- OT  14 minute(s)  -KF      OT Received On  01/20/20  -KF      OT Goal Re-Cert Due Date  01/27/20  -KF         Timed Charges    56524 - OT Therapeutic Exercise Minutes  5  -KF      35368 - OT Therapeutic Activity Minutes  5  -KF      10729 - OT Self Care/Mgmt Minutes  4  -KF        User Key  (r) = Recorded By, (t) = Taken By, (c) = Cosigned By    Initials Name Provider Type    Carlene Zimmer OT  Occupational Therapist        Therapy Charges for Today     Code Description Service Date Service Provider Modifiers Qty    61957973044 HC OT THER PROC EA 15 MIN 1/20/2020 Carlene Rios, OT GO 1               Carlene Rios OT  1/20/2020

## 2020-01-20 NOTE — THERAPY TREATMENT NOTE
Patient Name: Julissa Kee  : 1950    MRN: [id number]                              Today's Date: 2020       Admit Date: 2020    Visit Dx:     ICD-10-CM ICD-9-CM   1. Hypoxia R09.02 799.02   2. Respiratory distress R06.03 786.09   3. Viral illness B34.9 079.99   4. Pneumonia of both lungs due to infectious organism, unspecified part of lung J18.9 483.8   5. Dysphagia, unspecified type R13.10 787.20     Patient Active Problem List   Diagnosis   • Anxiety   • Chronic obstructive pulmonary disease (CMS/HCC)   • Diabetes mellitus (CMS/HCC)   • Hyperlipidemia   • Hypertension   • Hypothyroidism   • Morbid obesity (CMS/HCC)   • Extreme obesity with alveolar hypoventilation (CMS/HCC)   • Renal insufficiency   • Sacral wound   • Acute respiratory failure with hypoxia (CMS/HCC)   • Recurrent pneumonia   • Human metapneumovirus (hMPV) pneumonia   • Hypoxia   • Acute foot pain, left     Past Medical History:   Diagnosis Date   • Acute bronchitis    • Acute sinusitis    • Anxiety    • Bacterial conjunctivitis    • Chest pain    • COPD (chronic obstructive pulmonary disease) (CMS/HCC)    • Depression    • Diabetes mellitus (CMS/HCC)    • Dyspnea    • Dysuria    • Edema    • Elbow injury    • Elbow pain    • Eye drainage    • Fatigue    • Foot pain, right    • Humerus distal fracture    • Hyperlipidemia    • Hypertension    • Hypothyroidism    • Morbid obesity (CMS/HCC)    • Pickwickian syndrome (CMS/HCC)    • Pressure ulcer stage I    • Psoriasis    • SOB (shortness of breath)    • Symptoms of urinary tract infection    • Urinary frequency    • Urinary tract infection    • Vaginal candidiasis      Past Surgical History:   Procedure Laterality Date   • CERVICAL POLYPECTOMY     • HYSTERECTOMY       General Information     Row Name 20 1541          PT Evaluation Time/Intention    Document Type  therapy note (daily note)  -LF     Mode of Treatment  physical therapy  -LF     Row Name 20 1541           General Information    Patient Profile Reviewed?  yes  -     Existing Precautions/Restrictions  fall;oxygen therapy device and L/min  -     Barriers to Rehab  medically complex;previous functional deficit  -     Row Name 01/20/20 1541          Cognitive Assessment/Intervention- PT/OT    Orientation Status (Cognition)  oriented to;person  -     Cognitive Assessment/Intervention Comment  Pt quiet with low arousal throughout session.  -     Row Name 01/20/20 1541          Safety Issues, Functional Mobility    Safety Issues Affecting Function (Mobility)  ability to follow commands;awareness of need for assistance;insight into deficits/self awareness;safety precaution awareness;safety precautions follow-through/compliance;friction/shear risk  -     Impairments Affecting Function (Mobility)  endurance/activity tolerance;strength;range of motion (ROM);balance;postural/trunk control;shortness of breath  -       User Key  (r) = Recorded By, (t) = Taken By, (c) = Cosigned By    Initials Name Provider Type     Karol Chávez, PT Physical Therapist        Mobility     Row Name 01/20/20 1544          Transfer Assessment/Treatment    Comment (Transfers)  Pt attempted sit to stand from chair with armrest, scale with handrails, and maxAx2.  Pt able to push up with MaxA, but was not able to clear surface with 3 attempts.  -     Row Name 01/20/20 1544          Bed-Chair Transfer    Bed-Chair Navarro (Transfers)  dependent (less than 25% patient effort);2 person assist  -     Assistive Device (Bed-Chair Transfers)  mechanical lift/aid  -     Row Name 01/20/20 1544          Sit-Stand Transfer    Sit-Stand Navarro (Transfers)  dependent (less than 25% patient effort);2 person assist;verbal cues;nonverbal cues (demo/gesture)  -     Assistive Device (Sit-Stand Transfers)  other (see comments) armrest and scale with handrails.  -     Row Name 01/20/20 1546          Gait/Stairs Assessment/Training     Cameron Level (Gait)  unable to assess  -       User Key  (r) = Recorded By, (t) = Taken By, (c) = Cosigned By    Initials Name Provider Type    Karol Luque PT Physical Therapist        Obj/Interventions     Row Name 01/20/20 1549          Therapeutic Exercise    Lower Extremity Range of Motion (Therapeutic Exercise)  hip flexion/extension, bilateral;hip abduction/adduction, bilateral;hip internal/external rotation, bilateral;knee flexion/extension, bilateral;ankle dorsiflexion/plantar flexion, bilateral  -     Exercise Type (Therapeutic Exercise)  AAROM (active assistive range of motion)  -LF     Position (Therapeutic Exercise)  seated  -     Row Name 01/20/20 1549          Static Sitting Balance    Level of Cameron (Unsupported Sitting, Static Balance)  moderate assist, 50 to 74% patient effort  -LF     Sitting Position (Unsupported Sitting, Static Balance)  sitting in chair  -     Time Able to Maintain Position (Unsupported Sitting, Static Balance)  45 to 60 seconds  -[facility] Name 01/20/20 1549          Dynamic Sitting Balance    Level of Cameron, Reaches Outside Midline (Sitting, Dynamic Balance)  maximal assist, 25 to 49% patient effort  -LF     Sitting Position, Reaches Outside Midline (Sitting, Dynamic Balance)  sitting in chair  -     Row Name 01/20/20 1549          Static Standing Balance    Level of Cameron (Supported Standing, Static Balance)  unable to perform activity  -[facility] Name 01/20/20 1549          Dynamic Standing Balance    Level of Cameron, Reaches Outside Midline (Standing, Dynamic Balance)  unable to perform activity  -     Assistive Device Utilized (Supported Standing, Dynamic Balance)  other (see comments) armrest, scale with handrails  -       User Key  (r) = Recorded By, (t) = Taken By, (c) = Cosigned By    Initials Name Provider Type    Karol Luque PT Physical Therapist        Goals/Plan    No documentation.       Clinical  Impression     Row Name 01/20/20 1551          Pain Assessment    Additional Documentation  Pain Scale: Numbers Pre/Post-Treatment (Group)  -LF     Row Name 01/20/20 1551          Pain Scale: Numbers Pre/Post-Treatment    Pain Scale: Numbers, Pretreatment  0/10 - no pain  -LF     Pain Scale: Numbers, Post-Treatment  0/10 - no pain  -LF     Row Name 01/20/20 1551          Pain Scale: FACES Pre/Post-Treatment    Pain: FACES Scale, Pretreatment  0-->no hurt  -LF     Pain: FACES Scale, Post-Treatment  0-->no hurt  -LF     Row Name 01/20/20 1551          Plan of Care Review    Plan of Care Reviewed With  patient;spouse  -     Progress  improving  -     Outcome Summary  Pt required machanical lift to trnasfer bed to chair with maxA to roll for sling placement.  Pt attempted sit to stand from chair with armrest, scale with handrails, and maxAx2.  Pt able to push up with MaxA, but was not able to clear surface with 3 attempts.  Continue skilled PT with D/C to rehab at [facility] when appropriate.  -     Row Name 01/20/20 1551          Physical Therapy Clinical Impression    Criteria for Skilled Interventions Met (PT Clinical Impression)  yes;treatment indicated  -     Rehab Potential (PT Clinical Summary)  fair, will monitor progress closely  -     Row Name 01/20/20 1551          Vital Signs    Post Systolic BP Rehab  (S) 191  -LF     Post Treatment Diastolic BP  (S) 73  -LF     Posttreatment Heart Rate (beats/min)  71  -LF     O2 Delivery Pre Treatment  supplemental O2  -LF     O2 Delivery Intra Treatment  supplemental O2  -LF     Post SpO2 (%)  93  -LF     O2 Delivery Post Treatment  supplemental O2  -     Row Name 01/20/20 1551          Positioning and Restraints    Pre-Treatment Position  in bed  -LF     Post Treatment Position  chair  -LF     In Chair  reclined;legs elevated;heels elevated;waffle boot/both;exit alarm on;call light within reach;encouraged to call for assist;with family/caregiver;notified [facility]  -        User Key  (r) = Recorded By, (t) = Taken By, (c) = Cosigned By    Initials Name Provider Type    Karol Luque PT Physical Therapist        Outcome Measures     Row Name 01/20/20 8645          How much help from another person do you currently need...    Turning from your back to your side while in flat bed without using bedrails?  2  -LF     Moving from lying on back to sitting on the side of a flat bed without bedrails?  1  -LF     Moving to and from a bed to a chair (including a wheelchair)?  1  -LF     Standing up from a chair using your arms (e.g., wheelchair, bedside chair)?  1  -LF     Climbing 3-5 steps with a railing?  1  -LF     To walk in hospital room?  1  -LF     AM-PAC 6 Clicks Score (PT)  7  -LF     Row Name 01/20/20 8457          Functional Assessment    Outcome Measure Options  AM-PAC 6 Clicks Basic Mobility (PT)  -LF       User Key  (r) = Recorded By, (t) = Taken By, (c) = Cosigned By    Initials Name Provider Type    Karol Luque PT Physical Therapist          PT Recommendation and Plan     Outcome Summary/Treatment Plan (PT)  Anticipated Discharge Disposition (PT): skilled nursing facility  Plan of Care Reviewed With: patient, spouse  Progress: improving  Outcome Summary: Pt required machanical lift to trnasfer bed to chair with maxA to roll for sling placement.  Pt attempted sit to stand from chair with armrest, scale with handrails, and maxAx2.  Pt able to push up with MaxA, but was not able to clear surface with 3 attempts.  Continue skilled PT with D/C to rehab at SNF when appropriate.     Time Calculation:   PT Charges     Row Name 01/20/20 1443 01/20/20 1351          Time Calculation    Start Time  1443  -LF  0920  -MW     PT Received On  01/20/20 -LF  --     PT Goal Re-Cert Due Date  01/27/20 -LF  --        Timed Charges    98892 - PT Therapeutic Exercise Minutes  15  -LF  --     71755 - PT Therapeutic Activity Minutes  20  -LF  --       User Key  (r) = Recorded By,  (t) = Taken By, (c) = Cosigned By    Initials Name Provider Type    Delisa Leigh, PT Physical Therapist    LF Karol Chávez, PT Physical Therapist        Therapy Charges for Today     Code Description Service Date Service Provider Modifiers Qty    96748500288 HC PT THER PROC EA 15 MIN 1/20/2020 Karol Chávez, PT GP 1    13203690009 HC PT THERAPEUTIC ACT EA 15 MIN 1/20/2020 Karol Chávez, PT GP 1          PT G-Codes  Outcome Measure Options: AM-PAC 6 Clicks Basic Mobility (PT)  AM-PAC 6 Clicks Score (PT): 7  AM-PAC 6 Clicks Score (OT): 12    Karol Chávez PT  1/20/2020

## 2020-01-20 NOTE — PROGRESS NOTES
INFECTIOUS DISEASE PROGRESS NOTE     Julissa Kee  1950  [id number]      Admission Date: 1/14/2020      Requesting Provider: No ref. provider found  Evaluating Physician: Austen Redding MD    Reason for Consultation: Pneumonia    History of present illness:  1/15/20:   Patient is a 69 y.o. female , with COPD, Diabetes mellitus, SARAH BETH, seen today for Metapneumoviral pneumonia.  She was discharged from this facility 1/8 off antibiotics after being treated for right middle lobe pneumonia. She has been experiencing worsening hypoxia prompting return to ED.  Chest xray with pulmonary vascular congestion.Oxygen saturations in 70's on admission.  She has been afebrile, without leukocytosis.  She is currently on Vancomycin and Azactam and we were consulted for evaluation and treatment.  She developed worsening respiratory compromise and required transfer to the unit and a BiPAP mask.  1/16/20:  She was given Xanax due to anxiety and now resting.  02 per NC.    1/17/2020: She has decreased dyspnea.  She has no increased respiratory secretions.  She has remained afebrile.  She has no new complaints today.  1/18/2020: She denies increased cough and dyspnea.  She has remained afebrile.  She has no new complaints today.  1/19/2028: She has decreased cough and dyspnea.  She has remained afebrile.  1/22/20: She has remained afebrile.  She denies increased cough and dyspnea.  She has no new complaints today.    Past Medical History:   Diagnosis Date   • Acute bronchitis    • Acute sinusitis    • Anxiety    • Bacterial conjunctivitis    • Chest pain    • COPD (chronic obstructive pulmonary disease) (CMS/HCC)    • Depression    • Diabetes mellitus (CMS/HCC)    • Dyspnea    • Dysuria    • Edema    • Elbow injury    • Elbow pain    • Eye drainage    • Fatigue    • Foot pain, right    • Humerus distal fracture    • Hyperlipidemia    • Hypertension    • Hypothyroidism    • Morbid obesity (CMS/HCC)    • Pickwickian syndrome  (CMS/[facility])    • Pressure ulcer stage I    • Psoriasis    • SOB (shortness of breath)    • Symptoms of urinary tract infection    • Urinary frequency    • Urinary tract infection    • Vaginal candidiasis        Past Surgical History:   Procedure Laterality Date   • CERVICAL POLYPECTOMY     • HYSTERECTOMY         Family History   Problem Relation Age of Onset   • Diabetes Father    • Diabetes Sister    • Pancreatic cancer Sister        Social History     Socioeconomic History   • Marital status:      Spouse name: Not on file   • Number of children: Not on file   • Years of education: Not on file   • Highest education level: Not on file   Tobacco Use   • Smoking status: Former Smoker     Types: Cigarettes     Last attempt to quit: 2006     Years since quittin.0   • Smokeless tobacco: Never Used   Substance and Sexual Activity   • Alcohol use: No     Frequency: Never   • Drug use: Never   • Sexual activity: Defer       Allergies   Allergen Reactions   • Amoxicillin-Pot Clavulanate Other (See Comments)     HEADACHE - has tolerated ceftriaxone 2019   • Bactrim [Sulfamethoxazole-Trimethoprim] Other (See Comments)     .         Medication:    Current Facility-Administered Medications:   •  acetaminophen (TYLENOL) tablet 650 mg, 650 mg, Oral, Q4H PRN, 650 mg at 20 0000 **OR** acetaminophen (TYLENOL) 160 MG/5ML solution 650 mg, 650 mg, Oral, Q4H PRN **OR** acetaminophen (TYLENOL) suppository 650 mg, 650 mg, Rectal, Q4H PRN, Tho Quintana MD  •  ALPRAZolam (XANAX) tablet 0.25 mg, 0.25 mg, Oral, Nightly PRN, Tho Quintana MD, 0.25 mg at 20 2210  •  aspirin EC tablet 81 mg, 81 mg, Oral, Daily, Tho Quintana MD, 81 mg at 20 0837  •  atorvastatin (LIPITOR) tablet 10 mg, 10 mg, Oral, Daily, Tho Quintana MD, 10 mg at 20 0837  •  castor oil-balsam peru (VENELEX) ointment, , Topical, Q12H, Tho Quintana MD  •  dextrose (D50W) 25 g/ 50mL Intravenous Solution 25 g, 25 g,  Intravenous, Q15 Min PRN, Tho Quintana MD  •  dextrose (GLUTOSE) oral gel 15 g, 15 g, Oral, Q15 Min PRN, Tho Quintana MD  •  enoxaparin (LOVENOX) syringe 40 mg, 40 mg, Subcutaneous, Q12H, Tho Quintana MD, 40 mg at 01/20/20 0608  •  folic acid-vit B6-vit B12 (FOLGARD) tablet 1 tablet, 1 tablet, Oral, Daily, Tho Quintana MD, 1 tablet at 01/20/20 0837  •  glucagon (human recombinant) (GLUCAGEN DIAGNOSTIC) injection 1 mg, 1 mg, Subcutaneous, Q15 Min PRN, Tho Quintana MD  •  guaiFENesin (MUCINEX) 12 hr tablet 600 mg, 600 mg, Oral, Q12H, Joanna Maya PA-C, 600 mg at 01/20/20 0838  •  hydrALAZINE (APRESOLINE) injection 10 mg, 10 mg, Intravenous, Q6H PRN, Tho Quintana MD  •  insulin lispro (humaLOG) injection 0-7 Units, 0-7 Units, Subcutaneous, 4x Daily With Meals & Nightly, Tho Quintana MD, 2 Units at 01/19/20 2211  •  ipratropium-albuterol (DUO-NEB) nebulizer solution 3 mL, 3 mL, Nebulization, Q4H PRN, Tho Quintana MD  •  ipratropium-albuterol (DUO-NEB) nebulizer solution 3 mL, 3 mL, Nebulization, 4x Daily - RT, Tho Quintana MD, 3 mL at 01/20/20 0726  •  levothyroxine (SYNTHROID, LEVOTHROID) tablet 100 mcg, 100 mcg, Oral, Q AM, Tho Quintana MD, 100 mcg at 01/20/20 0608  •  magic mouthwash oral supsension 10 mL, 10 mL, Swish & Spit, Q4H PRN, Tho Quintana MD, 10 mL at 01/19/20 2210  •  Magnesium Sulfate 2 gram Bolus, followed by 8 gram infusion (total Mg dose 10 grams)- Mg less than or equal to 1mg/dL, 2 g, Intravenous, PRN **OR** Magnesium Sulfate 2 gram / 50mL Infusion (GIVE X 3 BAGS TO EQUAL 6GM TOTAL DOSE) - Mg 1.1 - 1.5 mg/dl, 2 g, Intravenous, PRN **OR** Magnesium Sulfate 4 gram infusion- Mg 1.6-1.9 mg/dL, 4 g, Intravenous, PRN, Tho Quintana MD, Last Rate: 25 mL/hr at 01/17/20 1243, 4 g at 01/17/20 1243  •  melatonin tablet 5 mg, 5 mg, Oral, Nightly, Tho Quintana MD, 5 mg at 01/19/20 0170  •  metoprolol succinate XL (TOPROL-XL) 24 hr tablet 25 mg, 25  mg, Oral, Q24H, Tho Quintana MD, 25 mg at 01/20/20 0837  •  miconazole (MICOTIN) 2 % powder, , Topical, Q12H, Tho Quintana MD  •  ondansetron (ZOFRAN) injection 4 mg, 4 mg, Intravenous, Q6H PRN, Tho Quintana MD  •  pantoprazole (PROTONIX) EC tablet 40 mg, 40 mg, Oral, Q AM, Tho Quintana MD, 40 mg at 01/20/20 0608  •  Pharmacy Consult - [facility], , Does not apply, Daily, Tho Quintana MD  •  polyethylene glycol 3350 powder (packet), 17 g, Oral, BID, Tho Quintana MD, 17 g at 01/20/20 0836  •  potassium & sodium phosphates (PHOS-NAK) 280-160-250 MG packet - for Phosphorus less than 1.25 mg/dL, 2 packet, Oral, Q6H PRN **OR** potassium & sodium phosphates (PHOS-NAK) 280-160-250 MG packet - for Phosphorus 1.25 - 2.5 mg/dL, 2 packet, Oral, Q6H PRN, Tho Quintana MD, 2 packet at 01/17/20 1753  •  predniSONE (DELTASONE) tablet 40 mg, 40 mg, Oral, Daily With Breakfast, Bell Calero II, DO, 40 mg at 01/20/20 0837  •  sennosides-docusate (PERICOLACE) 8.6-50 MG per tablet 2 tablet, 2 tablet, Oral, BID, Tho Quintana MD, 2 tablet at 01/20/20 0837  •  sertraline (ZOLOFT) tablet 50 mg, 50 mg, Oral, Daily, Tho Quintana MD, 50 mg at 01/20/20 0837  •  sodium chloride 0.9 % flush 10 mL, 10 mL, Intravenous, Q12H, Tho Quintana MD, 10 mL at 01/19/20 2211  •  sodium chloride 0.9 % flush 10 mL, 10 mL, Intravenous, PRN, Tho Quintana MD, 10 mL at 01/16/20 0532  •  terazosin (HYTRIN) capsule 2 mg, 2 mg, Oral, Nightly, Tho Quintana MD, 2 mg at 01/19/20 2223    Antibiotics:  Anti-Infectives (From admission, onward)    Ordered     Dose/Rate Route Frequency Start Stop    01/14/20 1708  aztreonam (AZACTAM) 2 g/100 mL 0.9% NS (mbp)     Ordering Provider:  Blaire Thomson MD    2 g  over 30 Minutes Intravenous Once 01/14/20 1710 01/14/20 2058    01/14/20 1603  vancomycin 3000 mg/500 mL 0.9% NS IVPB ([facility])     Ordering Provider:  Tahir Aparicio MD    20 mg/kg × 166 kg  over 180 Minutes  Intravenous Once 20 1604 20 1947                Physical Exam:   Vital Signs  Temp (24hrs), Av.1 °F (36.7 °C), Min:98.1 °F (36.7 °C), Max:98.2 °F (36.8 °C)    Temp  Min: 98.1 °F (36.7 °C)  Max: 98.2 °F (36.8 °C)  BP  Min: 137/57  Max: 156/62  Pulse  Min: 56  Max: 96  Resp  Min: 18  Max: 20  SpO2  Min: 90 %  Max: 92 %    GENERAL: Massively obese NAD.  HEENT: Normocephalic, atraumatic.  PERRL. EOMI. No conjunctival injection. No icterus. Oropharynx clear without evidence of thrush or exudate.     HEART: RRR; No murmur, rubs, gallops.   LUNGS: Decreased expiratory wheezes   ABDOMEN: Soft, nontender, nondistended. Positive bowel sounds. No rebound or guarding. Pannus with crusting lesions, woody induration   EXT:  No cyanosis, clubbing or edema. No cord.  : Mccarty catheter is in place  MSK:  Left hallux with ecchymosis,   SKIN: Warm and dry; sacral decubitus, unstageable, excoriated   NEURO: Oriented to person      Laboratory Data    Results from last 7 days   Lab Units 20  0340 20  0448 20  0511   WBC 10*3/mm3 5.79 5.56 8.39   HEMOGLOBIN g/dL 10.3* 9.7* 10.0*   HEMATOCRIT % 32.2* 31.3* 32.6*   PLATELETS 10*3/mm3 164 146 159     Results from last 7 days   Lab Units 20  0340   SODIUM mmol/L 139   POTASSIUM mmol/L 4.4   CHLORIDE mmol/L 95*   CO2 mmol/L 38.0*   BUN mg/dL 26*   CREATININE mg/dL 0.60   GLUCOSE mg/dL 120*   CALCIUM mg/dL 9.9     Results from last 7 days   Lab Units 20  0511   ALK PHOS U/L 73   BILIRUBIN mg/dL 0.4   ALT (SGPT) U/L 15   AST (SGOT) U/L 15             Results from last 7 days   Lab Units 20  1039   LACTATE mmol/L 1.1         Results from last 7 days   Lab Units 01/15/20  0519   VANCOMYCIN RM mcg/mL 23.20     Estimated Creatinine Clearance: 89 mL/min (by C-G formula based on SCr of 0.6 mg/dL).      Microbiology:  Blood Culture   Date Value Ref Range Status   2020 No growth at less than 24 hours  Preliminary   2020 No growth at less  than 24 hours  Preliminary           Radiology:  Imaging Results (Last 72 Hours)     Procedure Component Value Units Date/Time    XR Foot 3+ View Left [[id number]] Collected:  01/19/20 1139     Updated:  01/20/20 0852    Narrative:       EXAMINATION: XR FOOT 3+ VW LEFT- 01/19/2020     INDICATION: Fall at home; R09.02-Hypoxemia; R06.03-Acute respiratory  distress; B34.9-Viral infection, unspecified; J18.9-Pneumonia,  unspecified organism; R13.10-Dysphagia, unspecified      COMPARISON: Right foot radiographs 02/12/2015     FINDINGS: 3 views of the left foot were submitted for review.      This foot is diffusely abnormal in appearance with diffuse osteopenia  possibly disuse osteopenia without convincing evidence of fracture  and/or erosive changes. Identified involving the great toe is a possible  soft tissue defect although this could relate to a nail abnormality and  requires correlation with physical exam. Diffuse soft tissue swelling of  the foot is identified. There is no convincing evidence for radiopaque  foreign body. Small joint effusion at the ankle is suggested.           Impression:       1. Diffusely abnormal appearance of the foot with likely disuse  osteopenia without convincing evidence for acute osseous abnormality to  suggest fracture.     2. There is however soft tissue swelling of the foot diffusely with  possible nail abnormality or soft tissue laceration involving the great  toe although requires clinical correlation.      D:  01/19/2020  E:  01/20/2020       XR Chest 1 View [[id number]] Collected:  01/17/20 0811     Updated:  01/17/20 1635    Narrative:       EXAMINATION: XR CHEST 1 VW-01/17/2020:      INDICATION: Respiratory failure; R09.02-Hypoxemia; R06.03-Acute  respiratory distress; B34.9-Viral infection, unspecified;  J18.9-Pneumonia, unspecified organism; R13.10-Dysphagia, unspecified.      COMPARISON: Chest radiograph 01/15/2020.     FINDINGS: The heart is top normal in size, similar to  prior. Hazy left  lower lobe airspace changes with likely a trace left pleural effusion  identified. The right lung appears clear. No acute osseous abnormality.  The visualized upper abdomen is unrevealing.           Impression:       1.  Similar examination when compared to yesterday's study with mild  left lower lobe airspace disease and likely a trace left pleural  effusion.     2.  Similar cardiomegaly.     D:  01/17/2020  E:  01/17/2020     This report was finalized on 1/17/2020 4:32 PM by Dr. Ivan Miller MD.           I read her chest x-ray with the radiologist today    Impression:   1.  Human Metapneumovirus pneumonia-there is no evidence of a superimposed bacterial pneumonia with no fever, no leukocytosis, no new substantial pulmonary infiltrate, and a normal procalcitonin.  She is stable off of antibiotic therapy.  2.  COPD-in exacerbation secondary to the metapneumovirus infection  3.  Hypoxic and hypercapnic acute respiratory failure  4.  Sacral decubitus ulcer  5.  Massive obesity   6.  T2 Diabetes Mellitus     PLAN/RECOMMENDATIONS:  1.  Continue off of antibiotic therapy  2.  Therapy for COPD exacerbation     I discussed her situation with Dr. Calero today.  I will sign off         Austen Redding MD  1/20/2020  9:43 AM

## 2020-01-20 NOTE — PLAN OF CARE
Problem: Patient Care Overview  Goal: Plan of Care Review  Flowsheets (Taken 1/20/2020 1724)  Outcome Summary: Pt dependent transfer bed to chair via lift, max A x2 rolling R and L, unable to stand at this date, dep donning socks, set up for self-feeding liquids, tolerated BUE HEP, cont IPOT per POC

## 2020-01-20 NOTE — PLAN OF CARE
Problem: Patient Care Overview  Goal: Plan of Care Review  Outcome: Ongoing (interventions implemented as appropriate)  Flowsheets (Taken 1/20/2020 8327)  Plan of Care Reviewed With: patient;spouse  Outcome Summary: Pt required machanical lift to trnasfer bed to chair with maxA to roll for sling placement.  Pt attempted sit to stand from chair with armrest, scale with handrails, and maxAx2.  Pt able to push up with MaxA, but was not able to clear surface with 3 attempts.  Continue skilled PT with D/C to rehab at SNF when appropriate.

## 2020-01-21 VITALS
WEIGHT: 293 LBS | SYSTOLIC BLOOD PRESSURE: 164 MMHG | RESPIRATION RATE: 16 BRPM | BODY MASS INDEX: 51.91 KG/M2 | OXYGEN SATURATION: 93 % | DIASTOLIC BLOOD PRESSURE: 76 MMHG | TEMPERATURE: 98.1 F | HEIGHT: 63 IN | HEART RATE: 81 BPM

## 2020-01-21 LAB
GLUCOSE BLDC GLUCOMTR-MCNC: 152 MG/DL (ref 70–130)
GLUCOSE BLDC GLUCOMTR-MCNC: 153 MG/DL (ref 70–130)
GLUCOSE BLDC GLUCOMTR-MCNC: 172 MG/DL (ref 70–130)

## 2020-01-21 PROCEDURE — 63710000001 PREDNISONE PER 1 MG: Performed by: INTERNAL MEDICINE

## 2020-01-21 PROCEDURE — 82962 GLUCOSE BLOOD TEST: CPT

## 2020-01-21 PROCEDURE — 94799 UNLISTED PULMONARY SVC/PX: CPT

## 2020-01-21 PROCEDURE — 92526 ORAL FUNCTION THERAPY: CPT

## 2020-01-21 PROCEDURE — 99239 HOSP IP/OBS DSCHRG MGMT >30: CPT | Performed by: NURSE PRACTITIONER

## 2020-01-21 PROCEDURE — 25010000002 ENOXAPARIN PER 10 MG: Performed by: INTERNAL MEDICINE

## 2020-01-21 RX ORDER — LISINOPRIL 5 MG/1
5 TABLET ORAL
Start: 2020-01-22 | End: 2020-04-06

## 2020-01-21 RX ORDER — GUAIFENESIN 600 MG/1
600 TABLET, EXTENDED RELEASE ORAL EVERY 12 HOURS SCHEDULED
Status: ON HOLD
Start: 2020-01-21 | End: 2021-01-01

## 2020-01-21 RX ORDER — ALPRAZOLAM 0.25 MG/1
0.25 TABLET ORAL NIGHTLY PRN
Qty: 3 TABLET | Refills: 0 | Status: SHIPPED | OUTPATIENT
Start: 2020-01-21 | End: 2020-01-25

## 2020-01-21 RX ORDER — PREDNISONE 20 MG/1
TABLET ORAL
Start: 2020-01-21 | End: 2020-01-28

## 2020-01-21 RX ADMIN — PANTOPRAZOLE SODIUM 40 MG: 40 TABLET, DELAYED RELEASE ORAL at 06:22

## 2020-01-21 RX ADMIN — SODIUM CHLORIDE, PRESERVATIVE FREE 10 ML: 5 INJECTION INTRAVENOUS at 09:06

## 2020-01-21 RX ADMIN — CASTOR OIL AND BALSAM, PERU: 788; 87 OINTMENT TOPICAL at 09:04

## 2020-01-21 RX ADMIN — ENOXAPARIN SODIUM 40 MG: 40 INJECTION SUBCUTANEOUS at 06:22

## 2020-01-21 RX ADMIN — LEVOTHYROXINE SODIUM 100 MCG: 100 TABLET ORAL at 06:22

## 2020-01-21 RX ADMIN — ATORVASTATIN CALCIUM 10 MG: 10 TABLET, FILM COATED ORAL at 09:04

## 2020-01-21 RX ADMIN — Medication 1 TABLET: at 09:05

## 2020-01-21 RX ADMIN — LISINOPRIL 5 MG: 5 TABLET ORAL at 09:05

## 2020-01-21 RX ADMIN — SERTRALINE HYDROCHLORIDE 50 MG: 50 TABLET ORAL at 09:05

## 2020-01-21 RX ADMIN — METOPROLOL SUCCINATE 25 MG: 25 TABLET, EXTENDED RELEASE ORAL at 09:05

## 2020-01-21 RX ADMIN — IPRATROPIUM BROMIDE AND ALBUTEROL SULFATE 3 ML: 2.5; .5 SOLUTION RESPIRATORY (INHALATION) at 07:34

## 2020-01-21 RX ADMIN — GUAIFENESIN 600 MG: 600 TABLET, EXTENDED RELEASE ORAL at 09:06

## 2020-01-21 RX ADMIN — ASPIRIN 81 MG: 81 TABLET, COATED ORAL at 09:05

## 2020-01-21 RX ADMIN — PREDNISONE 40 MG: 20 TABLET ORAL at 09:05

## 2020-01-21 RX ADMIN — IPRATROPIUM BROMIDE AND ALBUTEROL SULFATE 3 ML: 2.5; .5 SOLUTION RESPIRATORY (INHALATION) at 12:45

## 2020-01-21 RX ADMIN — INSULIN LISPRO 2 UNITS: 100 INJECTION, SOLUTION INTRAVENOUS; SUBCUTANEOUS at 09:04

## 2020-01-21 RX ADMIN — SENNOSIDES AND DOCUSATE SODIUM 2 TABLET: 8.6; 5 TABLET ORAL at 09:06

## 2020-01-21 RX ADMIN — INSULIN LISPRO 2 UNITS: 100 INJECTION, SOLUTION INTRAVENOUS; SUBCUTANEOUS at 12:16

## 2020-01-21 RX ADMIN — MICONAZOLE NITRATE: 20 POWDER TOPICAL at 09:04

## 2020-01-21 NOTE — PLAN OF CARE
Problem: Patient Care Overview  Goal: Plan of Care Review  Outcome: Ongoing (interventions implemented as appropriate)  Flowsheets (Taken 1/21/2020 1030)  Progress: improving  Plan of Care Reviewed With: patient; other (see comments) (Ousmane HERNANDEZ)  Note:   WOC nurse f/u for coccyx wound and abdominal/groin folds. Coccyx stage 2 pressure injury improving; otherwise buttocks with chronic hypergranulation/MASD/shearing. Buttocks cleaned with blue skin wipes; Venelex applied to wound; then Z-guard. Abdominal folds red with no yeast visualized; cleaned with blue skin wipes; InterDry placed. Heels intact, blanchable; off loading heel boots in place. Pt on [facility] bed. Skin interventions in place. WOC nurse will f/u. Please contact WOC nurse as needed for concerns.

## 2020-01-21 NOTE — THERAPY TREATMENT NOTE
Acute Care - Speech Language Pathology   Swallow Treatment Note [facility]     Patient Name: Julissa Kee  : 1950  MRN: [id number]  Today's Date: 2020               Admit Date: 2020    Visit Dx:      ICD-10-CM ICD-9-CM   1. Hypoxia R09.02 799.02   2. Respiratory distress R06.03 786.09   3. Viral illness B34.9 079.99   4. Pneumonia of both lungs due to infectious organism, unspecified part of lung J18.9 483.8   5. Dysphagia, unspecified type R13.10 787.20   6. Anxiety F41.9 300.00     Patient Active Problem List   Diagnosis   • Anxiety   • Chronic obstructive pulmonary disease (CMS/HCC)   • Diabetes mellitus (CMS/HCC)   • Hyperlipidemia   • Hypertension   • Hypothyroidism   • Morbid obesity (CMS/HCC)   • Extreme obesity with alveolar hypoventilation (CMS/HCC)   • Renal insufficiency   • Sacral wound   • Acute respiratory failure with hypoxia (CMS/HCC)   • Recurrent pneumonia   • Human metapneumovirus (hMPV) pneumonia   • Hypoxia   • Acute foot pain, left       Therapy Treatment  Rehabilitation Treatment Summary     Row Name 20 1445             Treatment Time/Intention    Discipline  speech language pathologist  -MP      Document Type  therapy note (daily note)  -MP      Subjective Information  no complaints  -MP      Mode of Treatment  individual therapy;speech-language pathology  -MP      Care Plan Review  care plan/treatment goals reviewed;patient/other agree to care plan  -MP      Therapy Frequency (Swallow)  5 days per week  -MP      Patient Effort  good  -MP      Recorded by [MP] Kiko Pimentel MS CCC-SLP 20 1455      Row Name 20 1445             Pain Assessment    Additional Documentation  Pain Scale: FACES Pre/Post-Treatment (Group)  -MP      Recorded by [MP] Kiko Pimentel MS CCC-SLP 20 1455      Row Name 20 1445             Pain Scale: FACES Pre/Post-Treatment    Pain: FACES Scale, Pretreatment  0-->no hurt  -MP      Pain: FACES Scale, Post-Treatment   0-->no hurt  -MP      Recorded by [MP] Kiko Pimentel, MS CCC-SLP 01/21/20 1455      Row Name                Wound 01/14/20 1908 Left anterior greater trochanter Extravasation    Wound - Properties Group Date first assessed: 01/14/20 [ZACHARIAH] Time first assessed: 1908 [ZACHARIAH] Present on Hospital Admission: Y [BR] Side: Left [ZACHARIAH] Orientation: anterior [ZACHARIAH] Location: greater trochanter [ZACHARIAH] Primary Wound Type: Extravasatio [ZACHARIAH] Recorded by:  [BR] Judie Michael RN 01/14/20 2335 [ZACHARIAH] Melissa Luther RN 01/14/20 1909    Row Name                Wound 01/14/20 1909 Right anterior greater trochanter Extravasation    Wound - Properties Group Date first assessed: 01/14/20 [ZACHARIAH] Time first assessed: 1909 [ZACHARIAH] Present on Hospital Admission: Y [BR] Side: Right [ZACHARIAH] Orientation: anterior [ZACHARIAH] Location: greater trochanter [ZACHARIAH] Primary Wound Type: Extravasatio [ZACHARIAH] Recorded by:  [BR] Judie Michael RN 01/14/20 2335 [ZACHARIAH] Melissa Luther RN 01/14/20 1909    Row Name                Wound 01/14/20 1910 midline coccyx Pressure Injury    Wound - Properties Group Date first assessed: 01/14/20 [ZACHARIAH] Time first assessed: 1910 [ZACHRAIAH] Present on Hospital Admission: Y [BR] Orientation: midline [CP] Location: coccyx [ZACHARIAH] Primary Wound Type: Pressure inj [ZACHARIAH] Stage, Pressure Injury: Stage 2 [CP] Recorded by:  [BR] Judie Michael RN 01/14/20 2335 [CP] Sheridan Day APRN 01/15/20 1148 [ZACHARIAH] Melissa Luther RN 01/14/20 1911    Row Name                Wound 01/14/20 1912 Right anterior foot Other (comment)    Wound - Properties Group Date first assessed: 01/14/20 [ZACHARIAH] Time first assessed: 1912 [ZACHARIAH] Side: Right [ZACHARIAH] Orientation: anterior [ZACHARIAH] Location: foot [ZACHARIAH] Primary Wound Type: Other [ZACHARIAH] Recorded by:  [ZACHARIAH] Melissa Luther RN 01/14/20 1915    Row Name                Wound 01/14/20 2011 Left anterior toe    Wound - Properties Group Date first assessed: 01/14/20 [BR] Time first assessed: 2011 [BR] Present on Hospital Admission: Y [BR] Side: Left  [BR] Orientation: anterior [BR] Location: toe [BR], 1st toe  Stage, Pressure Injury: deep tissue injury [BR] Recorded by:  [BR] Judie Michael RN 01/14/20 2011    Row Name                Wound 12/28/19 2155 Left medial gluteal Other (comment)    Wound - Properties Group Date first assessed: 12/28/19 [ZONIA] Time first assessed: 2155 [ZONIA] Present on Hospital Admission: Y [ZONIA] Side: Left [ZONIA] Orientation: medial [ZONIA] Location: gluteal [ZONIA] Primary Wound Type: Other [ZONIA], 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  Recorded by:  [ZONIA] Travis Cobb RN 12/29/19 1256    Row Name                Wound 12/28/19 2251 Bilateral other (see notes) MASD (Moisutre associated skin damage)    Wound - Properties Group Date first assessed: 12/28/19 [ZONIA] Time first assessed: 2251 [ZONIA] Present on Hospital Admission: Y [ZONIA] Side: Bilateral [ZONIA] Location: other (see notes) [ZONIA], under Pannus  Primary Wound Type: MASD [ZONIA], yeast dermatitis  Additional Comments: shearing/hypergranulation [CP] Recorded by:  [CP] Sheridan Day APRN 01/15/20 1152 [ZONIA] Travis Cobb RN 12/29/19 1252    Row Name                Wound 01/15/20 1000 Left medial thigh Pressure Injury    Wound - Properties Group Date first assessed: 01/15/20 [CP] Time first assessed: 1000 [CP] Present on Hospital Admission: Y [CP] Side: Left [CP] Orientation: medial [CP] Location: thigh [CP] Primary Wound Type: Pressure inj [CP] Stage, Pressure Injury: deep tissue injury [CP] Recorded by:  [CP] Sheridan Day APRN 01/15/20 1155    Row Name                Wound 01/15/20 1000 Right medial thigh Pressure Injury    Wound - Properties Group Date first assessed: 01/15/20 [CP] Time first assessed: 1000 [CP] Present on Hospital Admission: Y [CP] Side: Right [CP] Orientation: medial [CP] Location: thigh [CP] Primary Wound Type: Pressure inj [CP] Stage, Pressure Injury: deep tissue injury [CP] Recorded by:  [CP] Sheridan Day APRN  01/15/20 1157    Row Name                Wound 01/15/20 1000 Left posterior thigh Pressure Injury    Wound - Properties Group Date first assessed: 01/15/20 [CP] Time first assessed: 1000 [CP] Present on Hospital Admission: Y [CP] Side: Left [CP] Orientation: posterior [CP] Location: thigh [CP] Primary Wound Type: Pressure inj [CP] Stage, Pressure Injury: deep tissue injury [CP] Recorded by:  [CP] Miya Dayine TIM, APRN 01/15/20 1159    Row Name 01/21/20 1445             Outcome Summary/Treatment Plan (SLP)    Daily Summary of Progress (SLP)  progress toward functional goals as expected  -MP      Plan for Continued Treatment (SLP)  Pt to d/c to SNF today. Tolerating diet w/ no s/sxs aspiration, but continued risk factors. Provided education re: general aspiration precautions.   -MP      Anticipated Dischage Disposition  skilled nursing facility;anticipate therapy at next level of care  -MP      Recorded by [MP] Kiko Pimentel MS CCC-SLP 01/21/20 1459        User Key  (r) = Recorded By, (t) = Taken By, (c) = Cosigned By    Initials Name Effective Dates Discipline    CP Miya Dayasif DUMONT, APRN 07/24/19 -  Nurse    Travis Aaron RN 06/16/16 -  Nurse    Melissa Hunt RN 06/16/16 -  Nurse    Judie Madera RN 12/23/16 -  Nurse    Kiko Orlando MS CCC-SLP 06/19/19 -  SLP          Outcome Summary  Outcome Summary/Treatment Plan (SLP)  Daily Summary of Progress (SLP): progress toward functional goals as expected (01/21/20 1445 : Kiko Pimentel MS CCC-SLP)  Plan for Continued Treatment (SLP): Pt to d/c to SNF today. Tolerating diet w/ no s/sxs aspiration, but continued risk factors. Provided education re: general aspiration precautions.  (01/21/20 1445 : Kiko Pimentel MS CCC-SLP)  Anticipated Dischage Disposition: skilled nursing facility, anticipate therapy at next level of care (01/21/20 1445 : Kiko Pimentel MS CCC-SLP)      SLP GOALS     Row Name 01/21/20 144              Oral Nutrition/Hydration Goal 1 (SLP)    Oral Nutrition/Hydration Goal 1, SLP  LTG: Tolerate soft solids and thin liqudis without s/s aspiration with 100% accuracy and no cues  -MP      Time Frame (Oral Nutrition/Hydration Goal 1, SLP)  by discharge  -MP      Progress/Outcomes (Oral Nutrition/Hydration Goal 1, SLP)  good progress toward goal  -MP         Swallow Management Recall Goal 1 (SLP)    Activity (Swallow Management Recall Goal 1, SLP)  safe diet/liquid level;compensatory swallow strategies/techniques;postural techniques;rationale for use of strategies/techniques  -MP      [place]/Accuracy (Swallow Management Recall Goal 1, SLP)  independently (over 90% accuracy)  -MP      Time Frame (Swallow Management Recall Goal 1, SLP)  short term goal (STG)  -MP      Progress/Outcomes (Swallow Management Recall Goal 1, SLP)  good progress toward goal  -MP         Swallow Compensatory Strategies Goal 1 (SLP)    Activity (Swallow Compensatory Strategies/Techniques Goal 1, SLP)  postural techniques;during meal intake;during p.o. trials;small bites;small cup sips;small straw sips  -MP      [place]/Accuracy (Swallow Compensatory Strategies/Techniques Goal 1, SLP)  independently (over 90% accuracy)  -MP      Time Frame (Swallow Compensatory Strategies/Techniques Goal 1, SLP)  short term goal (STG)  -MP      Progress/Outcomes (Swallow Compensatory Strategies/Techniques Goal 1, SLP)  good progress toward goal  -MP        User Key  (r) = Recorded By, (t) = Taken By, (c) = Cosigned By    Initials Name Provider Type    Kiko Orlando MS CCC-SLP Speech and Language Pathologist          EDUCATION  The patient has been educated in the following areas:   Dysphagia (Swallowing Impairment).    SLP Recommendation and Plan  Daily Summary of Progress (SLP): progress toward functional goals as expected     Plan for Continued Treatment (SLP): Pt to d/c to SNF today. Tolerating diet w/ no s/sxs aspiration, but continued risk  factors. Provided education re: general aspiration precautions.   Anticipated Dischage Disposition: skilled nursing facility, anticipate therapy at next level of care                    Time Calculation:   Time Calculation- SLP     Row Name 01/21/20 1456             Time Calculation- SLP    SLP Start Time  1445  -MP      SLP Received On  01/21/20  -MP        User Key  (r) = Recorded By, (t) = Taken By, (c) = Cosigned By    Initials Name Provider Type    Kiko Orlando MS CCC-SLP Speech and Language Pathologist          Therapy Charges for Today     Code Description Service Date Service Provider Modifiers Qty    [id number] HC ST TREATMENT SWALLOW 1 1/21/2020 Kiko Pimentel MS CCC-SLP GN 1                 Kiko Pimentel MS CCC-LANDY  1/21/2020

## 2020-01-21 NOTE — DISCHARGE PLACEMENT REQUEST
"Julissa Haynes (69 y.o. Female)     Kimberley Sequeira, RN  [phone]    Date of Birth Social Security Number Address Home Phone MRN    1950  [id number] [facility] [address] [phone] [id number]    [employer] Status          None        Admission Date Admission Type Admitting Provider Attending Provider Department, Room/Bed    20 Emergency Bell Calero II, Bell Cruz II, DO [facility] 4G, S446/1    Discharge Date Discharge Disposition Discharge Destination         Skilled Nursing Facility (DC - External)              Attending Provider:  Bell Calero II, DO    Allergies:  Amoxicillin-pot Clavulanate, Bactrim [Sulfamethoxazole-trimethoprim]    Isolation:  None   Infection:  None   Code Status:  No CPR    Ht:  160 cm (63\")   Wt:  137 kg (301 lb 9.4 oz)    Admission Cmt:  None   Principal Problem:  Acute respiratory failure with hypoxia (CMS/HCC) [J96.01]                 Active Insurance as of 2020     Primary Coverage     Payor Plan Insurance Group Employer/Plan Group    HUMANA MEDICARE REPLACEMENT HUMANA MEDICARE REPLACEMENT [id number]     Payor Plan Address Payor Plan Phone Number Payor Plan Fax Number Effective Dates    [address] [address] [phone]  2014 - None Entered    [facility] [address]       Subscriber Name Subscriber Birth Date Member ID       JULISSA HAYNES 1950 [id number]                 Emergency Contacts      (Rel.) Home Phone Work Phone Mobile Phone    Familia Haynes (Spouse) [phone] -- [phone]                 Discharge Summary      Roseann Bedolla APRN at [facility]              [facility] Medicine Services  DISCHARGE SUMMARY    Patient Name: Julissa Haynes  : 1950  MRN: [id number]    Date of Admission: 2020 10:24 AM  Date of Discharge:  2020  Primary Care Physician: Kee Hoffman MD    Consults     Date and Time Order Name Status Description    " 1/15/2020 0030 Inpatient Infectious Diseases Consult Completed     1/3/2020 0147 Inpatient Cardiology Consult Completed     12/30/2019 0407 Inpatient Infectious Diseases Consult Completed           Hospital Course     Presenting Problem:   PNA (pneumonia) [J18.9]  Hypoxia [R09.02]    Active Hospital Problems    Diagnosis  POA   • **Acute respiratory failure with hypoxia (CMS/HCC) [J96.01]  Yes   • Acute foot pain, left [M79.672]  Yes   • Recurrent pneumonia [J18.9]  Yes   • Human metapneumovirus (hMPV) pneumonia [J12.3]  Yes   • Hypoxia [R09.02]  Yes   • Sacral wound [S31.000A]  Yes   • Chronic obstructive pulmonary disease (CMS/HCC) [J44.9]  Yes   • Diabetes mellitus (CMS/HCC) [E11.9]  Yes   • Hyperlipidemia [E78.5]  Yes   • Hypertension [I10]  Yes   • Hypothyroidism [E03.9]  Yes   • 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          Hospital Course:  Julissa Kee is a 69 y.o. female who was admitted to hospital medicine services with acute respiratory failure secondary to human metapneumovirus with viral pneumonia.  She was initially managed in the ICU due to her respiratory failure and transferred to the floor on 1/18/2020.  She was placed on neb treatments, steroids, and antibiotics and seen in consultation by infectious disease.  She has been using BiPAP intermittently at night which does seem to help with her breathing.  She recently has had a bacterial pneumonia and is status post completion of antibiotics.  She is now doing well off of antibiotics.  Patient did sustain some trauma to her left foot with extensive ecchymosis over the metatarsal and tarsal region as well as the first digit.  Foot x-ray was negative for fracture.  It was notable for soft tissue swelling.  This was managed with conservative care.  It is unclear how the patient sustained this trauma to her  foot.  She does have some significant general  decline in physical debility and her morbid obesity complicates all aspects of her care.  Patient has been accepted to [facility] nursing [facility] today for further rehab.  She will need to follow-up with her primary care provider when she is discharged from rehab.  We recommend that she continue using BiPAP at night and oxygen via nasal cannula during the day.      Day of Discharge     HPI:   Resting comfortably in bed with  at bedside.  No overnight issues or complaints.  Breathing improving daily.    Review of Systems  Gen- No fevers, chills  CV- No chest pain, palpitations  Resp- No cough, dyspnea  GI- No N/V/D, abd pain        Vital Signs:   Temp:  [97.8 °F (36.6 °C)-98.4 °F (36.9 °C)] 98.4 °F (36.9 °C)  Heart Rate:  [] 91  Resp:  [16-18] 16  BP: (143-201)/(57-87) 164/76     Physical Exam:  Constitutional: No acute distress, awake, alert, chronically ill appearing  HENT: NCAT, mucous membranes moist  Respiratory: Coarse breath sounds throughout, decreased in bases, unlabored on 02  Cardiovascular: RRR, no murmurs, rubs, or gallops, palpable pedal pulses bilaterally  Gastrointestinal: Positive bowel sounds, soft, nontender, nondistended  Musculoskeletal: baseline chronic lower extremity edema, feet in boots  Psychiatric: Appropriate affect, cooperative  Neurologic: Oriented x 3, strength symmetric in all extremities, Cranial Nerves grossly intact to confrontation, speech clear  Skin: No rashes to exposed skin       Pertinent  and/or Most Recent Results     Results from last 7 days   Lab Units 01/20/20  0340 01/18/20  0448 01/17/20  0511 01/16/20  0442 01/15/20  0736 01/15/20  0519   WBC 10*3/mm3 5.79 5.56 8.39 5.43 5.68  --    HEMOGLOBIN g/dL 10.3* 9.7* 10.0* 10.7* 10.5*  --    HEMATOCRIT % 32.2* 31.3* 32.6* 34.3 34.1  --    PLATELETS 10*3/mm3 164 146 159 159 141  --    SODIUM mmol/L 139 143 142 138  --  140   POTASSIUM mmol/L 4.4 4.3 4.4 4.9  --  4.8   CHLORIDE mmol/L 95* 96* 96* 94*  --   96*   CO2 mmol/L 38.0* 40.0* 36.0* 28.0  --  31.0*   BUN mg/dL 26* 41* 38* 31*  --  25*   CREATININE mg/dL 0.60 0.55* 0.67 0.76  --  0.86   GLUCOSE mg/dL 120* 128* 193* 119*  --  86   CALCIUM mg/dL 9.9 10.0 9.8 9.8  --  9.2     Results from last 7 days   Lab Units 01/17/20  0511 01/16/20  0442 01/15/20  0519   BILIRUBIN mg/dL 0.4 0.5 0.4   ALK PHOS U/L 73 83 88   ALT (SGPT) U/L 15 14 16   AST (SGOT) U/L 15 23 21           Invalid input(s): TG, LDLCALC, LDLREALC  Results from last 7 days   Lab Units 01/16/20  0442 01/15/20  0519 01/14/20  1509   PROBNP pg/mL 645.0  --   --    TROPONIN T ng/mL  --   --  0.015   PROCALCITONIN ng/mL 0.10 0.12  --        Brief Urine Lab Results  (Last result in the past 365 days)      Color   Clarity   Blood   Leuk Est   Nitrite   Protein   CREAT   Urine HCG        01/15/20 1514 Yellow Clear Large (3+) Negative Negative Negative               Microbiology Results Abnormal     Procedure Component Value - Date/Time    Blood Culture - Blood, Arm, Right [[id number]] Collected:  01/14/20 1039    Lab Status:  Final result Specimen:  Blood from Arm, Right Updated:  01/19/20 1130     Blood Culture No growth at 5 days    Blood Culture - Blood, Arm, Left [[id number]] Collected:  01/14/20 1039    Lab Status:  Final result Specimen:  Blood from Arm, Left Updated:  01/19/20 1130     Blood Culture No growth at 5 days    Respiratory Panel, PCR - Swab, Nasopharynx [[id number]]  (Abnormal) Collected:  01/14/20 1039    Lab Status:  Final result Specimen:  Swab from Nasopharynx Updated:  01/14/20 1248     ADENOVIRUS, PCR Not Detected     Coronavirus 229E Not Detected     Coronavirus HKU1 Not Detected     Coronavirus NL63 Not Detected     Coronavirus OC43 Not Detected     Human Metapneumovirus Detected     Human Rhinovirus/Enterovirus Not Detected     Influenza B PCR Not Detected     Parainfluenza Virus 1 Not Detected     Parainfluenza Virus 2 Not Detected     Parainfluenza Virus 3 Not Detected      Parainfluenza Virus 4 Not Detected     Bordetella pertussis pcr Not Detected     Influenza A H1 2009 PCR Not Detected     Chlamydophila pneumoniae PCR Not Detected     Mycoplasma pneumo by PCR Not Detected     Influenza A PCR Not Detected     Influenza A H3 Not Detected     Influenza A H1 Not Detected     RSV, PCR Not Detected     Bordetella parapertussis PCR Not Detected          Imaging Results (All)     Procedure Component Value Units Date/Time    XR Foot 3+ View Left [[id number]] Collected:  01/19/20 1139     Updated:  01/21/20 0911    Narrative:       EXAMINATION: XR FOOT 3+ VW LEFT- 01/19/2020     INDICATION: Fall at home; R09.02-Hypoxemia; R06.03-Acute respiratory  distress; B34.9-Viral infection, unspecified; J18.9-Pneumonia,  unspecified organism; R13.10-Dysphagia, unspecified      COMPARISON: Right foot radiographs 02/12/2015     FINDINGS: 3 views of the left foot were submitted for review.      This foot is diffusely abnormal in appearance with diffuse osteopenia  possibly disuse osteopenia without convincing evidence of fracture  and/or erosive changes. Identified involving the great toe is a possible  soft tissue defect although this could relate to a nail abnormality and  requires correlation with physical exam. Diffuse soft tissue swelling of  the foot is identified. There is no convincing evidence for radiopaque  foreign body. Small joint effusion at the ankle is suggested.           Impression:       1. Diffusely abnormal appearance of the foot with likely disuse  osteopenia without convincing evidence for acute osseous abnormality to  suggest fracture.     2. There is however soft tissue swelling of the foot diffusely with  possible nail abnormality or soft tissue laceration involving the great  toe although requires clinical correlation.      D:  01/19/2020  E:  01/20/2020     This report was finalized on 1/21/2020 9:08 AM by Dr. Ivan Miller MD.       XR Chest 1 View [[id number]] Collected:   01/17/20 0811     Updated:  01/17/20 1635    Narrative:       EXAMINATION: XR CHEST 1 VW-01/17/2020:      INDICATION: Respiratory failure; R09.02-Hypoxemia; R06.03-Acute  respiratory distress; B34.9-Viral infection, unspecified;  J18.9-Pneumonia, unspecified organism; R13.10-Dysphagia, unspecified.      COMPARISON: Chest radiograph 01/15/2020.     FINDINGS: The heart is top normal in size, similar to prior. Hazy left  lower lobe airspace changes with likely a trace left pleural effusion  identified. The right lung appears clear. No acute osseous abnormality.  The visualized upper abdomen is unrevealing.           Impression:       1.  Similar examination when compared to yesterday's study with mild  left lower lobe airspace disease and likely a trace left pleural  effusion.     2.  Similar cardiomegaly.     D:  01/17/2020  E:  01/17/2020     This report was finalized on 1/17/2020 4:32 PM by Dr. Ivan Miller MD.       XR Chest 1 View [[id number]] Collected:  01/14/20 1102     Updated:  01/15/20 0830    Narrative:       EXAMINATION: XR CHEST 1 VW- 01/14/2020     INDICATION: SOA triage protocol      COMPARISON: Chest radiograph 12/28/2019, CT chest 12/28/2019     FINDINGS: Single portable chest radiograph was submitted for review. The  heart is enlarged. Mild pulmonary vascular congestion identified. Hazy  mid and lower lung airspace changes identified, somewhat similar to the  prior exam particularly the CT of the chest performed 12/28/2019. No  large pleural effusion or pneumothorax. Visualized upper abdomen is  unrevealing. No acute osseous abnormality is appreciated.       Impression:       Cardiomegaly mild pulmonary vasculature congestion with  multifocal airspace changes noted similar when compared to 12/28/2019.        D:  01/14/2020  E:  01/14/2020     This report was finalized on 1/15/2020 8:27 AM by Dr. Ivan Miller MD.       XR Chest 1 View [[id number]] Collected:  01/15/20 0602     Updated:   01/15/20 0604    Narrative:       CR Chest 1 Vw    INDICATION:   Hypoxemia this morning. Evaluate for aspiration pneumonia     COMPARISON:    1/14/2020 12/28/2019    FINDINGS:  Single portable AP view(s) of the chest.        Low lung volumes with mild left base atelectasis. No change. Heart size normal there is minimal interstitial prominence as compared with 12/20/2019      Impression:       There may be minimal left base atelectasis. Minimal interstitial prominence. Otherwise no active disease    Signer Name: Norman Martinez MD   Signed: 1/15/2020 6:02 AM   Workstation Name: [id number]    [facility]          Results for orders placed during the hospital encounter of 01/14/20   Duplex Venous Lower Extremity - Bilateral CAR    Narrative · Technically difficult and limited exam, however no DVT or SVT of the   visualized veins.          Results for orders placed during the hospital encounter of 01/14/20   Duplex Venous Lower Extremity - Bilateral CAR    Narrative · Technically difficult and limited exam, however no DVT or SVT of the   visualized veins.          Results for orders placed during the hospital encounter of 12/28/19   Adult Transthoracic Echo Complete W/ Cont if Necessary Per Protocol    Narrative · Left ventricular systolic function is normal. Estimated EF appears to be   in the range of 61 - 65%.  · Left ventricular wall thickness is consistent with moderate concentric   hypertrophy.  · Normal right ventricular wall thickness, systolic function and septal   motion noted. Right ventricular cavity is borderline dilated.  · Trace tricuspid valve regurgitation is present. Estimated right   ventricular systolic pressure from tricuspid regurgitation is normal (<35   mmHg)  · The valve appears trileaflet. The aortic valve is abnormal in structure.   There is mild calcification of the aortic valve.Trace aortic valve   regurgitation is present. No hemodynamically significant aortic valve      stenosis is present.            Discharge Details        Discharge Medications      New Medications      Instructions Start Date   ALPRAZolam 0.25 MG tablet  Commonly known as:  XANAX   0.25 mg, Oral, Nightly PRN      guaiFENesin 600 MG 12 hr tablet  Commonly known as:  MUCINEX   600 mg, Oral, Every 12 Hours Scheduled      lisinopril 5 MG tablet  Commonly known as:  PRINIVIL,ZESTRIL   5 mg, Oral, Every 24 Hours Scheduled   Start Date:  January 22, 2020     magic mouthwash   10 mL, Swish & Spit, Every 4 Hours PRN      miconazole 2 % powder  Commonly known as:  MICOTIN   Topical, Every 12 Hours Scheduled      predniSONE 20 MG tablet  Commonly known as:  DELTASONE   Take 1.5 tablets by mouth Daily for 3 days, THEN 1 tablet Daily for 4 days.   Start Date:  January 21, 2020        Continue These Medications      Instructions Start Date   acetaminophen 325 MG tablet  Commonly known as:  TYLENOL   650 mg, Oral, Every 4 Hours PRN      aspirin 81 MG tablet   1 tablet, Oral, Daily      atorvastatin 10 MG tablet  Commonly known as:  LIPITOR   TAKE 1 TABLET BY MOUTH EVERY DAY      folic acid-vit B6-vit B12 2.2-25-1 MG tablet tablet  Commonly known as:  FOLGARD   1 tablet, Oral, Daily      insulin lispro 100 UNIT/ML injection  Commonly known as:  humaLOG   0-7 Units, Subcutaneous, 3 Times Daily With Meals      ipratropium-albuterol 0.5-2.5 mg/3 ml nebulizer  Commonly known as:  DUO-NEB   3 mL, Nebulization, Every 4 Hours PRN      levothyroxine 100 MCG tablet  Commonly known as:  SYNTHROID, LEVOTHROID   100 mcg, Oral, Daily      linagliptin 5 MG tablet tablet  Commonly known as:  TRADJENTA   5 mg, Oral, Daily      melatonin 5 MG tablet tablet   5 mg, Oral, Nightly      metoprolol succinate XL 25 MG 24 hr tablet  Commonly known as:  TOPROL-XL   25 mg, Oral, Every 24 Hours Scheduled      ondansetron 4 MG/2ML injection  Commonly known as:  ZOFRAN   4 mg, Intravenous, Every 6 Hours PRN      polyethylene glycol pack packet  Commonly  known as:  MIRALAX   17 g, Oral, 2 Times Daily      sennosides-docusate 8.6-50 MG per tablet  Commonly known as:  PERICOLACE   2 tablets, Oral, 2 Times Daily      sertraline 50 MG tablet  Commonly known as:  ZOLOFT   TAKE 1 TABLET BY MOUTH EVERY DAY      terazosin 2 MG capsule  Commonly known as:  HYTRIN   2 mg, Oral, Nightly         Stop These Medications    linezolid 600 MG tablet  Commonly known as:  ZYVOX            Allergies   Allergen Reactions   • Amoxicillin-Pot Clavulanate Other (See Comments)     HEADACHE - has tolerated ceftriaxone 12/2019   • Bactrim [Sulfamethoxazole-Trimethoprim] Other (See Comments)     .         Discharge Disposition:  Skilled Nursing Facility (DC - External)    Diet:  Hospital:  Diet Order   Procedures   • Diet Soft Texture; Chopped; Cardiac, Consistent Carbohydrate       Activity:  Activity Instructions     Activity as Tolerated               CODE STATUS:    Code Status and Medical Interventions:   Ordered at: 01/15/20 1922     Limited Support to NOT Include:    Intubation    Cardioversion/Defibrillation     Level Of Support Discussed With:    Patient     Code Status:    No CPR     Medical Interventions (Level of Support Prior to Arrest):    Limited       Future Appointments   Date Time Provider Department Center   2/25/2020 11:15 AM Thony Ohara MD [facility] None   3/10/2020  4:00 PM Kee Hoffman MD [facility] None       Additional Instructions for the Follow-ups that You Need to Schedule     Discharge Follow-up with PCP   As directed       Currently Documented PCP:    Kee Hoffman MD    PCP Phone Number:    [phone]     Follow Up Details:  first available hospital follow up               Time Spent on Discharge:  40 minutes    Electronically signed by KALIN Mcginnis, 01/21/20, 12:30 PM.        Electronically signed by Roseann Bedolla APRN at 01/21/20 7199

## 2020-01-21 NOTE — PLAN OF CARE
Problem: Patient Care Overview  Goal: Plan of Care Review  1/21/2020 8516 by Kiko Pimentel MS CCC-SLP  Outcome: Ongoing (interventions implemented as appropriate)  Flowsheets (Taken 1/21/2020 1456)  Plan of Care Reviewed With: patient  Note:   SLP treatment completed. Will continue to address dysphagia. Please see note for further details and recommendations.

## 2020-01-21 NOTE — PROGRESS NOTES
Case Management Discharge Note      Final Note: Patient's plan is a skilled bed at [facility] today, 1/21.  Ambulance scheduled for 1500.  PCS on chartlet.  Nurse to call report to [phone].  CM will fax transfer summary when available to [phone].  Please place a copy of the transfer summary and any scripts in the packet to be sent to the facility with the patient.  Kimberley Sequeira RN [phone]    Provided post acute provider list?: (List not provided at this time as pt needs to speak with daughter regarding discharge plans)    Destination - Selection Complete      Service Provider Request Status Selected Services Address Phone Number Fax Number    [facility] Selected Skilled Nursing [address] [address] [phone]       Mary Garcia RN 1/17/2020 1202    Sadie is going to start precert today.                 Durable Medical Equipment      No service has been selected for the patient.      Dialysis/Infusion      No service has been selected for the patient.      Home Medical Care      No service has been selected for the patient.      Therapy      No service has been selected for the patient.      Community Resources      No service has been selected for the patient.        Transportation Services  Ambulance: [facility]    Final Discharge Disposition Code: 03 - skilled nursing facility (SNF)

## 2020-01-21 NOTE — PLAN OF CARE
Patient has rested well tonight. Plan is for patient to transfer via ambulance to [facility] pending insurance approval. Patient had an elevated b/p yesterday and was started on additional medication. Will continue to monitor.

## 2020-01-21 NOTE — DISCHARGE SUMMARY
[facility] Medicine Services  DISCHARGE SUMMARY    Patient Name: Julissa Kee  : 1950  MRN: [id number]    Date of Admission: 2020 10:24 AM  Date of Discharge:  2020  Primary Care Physician: Kee Hoffman MD    Consults     Date and Time Order Name Status Description    1/15/2020 0030 Inpatient Infectious Diseases Consult Completed     1/3/2020 0147 Inpatient Cardiology Consult Completed     2019 0407 Inpatient Infectious Diseases Consult Completed           Hospital Course     Presenting Problem:   PNA (pneumonia) [J18.9]  Hypoxia [R09.02]    Active Hospital Problems    Diagnosis  POA   • **Acute respiratory failure with hypoxia (CMS/HCC) [J96.01]  Yes   • Acute foot pain, left [M79.672]  Yes   • Recurrent pneumonia [J18.9]  Yes   • Human metapneumovirus (hMPV) pneumonia [J12.3]  Yes   • Hypoxia [R09.02]  Yes   • Sacral wound [S31.000A]  Yes   • Chronic obstructive pulmonary disease (CMS/HCC) [J44.9]  Yes   • Diabetes mellitus (CMS/HCC) [E11.9]  Yes   • Hyperlipidemia [E78.5]  Yes   • Hypertension [I10]  Yes   • Hypothyroidism [E03.9]  Yes   • 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          Hospital Course:  Julissa Kee is a 69 y.o. female who was admitted to hospital medicine services with acute respiratory failure secondary to human metapneumovirus with viral pneumonia.  She was initially managed in the ICU due to her respiratory failure and transferred to the floor on 2020.  She was placed on neb treatments, steroids, and antibiotics and seen in consultation by infectious disease.  She has been using BiPAP intermittently at night which does seem to help with her breathing.  She recently has had a bacterial pneumonia and is status post completion of antibiotics.  She is now doing well off of antibiotics.  Patient did sustain some trauma to her left foot  with extensive ecchymosis over the metatarsal and tarsal region as well as the first digit.  Foot x-ray was negative for fracture.  It was notable for soft tissue swelling.  This was managed with conservative care.  It is unclear how the patient sustained this trauma to her  foot.  She does have some significant general decline in physical debility and her morbid obesity complicates all aspects of her care.  Patient has been accepted to [facility] [place] today for further rehab.  She will need to follow-up with her primary care provider when she is discharged from rehab.  We recommend that she continue using BiPAP at night and oxygen via nasal cannula during the day.      Day of Discharge     HPI:   Resting comfortably in bed with  at bedside.  No overnight issues or complaints.  Breathing improving daily.    Review of Systems  Gen- No fevers, chills  CV- No chest pain, palpitations  Resp- No cough, dyspnea  GI- No N/V/D, abd pain        Vital Signs:   Temp:  [97.8 °F (36.6 °C)-98.4 °F (36.9 °C)] 98.4 °F (36.9 °C)  Heart Rate:  [] 91  Resp:  [16-18] 16  BP: (143-201)/(57-87) 164/76     Physical Exam:  Constitutional: No acute distress, awake, alert, chronically ill appearing  HENT: NCAT, mucous membranes moist  Respiratory: Coarse breath sounds throughout, decreased in bases, unlabored on 02  Cardiovascular: RRR, no murmurs, rubs, or gallops, palpable pedal pulses bilaterally  Gastrointestinal: Positive bowel sounds, soft, nontender, nondistended  Musculoskeletal: baseline chronic lower extremity edema, feet in boots  Psychiatric: Appropriate affect, cooperative  Neurologic: Oriented x 3, strength symmetric in all extremities, Cranial Nerves grossly intact to confrontation, speech clear  Skin: No rashes to exposed skin       Pertinent  and/or Most Recent Results     Results from last 7 days   Lab Units 01/20/20  0340 01/18/20  0448 01/17/20  0511 01/16/20  0442 01/15/20  0736  01/15/20  0519   WBC 10*3/mm3 5.79 5.56 8.39 5.43 5.68  --    HEMOGLOBIN g/dL 10.3* 9.7* 10.0* 10.7* 10.5*  --    HEMATOCRIT % 32.2* 31.3* 32.6* 34.3 34.1  --    PLATELETS 10*3/mm3 164 146 159 159 141  --    SODIUM mmol/L 139 143 142 138  --  140   POTASSIUM mmol/L 4.4 4.3 4.4 4.9  --  4.8   CHLORIDE mmol/L 95* 96* 96* 94*  --  96*   CO2 mmol/L 38.0* 40.0* 36.0* 28.0  --  31.0*   BUN mg/dL 26* 41* 38* 31*  --  25*   CREATININE mg/dL 0.60 0.55* 0.67 0.76  --  0.86   GLUCOSE mg/dL 120* 128* 193* 119*  --  86   CALCIUM mg/dL 9.9 10.0 9.8 9.8  --  9.2     Results from last 7 days   Lab Units 01/17/20  0511 01/16/20  0442 01/15/20  0519   BILIRUBIN mg/dL 0.4 0.5 0.4   ALK PHOS U/L 73 83 88   ALT (SGPT) U/L 15 14 16   AST (SGOT) U/L 15 23 21           Invalid input(s): TG, LDLCALC, LDLREALC  Results from last 7 days   Lab Units 01/16/20  0442 01/15/20  0519 01/14/20  1509   PROBNP pg/mL 645.0  --   --    TROPONIN T ng/mL  --   --  0.015   PROCALCITONIN ng/mL 0.10 0.12  --        Brief Urine Lab Results  (Last result in the past 365 days)      Color   Clarity   Blood   Leuk Est   Nitrite   Protein   CREAT   Urine HCG        01/15/20 1514 Yellow Clear Large (3+) Negative Negative Negative               Microbiology Results Abnormal     Procedure Component Value - Date/Time    Blood Culture - Blood, Arm, Right [[id number]] Collected:  01/14/20 1039    Lab Status:  Final result Specimen:  Blood from Arm, Right Updated:  01/19/20 1130     Blood Culture No growth at 5 days    Blood Culture - Blood, Arm, Left [[id number]] Collected:  01/14/20 1039    Lab Status:  Final result Specimen:  Blood from Arm, Left Updated:  01/19/20 1130     Blood Culture No growth at 5 days    Respiratory Panel, PCR - Swab, Nasopharynx [[id number]]  (Abnormal) Collected:  01/14/20 1039    Lab Status:  Final result Specimen:  Swab from Nasopharynx Updated:  01/14/20 1248     ADENOVIRUS, PCR Not Detected     Coronavirus 229E Not Detected     Coronavirus  HKU1 Not Detected     Coronavirus NL63 Not Detected     Coronavirus OC43 Not Detected     Human Metapneumovirus Detected     Human Rhinovirus/Enterovirus Not Detected     Influenza B PCR Not Detected     Parainfluenza Virus 1 Not Detected     Parainfluenza Virus 2 Not Detected     Parainfluenza Virus 3 Not Detected     Parainfluenza Virus 4 Not Detected     Bordetella pertussis pcr Not Detected     Influenza A H1 2009 PCR Not Detected     Chlamydophila pneumoniae PCR Not Detected     Mycoplasma pneumo by PCR Not Detected     Influenza A PCR Not Detected     Influenza A H3 Not Detected     Influenza A H1 Not Detected     RSV, PCR Not Detected     Bordetella parapertussis PCR Not Detected          Imaging Results (All)     Procedure Component Value Units Date/Time    XR Foot 3+ View Left [[id number]] Collected:  01/19/20 1139     Updated:  01/21/20 0911    Narrative:       EXAMINATION: XR FOOT 3+ VW LEFT- 01/19/2020     INDICATION: Fall at home; R09.02-Hypoxemia; R06.03-Acute respiratory  distress; B34.9-Viral infection, unspecified; J18.9-Pneumonia,  unspecified organism; R13.10-Dysphagia, unspecified      COMPARISON: Right foot radiographs 02/12/2015     FINDINGS: 3 views of the left foot were submitted for review.      This foot is diffusely abnormal in appearance with diffuse osteopenia  possibly disuse osteopenia without convincing evidence of fracture  and/or erosive changes. Identified involving the great toe is a possible  soft tissue defect although this could relate to a nail abnormality and  requires correlation with physical exam. Diffuse soft tissue swelling of  the foot is identified. There is no convincing evidence for radiopaque  foreign body. Small joint effusion at the ankle is suggested.           Impression:       1. Diffusely abnormal appearance of the foot with likely disuse  osteopenia without convincing evidence for acute osseous abnormality to  suggest fracture.     2. There is however soft  tissue swelling of the foot diffusely with  possible nail abnormality or soft tissue laceration involving the great  toe although requires clinical correlation.      D:  01/19/2020  E:  01/20/2020     This report was finalized on 1/21/2020 9:08 AM by Dr. Ivan Miller MD.       XR Chest 1 View [[id number]] Collected:  01/17/20 0811     Updated:  01/17/20 1635    Narrative:       EXAMINATION: XR CHEST 1 VW-01/17/2020:      INDICATION: Respiratory failure; R09.02-Hypoxemia; R06.03-Acute  respiratory distress; B34.9-Viral infection, unspecified;  J18.9-Pneumonia, unspecified organism; R13.10-Dysphagia, unspecified.      COMPARISON: Chest radiograph 01/15/2020.     FINDINGS: The heart is top normal in size, similar to prior. Hazy left  lower lobe airspace changes with likely a trace left pleural effusion  identified. The right lung appears clear. No acute osseous abnormality.  The visualized upper abdomen is unrevealing.           Impression:       1.  Similar examination when compared to yesterday's study with mild  left lower lobe airspace disease and likely a trace left pleural  effusion.     2.  Similar cardiomegaly.     D:  01/17/2020  E:  01/17/2020     This report was finalized on 1/17/2020 4:32 PM by Dr. Ivan Miller MD.       XR Chest 1 View [[id number]] Collected:  01/14/20 1102     Updated:  01/15/20 0830    Narrative:       EXAMINATION: XR CHEST 1 - 01/14/2020     INDICATION: SOA triage protocol      COMPARISON: Chest radiograph 12/28/2019, CT chest 12/28/2019     FINDINGS: Single portable chest radiograph was submitted for review. The  heart is enlarged. Mild pulmonary vascular congestion identified. Hazy  mid and lower lung airspace changes identified, somewhat similar to the  prior exam particularly the CT of the chest performed 12/28/2019. No  large pleural effusion or pneumothorax. Visualized upper abdomen is  unrevealing. No acute osseous abnormality is appreciated.       Impression:        Cardiomegaly mild pulmonary vasculature congestion with  multifocal airspace changes noted similar when compared to 12/28/2019.        D:  01/14/2020  E:  01/14/2020     This report was finalized on 1/15/2020 8:27 AM by Dr. Ivan Miller MD.       XR Chest 1 View [[id number]] Collected:  01/15/20 0602     Updated:  01/15/20 0604    Narrative:       CR Chest 1 Vw    INDICATION:   Hypoxemia this morning. Evaluate for aspiration pneumonia     COMPARISON:    1/14/2020 12/28/2019    FINDINGS:  Single portable AP view(s) of the chest.        Low lung volumes with mild left base atelectasis. No change. Heart size normal there is minimal interstitial prominence as compared with 12/20/2019      Impression:       There may be minimal left base atelectasis. Minimal interstitial prominence. Otherwise no active disease    Signer Name: Norman Martinez MD   Signed: 1/15/2020 6:02 AM   Workstation Name: [facility]-    [facility]          Results for orders placed during the hospital encounter of 01/14/20   Duplex Venous Lower Extremity - Bilateral CAR    Narrative · Technically difficult and limited exam, however no DVT or SVT of the   visualized veins.          Results for orders placed during the hospital encounter of 01/14/20   Duplex Venous Lower Extremity - Bilateral CAR    Narrative · Technically difficult and limited exam, however no DVT or SVT of the   visualized veins.          Results for orders placed during the hospital encounter of 12/28/19   Adult Transthoracic Echo Complete W/ Cont if Necessary Per Protocol    Narrative · Left ventricular systolic function is normal. Estimated EF appears to be   in the range of 61 - 65%.  · Left ventricular wall thickness is consistent with moderate concentric   hypertrophy.  · Normal right ventricular wall thickness, systolic function and septal   motion noted. Right ventricular cavity is borderline dilated.  · Trace tricuspid valve regurgitation is present.  Estimated right   ventricular systolic pressure from tricuspid regurgitation is normal (<35   mmHg)  · The valve appears trileaflet. The aortic valve is abnormal in structure.   There is mild calcification of the aortic valve.Trace aortic valve   regurgitation is present. No hemodynamically significant aortic valve   stenosis is present.            Discharge Details        Discharge Medications      New Medications      Instructions Start Date   ALPRAZolam 0.25 MG tablet  Commonly known as:  XANAX   0.25 mg, Oral, Nightly PRN      guaiFENesin 600 MG 12 hr tablet  Commonly known as:  MUCINEX   600 mg, Oral, Every 12 Hours Scheduled      lisinopril 5 MG tablet  Commonly known as:  PRINIVIL,ZESTRIL   5 mg, Oral, Every 24 Hours Scheduled   Start Date:  January 22, 2020     magic mouthwash   10 mL, Swish & Spit, Every 4 Hours PRN      miconazole 2 % powder  Commonly known as:  MICOTIN   Topical, Every 12 Hours Scheduled      predniSONE 20 MG tablet  Commonly known as:  DELTASONE   Take 1.5 tablets by mouth Daily for 3 days, THEN 1 tablet Daily for 4 days.   Start Date:  January 21, 2020        Continue These Medications      Instructions Start Date   acetaminophen 325 MG tablet  Commonly known as:  TYLENOL   650 mg, Oral, Every 4 Hours PRN      aspirin 81 MG tablet   1 tablet, Oral, Daily      atorvastatin 10 MG tablet  Commonly known as:  LIPITOR   TAKE 1 TABLET BY MOUTH EVERY DAY      folic acid-vit B6-vit B12 2.2-25-1 MG tablet tablet  Commonly known as:  FOLGARD   1 tablet, Oral, Daily      insulin lispro 100 UNIT/ML injection  Commonly known as:  humaLOG   0-7 Units, Subcutaneous, 3 Times Daily With Meals      ipratropium-albuterol 0.5-2.5 mg/3 ml nebulizer  Commonly known as:  DUO-NEB   3 mL, Nebulization, Every 4 Hours PRN      levothyroxine 100 MCG tablet  Commonly known as:  SYNTHROID, LEVOTHROID   100 mcg, Oral, Daily      linagliptin 5 MG tablet tablet  Commonly known as:  TRADJENTA   5 mg, Oral, Daily         melatonin 5 MG tablet tablet   5 mg, Oral, Nightly      metoprolol succinate XL 25 MG 24 hr tablet  Commonly known as:  TOPROL-XL   25 mg, Oral, Every 24 Hours Scheduled      ondansetron 4 MG/2ML injection  Commonly known as:  ZOFRAN   4 mg, Intravenous, Every 6 Hours PRN      polyethylene glycol pack packet  Commonly known as:  MIRALAX   17 g, Oral, 2 Times Daily      sennosides-docusate 8.6-50 MG per tablet  Commonly known as:  PERICOLACE   2 tablets, Oral, 2 Times Daily      sertraline 50 MG tablet  Commonly known as:  ZOLOFT   TAKE 1 TABLET BY MOUTH EVERY DAY      terazosin 2 MG capsule  Commonly known as:  HYTRIN   2 mg, Oral, Nightly         Stop These Medications    linezolid 600 MG tablet  Commonly known as:  ZYVOX            Allergies   Allergen Reactions   • Amoxicillin-Pot Clavulanate Other (See Comments)     HEADACHE - has tolerated ceftriaxone 12/2019   • Bactrim [Sulfamethoxazole-Trimethoprim] Other (See Comments)     .         Discharge Disposition:  Skilled Nursing Facility (DC - External)    Diet:  Hospital:  Diet Order   Procedures   • Diet Soft Texture; Chopped; Cardiac, Consistent Carbohydrate       Activity:  Activity Instructions     Activity as Tolerated               CODE STATUS:    Code Status and Medical Interventions:   Ordered at: 01/15/20 1922     Limited Support to NOT Include:    Intubation    Cardioversion/Defibrillation     Level Of Support Discussed With:    Patient     Code Status:    No CPR     Medical Interventions (Level of Support Prior to Arrest):    Limited       Future Appointments   Date Time Provider Department Center   2/25/2020 11:15 AM Tohny Ohara MD [facility] None   3/10/2020  4:00 PM Kee Hoffman MD [facility] None       Additional Instructions for the Follow-ups that You Need to Schedule     Discharge Follow-up with PCP   As directed       Currently Documented PCP:    Kee Hoffman MD    PCP Phone Number:    [phone]     Follow Up  Details:  first available hospital follow up               Time Spent on Discharge:  40 minutes    Electronically signed by KALIN Mcginnis, 01/21/20, 12:30 PM.

## 2020-01-21 NOTE — CONSULTS
Referring Provider: DO Abdias  Reason for Consultation: COPD    Subjective .   Education:  NN spoke with pt at BS.  Pt alert and able to answer questions appropriately.  Pt O2 sat    92% on 3  L currently, home O2 use 2-3 L.  Former smoker, quit date .  Pt is a poor historian.  She reports that at this time she has no issues with medications or transportation.  She is not ambulatory at this time do to a suspected foot injury and cannot give a baseline at this time.  She has no rescue medication on file.  She reports that she wore her cpap for about two hours last night, encouraged strongly to wear for the duration of the night.  Pt encouraged to do IS ten times every hour while awake and to continue to use it at the next level of care. Pt reports no previous hx of formal COPD teaching, no understanding of action plan, or KS.  Stop light report, NN contact information, instructions for accessing [facility] and list of educational videos given to pt.  COPD education completed in the form of explanation, handouts, and videos. No new concerns or questions voiced at this time.  NN will continue to follow as needed.    Age: 69 y.o.  Sex: female  Smoker Status: former, 27.5 pack years  Pulmonologist: VISHNU  FEV1 (PFT): NA  Home O2: 2-3L    Objective     SpO2 SpO2: 93 % (20 0718)  Device Device (Oxygen Therapy): humidified, nasal cannula (2034)  Flow Flow (L/min): 3 (2034)  Incentive Spirometer    IS Predicted Level (mL)     Number of Repetitions     Level Incentive Spirometer (mL)    Patient Tolerance     Inhaler Treatment Status    Treatment Route        Home Medications:  Medications Prior to Admission   Medication Sig Dispense Refill Last Dose   • [] linezolid (ZYVOX) 600 MG tablet Take 600 mg by mouth 2 (Two) Times a Day.      • acetaminophen (TYLENOL) 325 MG tablet Take 2 tablets by mouth Every 4 (Four) Hours As Needed for Mild Pain .      • aspirin 81 MG tablet Take 1 tablet by mouth  daily.   12/28/2019 at Unknown time   • atorvastatin (LIPITOR) 10 MG tablet TAKE 1 TABLET BY MOUTH EVERY DAY 90 tablet 1 12/28/2019 at Unknown time   • folic acid-vit B6-vit B12 (FOLGARD) 2.2-25-1 MG tablet tablet Take 1 tablet by mouth Daily.      • insulin lispro (humaLOG) 100 UNIT/ML injection Inject 0-7 Units under the skin into the appropriate area as directed 3 (Three) Times a Day With Meals.  12    • ipratropium-albuterol (DUO-NEB) 0.5-2.5 mg/3 ml nebulizer Take 3 mL by nebulization Every 4 (Four) Hours As Needed for Wheezing or Shortness of Air. 360 mL     • levothyroxine (SYNTHROID, LEVOTHROID) 100 MCG tablet Take 1 tablet by mouth Daily. 90 tablet 1 12/28/2019 at Unknown time   • linagliptin (TRADJENTA) 5 MG tablet tablet Take 1 tablet by mouth Daily. 30 tablet     • melatonin 5 MG tablet tablet Take 1 tablet by mouth Every Night.      • metoprolol succinate XL (TOPROL-XL) 25 MG 24 hr tablet Take 1 tablet by mouth Daily.      • ondansetron (ZOFRAN) 4 MG/2ML injection Infuse 2 mL into a venous catheter Every 6 (Six) Hours As Needed for Nausea or Vomiting.      • polyethylene glycol (MIRALAX) pack packet Take 17 g by mouth 2 (Two) Times a Day.      • sennosides-docusate (PERICOLACE) 8.6-50 MG per tablet Take 2 tablets by mouth 2 (Two) Times a Day.      • sertraline (ZOLOFT) 50 MG tablet TAKE 1 TABLET BY MOUTH EVERY DAY 90 tablet 1 12/28/2019 at Unknown time   • terazosin (HYTRIN) 2 MG capsule Take 1 capsule by mouth Every Night.          Discussion: Per current GOLD Standards, please consider: outpatient PFT, No LAMA/LABA/ICS in place      Discussed with primary RN     Mellisa Masterson RN

## 2020-02-07 ENCOUNTER — TELEPHONE (OUTPATIENT)
Dept: INTERNAL MEDICINE | Facility: CLINIC | Age: 70
End: 2020-02-07

## 2020-02-07 NOTE — TELEPHONE ENCOUNTER
[facility] called and states patient was released from the hospital and is now in a rehab facility. She states they need verbal orders for home health, nurse, PT and OT. Call back [phone].

## 2020-02-10 NOTE — TELEPHONE ENCOUNTER
[facility] [phone]  Jael   Verbal orders provided for HH, nursing, PT, and OT. Good verbal understanding

## 2020-02-11 ENCOUNTER — TELEPHONE (OUTPATIENT)
Dept: INTERNAL MEDICINE | Facility: CLINIC | Age: 70
End: 2020-02-11

## 2020-02-11 DIAGNOSIS — E66.2 EXTREME OBESITY WITH ALVEOLAR HYPOVENTILATION (HCC): ICD-10-CM

## 2020-02-11 DIAGNOSIS — L89.302 PRESSURE INJURY OF BUTTOCK, STAGE 2, UNSPECIFIED LATERALITY (HCC): Primary | ICD-10-CM

## 2020-02-11 NOTE — TELEPHONE ENCOUNTER
Shabbir Verdugo with [employer]   Phone callback [phone]   Fax # [phone]    She was given homehealth orders from the nursing home. She cannot get out of her chair or turn herself very well. Paperwork from nursing home says she can walk and she can assist with self care but she cannot.    But they are requesting a prescription for a hospital bed, with a trapeze bar. She also needs a wide bedside kamode, She weighs over 300 lbs.     She will be receiving nursing services, physical therapy and occupational therapy from [facility].

## 2020-02-17 NOTE — TELEPHONE ENCOUNTER
Natalya spoke with Tanisha from [facility]  and faxed order over to them

## 2020-02-17 NOTE — TELEPHONE ENCOUNTER
Netta from [facility], she said she faxed an order for the bed on 2/14/20. She said the order that was faxed today was for a bariatric hospital bed but the patient does not qualify for this unless there is documentation that she is more that 350lbs. The order they faxed on 2/13 to be signed is for a hospital bed w/ trapeze and a bariatric bedside commode, diagnosis code J96. She is needing that order faxed to [phone]

## 2020-02-17 NOTE — TELEPHONE ENCOUNTER
They received the order you completed yesterday but that it is for a bariatric bed and they must have documentation that pt is greater than 350lb in order to get bariatric bed approved. States they order they requested was for hospital bed with kesha.

## 2020-02-18 NOTE — TELEPHONE ENCOUNTER
New corrected order faxed to [facility] Attileana Chadwick at [phone] with confirmation of receipt received.

## 2020-02-19 RX ORDER — TOBRAMYCIN 3 MG/ML
2 SOLUTION/ DROPS OPHTHALMIC EVERY 6 HOURS SCHEDULED
Qty: 5 ML | Refills: 0 | Status: SHIPPED | OUTPATIENT
Start: 2020-02-19 | End: 2021-01-01

## 2020-03-11 ENCOUNTER — TELEPHONE (OUTPATIENT)
Dept: INTERNAL MEDICINE | Facility: CLINIC | Age: 70
End: 2020-03-11

## 2020-03-11 DIAGNOSIS — Z79.4 TYPE 2 DIABETES MELLITUS WITHOUT COMPLICATION, WITH LONG-TERM CURRENT USE OF INSULIN (HCC): Primary | ICD-10-CM

## 2020-03-11 DIAGNOSIS — E11.9 TYPE 2 DIABETES MELLITUS WITHOUT COMPLICATION, WITH LONG-TERM CURRENT USE OF INSULIN (HCC): Primary | ICD-10-CM

## 2020-03-11 DIAGNOSIS — R06.89 HYPOVENTILATION: ICD-10-CM

## 2020-03-11 DIAGNOSIS — R09.02 HYPOXEMIA: ICD-10-CM

## 2020-03-11 RX ORDER — SYRING-NEEDL,DISP,INSUL,0.3 ML 30 GX5/16"
SYRINGE, EMPTY DISPOSABLE MISCELLANEOUS
Qty: 100 EACH | Refills: 12 | Status: SHIPPED | OUTPATIENT
Start: 2020-03-11 | End: 2021-01-01

## 2020-03-11 RX ORDER — BLOOD-GLUCOSE METER
KIT MISCELLANEOUS
Qty: 1 EACH | Refills: 0 | Status: SHIPPED | OUTPATIENT
Start: 2020-03-11 | End: 2021-01-01

## 2020-03-11 NOTE — TELEPHONE ENCOUNTER
LEIA FROM HOME HEALTH CALLED AND STATED THAT INSURANCE IS NEEDING A NEW PRESCRIPTION SENT IN FOR HER OXYGEN. SHE STATED THAT THEY NEED AN ORDER FOR O2 @ 2 LITERS VIA CONSERVING DEVICE. PLEASE ADVISE.    [employer] [facility] [employer] FAX # [phone]

## 2020-03-11 NOTE — TELEPHONE ENCOUNTER
Glucometer device with lancets, test strips sent.     I have not addressed HH request for O2 as noted  below as I have not seen pt for face to face, and no recent office visit for this. Fwd to PCP to address tomorrow.

## 2020-03-11 NOTE — TELEPHONE ENCOUNTER
[facility]  nurse Maegan called, requesting a new script for TRADJENTA 5MG DAILY. Patient has ran out of this med and has supply remaining. Patient is also in need of glucometer. Please send to [facility] on [address] in [place]. Any questions please call [phone]

## 2020-03-16 NOTE — TELEPHONE ENCOUNTER
Mary with [facility] called requesting O2 stats on patient before Oxygen order can be filled. [phone]

## 2020-03-17 NOTE — TELEPHONE ENCOUNTER
S/W Celina at [facility], states Mary is gone for the day.  Expl that Dr Cavanaugh wants to order a 24 hour desat study on pt.  States she will have Mary return call in morning.  Ofc. # given.

## 2020-03-18 ENCOUNTER — TELEPHONE (OUTPATIENT)
Dept: INTERNAL MEDICINE | Facility: CLINIC | Age: 70
End: 2020-03-18

## 2020-03-18 NOTE — TELEPHONE ENCOUNTER
What?    If her pCO2 is 97 and she is in respiratory distress, she needs to go to the ER.    Kee Hoffman MD  13:50  03/18/20

## 2020-03-18 NOTE — TELEPHONE ENCOUNTER
Jonathan from [facility] is calling in concerns to pt respiratory and her PC02 was 96.7. They are calling to requested a call back to discuss getting her a triolgy ventilator for COPD pts. Please call back at [phone]

## 2020-03-18 NOTE — TELEPHONE ENCOUNTER
Elyssa called and states they want to cancel order and they will f/u with Luis Manuel. She can be reached at [phone].

## 2020-03-19 NOTE — TELEPHONE ENCOUNTER
S/W Jonathan at [facility], informed that Dr Cavanaugh said if pt PCO2 is 97 and she is in respiratory distress that she needs to go to ER.  States pt seems stable at present and that the PCO2 of 96.7 was from her hospital records in December and January.  States they were just recommending the option of using a trilogy ventilator for pt as she has COPD with recorded PCO2 of 96.7.  States she meets all the qualifications for it and that studies have shown it decreased readmissions to the hospital by 88%. States she fax over information and statement regarding use for Dr Cavanaugh to review and see if he feels it would benefit the pt.  Verified fax #.

## 2020-03-27 ENCOUNTER — TELEPHONE (OUTPATIENT)
Dept: INTERNAL MEDICINE | Facility: CLINIC | Age: 70
End: 2020-03-27

## 2020-03-27 NOTE — TELEPHONE ENCOUNTER
Home health verbal order: Pt requesting to put occupational therapy on hold for 2 weeks for COVID-19 reasoning

## 2020-03-30 NOTE — TELEPHONE ENCOUNTER
Lisa Reynoso [phone]  Spoke to  nurse, advised of clinical message. Nurse is in agreement with plan. Good verbal understanding.

## 2020-04-01 ENCOUNTER — TELEPHONE (OUTPATIENT)
Dept: INTERNAL MEDICINE | Facility: CLINIC | Age: 70
End: 2020-04-01

## 2020-04-01 DIAGNOSIS — E66.2 EXTREME OBESITY WITH ALVEOLAR HYPOVENTILATION (HCC): ICD-10-CM

## 2020-04-01 DIAGNOSIS — L89.302 PRESSURE INJURY OF BUTTOCK, STAGE 2, UNSPECIFIED LATERALITY (HCC): Primary | ICD-10-CM

## 2020-04-01 NOTE — TELEPHONE ENCOUNTER
Maegan with [facility] is requesting an order for a pressure relieving cushion for Pt's wheelchair.  Maegan says Pt has a stage 2 ulcer on her left buttock.  She is requesting the order be faxed to their office and they will order for Pt.     [facility] Fax# [phone]

## 2020-04-06 RX ORDER — IPRATROPIUM BROMIDE AND ALBUTEROL SULFATE 2.5; .5 MG/3ML; MG/3ML
SOLUTION RESPIRATORY (INHALATION)
Qty: 540 ML | Refills: 0 | Status: ON HOLD | OUTPATIENT
Start: 2020-04-06 | End: 2021-01-01

## 2020-04-06 RX ORDER — LISINOPRIL 5 MG/1
TABLET ORAL
Qty: 30 TABLET | Refills: 0 | Status: SHIPPED | OUTPATIENT
Start: 2020-04-06 | End: 2020-04-28

## 2020-04-06 RX ORDER — METOPROLOL SUCCINATE 25 MG/1
TABLET, EXTENDED RELEASE ORAL
Qty: 30 TABLET | Refills: 0 | Status: SHIPPED | OUTPATIENT
Start: 2020-04-06 | End: 2020-04-28

## 2020-04-07 RX ORDER — CUSHION
1 EACH MISCELLANEOUS AS NEEDED
Qty: 1 EACH | Refills: 0 | Status: SHIPPED | OUTPATIENT
Start: 2020-04-07 | End: 2021-01-01

## 2020-04-07 NOTE — TELEPHONE ENCOUNTER
Order faxed to [facility] at [phone] with confirmation of receipt received.

## 2020-04-08 ENCOUNTER — TELEPHONE (OUTPATIENT)
Dept: INTERNAL MEDICINE | Facility: CLINIC | Age: 70
End: 2020-04-08

## 2020-04-08 NOTE — TELEPHONE ENCOUNTER
JOHANNY, PATIENT'S OT, FROM [facility], CALLED TO GET A VERBAL ORDER TO DISCONTINUE LAST OT VISIT DUE TO COVID19. SHE STATED THAT PT WILL KEEP AN EYE OUT AND CONTACT HER IF HER ASSISTANCE IS NEEDED FOR PATIENT. PLEASE ADVISE.    JOHANNY'S CALLBACK # [phone] (OK TO [facility] FOR VERBAL)

## 2020-04-08 NOTE — TELEPHONE ENCOUNTER
[phone]  Lisa   Spoke to RN, advised of verbal ok. Good verbal understanding.

## 2020-04-09 ENCOUNTER — TELEPHONE (OUTPATIENT)
Dept: CARDIOLOGY | Facility: CLINIC | Age: 70
End: 2020-04-09

## 2020-04-09 NOTE — TELEPHONE ENCOUNTER
Called patient to let them know results and recommendations per NSK (see NSK note).  Patient agrees to plan and verbalized understanding.

## 2020-04-09 NOTE — TELEPHONE ENCOUNTER
----- Message from Thony Ohara MD sent at 4/2/2020  2:05 PM EDT -----  Not urgent, can we let this patient know that her monitor showed no atrial fibrillation just the extra PAC beats we discussed in the hospital.  Nothing different needs to be done right now

## 2020-04-18 DIAGNOSIS — I10 ESSENTIAL HYPERTENSION: ICD-10-CM

## 2020-04-21 RX ORDER — DOXAZOSIN MESYLATE 4 MG/1
TABLET ORAL
Qty: 30 TABLET | Refills: 0 | Status: SHIPPED | OUTPATIENT
Start: 2020-04-21 | End: 2020-05-18

## 2020-04-28 RX ORDER — LISINOPRIL 5 MG/1
TABLET ORAL
Qty: 30 TABLET | Refills: 0 | Status: SHIPPED | OUTPATIENT
Start: 2020-04-28 | End: 2020-06-03

## 2020-04-28 RX ORDER — METOPROLOL SUCCINATE 25 MG/1
TABLET, EXTENDED RELEASE ORAL
Qty: 30 TABLET | Refills: 0 | Status: SHIPPED | OUTPATIENT
Start: 2020-04-28 | End: 2020-06-03

## 2020-05-04 RX ORDER — HYDROCHLOROTHIAZIDE 25 MG/1
TABLET ORAL
Qty: 90 TABLET | Refills: 0 | OUTPATIENT
Start: 2020-05-04

## 2020-05-13 ENCOUNTER — TELEPHONE (OUTPATIENT)
Dept: INTERNAL MEDICINE | Facility: CLINIC | Age: 70
End: 2020-05-13

## 2020-05-13 NOTE — TELEPHONE ENCOUNTER
ZAKIA WITH HOME HEALTH IS CALLING TO A VERBAL ORDER TO TREAT WEEPING AREAS IN THE LOWER EXTREMITIES OF THIS PT.  PLEASE CONTACT ON HER AT THE NUMBER LISTED.

## 2020-05-18 ENCOUNTER — TELEMEDICINE (OUTPATIENT)
Dept: INTERNAL MEDICINE | Facility: CLINIC | Age: 70
End: 2020-05-18

## 2020-05-18 VITALS — DIASTOLIC BLOOD PRESSURE: 70 MMHG | SYSTOLIC BLOOD PRESSURE: 118 MMHG

## 2020-05-18 DIAGNOSIS — Z79.4 TYPE 2 DIABETES MELLITUS WITHOUT COMPLICATION, WITH LONG-TERM CURRENT USE OF INSULIN (HCC): ICD-10-CM

## 2020-05-18 DIAGNOSIS — E11.9 TYPE 2 DIABETES MELLITUS WITHOUT COMPLICATION, WITH LONG-TERM CURRENT USE OF INSULIN (HCC): ICD-10-CM

## 2020-05-18 DIAGNOSIS — E78.5 HYPERLIPIDEMIA, UNSPECIFIED HYPERLIPIDEMIA TYPE: ICD-10-CM

## 2020-05-18 DIAGNOSIS — I10 ESSENTIAL HYPERTENSION: Primary | ICD-10-CM

## 2020-05-18 DIAGNOSIS — E03.9 HYPOTHYROIDISM, UNSPECIFIED TYPE: ICD-10-CM

## 2020-05-18 DIAGNOSIS — I10 ESSENTIAL HYPERTENSION: ICD-10-CM

## 2020-05-18 PROCEDURE — 99214 OFFICE O/P EST MOD 30 MIN: CPT | Performed by: INTERNAL MEDICINE

## 2020-05-18 RX ORDER — DOXAZOSIN MESYLATE 4 MG/1
TABLET ORAL
Qty: 30 TABLET | Refills: 5 | Status: SHIPPED | OUTPATIENT
Start: 2020-05-18

## 2020-05-18 NOTE — PROGRESS NOTES
Chief Complaint   Patient presents with   • Follow-up     Telemedicine was provided via two-way interactive audiovisual telecommunication (Doxy) due to the COVID-19 outbreak in order to minimize risk of exposure.    Others in attendance:  None    Risks, benefits and alternative including in-person office visit have been explained to the patient, and the patient has consented to this mode of care.  The visit was conducted on a secure line.  All parties in the room, if any other, were identified and approved by the patient prior to the telemedicine visit.  No technical issues were experienced.  A level of care equivalent to in-person care was achieved.    History of Present Illness      The patient presents for a follow-up related to Type 2 Diabetes Mellitus and reports that she doesn't check her blood sugars at home. She denies hypoglycemic symptoms. The patient denies polyuria, polydypsia or polyphagia. She reports no symptoms of neuropathy. The patient takes her medication as prescribed. She is not taking insulin. The patient does check her feet daily at home. She denies chest pain, shortness of breath, orthopnea, paroxysmal nocturnal dyspnea, dyspnea on exertion, edema, palpitations or syncope.    The patient presents for a follow-up related to hypertension. The patient reports that she has had no headaches or blurred vision. She states that she is taking her medication as prescribed. She is not having medication side effects.    The patient presents for a follow-up related to hypothyroidism. She reports a good energy level. She reports no hair loss, heat intolerance, cold intolerance, diarrhea, constipation or sweats. She is taking her medication as prescribed.    The patient presents for a follow-up related to hyperlipidemia. She is not following a low fat diet. She reports that she is not exercising. She is taking atorvastatin. The patient is taking her medication as prescribed. She reports no medication side  effects, including muscle cramps, abdominal pain, headaches or weakness.    Review of Systems    CONSTITUTIONAL- Denies Unexplained Weight Loss, Fever, Chills, Sweats, Weakness or Malaise.    PULMONARY- Denies Wheezing, Sputum Production, Cough, Hemoptysis or Pleuritic Chest Pain.    CARDIOVASCULAR- Denies Claudication or Irregular Heart Beat.    Medications      Current Outpatient Medications:   •  acetaminophen (TYLENOL) 325 MG tablet, Take 2 tablets by mouth Every 4 (Four) Hours As Needed for Mild Pain ., Disp: , Rfl:   •  aspirin 81 MG tablet, Take 1 tablet by mouth daily., Disp: , Rfl:   •  atorvastatin (LIPITOR) 10 MG tablet, TAKE 1 TABLET BY MOUTH EVERY DAY, Disp: 90 tablet, Rfl: 1  •  doxazosin (CARDURA) 4 MG tablet, TAKE 1 TABLET BY MOUTH EVERY DAY EVERY NIGHT, Disp: 30 tablet, Rfl: 5  •  folic acid-vit B6-vit B12 (FOLGARD) 2.2-25-1 MG tablet tablet, Take 1 tablet by mouth Daily., Disp: , Rfl:   •  glucose blood test strip, Use as directed for blood glucose monitoring., Disp: 100 each, Rfl: 12  •  glucose monitor monitoring kit, Use as directed for blood glucose monitoring., Disp: 1 each, Rfl: 0  •  guaiFENesin (MUCINEX) 600 MG 12 hr tablet, Take 1 tablet by mouth Every 12 (Twelve) Hours., Disp: , Rfl:   •  insulin lispro (humaLOG) 100 UNIT/ML injection, Inject 0-7 Units under the skin into the appropriate area as directed 3 (Three) Times a Day With Meals., Disp: , Rfl: 12  •  ipratropium-albuterol (DUO-NEB) 0.5-2.5 mg/3 ml nebulizer, INHALE CONTENTS OF 1 VIAL EVERY 4 HOURS AS NEEDED FOR SHORTNESS OF AIR, Disp: 540 mL, Rfl: 0  •  Lancet Device [facility], Use as directed for blood glucose monitoring., Disp: 100 each, Rfl: 12  •  levothyroxine (SYNTHROID, LEVOTHROID) 100 MCG tablet, Take 1 tablet by mouth Daily., Disp: 90 tablet, Rfl: 1  •  linagliptin (TRADJENTA) 5 MG tablet tablet, Take 1 tablet by mouth Daily., Disp: 30 tablet, Rfl: 3  •  lisinopril (PRINIVIL,ZESTRIL) 5 MG tablet, TAKE 1 TABLET BY MOUTH EVERY  DAY, Disp: 30 tablet, Rfl: 0  •  melatonin 5 MG tablet tablet, Take 1 tablet by mouth Every Night., Disp: , Rfl:   •  metoprolol succinate XL (TOPROL-XL) 25 MG 24 hr tablet, TAKE 1 TABLET BY MOUTH EVERY DAY IN THE MORNING, Disp: 30 tablet, Rfl: 0  •  miconazole (MICOTIN) 2 % powder, Apply  topically to the appropriate area as directed Every 12 (Twelve) Hours., Disp: , Rfl:   •  Misc. Devices (COMMODE BEDSIDE) misc, 1 Device As Needed (toileting)., Disp: 1 each, Rfl: 0  •  Misc. Devices (WHEELCHAIR CUSHION) misc, 1 Units As Needed (pressure relief)., Disp: 1 each, Rfl: 0  •  Nystatin (MAGIC MOUTHWASH), Swish and spit 10 mL Every 4 (Four) Hours As Needed (thrush)., Disp: , Rfl:   •  ondansetron (ZOFRAN) 4 MG/2ML injection, Infuse 2 mL into a venous catheter Every 6 (Six) Hours As Needed for Nausea or Vomiting., Disp: , Rfl:   •  polyethylene glycol (MIRALAX) pack packet, Take 17 g by mouth 2 (Two) Times a Day., Disp: , Rfl:   •  sennosides-docusate (PERICOLACE) 8.6-50 MG per tablet, Take 2 tablets by mouth 2 (Two) Times a Day., Disp: , Rfl:   •  sertraline (ZOLOFT) 50 MG tablet, TAKE 1 TABLET BY MOUTH EVERY DAY, Disp: 90 tablet, Rfl: 1  •  terazosin (HYTRIN) 2 MG capsule, Take 1 capsule by mouth Every Night., Disp: , Rfl:   •  tobramycin (TOBREX) 0.3 % solution ophthalmic solution, Administer 2 drops to both eyes Every 6 (Six) Hours., Disp: 5 mL, Rfl: 0     Allergies    Allergies   Allergen Reactions   • Amoxicillin-Pot Clavulanate Other (See Comments)     HEADACHE - has tolerated ceftriaxone 12/2019   • Bactrim [Sulfamethoxazole-Trimethoprim] Other (See Comments)     .       Problem List    Patient Active Problem List   Diagnosis   • Anxiety   • Chronic obstructive pulmonary disease (CMS/HCC)   • Diabetes mellitus (CMS/HCC)   • Hyperlipidemia   • Hypertension   • Hypothyroidism   • Morbid obesity (CMS/HCC)   • Extreme obesity with alveolar hypoventilation (CMS/HCC)   • Renal insufficiency   • Sacral wound   • Acute  respiratory failure with hypoxia (CMS/HCC)   • Recurrent pneumonia   • Human metapneumovirus (hMPV) pneumonia   • Hypoxia   • Acute foot pain, left       Medications, Allergies, Problems List and Past History were reviewed and updated.    Physical Examination    /70   LMP  (LMP Unknown)     General: The patient is well developed, well nourished and obese.    Impression and Assessment    Type 2 Diabetes Mellitus without complication with insulin usage.    Essential Hypertension.    Hypothyroidism.    Hyperlipidemia.    Plan    Essential Hypertension Plan: The current plan was continued.    Hyperlipidemia Plan: The patient was instructed to exercise daily, eat a low fat diet and continue her medications.    Type 2 Diabetes Mellitus without complication with insulin usage Plan: The current plan was continued.    Hypothyroidism Plan: The current plan was continued.    Julissa was seen today for follow-up.    Diagnoses and all orders for this visit:    Essential hypertension  -     CBC & Differential; Future  -     Comprehensive Metabolic Panel; Future    Type 2 diabetes mellitus without complication, with long-term current use of insulin (CMS/HCC)  -     Comprehensive Metabolic Panel; Future  -     Hemoglobin A1c; Future    Hyperlipidemia, unspecified hyperlipidemia type  -     Comprehensive Metabolic Panel; Future  -     Lipid Panel; Future    Hypothyroidism, unspecified type  -     T4, Free; Future  -     TSH; Future        Return to Office    The patient was instructed to return for follow-up in 4 months.    The patient was instructed to return sooner if the condition changes, worsens, or does not resolve.

## 2020-05-21 ENCOUNTER — TELEPHONE (OUTPATIENT)
Dept: INTERNAL MEDICINE | Facility: CLINIC | Age: 70
End: 2020-05-21

## 2020-05-21 NOTE — TELEPHONE ENCOUNTER
AMERBLIE WITH [facility] STATES THAT PATIENT MISSED HER PHYSICAL THERAPY VISIT FOR THIS WEEK BECAUSE THE NORMAL PHYSICAL THERAPIST WAS NOT AVAILABLE AND SHE DID NOT WANT TO HAVE THERAPY WITH THE ASSISTANT AND WANTED TO WAIT UNTIL NEXT WEEK.      ANY QUESTIONS PLEASE CALL [facility] -[phone]

## 2020-06-03 RX ORDER — LISINOPRIL 5 MG/1
TABLET ORAL
Qty: 30 TABLET | Refills: 0 | Status: SHIPPED | OUTPATIENT
Start: 2020-06-03 | End: 2020-06-07

## 2020-06-03 RX ORDER — METOPROLOL SUCCINATE 25 MG/1
TABLET, EXTENDED RELEASE ORAL
Qty: 30 TABLET | Refills: 0 | Status: SHIPPED | OUTPATIENT
Start: 2020-06-03 | End: 2020-06-29

## 2020-06-07 RX ORDER — HYDROCHLOROTHIAZIDE 25 MG/1
TABLET ORAL
Qty: 90 TABLET | Refills: 1 | Status: SHIPPED | OUTPATIENT
Start: 2020-06-07

## 2020-06-07 RX ORDER — LISINOPRIL 5 MG/1
TABLET ORAL
Qty: 90 TABLET | Refills: 1 | Status: SHIPPED | OUTPATIENT
Start: 2020-06-07 | End: 2020-06-29

## 2020-06-29 RX ORDER — METOPROLOL SUCCINATE 25 MG/1
TABLET, EXTENDED RELEASE ORAL
Qty: 30 TABLET | Refills: 5 | Status: SHIPPED | OUTPATIENT
Start: 2020-06-29

## 2020-06-29 RX ORDER — LISINOPRIL 5 MG/1
TABLET ORAL
Qty: 30 TABLET | Refills: 5 | Status: SHIPPED | OUTPATIENT
Start: 2020-06-29

## 2020-07-16 ENCOUNTER — TELEPHONE (OUTPATIENT)
Dept: INTERNAL MEDICINE | Facility: CLINIC | Age: 70
End: 2020-07-16

## 2020-07-16 DIAGNOSIS — E78.5 HYPERLIPIDEMIA, UNSPECIFIED HYPERLIPIDEMIA TYPE: ICD-10-CM

## 2020-07-16 NOTE — TELEPHONE ENCOUNTER
Maritza with [facility] called and stated that they would like to change the plan of care for the patient and use dry gauze with alginate dressing.    Please advise

## 2020-07-16 NOTE — TELEPHONE ENCOUNTER
Attempted to call but [facility] is closed for the day.    Please call tomorrow morning and give order.  Thank you.

## 2020-07-20 NOTE — TELEPHONE ENCOUNTER
[facility] is closed for the day.  I am here tomorrow and will call in morning.

## 2020-07-21 RX ORDER — ATORVASTATIN CALCIUM 10 MG/1
10 TABLET, FILM COATED ORAL DAILY
Qty: 90 TABLET | Refills: 1 | Status: SHIPPED | OUTPATIENT
Start: 2020-07-21

## 2020-07-21 NOTE — TELEPHONE ENCOUNTER
S/W Tanisha with [facility].  Informed that Dr Cavanaugh said okay to change drsg regime to dry gauze with alginate dressing.Verb understanding and great apprec.  States pt also needs refill for Atorvastatin.  Expl we will send to [facility] on [address].  Verb apprec.  States she will let family know.

## 2021-01-01 ENCOUNTER — READMISSION MANAGEMENT (OUTPATIENT)
Dept: CALL CENTER | Facility: HOSPITAL | Age: 71
End: 2021-01-01

## 2021-01-01 ENCOUNTER — APPOINTMENT (OUTPATIENT)
Dept: GENERAL RADIOLOGY | Facility: HOSPITAL | Age: 71
End: 2021-01-01

## 2021-01-01 ENCOUNTER — APPOINTMENT (OUTPATIENT)
Dept: CT IMAGING | Facility: HOSPITAL | Age: 71
End: 2021-01-01

## 2021-01-01 ENCOUNTER — LAB (OUTPATIENT)
Dept: INTERNAL MEDICINE | Facility: CLINIC | Age: 71
End: 2021-01-01

## 2021-01-01 ENCOUNTER — HOSPITAL ENCOUNTER (INPATIENT)
Facility: HOSPITAL | Age: 71
LOS: 5 days | Discharge: HOME HEALTH | End: 2021-04-18
Attending: EMERGENCY MEDICINE | Admitting: INTERNAL MEDICINE

## 2021-01-01 ENCOUNTER — TRANSCRIBE ORDERS (OUTPATIENT)
Dept: DIABETES SERVICES | Facility: HOSPITAL | Age: 71
End: 2021-01-01

## 2021-01-01 VITALS
DIASTOLIC BLOOD PRESSURE: 75 MMHG | TEMPERATURE: 97.6 F | HEIGHT: 64 IN | SYSTOLIC BLOOD PRESSURE: 152 MMHG | BODY MASS INDEX: 47.21 KG/M2 | WEIGHT: 276.5 LBS | HEART RATE: 61 BPM | RESPIRATION RATE: 20 BRPM | OXYGEN SATURATION: 99 %

## 2021-01-01 DIAGNOSIS — E11.01 HHNC (HYPERGLYCEMIC HYPEROSMOLAR NONKETOTIC COMA) (HCC): ICD-10-CM

## 2021-01-01 DIAGNOSIS — J96.21 ACUTE ON CHRONIC RESPIRATORY FAILURE WITH HYPOXIA AND HYPERCAPNIA (HCC): ICD-10-CM

## 2021-01-01 DIAGNOSIS — R06.89 HYPERCARBIA: ICD-10-CM

## 2021-01-01 DIAGNOSIS — J96.22 ACUTE ON CHRONIC RESPIRATORY FAILURE WITH HYPOXIA AND HYPERCAPNIA (HCC): ICD-10-CM

## 2021-01-01 DIAGNOSIS — E66.01 MORBID OBESITY (HCC): ICD-10-CM

## 2021-01-01 DIAGNOSIS — R41.82 ALTERED MENTAL STATUS, UNSPECIFIED ALTERED MENTAL STATUS TYPE: Primary | ICD-10-CM

## 2021-01-01 DIAGNOSIS — E11.9 TYPE 2 DIABETES MELLITUS WITHOUT COMPLICATION, UNSPECIFIED WHETHER LONG TERM INSULIN USE (HCC): Primary | ICD-10-CM

## 2021-01-01 LAB
ALBUMIN SERPL-MCNC: 3.8 G/DL (ref 3.5–5.2)
ALBUMIN/GLOB SERPL: 1.1 G/DL
ALP SERPL-CCNC: 88 U/L (ref 39–117)
ALT SERPL W P-5'-P-CCNC: 13 U/L (ref 1–33)
AMPHET+METHAMPHET UR QL: NEGATIVE
AMPHETAMINES UR QL: NEGATIVE
ANION GAP SERPL CALCULATED.3IONS-SCNC: 10 MMOL/L (ref 5–15)
ANION GAP SERPL CALCULATED.3IONS-SCNC: 11 MMOL/L (ref 5–15)
ANION GAP SERPL CALCULATED.3IONS-SCNC: 6 MMOL/L (ref 5–15)
ANION GAP SERPL CALCULATED.3IONS-SCNC: 9 MMOL/L (ref 5–15)
ARTERIAL PATENCY WRIST A: ABNORMAL
ARTERIAL PATENCY WRIST A: ABNORMAL
AST SERPL-CCNC: 12 U/L (ref 1–32)
ATMOSPHERIC PRESS: ABNORMAL MM[HG]
ATMOSPHERIC PRESS: ABNORMAL MM[HG]
B-OH-BUTYR SERPL-SCNC: 1.03 MMOL/L (ref 0.02–0.27)
BACTERIA SPEC AEROBE CULT: NO GROWTH
BACTERIA SPEC AEROBE CULT: NORMAL
BACTERIA SPEC AEROBE CULT: NORMAL
BACTERIA UR QL AUTO: ABNORMAL /HPF
BARBITURATES UR QL SCN: NEGATIVE
BASE EXCESS BLDA CALC-SCNC: 12.7 MMOL/L (ref 0–2)
BASE EXCESS BLDA CALC-SCNC: 9.4 MMOL/L (ref 0–2)
BASOPHILS # BLD AUTO: 0.02 10*3/MM3 (ref 0–0.2)
BASOPHILS # BLD AUTO: 0.02 10*3/MM3 (ref 0–0.2)
BASOPHILS # BLD AUTO: 0.03 10*3/MM3 (ref 0–0.2)
BASOPHILS NFR BLD AUTO: 0.2 % (ref 0–1.5)
BASOPHILS NFR BLD AUTO: 0.3 % (ref 0–1.5)
BASOPHILS NFR BLD AUTO: 0.3 % (ref 0–1.5)
BDY SITE: ABNORMAL
BENZODIAZ UR QL SCN: NEGATIVE
BILIRUB SERPL-MCNC: 0.7 MG/DL (ref 0–1.2)
BILIRUB UR QL STRIP: NEGATIVE
BODY TEMPERATURE: 37 C
BODY TEMPERATURE: 37 C
BUN SERPL-MCNC: 22 MG/DL (ref 8–23)
BUN SERPL-MCNC: 25 MG/DL (ref 8–23)
BUN SERPL-MCNC: 26 MG/DL (ref 8–23)
BUN SERPL-MCNC: 27 MG/DL (ref 8–23)
BUN SERPL-MCNC: 27 MG/DL (ref 8–23)
BUN SERPL-MCNC: 31 MG/DL (ref 8–23)
BUN/CREAT SERPL: 21.9 (ref 7–25)
BUN/CREAT SERPL: 24.8 (ref 7–25)
BUN/CREAT SERPL: 25.2 (ref 7–25)
BUN/CREAT SERPL: 26.5 (ref 7–25)
BUN/CREAT SERPL: 28.9 (ref 7–25)
BUN/CREAT SERPL: 30.1 (ref 7–25)
BUPRENORPHINE SERPL-MCNC: NEGATIVE NG/ML
CALCIUM SPEC-SCNC: 10.2 MG/DL (ref 8.6–10.5)
CALCIUM SPEC-SCNC: 10.5 MG/DL (ref 8.6–10.5)
CALCIUM SPEC-SCNC: 9.4 MG/DL (ref 8.6–10.5)
CALCIUM SPEC-SCNC: 9.5 MG/DL (ref 8.6–10.5)
CALCIUM SPEC-SCNC: 9.5 MG/DL (ref 8.6–10.5)
CALCIUM SPEC-SCNC: 9.7 MG/DL (ref 8.6–10.5)
CANNABINOIDS SERPL QL: NEGATIVE
CHLORIDE SERPL-SCNC: 88 MMOL/L (ref 98–107)
CHLORIDE SERPL-SCNC: 94 MMOL/L (ref 98–107)
CHLORIDE SERPL-SCNC: 96 MMOL/L (ref 98–107)
CHLORIDE SERPL-SCNC: 97 MMOL/L (ref 98–107)
CK SERPL-CCNC: 25 U/L (ref 20–180)
CLARITY UR: CLEAR
CO2 BLDA-SCNC: 39.4 MMOL/L (ref 22–33)
CO2 BLDA-SCNC: 41.8 MMOL/L (ref 22–33)
CO2 SERPL-SCNC: 32 MMOL/L (ref 22–29)
CO2 SERPL-SCNC: 33 MMOL/L (ref 22–29)
CO2 SERPL-SCNC: 34 MMOL/L (ref 22–29)
CO2 SERPL-SCNC: 34 MMOL/L (ref 22–29)
CO2 SERPL-SCNC: 36 MMOL/L (ref 22–29)
CO2 SERPL-SCNC: 38 MMOL/L (ref 22–29)
COCAINE UR QL: NEGATIVE
COHGB MFR BLD: 1.1 % (ref 0–2)
COHGB MFR BLD: 1.9 % (ref 0–2)
COLOR UR: YELLOW
CORTIS SERPL-MCNC: 56.81 MCG/DL
CREAT SERPL-MCNC: 0.83 MG/DL (ref 0.57–1)
CREAT SERPL-MCNC: 0.9 MG/DL (ref 0.57–1)
CREAT SERPL-MCNC: 1.03 MG/DL (ref 0.57–1)
CREAT SERPL-MCNC: 1.07 MG/DL (ref 0.57–1)
CREAT SERPL-MCNC: 1.09 MG/DL (ref 0.57–1)
CREAT SERPL-MCNC: 1.14 MG/DL (ref 0.57–1)
D-LACTATE SERPL-SCNC: 2 MMOL/L (ref 0.5–2)
DEPRECATED RDW RBC AUTO: 45.8 FL (ref 37–54)
DEPRECATED RDW RBC AUTO: 47.4 FL (ref 37–54)
DEPRECATED RDW RBC AUTO: 47.7 FL (ref 37–54)
DEPRECATED RDW RBC AUTO: 48.2 FL (ref 37–54)
EOSINOPHIL # BLD AUTO: 0 10*3/MM3 (ref 0–0.4)
EOSINOPHIL # BLD AUTO: 0.03 10*3/MM3 (ref 0–0.4)
EOSINOPHIL # BLD AUTO: 0.04 10*3/MM3 (ref 0–0.4)
EOSINOPHIL NFR BLD AUTO: 0 % (ref 0.3–6.2)
EOSINOPHIL NFR BLD AUTO: 0.3 % (ref 0.3–6.2)
EOSINOPHIL NFR BLD AUTO: 0.4 % (ref 0.3–6.2)
EPAP: 0
ERYTHROCYTE [DISTWIDTH] IN BLOOD BY AUTOMATED COUNT: 12.5 % (ref 12.3–15.4)
ERYTHROCYTE [DISTWIDTH] IN BLOOD BY AUTOMATED COUNT: 12.7 % (ref 12.3–15.4)
ERYTHROCYTE [DISTWIDTH] IN BLOOD BY AUTOMATED COUNT: 12.8 % (ref 12.3–15.4)
ERYTHROCYTE [DISTWIDTH] IN BLOOD BY AUTOMATED COUNT: 12.9 % (ref 12.3–15.4)
ETHANOL BLD-MCNC: <10 MG/DL (ref 0–10)
FOLATE SERPL-MCNC: 4.69 NG/ML (ref 4.78–24.2)
GFR SERPL CREATININE-BSD FRML MDRD: 47 ML/MIN/1.73
GFR SERPL CREATININE-BSD FRML MDRD: 49 ML/MIN/1.73
GFR SERPL CREATININE-BSD FRML MDRD: 50 ML/MIN/1.73
GFR SERPL CREATININE-BSD FRML MDRD: 53 ML/MIN/1.73
GFR SERPL CREATININE-BSD FRML MDRD: 62 ML/MIN/1.73
GFR SERPL CREATININE-BSD FRML MDRD: 68 ML/MIN/1.73
GLOBULIN UR ELPH-MCNC: 3.5 GM/DL
GLUCOSE BLDC GLUCOMTR-MCNC: 130 MG/DL (ref 70–130)
GLUCOSE BLDC GLUCOMTR-MCNC: 132 MG/DL (ref 70–130)
GLUCOSE BLDC GLUCOMTR-MCNC: 143 MG/DL (ref 70–130)
GLUCOSE BLDC GLUCOMTR-MCNC: 143 MG/DL (ref 70–130)
GLUCOSE BLDC GLUCOMTR-MCNC: 144 MG/DL (ref 70–130)
GLUCOSE BLDC GLUCOMTR-MCNC: 159 MG/DL (ref 70–130)
GLUCOSE BLDC GLUCOMTR-MCNC: 166 MG/DL (ref 70–130)
GLUCOSE BLDC GLUCOMTR-MCNC: 168 MG/DL (ref 70–130)
GLUCOSE BLDC GLUCOMTR-MCNC: 177 MG/DL (ref 70–130)
GLUCOSE BLDC GLUCOMTR-MCNC: 192 MG/DL (ref 70–130)
GLUCOSE BLDC GLUCOMTR-MCNC: 203 MG/DL (ref 70–130)
GLUCOSE BLDC GLUCOMTR-MCNC: 240 MG/DL (ref 70–130)
GLUCOSE BLDC GLUCOMTR-MCNC: 244 MG/DL (ref 70–130)
GLUCOSE BLDC GLUCOMTR-MCNC: 249 MG/DL (ref 70–130)
GLUCOSE BLDC GLUCOMTR-MCNC: 251 MG/DL (ref 70–130)
GLUCOSE BLDC GLUCOMTR-MCNC: 263 MG/DL (ref 70–130)
GLUCOSE BLDC GLUCOMTR-MCNC: 271 MG/DL (ref 70–130)
GLUCOSE BLDC GLUCOMTR-MCNC: 271 MG/DL (ref 70–130)
GLUCOSE BLDC GLUCOMTR-MCNC: 272 MG/DL (ref 70–130)
GLUCOSE BLDC GLUCOMTR-MCNC: 273 MG/DL (ref 70–130)
GLUCOSE BLDC GLUCOMTR-MCNC: 285 MG/DL (ref 70–130)
GLUCOSE BLDC GLUCOMTR-MCNC: 293 MG/DL (ref 70–130)
GLUCOSE BLDC GLUCOMTR-MCNC: 304 MG/DL (ref 70–130)
GLUCOSE BLDC GLUCOMTR-MCNC: 309 MG/DL (ref 70–130)
GLUCOSE BLDC GLUCOMTR-MCNC: 309 MG/DL (ref 70–130)
GLUCOSE BLDC GLUCOMTR-MCNC: 316 MG/DL (ref 70–130)
GLUCOSE BLDC GLUCOMTR-MCNC: 319 MG/DL (ref 70–130)
GLUCOSE BLDC GLUCOMTR-MCNC: 334 MG/DL (ref 70–130)
GLUCOSE BLDC GLUCOMTR-MCNC: 337 MG/DL (ref 70–130)
GLUCOSE BLDC GLUCOMTR-MCNC: 355 MG/DL (ref 70–130)
GLUCOSE BLDC GLUCOMTR-MCNC: 365 MG/DL (ref 70–130)
GLUCOSE BLDC GLUCOMTR-MCNC: 395 MG/DL (ref 70–130)
GLUCOSE BLDC GLUCOMTR-MCNC: 484 MG/DL (ref 70–130)
GLUCOSE BLDC GLUCOMTR-MCNC: 486 MG/DL (ref 70–130)
GLUCOSE BLDC GLUCOMTR-MCNC: 488 MG/DL (ref 70–130)
GLUCOSE SERPL-MCNC: 176 MG/DL (ref 65–99)
GLUCOSE SERPL-MCNC: 238 MG/DL (ref 65–99)
GLUCOSE SERPL-MCNC: 240 MG/DL (ref 65–99)
GLUCOSE SERPL-MCNC: 254 MG/DL (ref 65–99)
GLUCOSE SERPL-MCNC: 265 MG/DL (ref 65–99)
GLUCOSE SERPL-MCNC: 546 MG/DL (ref 65–99)
GLUCOSE UR STRIP-MCNC: ABNORMAL MG/DL
HBA1C MFR BLD: 9.5 % (ref 4.8–5.6)
HCO3 BLDA-SCNC: 37.3 MMOL/L (ref 20–26)
HCO3 BLDA-SCNC: 39.9 MMOL/L (ref 20–26)
HCT VFR BLD AUTO: 32 % (ref 34–46.6)
HCT VFR BLD AUTO: 35.1 % (ref 34–46.6)
HCT VFR BLD AUTO: 37.6 % (ref 34–46.6)
HCT VFR BLD AUTO: 38.9 % (ref 34–46.6)
HCT VFR BLD CALC: 33.6 %
HCT VFR BLD CALC: 34.8 %
HGB BLD-MCNC: 10.1 G/DL (ref 12–15.9)
HGB BLD-MCNC: 11 G/DL (ref 12–15.9)
HGB BLD-MCNC: 11.6 G/DL (ref 12–15.9)
HGB BLD-MCNC: 12.6 G/DL (ref 12–15.9)
HGB BLDA-MCNC: 11 G/DL (ref 14–18)
HGB BLDA-MCNC: 11.4 G/DL (ref 14–18)
HGB UR QL STRIP.AUTO: ABNORMAL
HOLD SPECIMEN: NORMAL
HYALINE CASTS UR QL AUTO: ABNORMAL /LPF
IMM GRANULOCYTES # BLD AUTO: 0.04 10*3/MM3 (ref 0–0.05)
IMM GRANULOCYTES # BLD AUTO: 0.06 10*3/MM3 (ref 0–0.05)
IMM GRANULOCYTES # BLD AUTO: 0.08 10*3/MM3 (ref 0–0.05)
IMM GRANULOCYTES NFR BLD AUTO: 0.4 % (ref 0–0.5)
IMM GRANULOCYTES NFR BLD AUTO: 0.5 % (ref 0–0.5)
IMM GRANULOCYTES NFR BLD AUTO: 1.1 % (ref 0–0.5)
INHALED O2 CONCENTRATION: 35 %
INHALED O2 CONCENTRATION: 36 %
IPAP: 0
KETONES UR QL STRIP: ABNORMAL
LEUKOCYTE ESTERASE UR QL STRIP.AUTO: NEGATIVE
LYMPHOCYTES # BLD AUTO: 0.51 10*3/MM3 (ref 0.7–3.1)
LYMPHOCYTES # BLD AUTO: 0.97 10*3/MM3 (ref 0.7–3.1)
LYMPHOCYTES # BLD AUTO: 1.02 10*3/MM3 (ref 0.7–3.1)
LYMPHOCYTES NFR BLD AUTO: 11 % (ref 19.6–45.3)
LYMPHOCYTES NFR BLD AUTO: 7 % (ref 19.6–45.3)
LYMPHOCYTES NFR BLD AUTO: 8.3 % (ref 19.6–45.3)
Lab: ABNORMAL
MACROCYTES BLD QL SMEAR: NORMAL
MAGNESIUM SERPL-MCNC: 1.3 MG/DL (ref 1.6–2.4)
MAGNESIUM SERPL-MCNC: 1.8 MG/DL (ref 1.6–2.4)
MAGNESIUM SERPL-MCNC: 1.9 MG/DL (ref 1.6–2.4)
MAGNESIUM SERPL-MCNC: 2.2 MG/DL (ref 1.6–2.4)
MAGNESIUM SERPL-MCNC: 2.3 MG/DL (ref 1.6–2.4)
MCH RBC QN AUTO: 31.6 PG (ref 26.6–33)
MCH RBC QN AUTO: 32 PG (ref 26.6–33)
MCH RBC QN AUTO: 32.2 PG (ref 26.6–33)
MCH RBC QN AUTO: 32.6 PG (ref 26.6–33)
MCHC RBC AUTO-ENTMCNC: 30.9 G/DL (ref 31.5–35.7)
MCHC RBC AUTO-ENTMCNC: 31.3 G/DL (ref 31.5–35.7)
MCHC RBC AUTO-ENTMCNC: 31.6 G/DL (ref 31.5–35.7)
MCHC RBC AUTO-ENTMCNC: 32.4 G/DL (ref 31.5–35.7)
MCV RBC AUTO: 102 FL (ref 79–97)
MCV RBC AUTO: 102.5 FL (ref 79–97)
MCV RBC AUTO: 103.2 FL (ref 79–97)
MCV RBC AUTO: 99.5 FL (ref 79–97)
METHADONE UR QL SCN: NEGATIVE
METHGB BLD QL: 0.3 % (ref 0–1.5)
METHGB BLD QL: ABNORMAL
MODALITY: ABNORMAL
MODALITY: ABNORMAL
MONOCYTES # BLD AUTO: 0.1 10*3/MM3 (ref 0.1–0.9)
MONOCYTES # BLD AUTO: 0.44 10*3/MM3 (ref 0.1–0.9)
MONOCYTES # BLD AUTO: 0.61 10*3/MM3 (ref 0.1–0.9)
MONOCYTES NFR BLD AUTO: 1.4 % (ref 5–12)
MONOCYTES NFR BLD AUTO: 4.7 % (ref 5–12)
MONOCYTES NFR BLD AUTO: 5.2 % (ref 5–12)
NEUTROPHILS NFR BLD AUTO: 10.05 10*3/MM3 (ref 1.7–7)
NEUTROPHILS NFR BLD AUTO: 6.61 10*3/MM3 (ref 1.7–7)
NEUTROPHILS NFR BLD AUTO: 7.73 10*3/MM3 (ref 1.7–7)
NEUTROPHILS NFR BLD AUTO: 83.3 % (ref 42.7–76)
NEUTROPHILS NFR BLD AUTO: 85.4 % (ref 42.7–76)
NEUTROPHILS NFR BLD AUTO: 90.2 % (ref 42.7–76)
NITRITE UR QL STRIP: NEGATIVE
NOTE: ABNORMAL
NOTE: ABNORMAL
NOTIFIED BY: ABNORMAL
NOTIFIED WHO: ABNORMAL
NRBC BLD AUTO-RTO: 0 /100 WBC (ref 0–0.2)
NT-PROBNP SERPL-MCNC: 764.9 PG/ML (ref 0–900)
OPIATES UR QL: NEGATIVE
OXYCODONE UR QL SCN: NEGATIVE
OXYHGB MFR BLDV: 96.8 % (ref 94–99)
OXYHGB MFR BLDV: 98 % (ref 94–99)
PAW @ PEAK INSP FLOW SETTING VENT: 0 CMH2O
PCO2 BLDA: 64.5 MM HG (ref 35–45)
PCO2 BLDA: 68 MM HG (ref 35–45)
PCO2 TEMP ADJ BLD: 64.5 MM HG (ref 35–45)
PCO2 TEMP ADJ BLD: 68 MM HG (ref 35–45)
PCP UR QL SCN: NEGATIVE
PH BLDA: 7.35 PH UNITS (ref 7.35–7.45)
PH BLDA: 7.4 PH UNITS (ref 7.35–7.45)
PH UR STRIP.AUTO: <=5 [PH] (ref 5–8)
PH, TEMP CORRECTED: 7.35 PH UNITS
PH, TEMP CORRECTED: 7.4 PH UNITS
PHOSPHATE SERPL-MCNC: 1.6 MG/DL (ref 2.5–4.5)
PHOSPHATE SERPL-MCNC: 2.4 MG/DL (ref 2.5–4.5)
PHOSPHATE SERPL-MCNC: 2.6 MG/DL (ref 2.5–4.5)
PHOSPHATE SERPL-MCNC: 3.4 MG/DL (ref 2.5–4.5)
PLAT MORPH BLD: NORMAL
PLATELET # BLD AUTO: 158 10*3/MM3 (ref 140–450)
PLATELET # BLD AUTO: 191 10*3/MM3 (ref 140–450)
PLATELET # BLD AUTO: 198 10*3/MM3 (ref 140–450)
PLATELET # BLD AUTO: 204 10*3/MM3 (ref 140–450)
PMV BLD AUTO: 10.3 FL (ref 6–12)
PMV BLD AUTO: 10.4 FL (ref 6–12)
PMV BLD AUTO: 10.8 FL (ref 6–12)
PMV BLD AUTO: 10.9 FL (ref 6–12)
PO2 BLDA: 124 MM HG (ref 83–108)
PO2 BLDA: 84.2 MM HG (ref 83–108)
PO2 TEMP ADJ BLD: 124 MM HG (ref 83–108)
PO2 TEMP ADJ BLD: 84.2 MM HG (ref 83–108)
POTASSIUM SERPL-SCNC: 3.6 MMOL/L (ref 3.5–5.2)
POTASSIUM SERPL-SCNC: 3.7 MMOL/L (ref 3.5–5.2)
POTASSIUM SERPL-SCNC: 4.1 MMOL/L (ref 3.5–5.2)
POTASSIUM SERPL-SCNC: 4.3 MMOL/L (ref 3.5–5.2)
POTASSIUM SERPL-SCNC: 4.5 MMOL/L (ref 3.5–5.2)
POTASSIUM SERPL-SCNC: 5.2 MMOL/L (ref 3.5–5.2)
PROCALCITONIN SERPL-MCNC: 0.16 NG/ML (ref 0–0.25)
PROPOXYPH UR QL: NEGATIVE
PROT SERPL-MCNC: 7.3 G/DL (ref 6–8.5)
PROT UR QL STRIP: ABNORMAL
QT INTERVAL: 364 MS
QTC INTERVAL: 445 MS
RBC # BLD AUTO: 3.1 10*6/MM3 (ref 3.77–5.28)
RBC # BLD AUTO: 3.44 10*6/MM3 (ref 3.77–5.28)
RBC # BLD AUTO: 3.67 10*6/MM3 (ref 3.77–5.28)
RBC # BLD AUTO: 3.91 10*6/MM3 (ref 3.77–5.28)
RBC # UR: ABNORMAL /HPF
REF LAB TEST METHOD: ABNORMAL
SARS-COV-2 RNA RESP QL NAA+PROBE: NOT DETECTED
SODIUM SERPL-SCNC: 132 MMOL/L (ref 136–145)
SODIUM SERPL-SCNC: 137 MMOL/L (ref 136–145)
SODIUM SERPL-SCNC: 137 MMOL/L (ref 136–145)
SODIUM SERPL-SCNC: 138 MMOL/L (ref 136–145)
SODIUM SERPL-SCNC: 138 MMOL/L (ref 136–145)
SODIUM SERPL-SCNC: 140 MMOL/L (ref 136–145)
SP GR UR STRIP: 1.03 (ref 1–1.03)
SQUAMOUS #/AREA URNS HPF: ABNORMAL /HPF
T4 FREE SERPL-MCNC: 1.42 NG/DL (ref 0.93–1.7)
TOTAL RATE: 0 BREATHS/MINUTE
TRICYCLICS UR QL SCN: NEGATIVE
TROPONIN T SERPL-MCNC: <0.01 NG/ML (ref 0–0.03)
TSH SERPL DL<=0.05 MIU/L-ACNC: 3.36 UIU/ML (ref 0.27–4.2)
UROBILINOGEN UR QL STRIP: ABNORMAL
VENTILATOR MODE: ABNORMAL
VIT B12 BLD-MCNC: 638 PG/ML (ref 211–946)
WBC # BLD AUTO: 11.75 10*3/MM3 (ref 3.4–10.8)
WBC # BLD AUTO: 6.71 10*3/MM3 (ref 3.4–10.8)
WBC # BLD AUTO: 7.32 10*3/MM3 (ref 3.4–10.8)
WBC # BLD AUTO: 9.29 10*3/MM3 (ref 3.4–10.8)
WBC MORPH BLD: NORMAL
WBC UR QL AUTO: ABNORMAL /HPF
WHOLE BLOOD HOLD SPECIMEN: NORMAL
WHOLE BLOOD HOLD SPECIMEN: NORMAL

## 2021-01-01 PROCEDURE — 36600 WITHDRAWAL OF ARTERIAL BLOOD: CPT

## 2021-01-01 PROCEDURE — 82805 BLOOD GASES W/O2 SATURATION: CPT

## 2021-01-01 PROCEDURE — 84484 ASSAY OF TROPONIN QUANT: CPT | Performed by: EMERGENCY MEDICINE

## 2021-01-01 PROCEDURE — 87086 URINE CULTURE/COLONY COUNT: CPT | Performed by: INTERNAL MEDICINE

## 2021-01-01 PROCEDURE — 80048 BASIC METABOLIC PNL TOTAL CA: CPT | Performed by: NURSE PRACTITIONER

## 2021-01-01 PROCEDURE — 63710000001 INSULIN DETEMIR PER 5 UNITS: Performed by: INTERNAL MEDICINE

## 2021-01-01 PROCEDURE — 85025 COMPLETE CBC W/AUTO DIFF WBC: CPT | Performed by: INTERNAL MEDICINE

## 2021-01-01 PROCEDURE — 85025 COMPLETE CBC W/AUTO DIFF WBC: CPT | Performed by: NURSE PRACTITIONER

## 2021-01-01 PROCEDURE — 82550 ASSAY OF CK (CPK): CPT | Performed by: EMERGENCY MEDICINE

## 2021-01-01 PROCEDURE — 82010 KETONE BODYS QUAN: CPT | Performed by: EMERGENCY MEDICINE

## 2021-01-01 PROCEDURE — 80048 BASIC METABOLIC PNL TOTAL CA: CPT | Performed by: INTERNAL MEDICINE

## 2021-01-01 PROCEDURE — 84439 ASSAY OF FREE THYROXINE: CPT | Performed by: EMERGENCY MEDICINE

## 2021-01-01 PROCEDURE — 99232 SBSQ HOSP IP/OBS MODERATE 35: CPT | Performed by: INTERNAL MEDICINE

## 2021-01-01 PROCEDURE — 71250 CT THORAX DX C-: CPT

## 2021-01-01 PROCEDURE — 82375 ASSAY CARBOXYHB QUANT: CPT

## 2021-01-01 PROCEDURE — 83036 HEMOGLOBIN GLYCOSYLATED A1C: CPT | Performed by: INTERNAL MEDICINE

## 2021-01-01 PROCEDURE — 97116 GAIT TRAINING THERAPY: CPT

## 2021-01-01 PROCEDURE — 94799 UNLISTED PULMONARY SVC/PX: CPT

## 2021-01-01 PROCEDURE — 85027 COMPLETE CBC AUTOMATED: CPT | Performed by: INTERNAL MEDICINE

## 2021-01-01 PROCEDURE — 82533 TOTAL CORTISOL: CPT | Performed by: NURSE PRACTITIONER

## 2021-01-01 PROCEDURE — 83735 ASSAY OF MAGNESIUM: CPT | Performed by: INTERNAL MEDICINE

## 2021-01-01 PROCEDURE — 63710000001 INSULIN LISPRO (HUMAN) PER 5 UNITS: Performed by: INTERNAL MEDICINE

## 2021-01-01 PROCEDURE — 99239 HOSP IP/OBS DSCHRG MGMT >30: CPT | Performed by: INTERNAL MEDICINE

## 2021-01-01 PROCEDURE — 97162 PT EVAL MOD COMPLEX 30 MIN: CPT

## 2021-01-01 PROCEDURE — 94660 CPAP INITIATION&MGMT: CPT

## 2021-01-01 PROCEDURE — 93005 ELECTROCARDIOGRAM TRACING: CPT | Performed by: EMERGENCY MEDICINE

## 2021-01-01 PROCEDURE — 97530 THERAPEUTIC ACTIVITIES: CPT

## 2021-01-01 PROCEDURE — 85007 BL SMEAR W/DIFF WBC COUNT: CPT | Performed by: INTERNAL MEDICINE

## 2021-01-01 PROCEDURE — 25010000002 VANCOMYCIN 10 G RECONSTITUTED SOLUTION: Performed by: EMERGENCY MEDICINE

## 2021-01-01 PROCEDURE — 83735 ASSAY OF MAGNESIUM: CPT | Performed by: EMERGENCY MEDICINE

## 2021-01-01 PROCEDURE — 25010000003 MAGNESIUM SULFATE 4 GM/100ML SOLUTION: Performed by: INTERNAL MEDICINE

## 2021-01-01 PROCEDURE — 25010000002 FUROSEMIDE PER 20 MG: Performed by: INTERNAL MEDICINE

## 2021-01-01 PROCEDURE — 99233 SBSQ HOSP IP/OBS HIGH 50: CPT | Performed by: INTERNAL MEDICINE

## 2021-01-01 PROCEDURE — 97110 THERAPEUTIC EXERCISES: CPT

## 2021-01-01 PROCEDURE — 82962 GLUCOSE BLOOD TEST: CPT

## 2021-01-01 PROCEDURE — 63710000001 INSULIN REGULAR HUMAN PER 5 UNITS: Performed by: EMERGENCY MEDICINE

## 2021-01-01 PROCEDURE — P9612 CATHETERIZE FOR URINE SPEC: HCPCS

## 2021-01-01 PROCEDURE — 82077 ASSAY SPEC XCP UR&BREATH IA: CPT | Performed by: EMERGENCY MEDICINE

## 2021-01-01 PROCEDURE — 80053 COMPREHEN METABOLIC PANEL: CPT | Performed by: EMERGENCY MEDICINE

## 2021-01-01 PROCEDURE — 83050 HGB METHEMOGLOBIN QUAN: CPT

## 2021-01-01 PROCEDURE — 99285 EMERGENCY DEPT VISIT HI MDM: CPT

## 2021-01-01 PROCEDURE — 83735 ASSAY OF MAGNESIUM: CPT | Performed by: NURSE PRACTITIONER

## 2021-01-01 PROCEDURE — 83880 ASSAY OF NATRIURETIC PEPTIDE: CPT | Performed by: EMERGENCY MEDICINE

## 2021-01-01 PROCEDURE — 25010000002 NALOXONE PER 1 MG: Performed by: EMERGENCY MEDICINE

## 2021-01-01 PROCEDURE — 84443 ASSAY THYROID STIM HORMONE: CPT | Performed by: EMERGENCY MEDICINE

## 2021-01-01 PROCEDURE — 84100 ASSAY OF PHOSPHORUS: CPT | Performed by: INTERNAL MEDICINE

## 2021-01-01 PROCEDURE — 80306 DRUG TEST PRSMV INSTRMNT: CPT | Performed by: EMERGENCY MEDICINE

## 2021-01-01 PROCEDURE — 70450 CT HEAD/BRAIN W/O DYE: CPT

## 2021-01-01 PROCEDURE — 83605 ASSAY OF LACTIC ACID: CPT | Performed by: EMERGENCY MEDICINE

## 2021-01-01 PROCEDURE — U0003 INFECTIOUS AGENT DETECTION BY NUCLEIC ACID (DNA OR RNA); SEVERE ACUTE RESPIRATORY SYNDROME CORONAVIRUS 2 (SARS-COV-2) (CORONAVIRUS DISEASE [COVID-19]), AMPLIFIED PROBE TECHNIQUE, MAKING USE OF HIGH THROUGHPUT TECHNOLOGIES AS DESCRIBED BY CMS-2020-01-R: HCPCS | Performed by: EMERGENCY MEDICINE

## 2021-01-01 PROCEDURE — 71045 X-RAY EXAM CHEST 1 VIEW: CPT

## 2021-01-01 PROCEDURE — 25010000002 MAGNESIUM SULFATE 2 GM/50ML SOLUTION: Performed by: NURSE PRACTITIONER

## 2021-01-01 PROCEDURE — 84100 ASSAY OF PHOSPHORUS: CPT | Performed by: NURSE PRACTITIONER

## 2021-01-01 PROCEDURE — 81001 URINALYSIS AUTO W/SCOPE: CPT | Performed by: INTERNAL MEDICINE

## 2021-01-01 PROCEDURE — 25010000002 CEFTRIAXONE PER 250 MG: Performed by: INTERNAL MEDICINE

## 2021-01-01 PROCEDURE — 99291 CRITICAL CARE FIRST HOUR: CPT | Performed by: INTERNAL MEDICINE

## 2021-01-01 PROCEDURE — 97166 OT EVAL MOD COMPLEX 45 MIN: CPT

## 2021-01-01 PROCEDURE — 82746 ASSAY OF FOLIC ACID SERUM: CPT | Performed by: INTERNAL MEDICINE

## 2021-01-01 PROCEDURE — 82607 VITAMIN B-12: CPT | Performed by: INTERNAL MEDICINE

## 2021-01-01 PROCEDURE — 87040 BLOOD CULTURE FOR BACTERIA: CPT | Performed by: EMERGENCY MEDICINE

## 2021-01-01 PROCEDURE — 84145 PROCALCITONIN (PCT): CPT | Performed by: EMERGENCY MEDICINE

## 2021-01-01 PROCEDURE — 85025 COMPLETE CBC W/AUTO DIFF WBC: CPT | Performed by: EMERGENCY MEDICINE

## 2021-01-01 RX ORDER — MAGNESIUM SULFATE HEPTAHYDRATE 40 MG/ML
2 INJECTION, SOLUTION INTRAVENOUS AS NEEDED
Status: DISCONTINUED | OUTPATIENT
Start: 2021-01-01 | End: 2021-01-01 | Stop reason: HOSPADM

## 2021-01-01 RX ORDER — MAGNESIUM SULFATE HEPTAHYDRATE 40 MG/ML
4 INJECTION, SOLUTION INTRAVENOUS AS NEEDED
Status: DISCONTINUED | OUTPATIENT
Start: 2021-01-01 | End: 2021-01-01 | Stop reason: HOSPADM

## 2021-01-01 RX ORDER — DEXTROSE MONOHYDRATE 25 G/50ML
25-50 INJECTION, SOLUTION INTRAVENOUS
Status: DISCONTINUED | OUTPATIENT
Start: 2021-01-01 | End: 2021-01-01

## 2021-01-01 RX ORDER — HEPARIN SODIUM 5000 [USP'U]/ML
5000 INJECTION, SOLUTION INTRAVENOUS; SUBCUTANEOUS EVERY 12 HOURS SCHEDULED
Status: DISCONTINUED | OUTPATIENT
Start: 2021-01-01 | End: 2021-01-01

## 2021-01-01 RX ORDER — NALOXONE HCL 0.4 MG/ML
0.2 VIAL (ML) INJECTION ONCE
Status: COMPLETED | OUTPATIENT
Start: 2021-01-01 | End: 2021-01-01

## 2021-01-01 RX ORDER — PANTOPRAZOLE SODIUM 40 MG/10ML
40 INJECTION, POWDER, LYOPHILIZED, FOR SOLUTION INTRAVENOUS DAILY
Status: DISCONTINUED | OUTPATIENT
Start: 2021-01-01 | End: 2021-01-01 | Stop reason: HOSPADM

## 2021-01-01 RX ORDER — FUROSEMIDE 10 MG/ML
40 INJECTION INTRAMUSCULAR; INTRAVENOUS ONCE
Status: COMPLETED | OUTPATIENT
Start: 2021-01-01 | End: 2021-01-01

## 2021-01-01 RX ORDER — SODIUM CHLORIDE 0.9 % (FLUSH) 0.9 %
10 SYRINGE (ML) INJECTION AS NEEDED
Status: DISCONTINUED | OUTPATIENT
Start: 2021-01-01 | End: 2021-01-01 | Stop reason: HOSPADM

## 2021-01-01 RX ORDER — SODIUM CHLORIDE 9 MG/ML
30 INJECTION, SOLUTION INTRAVENOUS CONTINUOUS PRN
Status: DISCONTINUED | OUTPATIENT
Start: 2021-01-01 | End: 2021-01-01

## 2021-01-01 RX ORDER — INSULIN DETEMIR 100 [IU]/ML
20 INJECTION, SOLUTION SUBCUTANEOUS NIGHTLY
Qty: 29.8 ML | Refills: 12 | Status: SHIPPED | OUTPATIENT
Start: 2021-01-01 | End: 2021-01-01 | Stop reason: HOSPADM

## 2021-01-01 RX ORDER — ASPIRIN 81 MG/1
81 TABLET ORAL DAILY
Status: DISCONTINUED | OUTPATIENT
Start: 2021-01-01 | End: 2021-01-01 | Stop reason: HOSPADM

## 2021-01-01 RX ORDER — NICOTINE POLACRILEX 4 MG
15 LOZENGE BUCCAL
Status: DISCONTINUED | OUTPATIENT
Start: 2021-01-01 | End: 2021-01-01 | Stop reason: HOSPADM

## 2021-01-01 RX ORDER — LEVOTHYROXINE SODIUM 0.1 MG/1
100 TABLET ORAL
Status: DISCONTINUED | OUTPATIENT
Start: 2021-01-01 | End: 2021-01-01 | Stop reason: HOSPADM

## 2021-01-01 RX ORDER — MAGNESIUM SULFATE HEPTAHYDRATE 40 MG/ML
6 INJECTION, SOLUTION INTRAVENOUS ONCE
Status: COMPLETED | OUTPATIENT
Start: 2021-01-01 | End: 2021-01-01

## 2021-01-01 RX ORDER — DEXTROSE MONOHYDRATE 25 G/50ML
25 INJECTION, SOLUTION INTRAVENOUS
Status: DISCONTINUED | OUTPATIENT
Start: 2021-01-01 | End: 2021-01-01 | Stop reason: HOSPADM

## 2021-01-01 RX ADMIN — INSULIN HUMAN 10 UNITS: 100 INJECTION, SOLUTION PARENTERAL at 18:47

## 2021-01-01 RX ADMIN — INSULIN LISPRO 5 UNITS: 100 INJECTION, SOLUTION INTRAVENOUS; SUBCUTANEOUS at 21:38

## 2021-01-01 RX ADMIN — INSULIN LISPRO 4 UNITS: 100 INJECTION, SOLUTION INTRAVENOUS; SUBCUTANEOUS at 12:07

## 2021-01-01 RX ADMIN — SODIUM CHLORIDE, PRESERVATIVE FREE 10 ML: 5 INJECTION INTRAVENOUS at 08:27

## 2021-01-01 RX ADMIN — INSULIN LISPRO 12 UNITS: 100 INJECTION, SOLUTION INTRAVENOUS; SUBCUTANEOUS at 16:40

## 2021-01-01 RX ADMIN — LEVOTHYROXINE SODIUM 100 MCG: 100 TABLET ORAL at 06:48

## 2021-01-01 RX ADMIN — ASPIRIN 81 MG: 81 TABLET, COATED ORAL at 08:25

## 2021-01-01 RX ADMIN — SODIUM CHLORIDE 1 G: 900 INJECTION INTRAVENOUS at 22:11

## 2021-01-01 RX ADMIN — MICONAZOLE NITRATE 1 APPLICATION: 20 POWDER TOPICAL at 09:08

## 2021-01-01 RX ADMIN — INSULIN LISPRO 10 UNITS: 100 INJECTION, SOLUTION INTRAVENOUS; SUBCUTANEOUS at 21:20

## 2021-01-01 RX ADMIN — LEVOTHYROXINE SODIUM 100 MCG: 100 TABLET ORAL at 05:59

## 2021-01-01 RX ADMIN — MICONAZOLE NITRATE: 20 POWDER TOPICAL at 12:09

## 2021-01-01 RX ADMIN — INSULIN LISPRO 10 UNITS: 100 INJECTION, SOLUTION INTRAVENOUS; SUBCUTANEOUS at 12:20

## 2021-01-01 RX ADMIN — INSULIN LISPRO 3 UNITS: 100 INJECTION, SOLUTION INTRAVENOUS; SUBCUTANEOUS at 21:21

## 2021-01-01 RX ADMIN — LINAGLIPTIN 5 MG: 5 TABLET, FILM COATED ORAL at 09:08

## 2021-01-01 RX ADMIN — MICONAZOLE NITRATE 1 APPLICATION: 20 POWDER TOPICAL at 08:27

## 2021-01-01 RX ADMIN — POTASSIUM & SODIUM PHOSPHATES POWDER PACK 280-160-250 MG 2 PACKET: 280-160-250 PACK at 21:43

## 2021-01-01 RX ADMIN — MICONAZOLE NITRATE: 20 POWDER TOPICAL at 08:45

## 2021-01-01 RX ADMIN — SODIUM CHLORIDE 1000 ML: 9 INJECTION, SOLUTION INTRAVENOUS at 18:07

## 2021-01-01 RX ADMIN — INSULIN DETEMIR 20 UNITS: 100 INJECTION, SOLUTION SUBCUTANEOUS at 08:31

## 2021-01-01 RX ADMIN — NALXONE HYDROCHLORIDE 0.2 MG: 0.4 INJECTION INTRAMUSCULAR; INTRAVENOUS; SUBCUTANEOUS at 17:00

## 2021-01-01 RX ADMIN — MICONAZOLE NITRATE: 20 POWDER TOPICAL at 10:09

## 2021-01-01 RX ADMIN — SODIUM CHLORIDE 1000 ML: 9 INJECTION, SOLUTION INTRAVENOUS at 17:15

## 2021-01-01 RX ADMIN — ASPIRIN 81 MG: 81 TABLET, COATED ORAL at 08:31

## 2021-01-01 RX ADMIN — MICONAZOLE NITRATE: 20 POWDER TOPICAL at 21:38

## 2021-01-01 RX ADMIN — LEVOTHYROXINE SODIUM 100 MCG: 100 TABLET ORAL at 06:23

## 2021-01-01 RX ADMIN — LINAGLIPTIN 5 MG: 5 TABLET, FILM COATED ORAL at 08:26

## 2021-01-01 RX ADMIN — LINAGLIPTIN 5 MG: 5 TABLET, FILM COATED ORAL at 16:40

## 2021-01-01 RX ADMIN — AZTREONAM 2 G: 2 INJECTION, POWDER, LYOPHILIZED, FOR SOLUTION INTRAMUSCULAR; INTRAVENOUS at 17:17

## 2021-01-01 RX ADMIN — PANTOPRAZOLE SODIUM 40 MG: 40 INJECTION, POWDER, FOR SOLUTION INTRAVENOUS at 08:45

## 2021-01-01 RX ADMIN — PANTOPRAZOLE SODIUM 40 MG: 40 INJECTION, POWDER, FOR SOLUTION INTRAVENOUS at 08:25

## 2021-01-01 RX ADMIN — INSULIN HUMAN 8.8 UNITS/HR: 1 INJECTION, SOLUTION INTRAVENOUS at 20:29

## 2021-01-01 RX ADMIN — PANTOPRAZOLE SODIUM 40 MG: 40 INJECTION, POWDER, FOR SOLUTION INTRAVENOUS at 08:31

## 2021-01-01 RX ADMIN — INSULIN LISPRO 10 UNITS: 100 INJECTION, SOLUTION INTRAVENOUS; SUBCUTANEOUS at 17:11

## 2021-01-01 RX ADMIN — MAGNESIUM SULFATE HEPTAHYDRATE 6 G: 2 INJECTION, SOLUTION INTRAVENOUS at 20:42

## 2021-01-01 RX ADMIN — FUROSEMIDE 40 MG: 10 INJECTION, SOLUTION INTRAVENOUS at 14:40

## 2021-01-01 RX ADMIN — SODIUM CHLORIDE, PRESERVATIVE FREE 10 ML: 5 INJECTION INTRAVENOUS at 08:45

## 2021-01-01 RX ADMIN — MAGNESIUM SULFATE HEPTAHYDRATE 4 G: 40 INJECTION, SOLUTION INTRAVENOUS at 13:54

## 2021-01-01 RX ADMIN — INSULIN LISPRO 4 UNITS: 100 INJECTION, SOLUTION INTRAVENOUS; SUBCUTANEOUS at 17:14

## 2021-01-01 RX ADMIN — SODIUM CHLORIDE, PRESERVATIVE FREE 10 ML: 5 INJECTION INTRAVENOUS at 09:08

## 2021-01-01 RX ADMIN — SODIUM CHLORIDE, PRESERVATIVE FREE 10 ML: 5 INJECTION INTRAVENOUS at 08:59

## 2021-01-01 RX ADMIN — INSULIN LISPRO 8 UNITS: 100 INJECTION, SOLUTION INTRAVENOUS; SUBCUTANEOUS at 08:26

## 2021-01-01 RX ADMIN — INSULIN LISPRO 4 UNITS: 100 INJECTION, SOLUTION INTRAVENOUS; SUBCUTANEOUS at 17:20

## 2021-01-01 RX ADMIN — PANTOPRAZOLE SODIUM 40 MG: 40 INJECTION, POWDER, FOR SOLUTION INTRAVENOUS at 08:58

## 2021-01-01 RX ADMIN — SODIUM CHLORIDE 1 G: 900 INJECTION INTRAVENOUS at 21:21

## 2021-01-01 RX ADMIN — INSULIN LISPRO 4 UNITS: 100 INJECTION, SOLUTION INTRAVENOUS; SUBCUTANEOUS at 11:46

## 2021-01-01 RX ADMIN — INSULIN LISPRO 12 UNITS: 100 INJECTION, SOLUTION INTRAVENOUS; SUBCUTANEOUS at 22:37

## 2021-01-01 RX ADMIN — SODIUM CHLORIDE 1 G: 900 INJECTION INTRAVENOUS at 21:38

## 2021-01-01 RX ADMIN — ASPIRIN 81 MG: 81 TABLET, COATED ORAL at 09:08

## 2021-01-01 RX ADMIN — INSULIN DETEMIR 28 UNITS: 100 INJECTION, SOLUTION SUBCUTANEOUS at 08:27

## 2021-01-01 RX ADMIN — INSULIN HUMAN 7.2 UNITS/HR: 1 INJECTION, SOLUTION INTRAVENOUS at 06:10

## 2021-01-01 RX ADMIN — LEVOTHYROXINE SODIUM 100 MCG: 100 TABLET ORAL at 06:08

## 2021-01-01 RX ADMIN — INSULIN DETEMIR 35 UNITS: 100 INJECTION, SOLUTION SUBCUTANEOUS at 09:07

## 2021-01-01 RX ADMIN — MICONAZOLE NITRATE: 20 POWDER TOPICAL at 21:22

## 2021-01-01 RX ADMIN — MICONAZOLE NITRATE: 20 POWDER TOPICAL at 00:24

## 2021-01-01 RX ADMIN — MICONAZOLE NITRATE: 20 POWDER TOPICAL at 21:21

## 2021-01-01 RX ADMIN — VANCOMYCIN HYDROCHLORIDE 2750 MG: 10 INJECTION, POWDER, LYOPHILIZED, FOR SOLUTION INTRAVENOUS at 17:49

## 2021-01-01 RX ADMIN — INSULIN DETEMIR 10 UNITS: 100 INJECTION, SOLUTION SUBCUTANEOUS at 10:09

## 2021-01-01 RX ADMIN — INSULIN LISPRO 5 UNITS: 100 INJECTION, SOLUTION INTRAVENOUS; SUBCUTANEOUS at 08:31

## 2021-01-01 RX ADMIN — INSULIN LISPRO 10 UNITS: 100 INJECTION, SOLUTION INTRAVENOUS; SUBCUTANEOUS at 12:28

## 2021-01-01 RX ADMIN — INSULIN LISPRO 4 UNITS: 100 INJECTION, SOLUTION INTRAVENOUS; SUBCUTANEOUS at 08:45

## 2021-01-01 RX ADMIN — INSULIN DETEMIR 28 UNITS: 100 INJECTION, SOLUTION SUBCUTANEOUS at 08:46

## 2021-01-01 RX ADMIN — INSULIN LISPRO 8 UNITS: 100 INJECTION, SOLUTION INTRAVENOUS; SUBCUTANEOUS at 09:07

## 2021-01-01 RX ADMIN — PANTOPRAZOLE SODIUM 40 MG: 40 INJECTION, POWDER, FOR SOLUTION INTRAVENOUS at 09:08

## 2021-01-01 RX ADMIN — ASPIRIN 81 MG: 81 TABLET, COATED ORAL at 08:44

## 2021-04-13 PROBLEM — R41.82 ALTERED MENTAL STATUS: Status: ACTIVE | Noted: 2021-01-01

## 2021-04-13 PROBLEM — E27.8 ADRENAL NODULE (HCC): Chronic | Status: ACTIVE | Noted: 2021-01-01

## 2021-04-13 PROBLEM — E83.42 HYPOMAGNESEMIA: Status: ACTIVE | Noted: 2021-01-01

## 2021-04-13 PROBLEM — N17.9 AKI (ACUTE KIDNEY INJURY): Status: ACTIVE | Noted: 2021-01-01

## 2021-04-13 PROBLEM — J96.21 ACUTE ON CHRONIC RESPIRATORY FAILURE WITH HYPOXIA AND HYPERCAPNIA (HCC): Status: ACTIVE | Noted: 2021-01-01

## 2021-04-13 PROBLEM — R73.9 HYPERGLYCEMIA: Status: ACTIVE | Noted: 2021-01-01

## 2021-04-13 PROBLEM — G93.40 ENCEPHALOPATHY: Status: ACTIVE | Noted: 2021-01-01

## 2021-04-13 PROBLEM — J96.22 ACUTE ON CHRONIC RESPIRATORY FAILURE WITH HYPOXIA AND HYPERCAPNIA (HCC): Status: ACTIVE | Noted: 2021-01-01

## 2021-04-13 PROBLEM — E11.01 HHNC (HYPERGLYCEMIC HYPEROSMOLAR NONKETOTIC COMA) (HCC): Status: ACTIVE | Noted: 2021-01-01

## 2021-04-13 PROBLEM — E11.10 DKA (DIABETIC KETOACIDOSES): Status: ACTIVE | Noted: 2021-01-01

## 2021-04-13 PROBLEM — E27.8 ADRENAL NODULE: Status: ACTIVE | Noted: 2021-01-01

## 2021-04-13 NOTE — H&P
Chief complaint: Altered mental status    Subjective     71-year-old female who presented to the emergency room today that became apparent yesterday reportedly.  She was confused and complained of a headache on the night prior to admission.  Her  was not able to get her up.  Today she was sleeping and became increasingly poorly responsive and she was brought to the emergency room.  She reportedly had a urinary tract infection 6 weeks ago and was treated with 2 courses of antibiotics though these results are not in the [facility].  I do see prior UTIs remotely with E. coli but this was 5 years ago.      She is followed by Dr. Hoffman for primary care.  Her past medical history includes type 2 diabetes mellitus, dyslipidemia, hypertension, hypothyroidism, and morbid obesity.  The last chart interaction with Dr. Hoffman was July of last year and her last visit was May of last year.  She is listed as having COPD and is on as needed duo nebs but is a lifelong non-smoker per records.  History is obtained primarily from the chart as the patient is altered and family has left.    She was last admitted to the hospital in January 2020 for respiratory failure due to viral pneumonia.  Her respiratory failure was acute on chronic with a high CO2 at that time.    In our emergency department she underwent CT scanning which revealed no acute pulmonary parenchymal abnormalities.  There was cardiomegaly and pulmonary vascular engorgement.  Arterial blood gases revealed 7.35/68/84/37.  Covid testing was negative.  Glucose was elevated at 546.  Thyroid studies, lactic acid, procalcitonin, and BNP were acceptable.  Beta hydroxybutyrate was mildly elevated.    I was asked to admit her to the ICU.      History  Past Medical History:   Diagnosis Date   • Acute bronchitis    • Acute sinusitis    • Anxiety    • Bacterial conjunctivitis    • Chest pain    • COPD (chronic obstructive pulmonary disease) (CMS/[facility])    •  "Depression    • Diabetes mellitus (CMS/HCC)    • Dyspnea    • Dysuria    • Edema    • Elbow injury    • Elbow pain    • Eye drainage    • Fatigue    • Foot pain, right    • Humerus distal fracture    • Hyperlipidemia    • Hypertension    • Hypothyroidism    • Morbid obesity (CMS/HCC)    • Pickwickian syndrome (CMS/HCC)    • Pressure ulcer stage I    • Psoriasis    • SOB (shortness of breath)    • Symptoms of urinary tract infection    • Urinary frequency    • Urinary tract infection    • Vaginal candidiasis      Past Surgical History:   Procedure Laterality Date   • CERVICAL POLYPECTOMY     • HYSTERECTOMY       Family History   Problem Relation Age of Onset   • Diabetes Father    • Diabetes Sister    • Pancreatic cancer Sister      Social History     Tobacco Use   • Smoking status: Former Smoker     Types: Cigarettes     Quit date: 2006     Years since quitting: 15.2   • Smokeless tobacco: Never Used   Substance Use Topics   • Alcohol use: No   • Drug use: Never       Review of Systems   Review of systems could not be obtained due to   patient confusion.      Objective     Vital Signs  Temp:  [98.7 °F (37.1 °C)-99.2 °F (37.3 °C)] 99.2 °F (37.3 °C)  Heart Rate:  [71-95] 72  Resp:  [18] 18  BP: (119-162)/(53-67) 156/67    Physical Exam:    Objective:  General Appearance:  Ill-appearing.    Vital signs: (most recent): Blood pressure 156/67, pulse 72, temperature 99.2 °F (37.3 °C), temperature source Axillary, resp. rate 18, height 162.6 cm (64\"), weight (!) 141 kg (310 lb), SpO2 98 %, not currently breastfeeding.    HEENT: (BiPAP mask in place)    Lungs:  There are decreased breath sounds.  No rales, wheezes or rhonchi.    Heart: Normal rate.  Regular rhythm.  S1 normal and S2 normal.  No murmur, gallop or friction rub.   Chest: Symmetric chest wall expansion.   Abdomen: Abdomen is soft and non-distended.  Bowel sounds are normal.   There is no abdominal tenderness.   There is no mass. There is no splenomegaly. There " is no hepatomegaly.   Extremities: There is dependent edema.  There is no deformity.    Neurological: (Difficult to arouse  Occasional moaning  No focal motor deficits to painful stimuli).    Pupils:  Pupils are equal, round, and reactive to light.    Skin:  Warm and dry.              Results Review:    I reviewed the patient's new clinical results.  I reviewed the patient's new imaging results and agree with the interpretation.  I reviewed the patient's other test results and agree with the interpretation  I personally viewed and interpreted the patient's EKG/Telemetry data    Assessment/Plan     Assessment:    Active Hospital Problems    Diagnosis    • **HHNC (hyperglycemic hyperosmolar nonketotic coma) (CMS/HCC)    • Hyperglycemia    • Acute on chronic respiratory failure with hypoxia and hypercapnia (CMS/HCC)    • Left adrenal nodule      CT 4/13/21: 2 cm low-density left adrenal nodule.     • ADEEL    • Hypomagnesemia    • Altered mental status    • Morbid obesity with BMI of 50.0-59.9, adult    • Hypertension    • Hypothyroidism    • T2DM    • Dyslipidemia    • Pickwickian syndrome        71-year-old female brought to the ER by family primarily due to altered mental status that has occurred over the last 24 hours and may be longer.  She has CO2 retention which appears compensated and chronic.  I suspect the source of her encephalopathy primarily lies elsewhere.  She has had a history of UTIs and we have not been able to get a urinalysis as of yet but this is certainly a possibility.  Her blood sugar is markedly elevated and a nonketotic hyperosmolar state may be contributing.    My plan is to admit her to the ICU and treat her with an insulin drip but avoid aggressive fluid resuscitation as her renal function appears acceptable and her imaging suggests volume overload as does her exam.  I will treat her empirically with antibiotics and will obtain urine and blood cultures.  There is no evidence of pneumonia by  CT scanning.    Have discussed the situation with her  via telephone and he tells me that she does not want intubation or resuscitation but is comfortable with aggressive care up to that point.    Plan:    1. ICU admission  2. Serial neurologic exams and anticipate improvement with correction of metabolic abnormalities.  3. BiPAP.  Her  says she has not tolerated that in the past though at the current time she is altered so is tolerating that so far.  No intubation.  4. Insulin drip  5. Empiric Rocephin and await any culture data    I discussed the patients findings and my recommendations with nursing staff.     She is critically ill based upon her acute encephalopathy and acute respiratory failure.    Critical Care time spent in direct patient care: 60 minutes (excluding procedure time, if applicable) including high complexity decision making to assess, manipulate, and support vital organ system failure in this individual who has impairment of one or more vital organ systems such that there is a high probability of imminent or life threatening deterioration in the patient’s condition.    Dustin Valencia MD  Pulmonary and Critical Care Medicine  04/13/21  19:54 EDT

## 2021-04-13 NOTE — ED PROVIDER NOTES
EMERGENCY DEPARTMENT ENCOUNTER    Pt Name: Julissa Kee  MRN: [id number]  Pt :   1950  Room Number:    Date of encounter:  2021  PCP: Kee Hoffman MD   ED Provider: Austen Jack MD    Historian: Patient's       HPI:  Chief Complaint: Altered mental status        Context: Julissa Kee is a 71 y.o. female who presents to the ED c/o altered mental status which started sometime greater than 24 hours ago.  The patient's daughter was at her residence yesterday.  The patient was confused about who her daughter was and this is very abnormal for the patient.  She complained of a headache last night.  She sleeps in a recliner and her  attempted her to get her up at 2:30 AM in order to urinate.  She normally is able to support her weight and walk a few steps but was unable to do so at that time.  She has not spoken since that time.  All day she has been sleeping and very poorly responsive to the .  Patient's  notes she suffered from a urinary tract infection 6 weeks ago.  A test of cure was performed and she was positive.  She was placed on a second antibiotic, unknown to , which finished its course 2-1/2 weeks ago.  Patient suffered low oxygen saturations and was admitted to [facility] in .  She does not drink or smoke.  No recreational drug use.        PAST MEDICAL HISTORY  Past Medical History:   Diagnosis Date   • Acute bronchitis    • Acute sinusitis    • Anxiety    • Bacterial conjunctivitis    • Chest pain    • COPD (chronic obstructive pulmonary disease) (CMS/HCC)    • Depression    • Diabetes mellitus (CMS/HCC)    • Dyspnea    • Dysuria    • Edema    • Elbow injury    • Elbow pain    • Eye drainage    • Fatigue    • Foot pain, right    • Humerus distal fracture    • Hyperlipidemia    • Hypertension    • Hypothyroidism    • Morbid obesity (CMS/HCC)    • Pickwickian syndrome (CMS/HCC)    • Pressure ulcer stage I    • Psoriasis    • SOB  (shortness of breath)    • Symptoms of urinary tract infection    • Urinary frequency    • Urinary tract infection    • Vaginal candidiasis          PAST SURGICAL HISTORY  Past Surgical History:   Procedure Laterality Date   • CERVICAL POLYPECTOMY     • HYSTERECTOMY           FAMILY HISTORY  Family History   Problem Relation Age of Onset   • Diabetes Father    • Diabetes Sister    • Pancreatic cancer Sister          SOCIAL HISTORY  Social History     Socioeconomic History   • Marital status:      Spouse name: Not on file   • Number of children: Not on file   • Years of education: Not on file   • Highest education level: Not on file   Tobacco Use   • Smoking status: Former Smoker     Types: Cigarettes     Quit date: 2006     Years since quitting: 15.2   • Smokeless tobacco: Never Used   Substance and Sexual Activity   • Alcohol use: No   • Drug use: Never   • Sexual activity: Defer         ALLERGIES  Amoxicillin-pot clavulanate and Bactrim [sulfamethoxazole-trimethoprim]        REVIEW OF SYSTEMS  Review of Systems       All systems reviewed and negative except for those discussed in HPI.   Per 's report.    PHYSICAL EXAM    I have reviewed the triage vital signs and nursing notes.    ED Triage Vitals   Temp Heart Rate Resp BP SpO2   04/13/21 1546 04/13/21 1544 04/13/21 1546 04/13/21 1544 04/13/21 1546   98.7 °F (37.1 °C) 80 18 122/61 93 %      Temp src Heart Rate Source Patient Position BP Location FiO2 (%)   04/13/21 1546 04/13/21 1544 04/13/21 1546 04/13/21 1546 --   Oral Monitor Lying Left arm        Physical Exam  GENERAL:   Appears somnolent  HENT: Nares patent.  Extremely dry mucous membranes  EYES: No scleral icterus.  CV: Regular rhythm, regular rate.  No audible murmurs gallops rubs but with distant heart sounds secondary to body habitus.  RESPIRATORY: Normal effort.  No audible wheezes, rales or rhonchi.  Clear to auscultation but distant secondary to body habitus as well as poor inspiratory  effort.  ABDOMEN: Morbidly obese.  Soft, nontender.  Protuberant with adipose.  Very deep palpation of the abdomen elicits no pain response.  MUSCULOSKELETAL: No deformities.   NEURO: Extremely somnolent, moves all extremities but not on command.  Patient has a tremor in the right lower extremity which the  states is normal.  This is intermittent lasting a few minutes at a time.  She does spontaneously move her upper extremities but not on command.  When jostled she will open her eyes and look about but does not speak.  SKIN: Warm, dry, no rash visualized.        LAB RESULTS  Recent Results (from the past 24 hour(s))   Comprehensive Metabolic Panel    Collection Time: 04/13/21  4:21 PM    Specimen: Blood   Result Value Ref Range    Glucose 546 (C) 65 - 99 mg/dL    BUN 25 (H) 8 - 23 mg/dL    Creatinine 1.14 (H) 0.57 - 1.00 mg/dL    Sodium 132 (L) 136 - 145 mmol/L    Potassium 5.2 3.5 - 5.2 mmol/L    Chloride 88 (L) 98 - 107 mmol/L    CO2 34.0 (H) 22.0 - 29.0 mmol/L    Calcium 10.5 8.6 - 10.5 mg/dL    Total Protein 7.3 6.0 - 8.5 g/dL    Albumin 3.80 3.50 - 5.20 g/dL    ALT (SGPT) 13 1 - 33 U/L    AST (SGOT) 12 1 - 32 U/L    Alkaline Phosphatase 88 39 - 117 U/L    Total Bilirubin 0.7 0.0 - 1.2 mg/dL    eGFR Non African Amer 47 (L) >60 mL/min/1.73    Globulin 3.5 gm/dL    A/G Ratio 1.1 g/dL    BUN/Creatinine Ratio 21.9 7.0 - 25.0    Anion Gap 10.0 5.0 - 15.0 mmol/L   Troponin    Collection Time: 04/13/21  4:21 PM    Specimen: Blood   Result Value Ref Range    Troponin T <0.010 0.000 - 0.030 ng/mL   Magnesium    Collection Time: 04/13/21  4:21 PM    Specimen: Blood   Result Value Ref Range    Magnesium 1.3 (L) 1.6 - 2.4 mg/dL   Light Blue Top    Collection Time: 04/13/21  4:21 PM   Result Value Ref Range    Extra Tube hold for add-on    Green Top (Gel)    Collection Time: 04/13/21  4:21 PM   Result Value Ref Range    Extra Tube Hold for add-ons.    Lavender Top    Collection Time: 04/13/21  4:21 PM   Result Value  Ref Range    Extra Tube hold for add-on    Gold Top - SST    Collection Time: 04/13/21  4:21 PM   Result Value Ref Range    Extra Tube Hold for add-ons.    CBC Auto Differential    Collection Time: 04/13/21  4:21 PM    Specimen: Blood   Result Value Ref Range    WBC 7.32 3.40 - 10.80 10*3/mm3    RBC 3.91 3.77 - 5.28 10*6/mm3    Hemoglobin 12.6 12.0 - 15.9 g/dL    Hematocrit 38.9 34.0 - 46.6 %    MCV 99.5 (H) 79.0 - 97.0 fL    MCH 32.2 26.6 - 33.0 pg    MCHC 32.4 31.5 - 35.7 g/dL    RDW 12.5 12.3 - 15.4 %    RDW-SD 45.8 37.0 - 54.0 fl    MPV 10.4 6.0 - 12.0 fL    Platelets 204 140 - 450 10*3/mm3    Neutrophil % 90.2 (H) 42.7 - 76.0 %    Lymphocyte % 7.0 (L) 19.6 - 45.3 %    Monocyte % 1.4 (L) 5.0 - 12.0 %    Eosinophil % 0.0 (L) 0.3 - 6.2 %    Basophil % 0.3 0.0 - 1.5 %    Immature Grans % 1.1 (H) 0.0 - 0.5 %    Neutrophils, Absolute 6.61 1.70 - 7.00 10*3/mm3    Lymphocytes, Absolute 0.51 (L) 0.70 - 3.10 10*3/mm3    Monocytes, Absolute 0.10 0.10 - 0.90 10*3/mm3    Eosinophils, Absolute 0.00 0.00 - 0.40 10*3/mm3    Basophils, Absolute 0.02 0.00 - 0.20 10*3/mm3    Immature Grans, Absolute 0.08 (H) 0.00 - 0.05 10*3/mm3    nRBC 0.0 0.0 - 0.2 /100 WBC   TSH    Collection Time: 04/13/21  4:21 PM    Specimen: Blood   Result Value Ref Range    TSH 3.360 0.270 - 4.200 uIU/mL   T4, Free    Collection Time: 04/13/21  4:21 PM    Specimen: Blood   Result Value Ref Range    Free T4 1.42 0.93 - 1.70 ng/dL   CK    Collection Time: 04/13/21  4:21 PM    Specimen: Blood   Result Value Ref Range    Creatine Kinase 25 20 - 180 U/L   Lactic Acid, Plasma    Collection Time: 04/13/21  4:21 PM    Specimen: Blood   Result Value Ref Range    Lactate 2.0 0.5 - 2.0 mmol/L   Procalcitonin    Collection Time: 04/13/21  4:21 PM    Specimen: Blood   Result Value Ref Range    Procalcitonin 0.16 0.00 - 0.25 ng/mL   BNP    Collection Time: 04/13/21  4:21 PM    Specimen: Blood   Result Value Ref Range    proBNP 764.9 0.0 - 900.0 pg/mL   Blood Gas,  Arterial With Co-Ox    Collection Time: 04/13/21  5:48 PM    Specimen: Arterial Blood   Result Value Ref Range    Real's Test N/A     pH, Arterial 7.347 (L) 7.350 - 7.450 pH units    pCO2, Arterial 68.0 (C) 35.0 - 45.0 mm Hg    pO2, Arterial 84.2 83.0 - 108.0 mm Hg    HCO3, Arterial 37.3 (H) 20.0 - 26.0 mmol/L    Base Excess, Arterial 9.4 (H) 0.0 - 2.0 mmol/L    Hemoglobin, Blood Gas 11.4 (L) 14 - 18 g/dL    Hematocrit, Blood Gas 34.8 %    Oxyhemoglobin 96.8 94 - 99 %    Methemoglobin      Carboxyhemoglobin 1.9 0 - 2 %    CO2 Content 39.4 (H) 22 - 33 mmol/L    Temperature 37.0 C    Barometric Pressure for Blood Gas      Modality Nasal Cannula     FIO2 36 %    Rate 0 Breaths/minute    PIP 0 cmH2O    IPAP 0     EPAP 0     Note      Notified Blanche PEGMD     Notified By [id number]     Notified Time 04/13/2021 17:45     pH, Temp Corrected 7.347 pH Units    pCO2, Temperature Corrected 68.0 (H) 35 - 45 mm Hg    pO2, Temperature Corrected 84.2 83 - 108 mm Hg       If labs were ordered, I independently reviewed the results.        RADIOLOGY  CT Head Without Contrast    Result Date: 4/13/2021  EXAMINATION: CT HEAD WO CONTRAST-  INDICATION: ams  TECHNIQUE: 5 mm unenhanced images through the brain  The radiation dose reduction device was turned on for each scan per the ALARA (As Low as Reasonably Achievable) protocol.  COMPARISON: Head CT scan 12/20/2019  FINDINGS: History indicates altered mental status.  The calvarium appears intact. Paranasal sinuses and mastoids appear clear except for a small amount of sphenoid sinus mucosal thickening. Soft tissue window images show expected degree of generalized cerebral atrophy for patient's age. There is no evidence of intracranial hemorrhage, mass mass effect, edema, hydrocephalus, or abnormal extra-axial collection. No evidence of acute or old infarction is identified. No gross abnormalities are seen in the orbits..        No evidence of acute cranial disease.       CT Chest Without  Contrast Diagnostic    Result Date: 4/13/2021  EXAMINATION: CT CHEST WO CONTRAST DIAGNOSTIC-  INDICATION: ams, ?LLL infiltrate  TECHNIQUE: 5 mm unenhanced images through the chest and foramina  The radiation dose reduction device was turned on for each scan per the ALARA (As Low as Reasonably Achievable) protocol.  COMPARISON: Portable chest  The same date  FINDINGS:  Hazy opacity of the left lung base corresponds to mild left basilar pleural thickening, and small area of linear scarring or discoid atelectasis. The pulmonary vasculature is prominent and there is a mild diffuse perihilar disease pattern suspicious for early interstitial edema. No lung consolidation is seen. No pericardial effusion or mediastinal adenopathy is seen. Review of the 12/20/2019 chest CT scan shows very similar left basilar lung scarring.  Included images of the upper abdomen show no significant abnormalities of the visualized portions the liver, spleen, or right adrenal gland. There is a 2 cm low-density left adrenal nodule, unchanged from 12/20/2019 and apparently an incidental adenoma.        1.. Stable, mild left basilar lung scarring compared to 12/28/2019 chest CT scan. 2.. Cardiomegaly, pulmonary vascular enlargement and suspected early interstitial edema. Findings appear to represent early changes of volume overload/CHF.       XR Chest 1 View    Result Date: 4/13/2021  EXAMINATION: XR CHEST 1 VW- 04/13/2021  INDICATION: Weak/Dizzy/AMS triage protocol  COMPARISON: 01/17/2020  FINDINGS: Heart is mildly enlarged. Vasculature is cephalized. Lungs are moderately well-expanded and appear clear except for small area of linear atelectasis in the left lung base. No effusion or pneumothorax is seen.      1. Mild cardiomegaly and pulmonary venous hypertension. 2. Small area of left basilar discoid atelectasis. No other evidence of active chest disease.  D:  04/13/2021 E:  04/13/2021          I ordered and reviewed the above noted radiographic  studies.      I viewed images of head CT which showed no bleed per my independent interpretation.    See radiologist's dictation for official interpretation.        PROCEDURES    Procedures    ECG 12 Lead   Final Result   Test Reason : Weak/Dizzy/AMS protocol   Blood Pressure : **/** mmHG   Vent. Rate : 090 BPM     Atrial Rate : 090 BPM      P-R Int : 168 ms          QRS Dur : 096 ms       QT Int : 364 ms       P-R-T Axes : 007 -46 028 degrees      QTc Int : 445 ms      Normal sinus rhythm   Left anterior fascicular block   Minimal voltage criteria for LVH, may be normal variant   Abnormal ECG   When compared with ECG of 14-JAN-2020 14:43,   premature atrial complexes are no longer present   Confirmed by SHANNON CORDOVA MD ([id number]) on 4/13/2021 11:58:33 PM      Referred By:  EDMD           Confirmed By:SHANNON CORDOVA MD          MEDICATIONS GIVEN IN ER    Medications   sodium chloride 0.9 % flush 10 mL (has no administration in time range)   sodium chloride 0.9 % bolus 1,000 mL (1,000 mL Intravenous New Bag 4/13/21 1807)   insulin regular (humuLIN R,novoLIN R) injection 10 Units (has no administration in time range)   aztreonam (AZACTAM) 2 g/100 mL 0.9% NS (mbp) (0 g Intravenous Stopped 4/13/21 1749)   vancomycin 2750 mg/500 mL 0.9% NS IVPB ([facility]) (2,750 mg Intravenous New Bag 4/13/21 1749)   sodium chloride 0.9 % bolus 1,000 mL (1,000 mL Intravenous New Bag 4/13/21 1715)   naloxone (NARCAN) injection 0.2 mg (0.2 mg Intravenous Given 4/13/21 1700)         PROGRESS, DATA ANALYSIS, CONSULTS, AND MEDICAL DECISION MAKING    All labs have been independently reviewed by me.  All radiology studies have been reviewed by me and the radiologist dictating the report.   EKG's have been independently viewed and interpreted by me.      Differential diagnoses: Altered mental status.  Consider obtundation secondary to hypercarbia, sepsis, stroke, etc.      ED Course as of Apr 13 1816   Tue Apr 13, 2021   1758 Of ordered BiPAP.  I spoke  with our nurse who is having an extremely difficult time getting to the patient's vaginal area to obtain an in and out catheter urine sample.  When placed into Trendelenburg position the patient becomes dyspneic and her pannus is too large to maneuver out of the way.    [MS]   8804 I have paged our intensivist for admission.    [MS]      ED Course User Index  [MS] Austen Jack MD             AS OF 18:16 EDT VITALS:    BP - 139/59  HR - 78  TEMP - 98.7 °F (37.1 °C) (Oral)  O2 SATS - 95%        DIAGNOSIS  Final diagnoses:   Altered mental status, unspecified altered mental status type   Hypercarbia   Morbid obesity (CMS/HCC)         DISPOSITION  Admission to ICU           Austen Jack MD  04/13/21 2599

## 2021-04-14 NOTE — NURSING NOTE
WOC consulted to assess patients skin folds for skin breakdown:     There is mild friction damage to her abdominal and panus folds.   Some mild MASD noted and some moist yeast dermatitis to he left abdominal fold.     Interdry dry is in place in panus, abdomin, under breasts, and in the axilla.     Small gluteal cleft skin tear.   Wound bed is dermis, pink, and blanching.   Just treat with barrier cream.     Will order Micotin powder BID.   See skin care order for care needs.     Heels intact blanching.   Offloading heel boots in place.   Bariatric bed has been ordered.     Nothing further needed from WOC nurse.   Will sign off.     Thanks

## 2021-04-14 NOTE — PROGRESS NOTES
Discharge Planning Assessment  [facility]     Patient Name: Julissa Kee  MRN: [id number]  Today's Date: 4/14/2021    Admit Date: 4/13/2021    Discharge Needs Assessment     Row Name 04/14/21 1056       Living Environment    Lives With  spouse    Primary Care Provided by  spouse/significant other;other (see comments) Patients niece and daughter assist with bathing and dressing    Provides Primary Care For  no one, unable/limited ability to care for self    Family Caregiver if Needed  spouse;child(ashley), adult    Quality of Family Relationships  supportive       Resource/Environmental Concerns    Resource/Environmental Concerns  home accessibility    Home Accessibility Concerns  not wheelchair accessible    Transportation Concerns  car, none       Transition Planning    Patient/Family Anticipates Transition to  home with family    Patient/Family Anticipated Services at Transition  ;home health care    Transportation Anticipated  family or friend will provide;health plan transportation       Discharge Needs Assessment    Readmission Within the Last 30 Days  no previous admission in last 30 days    Equipment Currently Used at Home  commode;hospital bed;wheelchair;glucometer;walker, rolling;oxygen    Concerns to be Addressed  discharge planning;adjustment to diagnosis/illness;compliance issue    Anticipated Changes Related to Illness  inability to care for self    Equipment Needed After Discharge  none    Outpatient/Agency/Support Group Needs  homecare agency    Discharge Facility/Level of Care Needs  home with home health;nursing facility, skilled        Discharge Plan     Row Name 04/14/21 1100       Plan    Plan  Home with HH vs SNF    Patient/Family in Agreement with Plan  yes    Plan Comments  Attempted to speak with patient at bedside.  Patient kept falling asleep/ not speaking.  Spoke with  at bedside and daughter Luz on phone.  Patient resides in [facility] [facility] and lives with her  .  Prior to admission patient was dependent with ADL's and per  independent with a walker.  Patient has at home a walker, glucometer, bedside commode, wheelchair, hospital bed, recliner lift chair and oxygen.  Patients oxygen is provided by [employer], she is on 3L continuous, has a home concentrator, portables and a filler she can fill her own portables with per Maria Esther with [employer].   states that patient can feed herself and will take her own meds after someone has sorted them and put them out for her.   states she has had [facility] home health in the past and that patient was able to ambulate 20 feet with them but refused to get up when they weren't there.   states patient had been sleeping in her recliner lift chair and would stand and pivot onto a bedside commode.  Patient has [facility] and [employer] provided [facility] to come out once a week.   states that patient has been to [facility] in the past and they were not happy with her care there.  After a following admission patient went to [facility],  states they did do more physical therapy with her there than at [facility] but felt they could care for her better at home.   is wanting for patient to return home with home health when she is discharged.  Discussed with  that we would have PT/OT evaluate patient when appropriate to see what her needs are and to be sure he can care for her at home.  If  can not provide care for her we will then need to discuss other options.CM will continue to follow for discharge needs.        Continued Care and Services - Admitted Since 4/13/2021    Coordination has not been started for this encounter.       Expected Discharge Date and Time     Expected Discharge Date Expected Discharge Time    Apr 19, 2021         Demographic Summary     Row Name 04/14/21 1056       General Information    Admission Type  inpatient    Arrived From  home    Referral Source   admission list    Reason for Consult  discharge planning    Preferred Language  English       Contact Information    Permission Granted to Share Info With          Functional Status    No documentation.       Psychosocial    No documentation.       Abuse/Neglect    No documentation.       Legal    No documentation.       Substance Abuse    No documentation.       Patient Forms    No documentation.           Katerine Goodwin RN

## 2021-04-14 NOTE — PLAN OF CARE
Goal Outcome Evaluation:  Plan of Care Reviewed With: spouse  Progress: no change  Outcome Summary: Patient admitted from ED after EMS was called by  due to >24 hour confusion, lethagic, headache, and weakness.  Patient had UTI ~6weeks ago was given abx x2 rounds, finishing second 2 weeks ago.  Upon admission increased CO2 and BG.  Transfer to 2AICU on bipap and insulin drip initiated as ordered.  VSS;  AM labs noted;

## 2021-04-14 NOTE — PROGRESS NOTES
Clinical Nutrition     Multidisciplinary Rounds      Patient Name: Julissa Kee  Date of Encounter: 04/14/21 10:04 EDT  MRN: [id number]  Admission date: 4/13/2021      ANNE MARIE POSADA to continue to follow per protocol.     Pt resting in bed, on nasal cannula, very lethargic, keeps drifting back to sleep    Per RN: pt passed dysphagia eval, will likely need to be fed    Diet Order: Soft-Chopped  Diabetic Diet  Premier Protein 3x/day    Intervention:  Follow treatment plan  Care plan reviewed  Supplement provided    Follow up:   Per protocol      Delisa Bryant RD  10:04 EDT  Time: 15min

## 2021-04-14 NOTE — PROGRESS NOTES
Intensive Care Follow-up     Hospital:  LOS: 1 day   Ms. Julissa Kee, 71 y.o. female is followed for:   HHNC (hyperglycemic hyperosmolar nonketotic coma) (CMS/[facility])        Subjective     71-year-old female who presented to the emergency room today that became apparent yesterday reportedly.  She was confused and complained of a headache on the night prior to admission.  Her  was not able to get her up.  Today she was sleeping and became increasingly poorly responsive and she was brought to the emergency room.  She reportedly had a urinary tract infection 6 weeks ago and was treated with 2 courses of antibiotics though these results are not in the [facility] chart.  I do see prior UTIs remotely with E. coli but this was 5 years ago.       She is followed by Dr. Hoffman for primary care.  Her past medical history includes type 2 diabetes mellitus, dyslipidemia, hypertension, hypothyroidism, and morbid obesity.  The last chart interaction with Dr. Hoffman was July of last year and her last visit was May of last year.  She is listed as having COPD and is on as needed duo nebs but is a lifelong non-smoker per records.  History is obtained primarily from the chart as the patient is altered and family has left.     She was last admitted to the hospital in January 2020 for respiratory failure due to viral pneumonia.  Her respiratory failure was acute on chronic with a high CO2 at that time.     In our emergency department she underwent CT scanning which revealed no acute pulmonary parenchymal abnormalities.  There was cardiomegaly and pulmonary vascular engorgement.  Arterial blood gases revealed 7.35/68/84/37.  Covid testing was negative.  Glucose was elevated at 546.  Thyroid studies, lactic acid, procalcitonin, and BNP were acceptable.  Beta hydroxybutyrate was mildly elevated.  Interval History:  The chart has been reviewed in full.  The patient has woken up significantly better this morning and is oriented  "currently.  She occasionally does get confused.  She is on the 4 units/h of IV insulin.  She has not required BiPAP this morning.  She does continue on Rocephin for now.  Unfortunately, it appears that a urinalysis was never drawn however we will try to get this added to her previous specimen.    The patient's past medical, surgical and social history were reviewed and updated in Epic as appropriate.        Objective     Infusions:  sodium chloride, 30 mL/hr      Medications:  aspirin, 81 mg, Oral, Daily  cefTRIAXone, 1 g, Intravenous, Nightly  insulin detemir, 10 Units, Subcutaneous, Daily  insulin lispro, 0-7 Units, Subcutaneous, TID AC  levothyroxine, 100 mcg, Oral, Q AM  miconazole, , Topical, Q12H  pantoprazole, 40 mg, Intravenous, Daily        Vital Sign Min/Max for last 24 hours  Temp  Min: 98.7 °F (37.1 °C)  Max: 99.9 °F (37.7 °C)   BP  Min: 71/59  Max: 162/62   Pulse  Min: 62  Max: 98   Resp  Min: 15  Max: 20   SpO2  Min: 93 %  Max: 100 %   Flow (L/min)  Min: 4  Max: 4       Input/Output for last 24 hour shift  04/13 0701 - 04/14 0700  In: 1038 [I.V.:938]  Out: 745 [Urine:745]      Objective:  General Appearance:  Uncomfortable and in no acute distress.    Vital signs: (most recent): Blood pressure 134/48, pulse 70, temperature 99.3 °F (37.4 °C), temperature source Axillary, resp. rate 19, height 162.6 cm (64\"), weight 131 kg (288 lb 9.3 oz), SpO2 96 %, not currently breastfeeding.    HEENT: Normal HEENT exam.    Lungs:  Normal effort and normal respiratory rate.  There are decreased breath sounds.  No rales, wheezes or rhonchi.    Heart: Normal rate.  Regular rhythm.  S1 normal and S2 normal.  No murmur or friction rub.   Chest: Symmetric chest wall expansion.   Abdomen: Abdomen is soft and non-distended.  Bowel sounds are normal.   There is no abdominal tenderness.     Neurological: Patient is alert.    Pupils:  Pupils are equal, round, and reactive to light.  Pupils are equal.   Skin:  Warm and dry.  No " rash.             Results from last 7 days   Lab Units 04/14/21  0325 04/13/21  1621   WBC 10*3/mm3 11.75* 7.32   HEMOGLOBIN g/dL 11.6* 12.6   PLATELETS 10*3/mm3 198 204     Results from last 7 days   Lab Units 04/14/21  0325 04/14/21  0028 04/13/21  1621   SODIUM mmol/L 137 140 132*   POTASSIUM mmol/L 4.5 4.3 5.2   CO2 mmol/L 32.0* 33.0* 34.0*   BUN mg/dL 27* 27* 25*   CREATININE mg/dL 1.07* 1.09* 1.14*   MAGNESIUM mg/dL 2.3  --  1.3*   PHOSPHORUS mg/dL 2.4*  --   --    GLUCOSE mg/dL 176* 240* 546*     Estimated Creatinine Clearance: 63.9 mL/min (A) (by C-G formula based on SCr of 1.07 mg/dL (H)).    Results from last 7 days   Lab Units 04/13/21  1748   PH, ARTERIAL pH units 7.347*   PCO2, ARTERIAL mm Hg 68.0*   PO2 ART mm Hg 84.2         I reviewed the patient's results and images.     Assessment/Plan   Impression        HHNC (hyperglycemic hyperosmolar nonketotic coma) (CMS/HCC)    T2DM    Dyslipidemia    Hypertension    Hypothyroidism    Morbid obesity with BMI of 50.0-59.9, adult    Pickwickian syndrome    Left adrenal nodule    ADEEL    Hypomagnesemia    Hyperglycemia    Altered mental status    Acute on chronic respiratory failure with hypoxia and hypercapnia (CMS/HCC)       Plan        We will let her wake up a bit further and then get a speech evaluation.  If she passes, we will initiate a consistent carbohydrate diet.  Discontinue insulin drip and we will transition over to basal insulin with subcutaneous coverage as needed.  I have requested that urine in the lab be sent for urinalysis and culture.  Continue Rocephin for now.  If this were to be found negative, we likely can just stop her Rocephin.  Mobilize with physical and Occupational Therapy.  Follow-up labs and orders have been placed for tomorrow.  We will plan to transition her to telemetry today.    Plan of care and goals reviewed with mulitdisciplinary/antibiotic stewardship team during rounds.   I discussed the patient's findings and my  recommendations with patient and nursing staff       Rahat Aguilera MD, [facility]  Pulmonology and Critical Care Medicine

## 2021-04-14 NOTE — PLAN OF CARE
Goal Outcome Evaluation:  Plan of Care Reviewed With: patient, spouse  Progress: improving  Outcome Summary: Pt more alert today, oriented to self and answering questions appropriately for the most part. T max 99.3. VSS. Insulin gtt discontinued and levemir with SS initiated. Pt tolerated 4L O2 via NC all day. UO adequate. BS swallow test passed and diet given. Appetite fair.

## 2021-04-15 NOTE — THERAPY EVALUATION
Patient Name: Julissa Kee  : 1950    MRN: [id number]                              Today's Date: 4/15/2021       Admit Date: 2021    Visit Dx:     ICD-10-CM ICD-9-CM   1. Altered mental status, unspecified altered mental status type  R41.82 780.97   2. Hypercarbia  R06.89 786.09   3. Morbid obesity (CMS/HCC)  E66.01 278.01     Patient Active Problem List   Diagnosis   • Anxiety   • Chronic obstructive pulmonary disease (CMS/HCC)   • T2DM   • Dyslipidemia   • Hypertension   • Hypothyroidism   • Morbid obesity with BMI of 50.0-59.9, adult   • Pickwickian syndrome   • Renal insufficiency   • Sacral wound   • Recurrent pneumonia   • Human metapneumovirus (hMPV) pneumonia   • Hypoxia   • Acute foot pain, left   • Left adrenal nodule   • ADEEL   • Hypomagnesemia   • Hyperglycemia   • Altered mental status   • Acute on chronic respiratory failure with hypoxia and hypercapnia (CMS/HCC)   • HHNC (hyperglycemic hyperosmolar nonketotic coma) (CMS/HCC)     Past Medical History:   Diagnosis Date   • Acute bronchitis    • Acute sinusitis    • Anxiety    • Bacterial conjunctivitis    • Chest pain    • COPD (chronic obstructive pulmonary disease) (CMS/HCC)    • Depression    • Diabetes mellitus (CMS/HCC)    • Dyspnea    • Dysuria    • Edema    • Elbow injury    • Elbow pain    • Eye drainage    • Fatigue    • Foot pain, right    • Humerus distal fracture    • Hyperlipidemia    • Hypertension    • Hypothyroidism    • Morbid obesity (CMS/HCC)    • Pickwickian syndrome (CMS/HCC)    • Pressure ulcer stage I    • Psoriasis    • SOB (shortness of breath)    • Symptoms of urinary tract infection    • Urinary frequency    • Urinary tract infection    • Vaginal candidiasis      Past Surgical History:   Procedure Laterality Date   • CERVICAL POLYPECTOMY     • HYSTERECTOMY       General Information     Row Name 04/15/21 1541          Physical Therapy Time and Intention    Document Type  evaluation  -EJ     Mode of Treatment   physical therapy  -     Row Name 04/15/21 1541          General Information    Patient Profile Reviewed  yes  -EJ     Prior Level of Function  max assist:;ADL's;min assist:;transfer;independent:;all household mobility;dependent:;home management ambulates short distances with RWx, uses lift recliner chair to sleep in.  -EJ     Existing Precautions/Restrictions  fall;oxygen therapy device and L/min;other (see comments) krishnamurthy catheter.  -EJ     Barriers to Rehab  medically complex;ineffective coping  -     Row Name 04/15/21 1541          Living Environment    Lives With  spouse dtr assists frequently  -     Row Name 04/15/21 1541          Cognition    Orientation Status (Cognition)  oriented to;person;verbal cues/prompts needed for orientation;time;place;disoriented to;situation PT states month, unable to state year. asks if at [facility], asks about situation/why she is here.  -     Row Name 04/15/21 1541          Safety Issues, Functional Mobility    Safety Issues Affecting Function (Mobility)  ability to follow commands;awareness of need for assistance;insight into deficits/self-awareness;safety precautions follow-through/compliance;safety precaution awareness;judgment  -EJ     Impairments Affecting Function (Mobility)  balance;cognition;coordination;endurance/activity tolerance;motor planning;pain;postural/trunk control;shortness of breath;strength  -EJ     Comment, Safety Issues/Impairments (Mobility)  Pt c/o sensitivity in BLE feet when sensation testing.  -EJ       User Key  (r) = Recorded By, (t) = Taken By, (c) = Cosigned By    Initials Name Provider Type    EJ Addie Woodall PT Physical Therapist        Mobility     Row Name 04/15/21 1544          Bed Mobility    Bed Mobility  rolling left;sidelying-sit;sit-supine  -EJ     Rolling Left [place] (Bed Mobility)  maximum assist (25% patient effort);2 person assist  -EJ     Sit-Supine [place] (Bed Mobility)  dependent (less than 25%  patient effort);2 person assist  -EJ     Sidelying-Sit [place] (Bed Mobility)  maximum assist (25% patient effort);2 person assist  -EJ     Assistive Device (Bed Mobility)  bed rails;head of bed elevated;draw sheet  -     Row Name 04/15/21 1544          Transfers    Comment (Transfers)  Pt had been up in chair with NSG prior, assisted to chair second time as pt agreeable. with sit to stand pt required elevated surface, cueing to push from bed rail. Pt uses momentum to stand.  -     Row Name 04/15/21 1544          Bed-Chair Transfer    Bed-Chair [place] (Transfers)  dependent (less than 25% patient effort)  -     Assistive Device (Bed-Chair Transfers)  lift device  -[facility] Name 04/15/21 1544          Sit-Stand Transfer    Sit-Stand [place] (Transfers)  2 person assist;moderate assist (50% patient effort)  -[facility] Name 04/15/21 1542          Gait/Stairs (Locomotion)    Comment (Gait/Stairs)  pt takes 3 steps with each LE toward EOB. Pt cued for an additional step but unable to perform and required assist to lower during stand>sit in bed.  -       User Key  (r) = Recorded By, (t) = Taken By, (c) = Cosigned By    Initials Name Provider Type     Addie Woodall PT Physical Therapist        Obj/Interventions    No documentation.       Goals/Plan     [facility] Name 04/15/21 0073          Transfer Goal 1 (PT)    Activity/Assistive Device (Transfer Goal 1, PT)  sit-to-stand/stand-to-sit;bed-to-chair/chair-to-bed  -EJ     [place] Level/Cues Needed (Transfer Goal 1, PT)  minimum assist (75% or more patient effort)  -EJ     Time Frame (Transfer Goal 1, PT)  long term goal (LTG);2 weeks  -     Row Name 04/15/21 5695          Gait Training Goal 1 (PT)    Activity/Assistive Device (Gait Training Goal 1, PT)  gait (walking locomotion)  -EJ     [place] Level (Gait Training Goal 1, PT)  minimum assist (75% or more patient effort)  -EJ     Distance (Gait Training Goal 1, PT)  25  -EJ     Time  Frame (Gait Training Goal 1, PT)  long term goal (LTG);2 weeks  -EJ       User Key  (r) = Recorded By, (t) = Taken By, (c) = Cosigned By    Initials Name Provider Type     Addie Woodall, PT Physical Therapist        Clinical Impression     Row Name 04/15/21 1549          Pain Scale: Numbers Pre/Post-Treatment    Pain Intervention(s)  Repositioned  -[facility] Name 04/15/21 1549          Pain Scale: FACES Pre/Post-Treatment    Pain: FACES Scale, Pretreatment  2-->hurts little bit  -EJ     Posttreatment Pain Rating  4-->hurts little more  -EJ     Pain Location - Side  Bilateral  -EJ     Pain Location  foot  -EJ     Row Name 04/15/21 1549          Plan of Care Review    Plan of Care Reviewed With  patient;spouse  -EJ     Progress  improving  -EJ     Outcome Summary  PT eval completed. Pt demonstrates BLE weakness, sensation changes BLE feet, anxiety, confusion, decreased range of motion associated with soft tissue approximation. Pt would benefit from STR.  -[facility] Name 04/15/21 1540          Therapy Assessment/Plan (PT)    Rehab Potential (PT)  good, to achieve stated therapy goals  -EJ     Criteria for Skilled Interventions Met (PT)  yes;skilled treatment is necessary  -     Row Name 04/15/21 1547          Vital Signs    Pre Systolic BP Rehab  -- VSS  -EJ     Pre SpO2 (%)  98  -EJ     O2 Delivery Pre Treatment  supplemental O2  -EJ     Intra SpO2 (%)  98  -EJ     O2 Delivery Intra Treatment  supplemental O2  -EJ     Post SpO2 (%)  99  -EJ     O2 Delivery Post Treatment  supplemental O2  -EJ     Pre Patient Position  Supine  -EJ     Intra Patient Position  Sitting  -EJ     Post Patient Position  Sitting  -EJ     Row Name 04/15/21 1544          Positioning and Restraints    Pre-Treatment Position  in bed  -EJ     Post Treatment Position  chair  -EJ     In Chair  call light within reach;encouraged to call for assist;exit alarm on;with family/caregiver;waffle cushion;notified nsg;on mechanical lift  sling;reclined  -       User Key  (r) = Recorded By, (t) = Taken By, (c) = Cosigned By    Initials Name Provider Type    Addie Saleem PT Physical Therapist        Outcome Measures     Row Name 04/15/21 1556          How much help from another person do you currently need...    Turning from your back to your side while in flat bed without using bedrails?  2  -EJ     Moving from lying on back to sitting on the side of a flat bed without bedrails?  2  -EJ     Moving to and from a bed to a chair (including a wheelchair)?  2  -EJ     Standing up from a chair using your arms (e.g., wheelchair, bedside chair)?  2  -EJ     Climbing 3-5 steps with a railing?  1  -EJ     To walk in hospital room?  1  -EJ     AM-PAC 6 Clicks Score (PT)  10  -EJ     Row Name 04/15/21 1556          Functional Assessment    Outcome Measure Options  AM-PAC 6 Clicks Basic Mobility (PT)  -       User Key  (r) = Recorded By, (t) = Taken By, (c) = Cosigned By    Initials Name Provider Type    Addie Saleem PT Physical Therapist        Physical Therapy Education                 Title: PT OT SLP Therapies (In Progress)     Topic: Physical Therapy (Done)     Point: Mobility training (Done)     Learning Progress Summary           Patient Acceptance, E, NR,VU by  at 4/15/2021 1557                   Point: Home exercise program (Done)     Learning Progress Summary           Patient Acceptance, E, NR,VU by  at 4/15/2021 1557                   Point: Body mechanics (Done)     Learning Progress Summary           Patient Acceptance, E, NR,VU by  at 4/15/2021 1557                   Point: Precautions (Done)     Learning Progress Summary           Patient Acceptance, E, NR,VU by  at 4/15/2021 1557                               User Key     Initials Effective Dates Name Provider Type Discipline     11/20/18 -  Addie Woodall PT Physical Therapist PT              PT Recommendation and Plan  Planned Therapy Interventions (PT):  balance training, bed mobility training, gait training, home exercise program, joint mobilization, lumbar stabilization, manual therapy techniques, motor coordination training, strengthening, stretching, ROM (range of motion), postural re-education, patient/family education, neuromuscular re-education, transfer training  Plan of Care Reviewed With: patient, spouse  Progress: improving  Outcome Summary: PT eval completed. Pt demonstrates BLE weakness, sensation changes BLE feet, anxiety, confusion, decreased range of motion associated with soft tissue approximation. Pt would benefit from STR.     Time Calculation:   PT Charges     Row Name 04/15/21 1558             Time Calculation    Start Time  1500  -EJ      PT Received On  04/15/21  -EJ      PT Goal Re-Cert Due Date  04/25/21  -EJ         Time Calculation- PT    Total Timed Code Minutes- PT  8 minute(s)  -EJ         Timed Charges    84398 - PT Therapeutic Activity Minutes  8  -EJ        User Key  (r) = Recorded By, (t) = Taken By, (c) = Cosigned By    Initials Name Provider Type     Addie Woodall, PT Physical Therapist        Therapy Charges for Today     Code Description Service Date Service Provider Modifiers Qty    93125378562 HC PT THERAPEUTIC ACT EA 15 MIN 4/15/2021 Addie Woodall, PT GP 1    86606555408 HC PT EVAL MOD COMPLEXITY 4 4/15/2021 Addie Woodall, PT GP 1    89521230141 HC PT THER SUPP EA 15 MIN 4/15/2021 Addie Woodall, PT GP 2          PT G-Codes  Outcome Measure Options: AM-PAC 6 Clicks Basic Mobility (PT)  AM-PAC 6 Clicks Score (PT): 10  AM-PAC 6 Clicks Score (OT): 10    Addie Ha, PT  4/15/2021

## 2021-04-15 NOTE — PLAN OF CARE
Goal Outcome Evaluation:  Plan of Care Reviewed With: patient  Progress: improving  Outcome Summary: VSS;  Tx to tele, report given to Imer HERNANDEZ @ [id number]

## 2021-04-15 NOTE — PROGRESS NOTES
[facility] Medicine Services  PROGRESS NOTE    Patient Name: Julissa Kee  : 1950  MRN: [id number]    Date of Admission: 2021  Primary Care Physician: Kee Hoffman MD    Subjective   Subjective     CC:  unarousable at home    HPI:  She has no complaints.   states she had similar admission in 2019, but since then has been doing okay at home.     ROS:  Pt has trouble answering ROS questions and looks to her .    Eating without difficulty, denies n/v  No abd pain  No dyspnea.   confirms she does not have BIPAP machine at home.  Uses oxyge at 3L RTC.   musc - bed to chair with walker, requires 's assistance to stand   - raghu was placed in ICU  Neuro - husb says 'sometimes you have to say things to her twice' - unclear answer re memory problems.      Objective   Objective     Vital Signs:   Temp:  [97.1 °F (36.2 °C)-99.3 °F (37.4 °C)] 98.7 °F (37.1 °C)  Heart Rate:  [59-81] 80  Resp:  [15-20] 18  BP: ()/() 178/77        Physical Exam:  Constitutional: Awake, alert but speech a bit slow.  Tries to answer questions but often looks to her  and asks him to answer.  On NC oxygen  Eyes: PER, sclerae anicteric, no conjunctival injection  HENT: NCAT, mucous membranes moist  Neck: Supple,  trachea midline  Respiratory: Clear to auscultation bilaterally, nonlabored respirations , diminished throughout (anterior exam)  Cardiovascular: RRR, no murmurs, rubs, or gallops  Gastrointestinal: Positive bowel sounds, soft, nontender, nondistended  Musculoskeletal: No bilateral ankle edema, no clubbing or cyanosis to extremities  Psychiatric: Appropriate affect, cooperative  Neurologic:  Cannot name hospital.  Cannot tell me if she wears a NIPPV mask at night.  Seems unable to tell me how much she walks and eats at home. DAVIES.  CN grossly intact.   Skin: No rashes visible       Results Reviewed:  Results from last 7 days   Lab Units  04/15/21  0536 04/14/21  0325 04/13/21  1621   WBC 10*3/mm3 9.29 11.75* 7.32   HEMOGLOBIN g/dL 11.0* 11.6* 12.6   HEMATOCRIT % 35.1 37.6 38.9   PLATELETS 10*3/mm3 191 198 204   PROCALCITONIN ng/mL  --   --  0.16     Results from last 7 days   Lab Units 04/14/21  0325 04/14/21  0028 04/13/21  1621   SODIUM mmol/L 137 140 132*   POTASSIUM mmol/L 4.5 4.3 5.2   CHLORIDE mmol/L 96* 96* 88*   CO2 mmol/L 32.0* 33.0* 34.0*   BUN mg/dL 27* 27* 25*   CREATININE mg/dL 1.07* 1.09* 1.14*   GLUCOSE mg/dL 176* 240* 546*   CALCIUM mg/dL 9.5 9.4 10.5   ALT (SGPT) U/L  --   --  13   AST (SGOT) U/L  --   --  12   TROPONIN T ng/mL  --   --  <0.010   PROBNP pg/mL  --   --  764.9     Estimated Creatinine Clearance: 60.9 mL/min (A) (by C-G formula based on SCr of 1.07 mg/dL (H)).    Microbiology Results Abnormal     Procedure Component Value - Date/Time    Blood Culture - Blood, Hand, Right [[id number]] Collected: 04/13/21 1615    Lab Status: Preliminary result Specimen: Blood from Hand, Right Updated: 04/14/21 1645     Blood Culture No growth at 24 hours    Blood Culture - Blood, Hand, Left [[id number]] Collected: 04/13/21 1621    Lab Status: Preliminary result Specimen: Blood from Hand, Left Updated: 04/14/21 1645     Blood Culture No growth at 24 hours    COVID PRE-OP / PRE-PROCEDURE SCREENING ORDER (NO ISOLATION) - Swab, Nasopharynx [[id number]]  (Normal) Collected: 04/13/21 1725    Lab Status: Final result Specimen: Swab from Nasopharynx Updated: 04/13/21 1839    Narrative:      The following orders were created for panel order COVID PRE-OP / PRE-PROCEDURE SCREENING ORDER (NO ISOLATION) - Swab, Nasopharynx.  Procedure                               Abnormality         Status                     ---------                               -----------         ------                     COVID-19,CEPHEID,SUNITA IN-...[[id number]]  Normal              Final result                 Please view results for these tests on the individual orders.     COVID-19,CEPHEID,SUNITA IN-HOUSE(OR EMERGENT/ADD-ON),NP SWAB IN TRANSPORT MEDIA 3-4 HR TAT - Swab, Nasopharynx [[id number]]  (Normal) Collected: 04/13/21 1725    Lab Status: Final result Specimen: Swab from Nasopharynx Updated: 04/13/21 1839     COVID19 Not Detected    Narrative:      Fact sheet for providers: https://www.fda.gov/media/675861/download     Fact sheet for patients: https://www.fda.gov/media/059466/download          Imaging Results (Last 24 Hours)     ** No results found for the last 24 hours. **          Results for orders placed during the hospital encounter of 12/28/19    Adult Transthoracic Echo Complete W/ Cont if Necessary Per Protocol    Interpretation Summary  · Left ventricular systolic function is normal. Estimated EF appears to be in the range of 61 - 65%.  · Left ventricular wall thickness is consistent with moderate concentric hypertrophy.  · Normal right ventricular wall thickness, systolic function and septal motion noted. Right ventricular cavity is borderline dilated.  · Trace tricuspid valve regurgitation is present. Estimated right ventricular systolic pressure from tricuspid regurgitation is normal (<35 mmHg)  · The valve appears trileaflet. The aortic valve is abnormal in structure. There is mild calcification of the aortic valve.Trace aortic valve regurgitation is present. No hemodynamically significant aortic valve stenosis is present.      I have reviewed the medications:  Scheduled Meds:aspirin, 81 mg, Oral, Daily  cefTRIAXone, 1 g, Intravenous, Nightly  insulin detemir, 10 Units, Subcutaneous, Daily  insulin lispro, 0-7 Units, Subcutaneous, 4x Daily AC & at Bedtime  levothyroxine, 100 mcg, Oral, Q AM  miconazole, , Topical, Q12H  pantoprazole, 40 mg, Intravenous, Daily      Continuous Infusions:   PRN Meds:.dextrose  •  dextrose  •  glucagon (human recombinant)  •  sodium chloride    Assessment/Plan   Assessment & Plan     Active Hospital Problems    Diagnosis  POA   • **HHNC  (hyperglycemic hyperosmolar nonketotic coma) (CMS/HCC) [E11.01]  Yes   • Left adrenal nodule [E27.8]  Yes   • ADEEL [N17.9]  Yes   • Hypomagnesemia [E83.42]  Yes   • Hyperglycemia [R73.9]  Yes   • Altered mental status [R41.82]  Yes   • Acute on chronic respiratory failure with hypoxia and hypercapnia (CMS/HCC) [J96.21, J96.22]  Yes   • Morbid obesity with BMI of 50.0-59.9, adult [E66.01, Z68.43]  Not Applicable   • Hypertension [I10]  Yes   • Hypothyroidism [E03.9]  Yes   • T2DM [E11.9]  Yes   • Dyslipidemia [E78.5]  Yes   • Pickwickian syndrome [E66.2]  Yes      Resolved Hospital Problems   No resolved problems to display.        Brief Hospital Course to date:  Julissa Kee is a 71 y.o. female with history of DM, HTN MO.  Came to ED with acute confusion and  not able to get her up.  Recent UTI.  Admitted to ICU with hypercapnic resp failure, pCO2 of 68, also gluc of 546    HHNK  - insulin gtt started in ICU, now on levemir/log - increase levemir today   - home med was sitagliptin.  A1C was 5.2 in 1/2020 - recheck  - prednisone given yesterday  - checks sugars rarely at home.     Hypercapnic resp failure  - improved w bipap   - consider need for home bipap  - bicarb 34 on admission   - per , no similar events since 2019     ?CHF  - probnp elevated  - echo 2020 showed LVH, EF 60s    Slow mentation  - TSH nl  - Mild anemia, macrocytic; B12 low in 1/2020, recheck   - CT head with atrophy commensurate with age    ?UTI  - UA drawn late, unremarkabe; empiric CTX ongoing     Debility  Krishnamurthy placed in ICU  - bed-to-chair at home  - consider IRF  - PT OT  - remove krishnamurthy soon - tomorrow     DVT Prophylaxis:        Disposition: I expect the patient to be discharged tbd - consider IRF.     CODE STATUS:   Code Status and Medical Interventions:   Ordered at: 04/13/21 2009     Limited Support to NOT Include:    Intubation     Code Status:    No CPR     Medical Interventions (Level of Support Prior to Arrest):     Limited       Yumiko Dalton MD  04/15/21

## 2021-04-15 NOTE — THERAPY EVALUATION
Patient Name: Julissa Kee  : 1950    MRN: [id number]                              Today's Date: 4/15/2021       Admit Date: 2021    Visit Dx:     ICD-10-CM ICD-9-CM   1. Altered mental status, unspecified altered mental status type  R41.82 780.97   2. Hypercarbia  R06.89 786.09   3. Morbid obesity (CMS/HCC)  E66.01 278.01     Patient Active Problem List   Diagnosis   • Anxiety   • Chronic obstructive pulmonary disease (CMS/HCC)   • T2DM   • Dyslipidemia   • Hypertension   • Hypothyroidism   • Morbid obesity with BMI of 50.0-59.9, adult   • Pickwickian syndrome   • Renal insufficiency   • Sacral wound   • Recurrent pneumonia   • Human metapneumovirus (hMPV) pneumonia   • Hypoxia   • Acute foot pain, left   • Left adrenal nodule   • ADEEL   • Hypomagnesemia   • Hyperglycemia   • Altered mental status   • Acute on chronic respiratory failure with hypoxia and hypercapnia (CMS/HCC)   • HHNC (hyperglycemic hyperosmolar nonketotic coma) (CMS/HCC)     Past Medical History:   Diagnosis Date   • Acute bronchitis    • Acute sinusitis    • Anxiety    • Bacterial conjunctivitis    • Chest pain    • COPD (chronic obstructive pulmonary disease) (CMS/HCC)    • Depression    • Diabetes mellitus (CMS/HCC)    • Dyspnea    • Dysuria    • Edema    • Elbow injury    • Elbow pain    • Eye drainage    • Fatigue    • Foot pain, right    • Humerus distal fracture    • Hyperlipidemia    • Hypertension    • Hypothyroidism    • Morbid obesity (CMS/HCC)    • Pickwickian syndrome (CMS/HCC)    • Pressure ulcer stage I    • Psoriasis    • SOB (shortness of breath)    • Symptoms of urinary tract infection    • Urinary frequency    • Urinary tract infection    • Vaginal candidiasis      Past Surgical History:   Procedure Laterality Date   • CERVICAL POLYPECTOMY     • HYSTERECTOMY       General Information     Row Name 04/15/21 1107          OT Time and Intention    Document Type  evaluation  -ZACHARIAH     Mode of Treatment  occupational  therapy  -ZACHARIAH     Row Name 04/15/21 1107          General Information    Patient Profile Reviewed  yes  -ZACHARIAH     Prior Level of Function  max assist:;ADL's;min assist:;transfer;independent:;all household mobility;dependent:;home management Pt requires assist for LBD, bathing, and toileting hygiene at baseline; ambulates short distances using RW  -ZACHARIAH     Existing Precautions/Restrictions  fall;oxygen therapy device and L/min;other (see comments) Mccarty catheter  -ZACHARIAH     Barriers to Rehab  medically complex;ineffective coping  -ZACHARIAH     Row Name 04/15/21 1107          Occupational Profile    Environmental Supports and Barriers (Occupational Profile)  Pt has HD BSC, hospital bed, w/c, glucometer, RW, O2, sleeps in a recliner at home.  -ZACHARIAH     Patient Goals (Occupational Profile)  Spouse's goal is for pt to be able to transfer in and out of recliner, transfer on/off BSC, and walk household distance.  -ZACHARIAH     Row Name 04/15/21 1107          Living Environment    Lives With  spouse  -ZACHARIAH     Row Name 04/15/21 1107          Stairs Within Home, Primary    Number of Stairs, Within Home, Primary  none  -ZACHARIAH     Row Name 04/15/21 1107          Cognition    Orientation Status (Cognition)  oriented to;person;disoriented to;place;situation;time  -ZACHARIAH     Row Name 04/15/21 1107          Safety Issues, Functional Mobility    Safety Issues Affecting Function (Mobility)  ability to follow commands;friction/shear risk;insight into deficits/self-awareness;judgment;problem-solving;safety precaution awareness;sequencing abilities  -ZACHARIAH     Impairments Affecting Function (Mobility)  balance;cognition;coordination;endurance/activity tolerance;motor planning;pain;postural/trunk control;shortness of breath;strength  -ZACHARIAH     Cognitive Impairments, Mobility Safety/Performance  attention;awareness, need for assistance;insight into deficits/self-awareness;judgment;problem-solving/reasoning;safety precaution awareness;sequencing abilities  -ZACHARIAH       User  Key  (r) = Recorded By, (t) = Taken By, (c) = Cosigned By    Initials Name Provider Type    Sadie Cross OT Occupational Therapist          Mobility/ADL's     Row Name 04/15/21 1113          Bed Mobility    Bed Mobility  rolling left;rolling right  -ZACHARIAH     Rolling Left [place] (Bed Mobility)  maximum assist (25% patient effort);2 person assist  -ZACHARIAH     Rolling Right [place] (Bed Mobility)  maximum assist (25% patient effort);2 person assist  -ZACHARIAH     Bed Mobility, Safety Issues  cognitive deficits limit understanding;decreased use of arms for pushing/pulling;decreased use of legs for bridging/pushing;impaired trunk control for bed mobility  -ZACHARIAH     Assistive Device (Bed Mobility)  bed rails;draw sheet  -ZACHARIAH     Comment (Bed Mobility)  Pt rolled L/R for repositioning in supine, for offloading R hip for pressure relief  -     Row Name 04/15/21 1113          Transfers    Comment (Transfers)  Pt declined transfer to EOB d/t fear of pulling on krishnamurthy catheter; transfer to chair deferred, per nursing d/t pressure wound  -     Row Name 04/15/21 1113          Activities of Daily Living    BADL Assessment/Intervention  grooming  -[facility] Name 04/15/21 1113          Grooming Assessment/Training    [place] Level (Grooming)  wash face, hands;supervision;verbal cues  -ZACHARIAH     Position (Grooming)  sitting up in bed  -ZACHARIAH     Comment (Grooming)  cues to initiate; cues for thoroughness; pt declined oral care  -ZACHARIAH       User Key  (r) = Recorded By, (t) = Taken By, (c) = Cosigned By    Initials Name Provider Type    Sadie Cross OT Occupational Therapist        Obj/Interventions     Row Name 04/15/21 1118          Sensory Assessment (Somatosensory)    Sensory Assessment (Somatosensory)  UE sensation intact  -     Row Name 04/15/21 1118          Vision Assessment/Intervention    Visual Impairment/Limitations  WFL;corrective lenses for distance  -     Row Name 04/15/21 1118          Range of Motion  Comprehensive    General Range of Motion  bilateral upper extremity ROM WFL  -[facility] Name 04/15/21 1118          Strength Comprehensive (MMT)    Comment, General Manual Muscle Testing (MMT) Assessment  BUE's grossly 4/5  -       User Key  (r) = Recorded By, (t) = Taken By, (c) = Cosigned By    Initials Name Provider Type    Sadie Cross OT Occupational Therapist        Goals/Plan     Row Name 04/15/21 1133          Transfer Goal 1 (OT)    Activity/Assistive Device (Transfer Goal 1, OT)  sit-to-stand/stand-to-sit;bed-to-chair/chair-to-bed;toilet  -ZACHARIAH     Clint Level/Cues Needed (Transfer Goal 1, OT)  minimum assist (75% or more patient effort)  -ZACHARIAH     Time Frame (Transfer Goal 1, OT)  long term goal (LTG);by discharge  -ZACHARIAH     Progress/Outcome (Transfer Goal 1, OT)  goal ongoing  -ZACHARIAH     Row Name 04/15/21 1133          Toileting Goal 1 (OT)    Activity/Device (Toileting Goal 1, OT)  adjust/manage clothing;perform perineal hygiene;commode, bedside without drop arms  -ZACHARIAH     Clint Level/Cues Needed (Toileting Goal 1, OT)  moderate assist (50-74% patient effort);1 person assist  -ZACHARIAH     Time Frame (Toileting Goal 1, OT)  long term goal (LTG);by discharge  -ZACHARIAH     Progress/Outcome (Toileting Goal 1, OT)  goal ongoing  -ZACHARIAH     Row Name 04/15/21 7514          Therapy Assessment/Plan (OT)    Planned Therapy Interventions (OT)  activity tolerance training;BADL retraining;functional balance retraining;occupation/activity based interventions;ROM/therapeutic exercise;strengthening exercise;transfer/mobility retraining  -       User Key  (r) = Recorded By, (t) = Taken By, (c) = Cosigned By    Initials Name Provider Type    Sadie Cross OT Occupational Therapist        Clinical Impression     [facility] Name 04/15/21 1125          Pain Assessment    Additional Documentation  Pain Scale: FACES Pre/Post-Treatment (Group)  -ZACHARIAH     Row Name 04/15/21 1125          Pain Scale: Numbers Pre/Post-Treatment     Pain Intervention(s)  Repositioned  -ZACHARIAH     Row Name 04/15/21 1125          Pain Scale: FACES Pre/Post-Treatment    Pain: FACES Scale, Pretreatment  2-->hurts little bit  -ZACHARIAH     Posttreatment Pain Rating  4-->hurts little more  -ZACHARIAH     Pain Location - Side  Bilateral  -ZACHARIAH     Pain Location  foot  -ZACHARIAH     Row Name 04/15/21 1125          Plan of Care Review    Plan of Care Reviewed With  patient;spouse  -ZACHARIAH     Outcome Summary  OT eval completed. Pt oriented to self, presents with weakness and activity intolerance limiting ADL's. Pt rolled L/R in supine with MaxAx2 for repositioning/pressure relief, declined attempt to transfer to EOB d/t fear of pulling on krishnamurthy catheter. Pt Dep for LBD, bathing and toileting at baseline. Spouse reports pt ambulates short distances using RW with assist for STS. OT to follow to progress self-care as able. Recommend IRF at discharge to promote independence and reduce burden of care.  -     Row Name 04/15/21 1125          Therapy Assessment/Plan (OT)    Rehab Potential (OT)  fair, will monitor progress closely  -     Criteria for Skilled Therapeutic Interventions Met (OT)  yes;meets criteria;skilled treatment is necessary  -     Therapy Frequency (OT)  daily  -ZACHARIAH     Row Name 04/15/21 1125          Therapy Plan Review/Discharge Plan (OT)    Anticipated Discharge Disposition (OT)  inpatient rehabilitation facility  -     Row Name 04/15/21 1125          Vital Signs    Pre Systolic BP Rehab  151  -ZACHARIAH     Pre Treatment Diastolic BP  57  -ZACHARIAH     Pretreatment Heart Rate (beats/min)  84  -ZACHARIAH     Pre SpO2 (%)  97  -ZACHARIAH     O2 Delivery Pre Treatment  nasal cannula  -ZACHARIAH     O2 Delivery Intra Treatment  nasal cannula  -ZACHARIAH     O2 Delivery Post Treatment  nasal cannula  -ZACHARIAH     Pre Patient Position  Supine  -ZACHARIAH     Intra Patient Position  Side Lying  -ZACHARIAH     Post Patient Position  Supine  -ZACHARIAH     Row Name 04/15/21 1125          Positioning and Restraints    Pre-Treatment Position  in bed   -ZACHARIAH     Post Treatment Position  bed  -ZACHARIAH     In Bed  notified nsg;fowlers;call light within reach;encouraged to call for assist;with family/caregiver  -ZACHARIAH       User Key  (r) = Recorded By, (t) = Taken By, (c) = Cosigned By    Initials Name Provider Type    Sadie Cross OT Occupational Therapist        Outcome Measures     Row Name 04/15/21 1134          How much help from another is currently needed...    Putting on and taking off regular lower body clothing?  1  -ZACHARIAH     Bathing (including washing, rinsing, and drying)  1  -ZACHARIAH     Toileting (which includes using toilet bed pan or urinal)  1  -ZACHARIAH     Putting on and taking off regular upper body clothing  1  -ZACHARIAH     Taking care of personal grooming (such as brushing teeth)  3  -ZACHARIAH     Eating meals  3  -ZACHARIAH     AM-PAC 6 Clicks Score (OT)  10  -ZACHARIAH     Row Name 04/15/21 1134          Functional Assessment    Outcome Measure Options  AM-PAC 6 Clicks Daily Activity (OT)  -ZACHARIAH       User Key  (r) = Recorded By, (t) = Taken By, (c) = Cosigned By    Initials Name Provider Type    Sadie Cross OT Occupational Therapist        Occupational Therapy Education                 Title: PT OT SLP Therapies (In Progress)     Topic: Occupational Therapy (In Progress)     Point: ADL training (In Progress)     Description:   Instruct learner(s) on proper safety adaptation and remediation techniques during self care or transfers.   Instruct in proper use of assistive devices.              Learning Progress Summary           Patient Acceptance, E, NL,NR by ZACHARIAH at 4/15/2021 1135    Comment: Role of OT; necessity of adequate hydration and position change throughout the day; incentive spirometer use   Family Acceptance, E, NL,NR by ZACHARIAH at 4/15/2021 1135    Comment: Role of OT; necessity of adequate hydration and position change throughout the day; incentive spirometer use                   Point: Home exercise program (Not Started)     Description:   Instruct learner(s) on appropriate  technique for monitoring, assisting and/or progressing therapeutic exercises/activities.              Learner Progress:  Not documented in this visit.          Point: Precautions (In Progress)     Description:   Instruct learner(s) on prescribed precautions during self-care and functional transfers.              Learning Progress Summary           Patient Acceptance, E, NL,NR by ZACHARIAH at 4/15/2021 1135    Comment: Role of OT; necessity of adequate hydration and position change throughout the day; incentive spirometer use   Family Acceptance, E, NL,NR by ZACHARIAH at 4/15/2021 1135    Comment: Role of OT; necessity of adequate hydration and position change throughout the day; incentive spirometer use                   Point: Body mechanics (In Progress)     Description:   Instruct learner(s) on proper positioning and spine alignment during self-care, functional mobility activities and/or exercises.              Learning Progress Summary           Patient Acceptance, E, NL,NR by ZACHARIAH at 4/15/2021 1135    Comment: Role of OT; necessity of adequate hydration and position change throughout the day; incentive spirometer use   Family Acceptance, E, NL,NR by ZACHARIAH at 4/15/2021 1135    Comment: Role of OT; necessity of adequate hydration and position change throughout the day; incentive spirometer use                               User Key     Initials Effective Dates Name Provider Type Discipline     02/14/20 -  Sadie Whatley OT Occupational Therapist OT              OT Recommendation and Plan  Planned Therapy Interventions (OT): activity tolerance training, BADL retraining, functional balance retraining, occupation/activity based interventions, ROM/therapeutic exercise, strengthening exercise, transfer/mobility retraining  Therapy Frequency (OT): daily  Plan of Care Review  Plan of Care Reviewed With: patient, spouse  Outcome Summary: OT eval completed. Pt oriented to self, presents with weakness and activity intolerance limiting  ADL's. Pt rolled L/R in supine with MaxAx2 for repositioning/pressure relief, declined attempt to transfer to EOB d/t fear of pulling on krishnamurthy catheter. Pt Dep for LBD, bathing and toileting at baseline. Spouse reports pt ambulates short distances using RW with assist for STS. OT to follow to progress self-care as able. Recommend IRF at discharge to promote independence and reduce burden of care.     Time Calculation:   Time Calculation- OT     Row Name 04/15/21 1023             Time Calculation- OT    OT Start Time  1023  -ZAHCARIAH      OT Received On  04/15/21  -ZACHARIAH      OT Goal Re-Cert Due Date  04/25/21  -ZACHARIAH        User Key  (r) = Recorded By, (t) = Taken By, (c) = Cosigned By    Initials Name Provider Type    Sadie Cross OT Occupational Therapist        Therapy Charges for Today     Code Description Service Date Service Provider Modifiers Qty    [id number]  OT EVAL MOD COMPLEXITY 4 4/15/2021 Sadie Whatley OT GO 1               Sadie Whatley OT  4/15/2021

## 2021-04-15 NOTE — PLAN OF CARE
Goal Outcome Evaluation:  Plan of Care Reviewed With: patient     Outcome Summary: Patient confused and anxious. Vss, 4L/ bipap at 40%, Sr.Mccarty cath in place. Patient up with lift. Will continue to monitor.

## 2021-04-15 NOTE — PLAN OF CARE
Goal Outcome Evaluation:  Plan of Care Reviewed With: patient, spouse     Outcome Summary: OT eval completed. Pt oriented to self, presents with weakness and activity intolerance limiting ADL's. Pt rolled L/R in supine with MaxAx2 for repositioning/pressure relief, declined attempt to transfer to EOB d/t fear of pulling on krishnamurthy catheter. Pt Dep for LBD, bathing and toileting at baseline. Spouse reports pt ambulates short distances using RW with assist for STS. OT to follow to progress self-care as able. Recommend IRF at discharge to promote independence and reduce burden of care.

## 2021-04-15 NOTE — PLAN OF CARE
Goal Outcome Evaluation:  Plan of Care Reviewed With: patient, spouse  Progress: improving  Outcome Summary: PT eval completed. Pt demonstrates BLE weakness, sensation changes BLE feet, anxiety, confusion, decreased range of motion associated with soft tissue approximation. Pt would benefit from STR.

## 2021-04-16 NOTE — PLAN OF CARE
Goal Outcome Evaluation:  Plan of Care Reviewed With: patient  Progress: no change  Outcome Summary: PT up to chair most of the day. VSS. Lul d/c'ed per MD order. MG replaced. Need rehab but patient hopes to just go home with spouse.

## 2021-04-16 NOTE — CASE MANAGEMENT/SOCIAL WORK
Continued Stay Note  [facility]     Patient Name: Julissa Kee  MRN: [id number]  Today's Date: 4/16/2021    Admit Date: 4/13/2021    Discharge Plan     Row Name 04/16/21 1315       Plan    Plan  Home with HH or IRF    Plan Comments  Met & spoke with Mrs Kee at the bedside. Up in chair with lift pad underneath. PT note indicated that she took 3 steps forward/backward. Discussed PT's rec for IRF. She stated multiple times she just wants to go home. I informed her it would be short-term and ultimately she refuses the recommendation. I asked to call her spouse, which I did, and left a message. She's not being forthcoming during conversation and asked me to call her daughter. She didn't provide the daughter's number and it's not on file. Per morning rounds, Dr Dalton suggested that Mrs Kee needs a BiPAP for home due to increased CO2. Called [facility] for direction and was advised to obtain an order for a Trilogy. Once the order form is signed by Dr Dalton, I will fax that and the ABG's. Mrs Kee has not been compliant with wearing a BiPAP in the hospital and maintains sleepiness during the day. In consideration of her likely agreeing to rehab, I placed an OT consult. She is not medically ready for discharge. CM is following.    Final Discharge Disposition Code  30 - still a patient        Discharge Codes    No documentation.       Expected Discharge Date and Time     Expected Discharge Date Expected Discharge Time    Apr 19, 2021             Ariadna Mercedes RN

## 2021-04-16 NOTE — PROGRESS NOTES
[facility] Medicine Services  PROGRESS NOTE    Patient Name: Julissa Kee  : 1950  MRN: [id number]    Date of Admission: 2021  Primary Care Physician: Kee Hoffman MD    Subjective   Subjective     CC:  unarousable at home    HPI:  No complaints, slept well. Currently working with PT.     ROS:   No fevers or chills  No dyspnea or cough  No n/v  Mccarty still in    Objective   Objective     Vital Signs:   Temp:  [98.5 °F (36.9 °C)-98.8 °F (37.1 °C)] 98.6 °F (37 °C)  Heart Rate:  [] 68  Resp:  [18] 18  BP: (122-180)/(52-77) 180/67        Physical Exam:  Constitutional: Awake, alert, mentatioin less slow today.    Eyes: PER, sclerae anicteric, no conjunctival injection  HENT: NCAT, mucous membranes moist  Neck: Supple,  trachea midline  Respiratory: Clear to auscultation bilaterally, nonlabored respirations , diminished throughout, no wheeze  Cardiovascular: RRR, no murmurs, rubs, or gallops  Gastrointestinal: Positive bowel sounds, soft, nontender, nondistended  Musculoskeletal: No bilateral ankle edema, no clubbing or cyanosis to extremities  Psychiatric: Appropriate affect, cooperative  Neurologic: grossly intact, DAVIES   CN grossly intact.   Skin: No rashes visible       Results Reviewed:  Results from last 7 days   Lab Units 04/15/21  0536 21  0325 21  1621   WBC 10*3/mm3 9.29 11.75* 7.32   HEMOGLOBIN g/dL 11.0* 11.6* 12.6   HEMATOCRIT % 35.1 37.6 38.9   PLATELETS 10*3/mm3 191 198 204   PROCALCITONIN ng/mL  --   --  0.16     Results from last 7 days   Lab Units 04/15/21  1152 21  0325 21  0028 21  1621   SODIUM mmol/L 138 137 140 132*   POTASSIUM mmol/L 3.6 4.5 4.3 5.2   CHLORIDE mmol/L 96* 96* 96* 88*   CO2 mmol/L 36.0* 32.0* 33.0* 34.0*   BUN mg/dL 26* 27* 27* 25*   CREATININE mg/dL 0.90 1.07* 1.09* 1.14*   GLUCOSE mg/dL 265* 176* 240* 546*   CALCIUM mg/dL 9.7 9.5 9.4 10.5   ALT (SGPT) U/L  --   --   --  13   AST (SGOT) U/L  --   --    --  12   TROPONIN T ng/mL  --   --   --  <0.010   PROBNP pg/mL  --   --   --  764.9     Estimated Creatinine Clearance: 73.9 mL/min (by C-G formula based on SCr of 0.9 mg/dL).    Microbiology Results Abnormal     Procedure Component Value - Date/Time    Blood Culture - Blood, Hand, Left [[id number]] Collected: 04/13/21 1621    Lab Status: Preliminary result Specimen: Blood from Hand, Left Updated: 04/15/21 1645     Blood Culture No growth at 2 days    Blood Culture - Blood, Hand, Right [[id number]] Collected: 04/13/21 1615    Lab Status: Preliminary result Specimen: Blood from Hand, Right Updated: 04/15/21 1645     Blood Culture No growth at 2 days    Urine Culture - Urine, Urine, Catheter [[id number]]  (Normal) Collected: 04/13/21 2005    Lab Status: Final result Specimen: Urine, Catheter Updated: 04/15/21 1008     Urine Culture No growth    COVID PRE-OP / PRE-PROCEDURE SCREENING ORDER (NO ISOLATION) - Swab, Nasopharynx [[id number]]  (Normal) Collected: 04/13/21 1725    Lab Status: Final result Specimen: Swab from Nasopharynx Updated: 04/13/21 1839    Narrative:      The following orders were created for panel order COVID PRE-OP / PRE-PROCEDURE SCREENING ORDER (NO ISOLATION) - Swab, Nasopharynx.  Procedure                               Abnormality         Status                     ---------                               -----------         ------                     COVID-19,CEPHEID,SUNITA IN-...[[id number]]  Normal              Final result                 Please view results for these tests on the individual orders.    COVID-19,CEPHEID,SUNITA IN-HOUSE(OR EMERGENT/ADD-ON),NP SWAB IN TRANSPORT MEDIA 3-4 HR TAT - Swab, Nasopharynx [[id number]]  (Normal) Collected: 04/13/21 1725    Lab Status: Final result Specimen: Swab from Nasopharynx Updated: 04/13/21 1839     COVID19 Not Detected    Narrative:      Fact sheet for providers: https://www.fda.gov/media/228592/download     Fact sheet for patients:  https://www.fda.gov/media/037382/download          Imaging Results (Last 24 Hours)     ** No results found for the last 24 hours. **          Results for orders placed during the hospital encounter of 12/28/19    Adult Transthoracic Echo Complete W/ Cont if Necessary Per Protocol    Interpretation Summary  · Left ventricular systolic function is normal. Estimated EF appears to be in the range of 61 - 65%.  · Left ventricular wall thickness is consistent with moderate concentric hypertrophy.  · Normal right ventricular wall thickness, systolic function and septal motion noted. Right ventricular cavity is borderline dilated.  · Trace tricuspid valve regurgitation is present. Estimated right ventricular systolic pressure from tricuspid regurgitation is normal (<35 mmHg)  · The valve appears trileaflet. The aortic valve is abnormal in structure. There is mild calcification of the aortic valve.Trace aortic valve regurgitation is present. No hemodynamically significant aortic valve stenosis is present.      I have reviewed the medications:  Scheduled Meds:aspirin, 81 mg, Oral, Daily  insulin detemir, 28 Units, Subcutaneous, Daily  insulin lispro, 0-7 Units, Subcutaneous, 4x Daily AC & at Bedtime  levothyroxine, 100 mcg, Oral, Q AM  metFORMIN, 500 mg, Oral, Daily With Breakfast  miconazole, , Topical, Q12H  pantoprazole, 40 mg, Intravenous, Daily      Continuous Infusions:   PRN Meds:.dextrose  •  dextrose  •  glucagon (human recombinant)  •  potassium & sodium phosphates **OR** potassium & sodium phosphates  •  sodium chloride    Assessment/Plan   Assessment & Plan     Active Hospital Problems    Diagnosis  POA   • **HHNC (hyperglycemic hyperosmolar nonketotic coma) (CMS/HCC) [E11.01]  Yes   • Left adrenal nodule [E27.8]  Yes   • ADEEL [N17.9]  Yes   • Hypomagnesemia [E83.42]  Yes   • Hyperglycemia [R73.9]  Yes   • Altered mental status [R41.82]  Yes   • Acute on chronic respiratory failure with hypoxia and hypercapnia  (CMS/[facility]) [J96.21, J96.22]  Yes   • Morbid obesity with BMI of 50.0-59.9, adult [E66.01, Z68.43]  Not Applicable   • Hypertension [I10]  Yes   • Hypothyroidism [E03.9]  Yes   • T2DM [E11.9]  Yes   • Dyslipidemia [E78.5]  Yes   • Pickwickian syndrome [E66.2]  Yes      Resolved Hospital Problems   No resolved problems to display.        Brief Hospital Course to date:  Julissa Kee is a 71 y.o. female with history of DM, HTN MO.  Came to ED with acute confusion and  not able to get her up.  Recent UTI.  Admitted to ICU with hypercapnic resp failure, pCO2 of 68, also gluc of 546    HHNK  - insulin gtt started in ICU, now on levemir/log - increase levemir today   - home med was sitagliptin.  A1C was 5.2 in 1/2020 - recheck  - prednisone given 4/14  - checks sugars rarely at home.   - increase levemir.  Add metformin.      Hypercapnic resp failure  - improved w bipap   - consider need for home bipap.  ABG this morning w pCO2 65.   - bicarb 34 on admission   - per , no similar events since 2019     ?CHF  - probnp elevated  - echo 2020 showed LVH, EF 60s    Slow mentation  - TSH nl  - Mild anemia, macrocytic; B12 wnl  - CT head with atrophy commensurate with age    ?UTI  - UA drawn late, unremarkabe; empiric CTX ongoing     Debility  Krishnamurthy placed in ICU  - bed-to-chair at home  - consider IRF  - PT OT  - remove krishnamurthy today     DVT Prophylaxis:        Disposition: I expect the patient to be discharged tbd - consider IRF. If she goes home, would get bipap for QHS use.      CODE STATUS:   Code Status and Medical Interventions:   Ordered at: 04/13/21 2009     Limited Support to NOT Include:    Intubation     Code Status:    No CPR     Medical Interventions (Level of Support Prior to Arrest):    Limited       Yumiko Dalton MD  04/16/21

## 2021-04-16 NOTE — THERAPY TREATMENT NOTE
Patient Name: Julissa Kee  : 1950    MRN: [id number]                              Today's Date: 2021       Admit Date: 2021    Visit Dx:     ICD-10-CM ICD-9-CM   1. Altered mental status, unspecified altered mental status type  R41.82 780.97   2. Hypercarbia  R06.89 786.09   3. Morbid obesity (CMS/HCC)  E66.01 278.01     Patient Active Problem List   Diagnosis   • Anxiety   • Chronic obstructive pulmonary disease (CMS/HCC)   • T2DM   • Dyslipidemia   • Hypertension   • Hypothyroidism   • Morbid obesity with BMI of 50.0-59.9, adult   • Pickwickian syndrome   • Renal insufficiency   • Sacral wound   • Recurrent pneumonia   • Human metapneumovirus (hMPV) pneumonia   • Hypoxia   • Acute foot pain, left   • Left adrenal nodule   • ADEEL   • Hypomagnesemia   • Hyperglycemia   • Altered mental status   • Acute on chronic respiratory failure with hypoxia and hypercapnia (CMS/HCC)   • HHNC (hyperglycemic hyperosmolar nonketotic coma) (CMS/HCC)     Past Medical History:   Diagnosis Date   • Acute bronchitis    • Acute sinusitis    • Anxiety    • Bacterial conjunctivitis    • Chest pain    • COPD (chronic obstructive pulmonary disease) (CMS/HCC)    • Depression    • Diabetes mellitus (CMS/HCC)    • Dyspnea    • Dysuria    • Edema    • Elbow injury    • Elbow pain    • Eye drainage    • Fatigue    • Foot pain, right    • Humerus distal fracture    • Hyperlipidemia    • Hypertension    • Hypothyroidism    • Morbid obesity (CMS/HCC)    • Pickwickian syndrome (CMS/HCC)    • Pressure ulcer stage I    • Psoriasis    • SOB (shortness of breath)    • Symptoms of urinary tract infection    • Urinary frequency    • Urinary tract infection    • Vaginal candidiasis      Past Surgical History:   Procedure Laterality Date   • CERVICAL POLYPECTOMY     • HYSTERECTOMY       General Information     Row Name 21 1051          Physical Therapy Time and Intention    Document Type  evaluation  -EJ     Mode of Treatment   physical therapy  -[facility] Name 04/16/21 1051          General Information    Patient Profile Reviewed  yes  -EJ     Existing Precautions/Restrictions  fall;oxygen therapy device and L/min;other (see comments) krishnamurthy catheter.  -[facility] Name 04/16/21 1051          Cognition    Orientation Status (Cognition)  oriented to;person;place confusion in conversation persists  -[facility] Name 04/16/21 1051          Safety Issues, Functional Mobility    Safety Issues Affecting Function (Mobility)  insight into deficits/self-awareness;safety precautions follow-through/compliance;ability to follow commands;awareness of need for assistance;judgment;problem-solving  -EJ     Impairments Affecting Function (Mobility)  balance;cognition;coordination;endurance/activity tolerance;motor planning;pain;postural/trunk control;shortness of breath;strength  -EJ     Cognitive Impairments, Mobility Safety/Performance  insight into deficits/self-awareness;judgment;attention;safety precaution follow-through;problem-solving/reasoning;awareness, need for assistance  -       User Key  (r) = Recorded By, (t) = Taken By, (c) = Cosigned By    Initials Name Provider Type    EJ Addie Woodall PT Physical Therapist        Mobility     Row Name 04/16/21 1053          Bed Mobility    Bed Mobility  rolling left;rolling right  -EJ     Rolling Left [place] (Bed Mobility)  2 person assist;maximum assist (25% patient effort)  -EJ     Rolling Right [place] (Bed Mobility)  minimum assist (75% patient effort)  -EJ     Comment (Bed Mobility)  rolling for placement of sling, vineet care.  -[facility] Name 04/16/21 1053          Transfers    Comment (Transfers)  Pt assisted to chair (has recliner lift chair she sleeps in at home). Pt stands from chair, pushes from arm rests.  -[facility] Name 04/16/21 1053          Bed-Chair Transfer    Bed-Chair [place] (Transfers)  dependent (less than 25% patient effort)  -     Assistive Device  (Bed-Chair Transfers)  lift device  -EJ     Row Name 04/16/21 1053          Sit-Stand Transfer    Sit-Stand [place] (Transfers)  minimum assist (75% patient effort);2 person assist  -EJ     Assistive Device (Sit-Stand Transfers)  walker, front-wheeled  -EJ     Row Name 04/16/21 1053          Gait/Stairs (Locomotion)    [place] Level (Gait)  contact guard;2 person assist  -EJ     Assistive Device (Gait)  walker, front-wheeled  -EJ     Distance in Feet (Gait)  3 feet forward then 3 feet backward.  -EJ     Deviations/Abnormal Patterns (Gait)  festinating/shuffling  -EJ     Comment (Gait/Stairs)  pt requires extended time to follow through with cues for steps forward, improved when cued to step back.  -EJ       User Key  (r) = Recorded By, (t) = Taken By, (c) = Cosigned By    Initials Name Provider Type    Addie Saleem PT Physical Therapist        Obj/Interventions    No documentation.       Goals/Plan    No documentation.       Clinical Impression     Row Name 04/16/21 1100          Pain Scale: Numbers Pre/Post-Treatment    Pain Intervention(s)  Repositioned;Ambulation/increased activity  -EJ     Row Name 04/16/21 1100          Pain Scale: FACES Pre/Post-Treatment    Pain: FACES Scale, Pretreatment  2-->hurts little bit  -EJ     Pain Location - Side  Bilateral  -EJ     Pain Location  foot  -EJ     Row Name 04/16/21 1100          Plan of Care Review    Plan of Care Reviewed With  patient  -EJ     Progress  improving  -EJ     Outcome Summary  Pt ambulates forward/backward 3 ft with RWx with CGAx2. Pt has improved independence with rolling compared to last session and requires verbal cues for scooting forward and back in chair. PT to continue during stay. Pt would benefit from STR, but seems hesitent.  -EJ     Row Name 04/16/21 1100          Positioning and Restraints    Pre-Treatment Position  in bed  -EJ     Post Treatment Position  chair  -EJ     In Chair  notified nsg;call light within  reach;encouraged to call for assist;exit alarm on;compression device;waffle cushion;on mechanical lift sling  -       User Key  (r) = Recorded By, (t) = Taken By, (c) = Cosigned By    Initials Name Provider Type    Addie Saleem PT Physical Therapist        Outcome Measures     Row Name 04/16/21 1109          How much help from another person do you currently need...    Turning from your back to your side while in flat bed without using bedrails?  2  -EJ     Moving from lying on back to sitting on the side of a flat bed without bedrails?  2  -EJ     Moving to and from a bed to a chair (including a wheelchair)?  3  -EJ     Standing up from a chair using your arms (e.g., wheelchair, bedside chair)?  3  -EJ     Climbing 3-5 steps with a railing?  1  -EJ     To walk in hospital room?  2  -EJ     AM-PAC 6 Clicks Score (PT)  13  -EJ     Row Name 04/16/21 1109          Functional Assessment    Outcome Measure Options  AM-PAC 6 Clicks Basic Mobility (PT)  -       User Key  (r) = Recorded By, (t) = Taken By, (c) = Cosigned By    Initials Name Provider Type    Addie Saleem PT Physical Therapist        Physical Therapy Education                 Title: PT OT SLP Therapies (In Progress)     Topic: Physical Therapy (In Progress)     Point: Mobility training (In Progress)     Learning Progress Summary           Patient Acceptance, E, NR by JAREN at 4/16/2021 1110    Acceptance, E, NR,VU by JAREN at 4/15/2021 1557                   Point: Home exercise program (In Progress)     Learning Progress Summary           Patient Acceptance, E, NR by JAREN at 4/16/2021 1110    Acceptance, E, NR,VU by JAREN at 4/15/2021 1557                   Point: Body mechanics (In Progress)     Learning Progress Summary           Patient Acceptance, E, NR by JAREN at 4/16/2021 1110    Acceptance, E, NR,VU by JAREN at 4/15/2021 1557                   Point: Precautions (In Progress)     Learning Progress Summary           Patient Acceptance, E, NR by  EJ at 4/16/2021 1110    Acceptance, E, NR,VU by EJ at 4/15/2021 1557                               User Key     Initials Effective Dates Name Provider Type Discipline     11/20/18 -  Addie Woodall PT Physical Therapist PT              PT Recommendation and Plan  Planned Therapy Interventions (PT): balance training, bed mobility training, gait training, home exercise program, joint mobilization, lumbar stabilization, manual therapy techniques, motor coordination training, strengthening, stretching, ROM (range of motion), postural re-education, patient/family education, neuromuscular re-education, transfer training  Plan of Care Reviewed With: patient  Progress: improving  Outcome Summary: Pt ambulates forward/backward 3 ft with RWx with CGAx2. Pt has improved independence with rolling compared to last session and requires verbal cues for scooting forward and back in chair. PT to continue during stay. Pt would benefit from STR, but seems hesitent.     Time Calculation:   PT Charges     Row Name 04/16/21 1110             Time Calculation    Start Time  0911  -EJ      PT Received On  04/16/21  -      PT Goal Re-Cert Due Date  04/25/21  -         Time Calculation- PT    Total Timed Code Minutes- PT  39 minute(s)  -EJ         Timed Charges    12418 - Gait Training Minutes   8  -EJ      14510 - PT Therapeutic Activity Minutes  31  -EJ        User Key  (r) = Recorded By, (t) = Taken By, (c) = Cosigned By    Initials Name Provider Type     Addie Woodall PT Physical Therapist        Therapy Charges for Today     Code Description Service Date Service Provider Modifiers Qty    30516015456 HC PT THERAPEUTIC ACT EA 15 MIN 4/15/2021 Addie Woodall, PT GP 1    52231288004 HC PT EVAL MOD COMPLEXITY 4 4/15/2021 Addie Woodall, PT GP 1    32256562425 HC PT THER SUPP EA 15 MIN 4/15/2021 Addie Woodall, PT GP 2    03969523086 HC GAIT TRAINING EA 15 MIN 4/16/2021 Addie Woodall, PT GP 1    70885733121  HC PT THERAPEUTIC ACT EA 15 MIN 4/16/2021 Addie Woodall, PT GP 2    02289643746 HC PT THER SUPP EA 15 MIN 4/16/2021 Addie Woodall, PT GP 3          PT G-Codes  Outcome Measure Options: AM-PAC 6 Clicks Basic Mobility (PT)  AM-PAC 6 Clicks Score (PT): 13  AM-PAC 6 Clicks Score (OT): 10    Addie Ha, PT  4/16/2021

## 2021-04-16 NOTE — PLAN OF CARE
Goal Outcome Evaluation:  Plan of Care Reviewed With: patient  Progress: improving  Outcome Summary: Pt ambulates forward/backward 3 ft with RWx with CGAx2. Pt has improved independence with rolling compared to last session and requires verbal cues for scooting forward and back in chair. PT to continue during stay. Pt would benefit from STR, but seems hesitant.

## 2021-04-17 NOTE — PROGRESS NOTES
[facility] Medicine Services  PROGRESS NOTE    Patient Name: Juilssa Kee  : 1950  MRN: [id number]    Date of Admission: 2021  Primary Care Physician: Kee Hoffman MD    Subjective   Subjective     CC:  unarousable at home    HPI:  No complaints. Does not have dyspnea.  However, sats running low 80s when she falls asleep; supplemental oxygen increasd fo venti-mask by nurse without much effect; patient unwilling to use Bipap.  CXR checked and unchanged.     Again we discussed bipap use.  She says she wore it for an hour and it was tolerable.  I encouraged her to try it again tonight.  She still is not willing to go to inpt rehab.       ROS:   Gen- No fevers, chills  CV- No chest pain, palpitations  Resp- No cough, dyspnea  GI- No N/V/D, abd pain.        Objective   Objective     Vital Signs:   Temp:  [97.9 °F (36.6 °C)-98.4 °F (36.9 °C)] 98.4 °F (36.9 °C)  Heart Rate:  [60-91] 84  Resp:  [18-20] 20  BP: (146-157)/(46-60) 149/56        Physical Exam:  Constitutional: Awake, alert, speech still a bit slow, eating all her lunch.  NAD  Eyes: PER, sclerae anicteric, no conjunctival injection  HENT: NCAT, mucous membranes moist  Neck: Supple,  trachea midline  Respiratory: Clear to auscultation bilaterally, nonlabored respirations , diminished throughout, no wheeze  Cardiovascular: RRR, no murmurs, rubs, or gallops  Gastrointestinal: Positive bowel sounds, soft, nontender, nondistended  Musculoskeletal: No bilateral ankle edema, no clubbing or cyanosis to extremities  Psychiatric: Appropriate affect, cooperative  Neurologic: grossly intact, DAVIES   CN grossly intact.   Skin: No rashes visible       Results Reviewed:  Results from last 7 days   Lab Units 21  0807 04/15/21  0536 21  0325 21  1621   WBC 10*3/mm3 6.71 9.29 11.75* 7.32   HEMOGLOBIN g/dL 10.1* 11.0* 11.6* 12.6   HEMATOCRIT % 32.0* 35.1 37.6 38.9   PLATELETS 10*3/mm3 158 191 198 204   PROCALCITONIN  ng/mL  --   --   --  0.16     Results from last 7 days   Lab Units 04/16/21  0807 04/15/21  1152 04/14/21  0325 04/13/21  1621   SODIUM mmol/L 137 138 137 132*   POTASSIUM mmol/L 3.7 3.6 4.5 5.2   CHLORIDE mmol/L 97* 96* 96* 88*   CO2 mmol/L 34.0* 36.0* 32.0* 34.0*   BUN mg/dL 22 26* 27* 25*   CREATININE mg/dL 0.83 0.90 1.07* 1.14*   GLUCOSE mg/dL 254* 265* 176* 546*   CALCIUM mg/dL 9.5 9.7 9.5 10.5   ALT (SGPT) U/L  --   --   --  13   AST (SGOT) U/L  --   --   --  12   TROPONIN T ng/mL  --   --   --  <0.010   PROBNP pg/mL  --   --   --  764.9     Estimated Creatinine Clearance: 80.1 mL/min (by C-G formula based on SCr of 0.83 mg/dL).    Microbiology Results Abnormal     Procedure Component Value - Date/Time    Blood Culture - Blood, Hand, Left [[id number]] Collected: 04/13/21 1621    Lab Status: Preliminary result Specimen: Blood from Hand, Left Updated: 04/16/21 1645     Blood Culture No growth at 3 days    Blood Culture - Blood, Hand, Right [[id number]] Collected: 04/13/21 1615    Lab Status: Preliminary result Specimen: Blood from Hand, Right Updated: 04/16/21 1645     Blood Culture No growth at 3 days    Urine Culture - Urine, Urine, Catheter [[id number]]  (Normal) Collected: 04/13/21 2005    Lab Status: Final result Specimen: Urine, Catheter Updated: 04/15/21 1008     Urine Culture No growth    COVID PRE-OP / PRE-PROCEDURE SCREENING ORDER (NO ISOLATION) - Swab, Nasopharynx [[id number]]  (Normal) Collected: 04/13/21 1725    Lab Status: Final result Specimen: Swab from Nasopharynx Updated: 04/13/21 1839    Narrative:      The following orders were created for panel order COVID PRE-OP / PRE-PROCEDURE SCREENING ORDER (NO ISOLATION) - Swab, Nasopharynx.  Procedure                               Abnormality         Status                     ---------                               -----------         ------                     COVID-19,CEPHEID,SUNITA IN-...[[id number]]  Normal              Final result                 Please  view results for these tests on the individual orders.    COVID-19,CEPHEID,SUNITA IN-HOUSE(OR EMERGENT/ADD-ON),NP SWAB IN TRANSPORT MEDIA 3-4 HR TAT - Swab, Nasopharynx [[id number]]  (Normal) Collected: 04/13/21 1725    Lab Status: Final result Specimen: Swab from Nasopharynx Updated: 04/13/21 1839     COVID19 Not Detected    Narrative:      Fact sheet for providers: https://www.fda.gov/media/568192/download     Fact sheet for patients: https://www.fda.gov/media/763003/download          Imaging Results (Last 24 Hours)     ** No results found for the last 24 hours. **          Results for orders placed during the hospital encounter of 12/28/19    Adult Transthoracic Echo Complete W/ Cont if Necessary Per Protocol    Interpretation Summary  · Left ventricular systolic function is normal. Estimated EF appears to be in the range of 61 - 65%.  · Left ventricular wall thickness is consistent with moderate concentric hypertrophy.  · Normal right ventricular wall thickness, systolic function and septal motion noted. Right ventricular cavity is borderline dilated.  · Trace tricuspid valve regurgitation is present. Estimated right ventricular systolic pressure from tricuspid regurgitation is normal (<35 mmHg)  · The valve appears trileaflet. The aortic valve is abnormal in structure. There is mild calcification of the aortic valve.Trace aortic valve regurgitation is present. No hemodynamically significant aortic valve stenosis is present.      I have reviewed the medications:  Scheduled Meds:aspirin, 81 mg, Oral, Daily  insulin detemir, 28 Units, Subcutaneous, Daily  insulin lispro, 0-14 Units, Subcutaneous, 4x Daily With Meals & Nightly  levothyroxine, 100 mcg, Oral, Q AM  linagliptin, 5 mg, Oral, Daily  miconazole, , Topical, Q12H  pantoprazole, 40 mg, Intravenous, Daily      Continuous Infusions:   PRN Meds:.dextrose  •  dextrose  •  glucagon (human recombinant)  •  magnesium sulfate **OR** magnesium sulfate **OR** magnesium  sulfate  •  potassium & sodium phosphates **OR** potassium & sodium phosphates  •  sodium chloride    Assessment/Plan   Assessment & Plan     Active Hospital Problems    Diagnosis  POA   • **HHNC (hyperglycemic hyperosmolar nonketotic coma) (CMS/HCC) [E11.01]  Yes   • Left adrenal nodule [E27.8]  Yes   • ADEEL [N17.9]  Yes   • Hypomagnesemia [E83.42]  Yes   • Hyperglycemia [R73.9]  Yes   • Altered mental status [R41.82]  Yes   • Acute on chronic respiratory failure with hypoxia and hypercapnia (CMS/HCC) [J96.21, J96.22]  Yes   • Morbid obesity with BMI of 50.0-59.9, adult [E66.01, Z68.43]  Not Applicable   • Hypertension [I10]  Yes   • Hypothyroidism [E03.9]  Yes   • T2DM [E11.9]  Yes   • Dyslipidemia [E78.5]  Yes   • Pickwickian syndrome [E66.2]  Yes      Resolved Hospital Problems   No resolved problems to display.        Brief Hospital Course to date:  Julissa Kee is a 71 y.o. female with history of DM, HTN MO.  Came to ED with acute confusion and  not able to get her up.  Recent UTI.  Admitted to ICU with hypercapnic resp failure, pCO2 of 68, also gluc of 546    HHNK  - insulin gtt started in ICU, now on levemir/log - increase levemir today   - home med was sitagliptin.  A1C was 5.2 in 1/2020 - now 9.4  - prednisone given 4/14  - checks sugars rarely at home.   - increase levemir.  Added januvia (home med)  - suspect  will be the one giving insulin:  Consulted DM educator.    Hypoxic, hypercapnic resp failure  - improved w bipap and approved for home trilogy - however she has not been willing to use bipap in hospital.  PCO2 65 and pH normal.   - bicarb 34 on admission   - per , no similar events since 2019     ?CHF  - probnp elevated  - echo 2020 showed LVH, EF 60s  - try diuresing in case it helps oxygenation   Watch creatinine    Slow mentation  - TSH nl  - Mild anemia, macrocytic; B12 wnl  - CT head with atrophy commensurate with age    ?UTI  - UA drawn late, unremarkabe; empiric CTX  complete    Debility  Lul placed in ICU - removed  - bed-to-chair at home  - consider IRF but patient unwilling  - PT OT  - I had a long talk w her today about ny concern that she will soon be bedbound.  Encouraged her to exercise in bed.  Discussed the danger of progressive debility due to sedentary life.  She asked if she could have Purewick for home use.      DVT Prophylaxis:        Disposition: I expect the patient to be discharged tomorrow if she does not want to go to rehab.  May need ambulance.        CODE STATUS:   Code Status and Medical Interventions:   Ordered at: 04/13/21 2009     Limited Support to NOT Include:    Intubation     Code Status:    No CPR     Medical Interventions (Level of Support Prior to Arrest):    Limited       Yumiko Dalton MD  04/17/21

## 2021-04-17 NOTE — THERAPY TREATMENT NOTE
Patient Name: Julissa Kee  : 1950    MRN: [id number]                              Today's Date: 2021       Admit Date: 2021    Visit Dx:     ICD-10-CM ICD-9-CM   1. Altered mental status, unspecified altered mental status type  R41.82 780.97   2. Hypercarbia  R06.89 786.09   3. Morbid obesity (CMS/HCC)  E66.01 278.01     Patient Active Problem List   Diagnosis   • Anxiety   • Chronic obstructive pulmonary disease (CMS/HCC)   • T2DM   • Dyslipidemia   • Hypertension   • Hypothyroidism   • Morbid obesity with BMI of 50.0-59.9, adult   • Pickwickian syndrome   • Renal insufficiency   • Sacral wound   • Recurrent pneumonia   • Human metapneumovirus (hMPV) pneumonia   • Hypoxia   • Acute foot pain, left   • Left adrenal nodule   • ADEEL   • Hypomagnesemia   • Hyperglycemia   • Altered mental status   • Acute on chronic respiratory failure with hypoxia and hypercapnia (CMS/HCC)   • HHNC (hyperglycemic hyperosmolar nonketotic coma) (CMS/HCC)     Past Medical History:   Diagnosis Date   • Acute bronchitis    • Acute sinusitis    • Anxiety    • Bacterial conjunctivitis    • Chest pain    • COPD (chronic obstructive pulmonary disease) (CMS/HCC)    • Depression    • Diabetes mellitus (CMS/HCC)    • Dyspnea    • Dysuria    • Edema    • Elbow injury    • Elbow pain    • Eye drainage    • Fatigue    • Foot pain, right    • Humerus distal fracture    • Hyperlipidemia    • Hypertension    • Hypothyroidism    • Morbid obesity (CMS/HCC)    • Pickwickian syndrome (CMS/HCC)    • Pressure ulcer stage I    • Psoriasis    • SOB (shortness of breath)    • Symptoms of urinary tract infection    • Urinary frequency    • Urinary tract infection    • Vaginal candidiasis      Past Surgical History:   Procedure Laterality Date   • CERVICAL POLYPECTOMY     • HYSTERECTOMY       General Information     Row Name 21 1546          OT Time and Intention    Document Type  therapy note (daily note)  -KF     Mode of Treatment   occupational therapy  -KF     Row Name 04/17/21 1546          General Information    Patient Profile Reviewed  yes  -KF     Existing Precautions/Restrictions  fall;oxygen therapy device and L/min;other (see comments) recom josefina walker  -KF     Barriers to Rehab  medically complex;ineffective coping  -KF     Row Name 04/17/21 1546          Cognition    Orientation Status (Cognition)  oriented to;person;place  -KF     Row Name 04/17/21 1546          Safety Issues, Functional Mobility    Safety Issues Affecting Function (Mobility)  insight into deficits/self-awareness;safety precaution awareness;awareness of need for assistance  -KF     Impairments Affecting Function (Mobility)  balance;cognition;coordination;endurance/activity tolerance;motor planning;pain;postural/trunk control;shortness of breath;strength  -KF     Cognitive Impairments, Mobility Safety/Performance  attention;awareness, need for assistance;judgment;insight into deficits/self-awareness  -KF     Comment, Safety Issues/Impairments (Mobility)  fearful and anxious throughout seated EOB for ther ex  -KF       User Key  (r) = Recorded By, (t) = Taken By, (c) = Cosigned By    Initials Name Provider Type    KF Carlene Rios OT Occupational Therapist          Mobility/ADL's     Row Name 04/17/21 1540          Bed Mobility    Bed Mobility  scooting/bridging;supine-sit  -KF     Scooting/Bridging Long (Bed Mobility)  moderate assist (50% patient effort);2 person assist;nonverbal cues (demo/gesture);verbal cues  -KF     Supine-Sit Long (Bed Mobility)  moderate assist (50% patient effort);2 person assist;nonverbal cues (demo/gesture);verbal cues  -KF     Bed Mobility, Safety Issues  cognitive deficits limit understanding;decreased use of arms for pushing/pulling;decreased use of legs for bridging/pushing;impaired trunk control for bed mobility  -KF     Assistive Device (Bed Mobility)  bed rails;head of bed elevated;draw sheet  -KF     Comment  (Bed Mobility)  cues for encouragement and sequencing  -KF     Row Name 04/17/21 1547          Transfers    Comment (Transfers)  deferred to PT  -KF     Row Name 04/17/21 1547          Functional Mobility    Functional Mobility- Comment  deferred to PT  -KF     Row Name 04/17/21 1547          Activities of Daily Living    BADL Assessment/Intervention  upper body dressing  -KF     Row Name 04/17/21 1547          Upper Body Dressing Assessment/Training    [place] Level (Upper Body Dressing)  don;front opening garment;moderate assist (50% patient effort)  -KF     Position (Upper Body Dressing)  edge of bed sitting  -KF       User Key  (r) = Recorded By, (t) = Taken By, (c) = Cosigned By    Initials Name Provider Type    Carlene Zimmer OT Occupational Therapist        Obj/Interventions     Row Name 04/17/21 1550 04/17/21 1548       Shoulder (Therapeutic Exercise)    Shoulder (Therapeutic Exercise)  AROM (active range of motion)  -KF  AROM (active range of motion)  -KF    Shoulder AROM (Therapeutic Exercise)  bilateral;flexion;extension;sitting;10 repetitions;scapular protraction  -KF  bilateral;flexion;extension;scapular protraction;10 repetitions;sitting  -KF    Row Name 04/17/21 1548          Balance    Balance Assessment  sitting static balance;sitting dynamic balance  -KF     Static Sitting Balance  WFL;unsupported;sitting, edge of bed  -KF     Dynamic Sitting Balance  mild impairment;unsupported;sitting, edge of bed  -KF     Balance Interventions  sitting;UE activity with balance activity;dynamic reaching  -KF     Comment, Balance  reaching across midline completed for improvement in trunk control for ADL item retrieval in sitting  -KF       User Key  (r) = Recorded By, (t) = Taken By, (c) = Cosigned By    Initials Name Provider Type    Carlene Zimmer OT Occupational Therapist        Goals/Plan    No documentation.       Clinical Impression     Row Name 04/17/21 1550          Pain Assessment     Additional Documentation  Pain Scale: Numbers Pre/Post-Treatment (Group)  -KF     Row Name 04/17/21 1550          Pain Scale: Numbers Pre/Post-Treatment    Pretreatment Pain Rating  0/10 - no pain  -KF     Posttreatment Pain Rating  0/10 - no pain  -KF     Pre/Posttreatment Pain Comment  tolerated  -KF     Pain Intervention(s)  Ambulation/increased activity;Repositioned  -KF     Row Name 04/17/21 1550          Plan of Care Review    Plan of Care Reviewed With  patient  -KF     Progress  improving  -KF     Outcome Summary  Pt completed bed mob modAx2 with frequent cues for encouragement, tolerated BUE ther ex seated with incorporated dynamic reaching tasks to progress ADL completion in sitting, cont IPOT per POC  -KF     Row Name 04/17/21 1550          Therapy Assessment/Plan (OT)    Rehab Potential (OT)  fair, will monitor progress closely  -KF     Criteria for Skilled Therapeutic Interventions Met (OT)  yes;meets criteria;skilled treatment is necessary  -KF     Therapy Frequency (OT)  daily  -KF     Row Name 04/17/21 1550          Therapy Plan Review/Discharge Plan (OT)    Anticipated Discharge Disposition (OT)  inpatient rehabilitation facility  -     Row Name 04/17/21 1550          Vital Signs    Pre Systolic BP Rehab  -- RN cleared VSS  -KF     O2 Delivery Pre Treatment  supplemental O2  -KF     O2 Delivery Intra Treatment  supplemental O2  -KF     Post SpO2 (%)  98  -KF     O2 Delivery Post Treatment  supplemental O2  -KF     Rest Breaks   1  -KF     Row Name 04/17/21 1550          Positioning and Restraints    Pre-Treatment Position  in bed  -KF     Post Treatment Position  bed  -KF     In Bed  notified nsg;sitting EOB;with PT;with other staff  -KF       User Key  (r) = Recorded By, (t) = Taken By, (c) = Cosigned By    Initials Name Provider Type    Carlene Zimmer, OT Occupational Therapist        Outcome Measures     Row Name 04/17/21 9247          How much help from another is currently needed...     Putting on and taking off regular lower body clothing?  1  -KF     Bathing (including washing, rinsing, and drying)  2  -KF     Toileting (which includes using toilet bed pan or urinal)  1  -KF     Putting on and taking off regular upper body clothing  2  -KF     Taking care of personal grooming (such as brushing teeth)  3  -KF     Eating meals  3  -KF     AM-PAC 6 Clicks Score (OT)  12  -KF     Row Name 04/17/21 1552          Functional Assessment    Outcome Measure Options  AM-PAC 6 Clicks Daily Activity (OT)  -KF       User Key  (r) = Recorded By, (t) = Taken By, (c) = Cosigned By    Initials Name Provider Type    KF Carlene Rios OT Occupational Therapist        Occupational Therapy Education                 Title: PT OT SLP Therapies (In Progress)     Topic: Occupational Therapy (Done)     Point: ADL training (Done)     Description:   Instruct learner(s) on proper safety adaptation and remediation techniques during self care or transfers.   Instruct in proper use of assistive devices.              Learning Progress Summary           Patient Acceptance, E,D,TB, VU,NR by  at 4/17/2021 1552    Acceptance, E, NL,NR by ZACHARIAH at 4/15/2021 1135    Comment: Role of OT; necessity of adequate hydration and position change throughout the day; incentive spirometer use   Family Acceptance, E,D,TB, VU,NR by KF at 4/17/2021 1552    Acceptance, E, NL,NR by ZACHARIAH at 4/15/2021 1135    Comment: Role of OT; necessity of adequate hydration and position change throughout the day; incentive spirometer use                   Point: Home exercise program (Done)     Description:   Instruct learner(s) on appropriate technique for monitoring, assisting and/or progressing therapeutic exercises/activities.              Learning Progress Summary           Patient Acceptance, E,D,TB, VU,NR by KF at 4/17/2021 1552   Family Acceptance, E,D,TB, VU,NR by KF at 4/17/2021 1552                   Point: Precautions (Done)     Description:   Instruct  learner(s) on prescribed precautions during self-care and functional transfers.              Learning Progress Summary           Patient Acceptance, E,D,TB, VU,NR by KF at 4/17/2021 1552    Acceptance, E, NL,NR by ZACHARIAH at 4/15/2021 1135    Comment: Role of OT; necessity of adequate hydration and position change throughout the day; incentive spirometer use   Family Acceptance, E,D,TB, VU,NR by  at 4/17/2021 1552    Acceptance, E, NL,NR by ZACHARIAH at 4/15/2021 1135    Comment: Role of OT; necessity of adequate hydration and position change throughout the day; incentive spirometer use                   Point: Body mechanics (Done)     Description:   Instruct learner(s) on proper positioning and spine alignment during self-care, functional mobility activities and/or exercises.              Learning Progress Summary           Patient Acceptance, E,D,TB, VU,NR by KF at 4/17/2021 1552    Acceptance, E, NL,NR by ZACHARIAH at 4/15/2021 1135    Comment: Role of OT; necessity of adequate hydration and position change throughout the day; incentive spirometer use   Family Acceptance, E,D,TB, VU,NR by KF at 4/17/2021 1552    Acceptance, E, NL,NR by ZACHARIAH at 4/15/2021 1135    Comment: Role of OT; necessity of adequate hydration and position change throughout the day; incentive spirometer use                               User Key     Initials Effective Dates Name Provider Type Discipline     04/03/18 -  Carlene Rios OT Occupational Therapist OT    ZACHARIAH 02/14/20 -  Sadie Whatley OT Occupational Therapist OT              OT Recommendation and Plan  Therapy Frequency (OT): daily  Plan of Care Review  Plan of Care Reviewed With: patient  Progress: improving  Outcome Summary: Pt completed bed mob modAx2 with frequent cues for encouragement, tolerated BUE ther ex seated with incorporated dynamic reaching tasks to progress ADL completion in sitting, cont IPOT per POC     Time Calculation:   Time Calculation- OT     Row Name 04/17/21 3117              Time Calculation- OT    OT Start Time  1516  -KF      OT Received On  04/17/21  -KF         Timed Charges    85214 - OT Therapeutic Exercise Minutes  10  -KF      38419 - OT Therapeutic Activity Minutes  5  -KF        User Key  (r) = Recorded By, (t) = Taken By, (c) = Cosigned By    Initials Name Provider Type     Carlene Rios OT Occupational Therapist        Therapy Charges for Today     Code Description Service Date Service Provider Modifiers Qty    [id number]  OT THER PROC EA 15 MIN 4/17/2021 Carlene Rios OT GO 1               Carlene Rios OT  4/17/2021

## 2021-04-17 NOTE — PLAN OF CARE
Goal Outcome Evaluation:  Plan of Care Reviewed With: patient  Progress: improving  Outcome Summary: Pt completed bed mob modAx2 with frequent cues for encouragement, tolerated BUE ther ex seated with incorporated dynamic reaching tasks to progress ADL completion in sitting, cont IPOT per POC

## 2021-04-17 NOTE — PLAN OF CARE
Goal Outcome Evaluation:  Plan of Care Reviewed With: patient  Progress: improving  Outcome Summary: patient took 4 steps to recliner with max assist x2, verbal cues for hand placement and step sequence, constant cues for safe mobility due to ineffective coping. Completed B LE exercises in seated position

## 2021-04-17 NOTE — THERAPY TREATMENT NOTE
Patient Name: Julissa Kee  : 1950    MRN: [id number]                              Today's Date: 2021       Admit Date: 2021    Visit Dx:     ICD-10-CM ICD-9-CM   1. Altered mental status, unspecified altered mental status type  R41.82 780.97   2. Hypercarbia  R06.89 786.09   3. Morbid obesity (CMS/HCC)  E66.01 278.01     Patient Active Problem List   Diagnosis   • Anxiety   • Chronic obstructive pulmonary disease (CMS/HCC)   • T2DM   • Dyslipidemia   • Hypertension   • Hypothyroidism   • Morbid obesity with BMI of 50.0-59.9, adult   • Pickwickian syndrome   • Renal insufficiency   • Sacral wound   • Recurrent pneumonia   • Human metapneumovirus (hMPV) pneumonia   • Hypoxia   • Acute foot pain, left   • Left adrenal nodule   • ADEEL   • Hypomagnesemia   • Hyperglycemia   • Altered mental status   • Acute on chronic respiratory failure with hypoxia and hypercapnia (CMS/HCC)   • HHNC (hyperglycemic hyperosmolar nonketotic coma) (CMS/HCC)     Past Medical History:   Diagnosis Date   • Acute bronchitis    • Acute sinusitis    • Anxiety    • Bacterial conjunctivitis    • Chest pain    • COPD (chronic obstructive pulmonary disease) (CMS/HCC)    • Depression    • Diabetes mellitus (CMS/HCC)    • Dyspnea    • Dysuria    • Edema    • Elbow injury    • Elbow pain    • Eye drainage    • Fatigue    • Foot pain, right    • Humerus distal fracture    • Hyperlipidemia    • Hypertension    • Hypothyroidism    • Morbid obesity (CMS/HCC)    • Pickwickian syndrome (CMS/HCC)    • Pressure ulcer stage I    • Psoriasis    • SOB (shortness of breath)    • Symptoms of urinary tract infection    • Urinary frequency    • Urinary tract infection    • Vaginal candidiasis      Past Surgical History:   Procedure Laterality Date   • CERVICAL POLYPECTOMY     • HYSTERECTOMY       General Information     Row Name 21 1542          Physical Therapy Time and Intention    Document Type  therapy note (daily note)  -AS     Mode of  Treatment  physical therapy  -AS     Row Name 04/17/21 1542          General Information    Patient Profile Reviewed  yes  -AS     Existing Precautions/Restrictions  fall;oxygen therapy device and L/min;other (see comments) pure wick, obesity  -AS     Barriers to Rehab  medically complex;ineffective coping  -AS     Row Name 04/17/21 1542          Cognition    Orientation Status (Cognition)  oriented to;person;place  -AS     Row Name 04/17/21 1542          Safety Issues, Functional Mobility    Safety Issues Affecting Function (Mobility)  awareness of need for assistance;insight into deficits/self-awareness;judgment;safety precaution awareness;safety precautions follow-through/compliance  -AS     Impairments Affecting Function (Mobility)  balance;cognition;coordination;endurance/activity tolerance;motor planning;pain;postural/trunk control;shortness of breath;strength  -AS     Cognitive Impairments, Mobility Safety/Performance  attention;awareness, need for assistance;insight into deficits/self-awareness;judgment;safety precaution awareness;safety precaution follow-through  -AS     Comment, Safety Issues/Impairments (Mobility)  patient is fearful of falling, needs constant verbal cues for technique and step sequence for transfers  -AS       User Key  (r) = Recorded By, (t) = Taken By, (c) = Cosigned By    Initials Name Provider Type    AS Tatyana Salmeron PTA Physical Therapy Assistant        Mobility     Row Name 04/17/21 1544          Bed Mobility    Supine-Sit [place] (Bed Mobility)  verbal cues;moderate assist (50% patient effort);2 person assist  -AS     Sit-Supine [place] (Bed Mobility)  not tested  -AS     Assistive Device (Bed Mobility)  bed rails;head of bed elevated;draw sheet  -AS     Comment (Bed Mobility)  cues for sequencing, increased time and effort to reach EOB  -AS     Row Name 04/17/21 1544          Transfers    Comment (Transfers)  verbal cues for sequencing and hand placement, B UE  support from spouse and therapist x2  -AS     Row Name 04/17/21 1544          Bed-Chair Transfer    Bed-Chair [place] (Transfers)  verbal cues;maximum assist (25% patient effort);2 person assist  -AS     Assistive Device (Bed-Chair Transfers)  other (see comments) B UE support/gait belt  -AS     Row Name 04/17/21 1544          Sit-Stand Transfer    Sit-Stand [place] (Transfers)  verbal cues;maximum assist (25% patient effort);2 person assist  -AS     Assistive Device (Sit-Stand Transfers)  other (see comments) B UE support, gait belt  -AS     Row Name 04/17/21 1544          Gait/Stairs (Locomotion)    [place] Level (Gait)  verbal cues;maximum assist (25% patient effort);2 person assist  -AS     Assistive Device (Gait)  other (see comments) B UE support  -AS     Distance in Feet (Gait)  4'  -AS     Deviations/Abnormal Patterns (Gait)  bilateral deviations;base of support, wide;elisa decreased;gait speed decreased;weight shifting decreased  -AS     Bilateral Gait Deviations  forward flexed posture;heel strike decreased;weight shift ability decreased  -AS     Comment (Gait/Stairs)  patient took 4 steps to recliner with max assist x2, verbal cues for hand placement and step sequence, constant cues for safe mobility due to ineffective coping.  -AS       User Key  (r) = Recorded By, (t) = Taken By, (c) = Cosigned By    Initials Name Provider Type    AS Tatyana Salmeron PTA Physical Therapy Assistant        Obj/Interventions     Row Name 04/17/21 1548          Motor Skills    Therapeutic Exercise  knee;ankle Simultaneous filing. User may be unaware of other data.  -AS     Row Name 04/17/21 1548          Knee (Therapeutic Exercise)    Knee (Therapeutic Exercise)  strengthening exercise  -AS     Knee Strengthening (Therapeutic Exercise)  marching while seated;LAQ (long arc quad);sitting;10 repetitions  -AS     Row Name 04/17/21 1548          Ankle (Therapeutic Exercise)    Ankle (Therapeutic Exercise)   AROM (active range of motion)  -AS     Ankle AROM (Therapeutic Exercise)  bilateral;dorsiflexion;plantarflexion;sitting;10 repetitions  -AS       User Key  (r) = Recorded By, (t) = Taken By, (c) = Cosigned By    Initials Name Provider Type    AS Tatyana Salmeron PTA Physical Therapy Assistant        Goals/Plan    No documentation.       Clinical Impression     Row Name 04/17/21 1548          Pain    Additional Documentation  Pain Scale: Numbers Pre/Post-Treatment (Group)  -AS     Row Name 04/17/21 1548          Pain Scale: Numbers Pre/Post-Treatment    Pretreatment Pain Rating  3/10  -AS     Posttreatment Pain Rating  0/10 - no pain  -AS     Pain Location - Orientation  lower  -AS     Pain Location  back  -AS     Pain Intervention(s)  Ambulation/increased activity;Repositioned  -AS     Row Name 04/17/21 1548          Plan of Care Review    Plan of Care Reviewed With  patient  -AS     Progress  improving  -AS     Outcome Summary  patient took 4 steps to recliner with max assist x2, verbal cues for hand placement and step sequence, constant cues for safe mobility due to ineffective coping. Completed B LE exercises in seated position  -AS     Row Name 04/17/21 1548          Positioning and Restraints    Pre-Treatment Position  in bed  -AS     Post Treatment Position  chair  -AS     In Chair  reclined;call light within reach;encouraged to call for assist;exit alarm on;waffle cushion;on mechanical lift sling  -AS       User Key  (r) = Recorded By, (t) = Taken By, (c) = Cosigned By    Initials Name Provider Type    AS Tatyana Salmeron PTA Physical Therapy Assistant        Outcome Measures     Row Name 04/17/21 1549          How much help from another person do you currently need...    Turning from your back to your side while in flat bed without using bedrails?  2  -AS     Moving from lying on back to sitting on the side of a flat bed without bedrails?  2  -AS     Moving to and from a bed to a chair (including a  wheelchair)?  2  -AS     Standing up from a chair using your arms (e.g., wheelchair, bedside chair)?  2  -AS     Climbing 3-5 steps with a railing?  1  -AS     To walk in hospital room?  2  -AS     AM-PAC 6 Clicks Score (PT)  11  -AS     Row Name 04/17/21 1549          Functional Assessment    Outcome Measure Options  AM-PAC 6 Clicks Basic Mobility (PT)  -AS       User Key  (r) = Recorded By, (t) = Taken By, (c) = Cosigned By    Initials Name Provider Type    AS Tatyana Salmeron, FLORIAN Physical Therapy Assistant        Physical Therapy Education                 Title: PT OT SLP Therapies (In Progress)     Topic: Physical Therapy (In Progress)     Point: Mobility training (In Progress)     Learning Progress Summary           Patient Acceptance, E, NR by AS at 4/17/2021 1550    Acceptance, E, NR by EJ at 4/16/2021 1110    Acceptance, E, NR,VU by EJ at 4/15/2021 1557                   Point: Home exercise program (In Progress)     Learning Progress Summary           Patient Acceptance, E, NR by AS at 4/17/2021 1550    Acceptance, E, NR by EJ at 4/16/2021 1110    Acceptance, E, NR,VU by EJ at 4/15/2021 1557                   Point: Body mechanics (In Progress)     Learning Progress Summary           Patient Acceptance, E, NR by AS at 4/17/2021 1550    Acceptance, E, NR by EJ at 4/16/2021 1110    Acceptance, E, NR,VU by EJ at 4/15/2021 1557                   Point: Precautions (In Progress)     Learning Progress Summary           Patient Acceptance, E, NR by AS at 4/17/2021 1550    Acceptance, E, NR by EJ at 4/16/2021 1110    Acceptance, E, NR,VU by EJ at 4/15/2021 1557                               User Key     Initials Effective Dates Name Provider Type Discipline    JAREN 11/20/18 -  Addie Woodall PT Physical Therapist PT    AS 06/22/15 -  Tatyana Salmeron, FLORIAN Physical Therapy Assistant PT              PT Recommendation and Plan     Plan of Care Reviewed With: patient  Progress: improving  Outcome Summary:  patient took 4 steps to recliner with max assist x2, verbal cues for hand placement and step sequence, constant cues for safe mobility due to ineffective coping. Completed B LE exercises in seated position     Time Calculation:   PT Charges     Row Name 04/17/21 1550             Time Calculation    Start Time  1530  -AS      PT Received On  04/17/21  -AS      PT Goal Re-Cert Due Date  04/25/21  -AS         Timed Charges    87739 - PT Therapeutic Exercise Minutes  20  -AS        User Key  (r) = Recorded By, (t) = Taken By, (c) = Cosigned By    Initials Name Provider Type    AS Tatyana Salmeron PTA Physical Therapy Assistant        Therapy Charges for Today     Code Description Service Date Service Provider Modifiers Qty    09633999917 HC PT THER PROC EA 15 MIN 4/17/2021 Tatyana Salmeron PTA GP 1          PT G-Codes  Outcome Measure Options: AM-PAC 6 Clicks Basic Mobility (PT)  AM-PAC 6 Clicks Score (PT): 11  AM-PAC 6 Clicks Score (OT): 10    Tatyana Salmeron PTA  4/17/2021

## 2021-04-17 NOTE — PLAN OF CARE
Goal Outcome Evaluation:     Progress: no change  Outcome Summary: Patient resting in bed much of the day, o2 requirements in am were 6L to nonrebreather, now back to 4L.  Patient lethargic in early am but much more alert this afternoon and participating in care. VSS on 4L. Lasix given x 1. Discussed progress with daughter and  at bedside. Patient with new skin injury this shift- a skin tear on the left lateral buttock. vaseline and mepilex applied RN will continue to monitor.

## 2021-04-18 PROBLEM — R53.81 PHYSICAL DEBILITY: Chronic | Status: ACTIVE | Noted: 2021-01-01

## 2021-04-18 NOTE — DISCHARGE PLACEMENT REQUEST
"Julissa Haynes (71 y.o. Female)     Date of Birth Social Security Number Address Home Phone MRN    1950  [address] [facility] [address] [phone] [id number]    Hindu Marital Status          None        Admission Date Admission Type Admitting Provider Attending Provider Department, Room/Bed    4/13/21 Emergency Yumiko Dalton MD Mini, Jocelyn, MD [facility] 4G, S463/1    Discharge Date Discharge Disposition Discharge Destination                       Attending Provider: Yumiko Dalton MD    Allergies: Amoxicillin-pot Clavulanate, Bactrim [Sulfamethoxazole-trimethoprim]    Isolation: None   Infection: None   Code Status: No CPR    Ht: 162.6 cm (64\")   Wt: 125 kg (276 lb 8 oz)    Admission Cmt: None   Principal Problem: HHNC (hyperglycemic hyperosmolar nonketotic coma) (CMS/[facility]) [E11.01]                 Active Insurance as of 4/13/2021     Primary Coverage     Payor Plan Insurance Group Employer/Plan Group    HUMANA MEDICARE REPLACEMENT HUMANA MEDICARE REPLACEMENT [id number]     Payor Plan Address Payor Plan Phone Number Payor Plan Fax Number Effective Dates    [address] [address] [phone]  4/1/2014 - None Entered    [facility] [id number]       Subscriber Name Subscriber Birth Date Member ID       JULISSA HAYNES 1950 [id number]                 Emergency Contacts      (Rel.) Home Phone Work Phone Mobile Phone    Familia Haynes (Spouse) [phone] -- [phone]                            Blood Gas, Arterial With Co-Ox [[id number]] (Order [id number])  Order  Date: 4/13/2021 Department: [address] Released By: Interface, Poct Results To Betsy (auto-released) Authorizing: Austen Jack MD   Reprint Order Requisition    Blood Gas, Arterial With Co-Ox (Order #[id number]) on 4/13/21       Contains critical data Blood Gas, Arterial With Co-Ox  Order: [id number]  Status:  Final result   Visible to patient:  No (not released)   Next appt:  " None  Specimen Information: Arterial Blood        Component   Ref Range & Units 5 d ago   Real's Test  N/A    pH, Arterial   7.350 - 7.450 pH units 7.347Low     Comment: 84 Value below reference range   pCO2, Arterial   35.0 - 45.0 mm Hg 68.0High Critical     Comment: 86 Value above critical limit   pO2, Arterial   83.0 - 108.0 mm Hg 84.2    HCO3, Arterial   20.0 - 26.0 mmol/L 37.3High     Base Excess, Arterial   0.0 - 2.0 mmol/L 9.4High     Hemoglobin, Blood Gas   14 - 18 g/dL 11.4Low     Comment: 84 Value below reference range   Hematocrit, Blood Gas   % 34.8    Oxyhemoglobin   94 - 99 % 96.8    Methemoglobin     Comment: 94 Value below reportable range < _0.1   Carboxyhemoglobin   0 - 2 % 1.9    CO2 Content   22 - 33 mmol/L 39.4High     Temperature   C 37.0    Barometric Pressure for Blood Gas     Comment: N/A   Modality  Nasal Cannula    FIO2   % 36    Rate   Breaths/minute 0    PIP   cmH2O 0    Comment: Meter: [id number]     :  [id number]   IPAP  0    EPAP  0    Note     Notified Who  CHAVO    Notified By  [id number]    Notified Time  04/13/2021 17:45    pH, Temp Corrected   pH Units 7.347    pCO2, Temperature Corrected   35 - 45 mm Hg 68.0High     pO2, Temperature Corrected   83 - 108 mm Hg 84.2    Resulting Agency  [facility]         Specimen Collected: 04/13/21 17:48   Last Resulted: 04/13/21 17:45        Lab Flowsheet     Order Details     View Encounter     Lab and Collection Details     Routing     Result History              Result Read / Acknowledged    Acknowledge result  No acknowledgement history exists for this order.   Lab Component SmartPhrase Guide    Blood Gas, Arterial With Co-Ox (Order #[id number]) on 4/13/21   Other Results from 4/13/2021                                 History & Physical      Dustin Valencia MD at 04/13/21 1952                Chief complaint: Altered mental status    Subjective     71-year-old female who presented to the emergency room today that became  apparent yesterday reportedly.  She was confused and complained of a headache on the night prior to admission.  Her  was not able to get her up.  Today she was sleeping and became increasingly poorly responsive and she was brought to the emergency room.  She reportedly had a urinary tract infection 6 weeks ago and was treated with 2 courses of antibiotics though these results are not in the [facility] chart.  I do see prior UTIs remotely with E. coli but this was 5 years ago.      She is followed by Dr. Hoffman for primary care.  Her past medical history includes type 2 diabetes mellitus, dyslipidemia, hypertension, hypothyroidism, and morbid obesity.  The last chart interaction with Dr. Hoffman was July of last year and her last visit was May of last year.  She is listed as having COPD and is on as needed duo nebs but is a lifelong non-smoker per records.  History is obtained primarily from the chart as the patient is altered and family has left.    She was last admitted to the hospital in January 2020 for respiratory failure due to viral pneumonia.  Her respiratory failure was acute on chronic with a high CO2 at that time.    In our emergency department she underwent CT scanning which revealed no acute pulmonary parenchymal abnormalities.  There was cardiomegaly and pulmonary vascular engorgement.  Arterial blood gases revealed 7.35/68/84/37.  Covid testing was negative.  Glucose was elevated at 546.  Thyroid studies, lactic acid, procalcitonin, and BNP were acceptable.  Beta hydroxybutyrate was mildly elevated.    I was asked to admit her to the ICU.      History  Past Medical History:   Diagnosis Date   • Acute bronchitis    • Acute sinusitis    • Anxiety    • Bacterial conjunctivitis    • Chest pain    • COPD (chronic obstructive pulmonary disease) (CMS/[facility])    • Depression    • Diabetes mellitus (CMS/[facility])    • Dyspnea    • Dysuria    • Edema    • Elbow injury    • Elbow pain    • Eye drainage    •  "Fatigue    • Foot pain, right    • Humerus distal fracture    • Hyperlipidemia    • Hypertension    • Hypothyroidism    • Morbid obesity (CMS/HCC)    • Pickwickian syndrome (CMS/HCC)    • Pressure ulcer stage I    • Psoriasis    • SOB (shortness of breath)    • Symptoms of urinary tract infection    • Urinary frequency    • Urinary tract infection    • Vaginal candidiasis      Past Surgical History:   Procedure Laterality Date   • CERVICAL POLYPECTOMY     • HYSTERECTOMY       Family History   Problem Relation Age of Onset   • Diabetes Father    • Diabetes Sister    • Pancreatic cancer Sister      Social History     Tobacco Use   • Smoking status: Former Smoker     Types: Cigarettes     Quit date: 2006     Years since quitting: 15.2   • Smokeless tobacco: Never Used   Substance Use Topics   • Alcohol use: No   • Drug use: Never       Review of Systems   Review of systems could not be obtained due to   patient confusion.      Objective     Vital Signs  Temp:  [98.7 °F (37.1 °C)-99.2 °F (37.3 °C)] 99.2 °F (37.3 °C)  Heart Rate:  [71-95] 72  Resp:  [18] 18  BP: (119-162)/(53-67) 156/67    Physical Exam:    Objective:  General Appearance:  Ill-appearing.    Vital signs: (most recent): Blood pressure 156/67, pulse 72, temperature 99.2 °F (37.3 °C), temperature source Axillary, resp. rate 18, height 162.6 cm (64\"), weight (!) 141 kg (310 lb), SpO2 98 %, not currently breastfeeding.    HEENT: (BiPAP mask in place)    Lungs:  There are decreased breath sounds.  No rales, wheezes or rhonchi.    Heart: Normal rate.  Regular rhythm.  S1 normal and S2 normal.  No murmur, gallop or friction rub.   Chest: Symmetric chest wall expansion.   Abdomen: Abdomen is soft and non-distended.  Bowel sounds are normal.   There is no abdominal tenderness.   There is no mass. There is no splenomegaly. There is no hepatomegaly.   Extremities: There is dependent edema.  There is no deformity.    Neurological: (Difficult to arouse  Occasional " moaning  No focal motor deficits to painful stimuli).    Pupils:  Pupils are equal, round, and reactive to light.    Skin:  Warm and dry.              Results Review:    I reviewed the patient's new clinical results.  I reviewed the patient's new imaging results and agree with the interpretation.  I reviewed the patient's other test results and agree with the interpretation  I personally viewed and interpreted the patient's EKG/Telemetry data    Assessment/Plan     Assessment:    Active Hospital Problems    Diagnosis    • **HHNC (hyperglycemic hyperosmolar nonketotic coma) (CMS/HCC)    • Hyperglycemia    • Acute on chronic respiratory failure with hypoxia and hypercapnia (CMS/HCC)    • Left adrenal nodule      CT 4/13/21: 2 cm low-density left adrenal nodule.     • ADEEL    • Hypomagnesemia    • Altered mental status    • Morbid obesity with BMI of 50.0-59.9, adult    • Hypertension    • Hypothyroidism    • T2DM    • Dyslipidemia    • Pickwickian syndrome        71-year-old female brought to the ER by family primarily due to altered mental status that has occurred over the last 24 hours and may be longer.  She has CO2 retention which appears compensated and chronic.  I suspect the source of her encephalopathy primarily lies elsewhere.  She has had a history of UTIs and we have not been able to get a urinalysis as of yet but this is certainly a possibility.  Her blood sugar is markedly elevated and a nonketotic hyperosmolar state may be contributing.    My plan is to admit her to the ICU and treat her with an insulin drip but avoid aggressive fluid resuscitation as her renal function appears acceptable and her imaging suggests volume overload as does her exam.  I will treat her empirically with antibiotics and will obtain urine and blood cultures.  There is no evidence of pneumonia by CT scanning.    Have discussed the situation with her  via telephone and he tells me that she does not want intubation or  resuscitation but is comfortable with aggressive care up to that point.    Plan:    7. ICU admission  8. Serial neurologic exams and anticipate improvement with correction of metabolic abnormalities.  9. BiPAP.  Her  says she has not tolerated that in the past though at the current time she is altered so is tolerating that so far.  No intubation.  10. Insulin drip  11. Empiric Rocephin and await any culture data    I discussed the patients findings and my recommendations with nursing staff.     She is critically ill based upon her acute encephalopathy and acute respiratory failure.    Critical Care time spent in direct patient care: 60 minutes (excluding procedure time, if applicable) including high complexity decision making to assess, manipulate, and support vital organ system failure in this individual who has impairment of one or more vital organ systems such that there is a high probability of imminent or life threatening deterioration in the patient’s condition.    Dustin Valencia MD  Pulmonary and Critical Care Medicine  04/13/21  19:54 EDT            Electronically signed by Dustin Valencia MD at 04/13/21 2035

## 2021-04-18 NOTE — CONSULTS
Reviewed chart for Ms. Kee this evening.  Noted that she has Type 2 Diabetes.  Noted a1c of 9.5%.  Noted possible discharge 4/18/2021.  Spoke with Ms. Kee's nurse who stated that she was appropriate for education over the phone.  Spoke with Ms. Kee over the phone this evening.  She stated that she has had diabetes for greater than 10 years she thinks.  She stated that her father had diabetes and she guessed that is why she does.  She states she is not aware of any other family history.  She states she was taking Januvia at home to treat diabetes, and she thought it was working ok.  She states they changed her medication while she has been here.  Explained that they are giving her Trajenta while she is here.  She states she has had high blood sugars while she is here.  Explained that illness causes blood sugar to go up.  Explained that is why we are checking blood sugar so frequently here. Asked if she had a meter to check blood sugar at home, and she stated she thought that she did, but she was not sure how old it is.  Encouraged her to look for it, but if she did not find it to call provider and ask to send a script to pharmacy for meter and strips and lancets, or to purchase a generic meter over the counter.  She asked if provider here could provide script for meter before discharge.  I told her I would ask them to write script.  She stated that she was only checking less than 1 time monthly at home before all this.  Encouraged her that it is important for her to check blood sugar regularly right now to be sure that it isnt high and does not increase her risk for infection or for poor recovery.  Encouraged her to make a goal to check at least daily.  Encouraged  Her to check before breakfast or 2 hours after largest meal.  Explained that she wants her blood sugar to be less than 130 before breakfast and less david 180 2 hours after meals.  I asked if she was familiar with a1c.  She stated that she was, and I  asked if she knew what her a1c goal was.  She stated 8.7%, and I told her that ADA recommends a1c of 7% or less in order to prevent complications related to diabetes.  Explained that her a1c was 9.5%, and we needed to get it down.  She states she does not really have a family doctor that she sees regularly.  Encouraged her that she needed to see someone to care for diabetes every 3 months.  She stated she did see Dr. JEANNIE Aguirre in the past.  Encouraged her to follow up with endocrinology.  I asked if she had ever attended a class about diabetes, and she stated she had not.  I asked if she would like us to follow up with an outpatient diabetes class over the phone when she gets home.  She stated she would like to do that.  We will add her to the outpatient list to schedule for a class.  She states that it would be good if I just mail the information to her home.  I am mailing What is Diabetes handout as well as our contact information for resource.  Thank you for this referral.

## 2021-04-18 NOTE — PROGRESS NOTES
Case Management Discharge Note      Final Note: Spoke with  via phone.  Plan is home with HH.  Per request, CM called/faxed referral to Mariza at [facility].  Home [facility] referral/orders called and faxed to Nahum at [facility].  MD is aware that this will not be arranged at pt's home until tomorrow (once insurance benefits are verified).   is aware.  [facility] to transport home today at 1530.  CM faxed H&P and DC summary to [facility] (Eileen Du).  Advised  to call Eileen's office to arrange a f/u appt in am.         Selected Continued Care - Admitted Since 4/13/2021     Destination    No services have been selected for the patient.              Durable Medical Equipment Coordination complete    Service Provider Selected Services Address Phone Fax Patient Preferred    [facility] [place]  Durable Medical Equipment [address] [facility] [id number] [phone] --       Internal Comment last updated by Ariadna Mercedes, RN 4/16/2021 1440    4/16 referral for [facility] manually faxed                     Dialysis/Infusion    No services have been selected for the patient.              Home Medical Care Coordination complete    Service Provider Selected Services Address Phone Fax Patient Preferred    [facility] - [place]  Home Health Services [facility] [id number] [phone] --          Therapy    No services have been selected for the patient.              Community Resources    No services have been selected for the patient.                       Final Discharge Disposition Code: 06 - home with home health care

## 2021-04-18 NOTE — DISCHARGE PLACEMENT REQUEST
"Maria G Ceja RN or Ariadna Mercedes RN  (s)  [phone]              Julissa Haynes (71 y.o. Female)     Date of Birth Social Security Number Address Home Phone MRN    1950  [id number] [facility] [address] [phone] [id number]    [place] Marital Status          None        Admission Date Admission Type Admitting Provider Attending Provider Department, Room/Bed    4/13/21 Emergency Yumiko Dalton MD Mini, Jocelyn, MD [facility] 4G, S463/1    Discharge Date Discharge Disposition Discharge Destination         Home or Self Care              Attending Provider: Yumiko Dalton MD    Allergies: Amoxicillin-pot Clavulanate, Bactrim [Sulfamethoxazole-trimethoprim]    Isolation: None   Infection: None   Code Status: No CPR    Ht: 162.6 cm (64\")   Wt: 125 kg (276 lb 8 oz)    Admission Cmt: None   Principal Problem: HHNC (hyperglycemic hyperosmolar nonketotic coma) (CMS/HCC) [E11.01]                 Active Insurance as of 4/13/2021     Primary Coverage     Payor Plan Insurance Group Employer/Plan Group    HUMANA MEDICARE REPLACEMENT HUMANA MEDICARE REPLACEMENT [id number]     Payor Plan Address Payor Plan Phone Number Payor Plan Fax Number Effective Dates    [address] [address] [phone]  4/1/2014 - None Entered    [facility] [id number]       Subscriber Name Subscriber Birth Date Member ID       JULISSA HAYNES 1950 [id number]                 Emergency Contacts      (Rel.) Home Phone Work Phone Mobile Phone    Familia Haynes (Spouse) [phone] -- [phone]               History & Physical      Dustin Valencia MD at 04/13/21 1952                Chief complaint: Altered mental status    Subjective     71-year-old female who presented to the emergency room today that became apparent yesterday reportedly.  She was confused and complained of a headache on the night prior to admission.  Her  was not able to get her up.  Today she was " sleeping and became increasingly poorly responsive and she was brought to the emergency room.  She reportedly had a urinary tract infection 6 weeks ago and was treated with 2 courses of antibiotics though these results are not in the [facility] chart.  I do see prior UTIs remotely with E. coli but this was 5 years ago.      She is followed by Dr. Hoffman for primary care.  Her past medical history includes type 2 diabetes mellitus, dyslipidemia, hypertension, hypothyroidism, and morbid obesity.  The last chart interaction with Dr. Hoffman was July of last year and her last visit was May of last year.  She is listed as having COPD and is on as needed duo nebs but is a lifelong non-smoker per records.  History is obtained primarily from the chart as the patient is altered and family has left.    She was last admitted to the hospital in January 2020 for respiratory failure due to viral pneumonia.  Her respiratory failure was acute on chronic with a high CO2 at that time.    In our emergency department she underwent CT scanning which revealed no acute pulmonary parenchymal abnormalities.  There was cardiomegaly and pulmonary vascular engorgement.  Arterial blood gases revealed 7.35/68/84/37.  Covid testing was negative.  Glucose was elevated at 546.  Thyroid studies, lactic acid, procalcitonin, and BNP were acceptable.  Beta hydroxybutyrate was mildly elevated.    I was asked to admit her to the ICU.      History  Past Medical History:   Diagnosis Date   • Acute bronchitis    • Acute sinusitis    • Anxiety    • Bacterial conjunctivitis    • Chest pain    • COPD (chronic obstructive pulmonary disease) (CMS/HCC)    • Depression    • Diabetes mellitus (CMS/[facility])    • Dyspnea    • Dysuria    • Edema    • Elbow injury    • Elbow pain    • Eye drainage    • Fatigue    • Foot pain, right    • Humerus distal fracture    • Hyperlipidemia    • Hypertension    • Hypothyroidism    • Morbid obesity (CMS/[facility])    • Pickwickian syndrome  "(CMS/[facility])    • Pressure ulcer stage I    • Psoriasis    • SOB (shortness of breath)    • Symptoms of urinary tract infection    • Urinary frequency    • Urinary tract infection    • Vaginal candidiasis      Past Surgical History:   Procedure Laterality Date   • CERVICAL POLYPECTOMY     • HYSTERECTOMY       Family History   Problem Relation Age of Onset   • Diabetes Father    • Diabetes Sister    • Pancreatic cancer Sister      Social History     Tobacco Use   • Smoking status: Former Smoker     Types: Cigarettes     Quit date: 2006     Years since quitting: 15.2   • Smokeless tobacco: Never Used   Substance Use Topics   • Alcohol use: No   • Drug use: Never       Review of Systems   Review of systems could not be obtained due to   patient confusion.      Objective     Vital Signs  Temp:  [98.7 °F (37.1 °C)-99.2 °F (37.3 °C)] 99.2 °F (37.3 °C)  Heart Rate:  [71-95] 72  Resp:  [18] 18  BP: (119-162)/(53-67) 156/67    Physical Exam:    Objective:  General Appearance:  Ill-appearing.    Vital signs: (most recent): Blood pressure 156/67, pulse 72, temperature 99.2 °F (37.3 °C), temperature source Axillary, resp. rate 18, height 162.6 cm (64\"), weight (!) 141 kg (310 lb), SpO2 98 %, not currently breastfeeding.    HEENT: (BiPAP mask in place)    Lungs:  There are decreased breath sounds.  No rales, wheezes or rhonchi.    Heart: Normal rate.  Regular rhythm.  S1 normal and S2 normal.  No murmur, gallop or friction rub.   Chest: Symmetric chest wall expansion.   Abdomen: Abdomen is soft and non-distended.  Bowel sounds are normal.   There is no abdominal tenderness.   There is no mass. There is no splenomegaly. There is no hepatomegaly.   Extremities: There is dependent edema.  There is no deformity.    Neurological: (Difficult to arouse  Occasional moaning  No focal motor deficits to painful stimuli).    Pupils:  Pupils are equal, round, and reactive to light.    Skin:  Warm and dry.              Results Review:    I " reviewed the patient's new clinical results.  I reviewed the patient's new imaging results and agree with the interpretation.  I reviewed the patient's other test results and agree with the interpretation  I personally viewed and interpreted the patient's EKG/Telemetry data    Assessment/Plan     Assessment:    Active Hospital Problems    Diagnosis    • **HHNC (hyperglycemic hyperosmolar nonketotic coma) (CMS/HCC)    • Hyperglycemia    • Acute on chronic respiratory failure with hypoxia and hypercapnia (CMS/HCC)    • Left adrenal nodule      CT 4/13/21: 2 cm low-density left adrenal nodule.     • ADEEL    • Hypomagnesemia    • Altered mental status    • Morbid obesity with BMI of 50.0-59.9, adult    • Hypertension    • Hypothyroidism    • T2DM    • Dyslipidemia    • Pickwickian syndrome        71-year-old female brought to the ER by family primarily due to altered mental status that has occurred over the last 24 hours and may be longer.  She has CO2 retention which appears compensated and chronic.  I suspect the source of her encephalopathy primarily lies elsewhere.  She has had a history of UTIs and we have not been able to get a urinalysis as of yet but this is certainly a possibility.  Her blood sugar is markedly elevated and a nonketotic hyperosmolar state may be contributing.    My plan is to admit her to the ICU and treat her with an insulin drip but avoid aggressive fluid resuscitation as her renal function appears acceptable and her imaging suggests volume overload as does her exam.  I will treat her empirically with antibiotics and will obtain urine and blood cultures.  There is no evidence of pneumonia by CT scanning.    Have discussed the situation with her  via telephone and he tells me that she does not want intubation or resuscitation but is comfortable with aggressive care up to that point.    Plan:    1. ICU admission  2. Serial neurologic exams and anticipate improvement with correction of  metabolic abnormalities.  3. BiPAP.  Her  says she has not tolerated that in the past though at the current time she is altered so is tolerating that so far.  No intubation.  4. Insulin drip  5. Empiric Rocephin and await any culture data    I discussed the patients findings and my recommendations with nursing staff.     She is critically ill based upon her acute encephalopathy and acute respiratory failure.    Critical Care time spent in direct patient care: 60 minutes (excluding procedure time, if applicable) including high complexity decision making to assess, manipulate, and support vital organ system failure in this individual who has impairment of one or more vital organ systems such that there is a high probability of imminent or life threatening deterioration in the patient’s condition.    Dustin Valencia MD  Pulmonary and Critical Care Medicine  21  19:54 EDT            Electronically signed by Dustin Valencia MD at 21          Discharge Summary      Yumiko Dalton MD at 21 0829              [facility]  DISCHARGE SUMMARY    Patient Name: Julissa Kee  : 1950  MRN: [id number]    Date of Admission: 2021  3:42 PM  Date of Discharge:  2021  Primary Care Physician: Kee Hoffman MD    Consults     No orders found from 3/15/2021 to 2021.          Hospital Course     Presenting Problem:   Altered mental status, unspecified altered mental status type [R41.82]    Active Hospital Problems    Diagnosis  POA   • **HHNC (hyperglycemic hyperosmolar nonketotic coma) (CMS/[facility]) [E11.01]  Yes     Priority: High   • Physical debility [R53.81]  Unknown     Priority: High   • Altered mental status [R41.82]  Yes     Priority: High   • Morbid obesity with BMI of 50.0-59.9, adult [E66.01, Z68.43]  Not Applicable     Priority: High   • T2DM [E11.9]  Yes     Priority: High   • Pickwickian syndrome [E66.2]  Yes      Priority: High   • Left adrenal nodule [E27.8]  Yes   • ADEEL [N17.9]  Yes   • Hypomagnesemia [E83.42]  Yes   • Hyperglycemia [R73.9]  Yes   • Acute on chronic respiratory failure with hypoxia and hypercapnia (CMS/HCC) [J96.21, J96.22]  Yes   • Hypertension [I10]  Yes   • Hypothyroidism [E03.9]  Yes   • Dyslipidemia [E78.5]  Yes      Resolved Hospital Problems   No resolved problems to display.          Hospital Course:  Julissa Kee is a 71 y.o. female with DM HL HTN MO who lives at home with her  and has limited mobility, moving from bed to chair with walker and assistance of her .  She became confused at home, and then unarousable.  She was brought to the ED where glucose was 550 and pCO2 was 68 with pH of 7.35.  She was placed on BIPAP and insulin drip admitted to the ICU with hyperosmolar hyperglycemic nonketotic coma.    Neuro/Pulm:  She improved and moved out to the floor.  Mentation remained slow, however, which seems to be her baseline.  At times she had difficulty answering simple questions, but at other times showed good memory (EG, remembering that she has GI intolerance to metformin.)  TSH and B12 were normal.  She was offered BIPAP at night and has been arranged for a home Trilogy machine.  She often refused BIPAP but her  will encourage use at home.     Diabetes:  Her hemoglobin A1C was 9.6, up from 5.2 a year ago.  She was taking Januvia at home but never checks her blood sugars and is not sure where her meter is.  Here, she was escalated to levemir 35 units QHS.  She is intolerant of metformin.   Her  says he has given her insulin before - in fact she used to be on high doses but was changed to just Januvia during a hospital stay in 12/2019.  He has a glucometer and feels confident about managing her injections.    Debility:  She worked with PT.  She took four steps with max assist.  Her  was present and helped her stand.  He feels able to continue helping her. I  told her and her  that I am concerned she will soon be bedbound if she does not work on increasing her strength and mobility.  Home PT is arranged.  She did not want to go to inpatient rehab.      Cardiac:  ProBNP was elevated.  She received a few doses of lasix.  Her last echo, in 1/2020, showed EF 65 amd moderate LV hypertrophy.         Discharge Follow Up Recommendations for outpatient labs/diagnostics:   *PCP in 1-2 weeks (consider TeleMed as she has limited mobility):    Please follow up on insulin use and home PT progress and whether she is tolerating Trilogy machine.     - Hgb A1C in 3 months      Day of Discharge     HPI: No complaints.  Wore Bipap last night without difficulty.  Eager to go home.  I spoke with her  today who feels capable of helping her with insulin and physical therapy.        Review of Systems  Gen- No fevers, chills  CV- No chest pain, palpitations  Resp- No cough, dyspnea  GI- No N/V/D, abd pain        Vital Signs:   Temp:  [97.5 °F (36.4 °C)-98 °F (36.7 °C)] 97.6 °F (36.4 °C)  Heart Rate:  [54-95] 65  Resp:  [18-24] 22  BP: (152-171)/(60-81) 152/75     Physical Exam:  Constitutional: Awake, alert though continues to have slow speech and evidenced of impaired mentation/memory  .  Asks me if she wore the BIPAP last night.  (She did.)   Eyes: PER, sclerae anicteric, no conjunctival injection  HENT: NCAT, mucous membranes moist  Neck: Supple, , trachea midline  Respiratory: Clear to auscultation bilaterally, nonlabored respirations   Cardiovascular: RRR, no murmurs, rubs, or gallops, palpable pedal pulses bilaterally  Gastrointestinal: Positive bowel sounds, soft, nontender, nondistended  Musculoskeletal: No bilateral ankle edema, no clubbing or cyanosis to extremities  Psychiatric: Appropriate affect, cooperative  Neurologic: Oriented x 3, legs very weak, strength symmetric, short-term memory deficit  Skin: No rashes; skin thickening on lower legs       Pertinent  and/or  Most Recent Results     LAB RESULTS:      Lab 04/16/21  0807 04/15/21  0536 04/14/21  0325 04/13/21  1621   WBC 6.71 9.29 11.75* 7.32   HEMOGLOBIN 10.1* 11.0* 11.6* 12.6   HEMATOCRIT 32.0* 35.1 37.6 38.9   PLATELETS 158 191 198 204   NEUTROS ABS  --  7.73* 10.05* 6.61   IMMATURE GRANS (ABS)  --  0.04 0.06* 0.08*   LYMPHS ABS  --  1.02 0.97 0.51*   MONOS ABS  --  0.44 0.61 0.10   EOS ABS  --  0.04 0.03 0.00   .2* 102.0* 102.5* 99.5*   PROCALCITONIN  --   --   --  0.16   LACTATE  --   --   --  2.0         Lab 04/18/21  0639 04/17/21  0524 04/16/21  0807 04/15/21  1152 04/14/21 0325 04/14/21  0028 04/13/21  1621   SODIUM 138  --  137 138 137 140 132*   POTASSIUM 4.1  --  3.7 3.6 4.5 4.3 5.2   CHLORIDE 94*  --  97* 96* 96* 96* 88*   CO2 38.0*  --  34.0* 36.0* 32.0* 33.0* 34.0*   ANION GAP 6.0  --  6.0 6.0 9.0 11.0 10.0   BUN 31*  --  22 26* 27* 27* 25*   CREATININE 1.03*  --  0.83 0.90 1.07* 1.09* 1.14*   GLUCOSE 238*  --  254* 265* 176* 240* 546*   CALCIUM 10.2  --  9.5 9.7 9.5 9.4 10.5   MAGNESIUM  --  2.2 1.8 1.9 2.3  --  1.3*   PHOSPHORUS  --  3.4 2.6 1.6* 2.4*  --   --    HEMOGLOBIN A1C  --   --  9.50*  --   --   --   --    TSH  --   --   --   --   --   --  3.360         Lab 04/13/21  1621   TOTAL PROTEIN 7.3   ALBUMIN 3.80   GLOBULIN 3.5   ALT (SGPT) 13   AST (SGOT) 12   BILIRUBIN 0.7   ALK PHOS 88         Lab 04/13/21  1621   PROBNP 764.9   TROPONIN T <0.010             Lab 04/16/21  0807 04/15/21  0850   FOLATE 4.69*  --    VITAMIN B 12  --  638         Lab 04/16/21  0342 04/13/21  1748   PH, ARTERIAL 7.399 7.347*   PCO2, ARTERIAL 64.5* 68.0*   PO2 .0* 84.2   FIO2 35 36   HCO3 ART 39.9* 37.3*   BASE EXCESS ART 12.7* 9.4*   CARBOXYHEMOGLOBIN 1.1 1.9     Brief Urine Lab Results  (Last result in the past 365 days)      Color   Clarity   Blood   Leuk Est   Nitrite   Protein   CREAT   Urine HCG        04/13/21 2005 Yellow Clear Moderate (2+) Negative Negative Trace             Microbiology Results  (last 10 days)     Procedure Component Value - Date/Time    Urine Culture - Urine, Urine, Catheter [[id number]]  (Normal) Collected: 04/13/21 2005    Lab Status: Final result Specimen: Urine, Catheter Updated: 04/15/21 1008     Urine Culture No growth    COVID PRE-OP / PRE-PROCEDURE SCREENING ORDER (NO ISOLATION) - Swab, Nasopharynx [[id number]]  (Normal) Collected: 04/13/21 1725    Lab Status: Final result Specimen: Swab from Nasopharynx Updated: 04/13/21 1839    Narrative:      The following orders were created for panel order COVID PRE-OP / PRE-PROCEDURE SCREENING ORDER (NO ISOLATION) - Swab, Nasopharynx.  Procedure                               Abnormality         Status                     ---------                               -----------         ------                     COVID-19,CEPHEID,SUNITA IN-...[[id number]]  Normal              Final result                 Please view results for these tests on the individual orders.    COVID-19,CEPHEID,SUNITA IN-HOUSE(OR EMERGENT/ADD-ON),NP SWAB IN TRANSPORT MEDIA 3-4 HR TAT - Swab, Nasopharynx [[id number]]  (Normal) Collected: 04/13/21 1725    Lab Status: Final result Specimen: Swab from Nasopharynx Updated: 04/13/21 1839     COVID19 Not Detected    Narrative:      Fact sheet for providers: https://www.fda.gov/media/744005/download     Fact sheet for patients: https://www.fda.gov/media/481033/download    Blood Culture - Blood, Hand, Left [[id number]] Collected: 04/13/21 1621    Lab Status: Preliminary result Specimen: Blood from Hand, Left Updated: 04/17/21 1645     Blood Culture No growth at 4 days    Blood Culture - Blood, Hand, Right [[id number]] Collected: 04/13/21 1615    Lab Status: Preliminary result Specimen: Blood from Hand, Right Updated: 04/17/21 1645     Blood Culture No growth at 4 days          CT Head Without Contrast    Result Date: 4/14/2021  EXAMINATION: CT HEAD WO CONTRAST- 04/13/2021  INDICATION: AMS  TECHNIQUE: 5 mm unenhanced images through the brain  The  radiation dose reduction device was turned on for each scan per the ALARA (As Low as Reasonably Achievable) protocol.  COMPARISON: Head CT scan 12/28/2019  FINDINGS: History indicates altered mental status.  The calvarium appears intact. Paranasal sinuses and mastoids appear clear except for a small amount of sphenoid sinus mucosal thickening. Soft tissue window images show expected degree of generalized cerebral atrophy for patient's age. There is no evidence of intracranial hemorrhage, mass, mass effect, edema, hydrocephalus, or abnormal extra-axial collection. No evidence of acute or old infarction is identified. No gross abnormalities are seen in the orbits.      No evidence of acute intracranial disease.  D:  04/13/2021 E:  04/14/2021   This report was finalized on 4/14/2021 9:46 AM by Dr. Virgilio Ackerman MD.      CT Chest Without Contrast Diagnostic    Result Date: 4/14/2021  EXAMINATION: CT CHEST WO CONTRAST, DIAGNOSTIC-04/13/2021:  INDICATION: AMS, ? LLL infiltrate.  TECHNIQUE: 5 mm unenhanced images through the chest and upper abdomen.  The radiation dose reduction device was turned on for each scan per the ALARA (As Low as Reasonably Achievable) protocol.  COMPARISON: Portable chest radiograph of the same date.  FINDINGS: Hazy opacity of the left lung base corresponds to mild left basilar pleural thickening, and a small area of linear scarring or discoid atelectasis. The pulmonary vasculature is prominent and there is a mild diffuse perihilar disease pattern suspicious for early interstitial edema. No lung consolidation is seen. No pericardial effusion or mediastinal adenopathy is seen. Review of the 12/28/2019 chest CT scan shows very similar left basilar lung scarring.  Included images of the upper abdomen show no significant abnormalities of the visualized portions of the liver, spleen, or right adrenal gland. There is a 2 cm low-density left adrenal nodule, unchanged from 12/28/2019 and apparently an  incidental adenoma.      1.  Stable, mild left basilar lung scarring compared to 12/28/2019 chest CT scan. 2.  Cardiomegaly, pulmonary vascular enlargement and suspected early interstitial edema. Findings appear to represent early changes of volume overload/CHF. 3. Small left adrenal adenoma, unchanged.   D:  04/13/2021 E:  04/14/2021  This report was finalized on 4/14/2021 9:45 AM by Dr. Virgilio Ackerman MD.      XR Chest 1 View    Result Date: 4/17/2021   EXAMINATION: XR CHEST 1 VW - 04/17/2021  INDICATION: R41.82-Altered mental status, unspecified; R06.89-Other abnormalities of breathing; E66.01-Morbid (severe) obesity due to excess calories. Increasing oxygen requirements.  COMPARISON: 04/13/2021  FINDINGS: Portable chest reveals heart to be enlarged. Low lung volumes. Mild increased markings identified at the left lung base concerning for infiltrate. Degenerative changes seen within the spine. The upper lung fields are clear.        Ill-defined opacification concerning for infiltrate at the left lung base. Lung volumes are low. Findings are similar compared to the prior examination.  DICTATED:   04/17/2021 EDITED/ls :   04/17/2021       XR Chest 1 View    Result Date: 4/13/2021  EXAMINATION: XR CHEST 1 VW- 04/13/2021  INDICATION: Weak/Dizzy/AMS triage protocol  COMPARISON: 01/17/2020  FINDINGS: Heart is mildly enlarged. Vasculature is cephalized. Lungs are moderately well-expanded and appear clear except for small area of linear atelectasis in the left lung base. No effusion or pneumothorax is seen.      1. Mild cardiomegaly and pulmonary venous hypertension. 2. Small area of left basilar discoid atelectasis. No other evidence of active chest disease.  D:  04/13/2021 E:  04/13/2021    This report was finalized on 4/13/2021 9:23 PM by Dr. Virgilio Ackerman MD.        Results for orders placed during the hospital encounter of 01/14/20    Duplex Venous Lower Extremity - Bilateral CAR    Interpretation Summary  · Technically  difficult and limited exam, however no DVT or SVT of the visualized veins.      Results for orders placed during the hospital encounter of 01/14/20    Duplex Venous Lower Extremity - Bilateral CAR    Interpretation Summary  · Technically difficult and limited exam, however no DVT or SVT of the visualized veins.      Results for orders placed during the hospital encounter of 12/28/19    Adult Transthoracic Echo Complete W/ Cont if Necessary Per Protocol    Interpretation Summary  · Left ventricular systolic function is normal. Estimated EF appears to be in the range of 61 - 65%.  · Left ventricular wall thickness is consistent with moderate concentric hypertrophy.  · Normal right ventricular wall thickness, systolic function and septal motion noted. Right ventricular cavity is borderline dilated.  · Trace tricuspid valve regurgitation is present. Estimated right ventricular systolic pressure from tricuspid regurgitation is normal (<35 mmHg)  · The valve appears trileaflet. The aortic valve is abnormal in structure. There is mild calcification of the aortic valve.Trace aortic valve regurgitation is present. No hemodynamically significant aortic valve stenosis is present.      Plan for Follow-up of Pending Labs/Results: I will follow up   Pending Labs     Order Current Status    Blood Culture - Blood, Hand, Left Preliminary result    Blood Culture - Blood, Hand, Right Preliminary result        Discharge Details        Discharge Medications      New Medications      Instructions Start Date   Levemir 100 UNIT/ML injection  Generic drug: insulin detemir   20 Units, Subcutaneous, Nightly         Continue These Medications      Instructions Start Date   aspirin 81 MG tablet   1 tablet, Oral, Daily      atorvastatin 10 MG tablet  Commonly known as: LIPITOR   10 mg, Oral, Daily      doxazosin 4 MG tablet  Commonly known as: CARDURA   TAKE 1 TABLET BY MOUTH EVERY DAY EVERY NIGHT      hydroCHLOROthiazide 25 MG tablet  Commonly  known as: HYDRODIURIL   TAKE 1 TABLET BY MOUTH EVERY DAY      levothyroxine 100 MCG tablet  Commonly known as: SYNTHROID, LEVOTHROID   100 mcg, Oral, Daily      lisinopril 5 MG tablet  Commonly known as: PRINIVIL,ZESTRIL   TAKE 1 TABLET BY MOUTH EVERY DAY      metoprolol succinate XL 25 MG 24 hr tablet  Commonly known as: TOPROL-XL   TAKE 1 TABLET BY MOUTH EVERY DAY IN THE MORNING      sertraline 50 MG tablet  Commonly known as: ZOLOFT   TAKE 1 TABLET BY MOUTH EVERY DAY      SITagliptin 100 MG tablet  Commonly known as: JANUVIA   100 mg, Oral, Daily             Allergies   Allergen Reactions   • Amoxicillin-Pot Clavulanate Other (See Comments)     HEADACHE - has tolerated ceftriaxone 12/2019   • Bactrim [Sulfamethoxazole-Trimethoprim] Other (See Comments)     .         Discharge Disposition:  Home or Self Care    Diet:  Hospital:  Diet Order   Procedures   • Diet Soft Texture; Ground; Thin; Consistent Carbohydrate       Activity:  PT at home, increase mobility throughout the day.  Trilogy at night.         CODE STATUS:    Code Status and Medical Interventions:   Ordered at: 04/13/21 2009     Limited Support to NOT Include:    Intubation     Code Status:    No CPR     Medical Interventions (Level of Support Prior to Arrest):    Limited       No future appointments.    Additional Instructions for the Follow-ups that You Need to Schedule     Ambulatory Referral to Home Health   As directed      Face to Face Visit Date: 4/18/2021    Follow-up provider for Plan of Care?: I treated the patient in an acute care facility and will not continue treatment after discharge.    Follow-up provider: HEIDY MCCAIN [[id number]]    Reason/Clinical Findings: hyperglycemic hyperosmolar nonketotic coma;  hypercapnic respiratory failure    Describe mobility limitations that make leaving home difficult: Impaired functional mobility, gait, balance, endurance    Nursing/Therapeutic Services Requested: Skilled Nursing Physical Therapy  Occupational Therapy    Skilled nursing orders: Medication education Cardiopulmonary assessments    PT orders: Strengthening Gait Training Therapeutic exercise Transfer training    Weight Bearing Status: Non-Weight Bearing    Occupational orders: Activities of daily living Energy conservation Strengthening    Frequency: 1 Week 1         Discharge Follow-up with PCP   As directed       Currently Documented PCP:    Kee Hoffman MD    PCP Phone Number:    [phone]     Follow Up Details: one week                     Yumiko Dalton MD  04/18/21      Time Spent on Discharge:  I spent  40  minutes on this discharge activity which included: face-to-face encounter with the patient, reviewing the data in the system, coordination of the care with the nursing staff as well as consultants, documentation, and entering orders.            Electronically signed by Yumiko Dalton MD at 04/18/21 7402

## 2021-04-18 NOTE — DISCHARGE SUMMARY
[facility] Medicine Services  DISCHARGE SUMMARY    Patient Name: Julissa Kee  : 1950  MRN: [id number]    Date of Admission: 2021  3:42 PM  Date of Discharge:  2021  Primary Care Physician: Kee Hoffman MD    Consults     No orders found from 3/15/2021 to 2021.          Hospital Course     Presenting Problem:   Altered mental status, unspecified altered mental status type [R41.82]    Active Hospital Problems    Diagnosis  POA   • **HHNC (hyperglycemic hyperosmolar nonketotic coma) (CMS/HCC) [E11.01]  Yes     Priority: High   • Physical debility [R53.81]  Unknown     Priority: High   • Altered mental status [R41.82]  Yes     Priority: High   • Morbid obesity with BMI of 50.0-59.9, adult [E66.01, Z68.43]  Not Applicable     Priority: High   • T2DM [E11.9]  Yes     Priority: High   • Pickwickian syndrome [E66.2]  Yes     Priority: High   • Left adrenal nodule [E27.8]  Yes   • ADEEL [N17.9]  Yes   • Hypomagnesemia [E83.42]  Yes   • Hyperglycemia [R73.9]  Yes   • Acute on chronic respiratory failure with hypoxia and hypercapnia (CMS/HCC) [J96.21, J96.22]  Yes   • Hypertension [I10]  Yes   • Hypothyroidism [E03.9]  Yes   • Dyslipidemia [E78.5]  Yes      Resolved Hospital Problems   No resolved problems to display.          Hospital Course:  Julissa Kee is a 71 y.o. female with DM HL HTN MO who lives at home with her  and has limited mobility, moving from bed to chair with walker and assistance of her .  She became confused at home, and then unarousable.  She was brought to the ED where glucose was 550 and pCO2 was 68 with pH of 7.35.  She was placed on BIPAP and insulin drip admitted to the ICU with hyperosmolar hyperglycemic nonketotic coma.    Neuro/Pulm:  She improved and moved out to the floor.  Mentation remained slow, however, which seems to be her baseline.  At times she had difficulty answering simple questions, but at other times showed good  memory (EG, remembering that she has GI intolerance to metformin.)  TSH and B12 were normal.  She was offered BIPAP at night and has been arranged for a home Trilogy machine.  She often refused BIPAP but her  will encourage use at home.     Diabetes:  Her hemoglobin A1C was 9.6, up from 5.2 a year ago.  She was taking Januvia at home but never checks her blood sugars and is not sure where her meter is.  Here, she was escalated to levemir 35 units QHS.  She is intolerant of metformin.   Her  says he has given her insulin before - in fact she used to be on high doses but was changed to just Januvia during a hospital stay in 12/2019.  He has a glucometer and feels confident about managing her injections.    Debility:  She worked with PT.  She took four steps with max assist.  Her  was present and helped her stand.  He feels able to continue helping her. I told her and her  that I am concerned she will soon be bedbound if she does not work on increasing her strength and mobility.  Home PT is arranged.  She did not want to go to inpatient rehab.      Cardiac:  ProBNP was elevated.  She received a few doses of lasix.  Her last echo, in 1/2020, showed EF 65 amd moderate LV hypertrophy.         Discharge Follow Up Recommendations for outpatient labs/diagnostics:   *PCP in 1-2 weeks (consider TeleMed as she has limited mobility):    Please follow up on insulin use and home PT progress and whether she is tolerating Trilogy machine.     - Hgb A1C in 3 months      Day of Discharge     HPI: No complaints.  Wore Bipap last night without difficulty.  Eager to go home.  I spoke with her  today who feels capable of helping her with insulin and physical therapy.        Review of Systems  Gen- No fevers, chills  CV- No chest pain, palpitations  Resp- No cough, dyspnea  GI- No N/V/D, abd pain        Vital Signs:   Temp:  [97.5 °F (36.4 °C)-98 °F (36.7 °C)] 97.6 °F (36.4 °C)  Heart Rate:  [54-95]  65  Resp:  [18-24] 22  BP: (152-171)/(60-81) 152/75     Physical Exam:  Constitutional: Awake, alert though continues to have slow speech and evidenced of impaired mentation/memory  .  Asks me if she wore the BIPAP last night.  (She did.)   Eyes: PER, sclerae anicteric, no conjunctival injection  HENT: NCAT, mucous membranes moist  Neck: Supple, , trachea midline  Respiratory: Clear to auscultation bilaterally, nonlabored respirations   Cardiovascular: RRR, no murmurs, rubs, or gallops, palpable pedal pulses bilaterally  Gastrointestinal: Positive bowel sounds, soft, nontender, nondistended  Musculoskeletal: No bilateral ankle edema, no clubbing or cyanosis to extremities  Psychiatric: Appropriate affect, cooperative  Neurologic: Oriented x 3, legs very weak, strength symmetric, short-term memory deficit  Skin: No rashes; skin thickening on lower legs       Pertinent  and/or Most Recent Results     LAB RESULTS:      Lab 04/16/21  0807 04/15/21  0536 04/14/21  0325 04/13/21  1621   WBC 6.71 9.29 11.75* 7.32   HEMOGLOBIN 10.1* 11.0* 11.6* 12.6   HEMATOCRIT 32.0* 35.1 37.6 38.9   PLATELETS 158 191 198 204   NEUTROS ABS  --  7.73* 10.05* 6.61   IMMATURE GRANS (ABS)  --  0.04 0.06* 0.08*   LYMPHS ABS  --  1.02 0.97 0.51*   MONOS ABS  --  0.44 0.61 0.10   EOS ABS  --  0.04 0.03 0.00   .2* 102.0* 102.5* 99.5*   PROCALCITONIN  --   --   --  0.16   LACTATE  --   --   --  2.0         Lab 04/18/21  0639 04/17/21  0524 04/16/21  0807 04/15/21  1152 04/14/21  0325 04/14/21  0028 04/13/21  1621   SODIUM 138  --  137 138 137 140 132*   POTASSIUM 4.1  --  3.7 3.6 4.5 4.3 5.2   CHLORIDE 94*  --  97* 96* 96* 96* 88*   CO2 38.0*  --  34.0* 36.0* 32.0* 33.0* 34.0*   ANION GAP 6.0  --  6.0 6.0 9.0 11.0 10.0   BUN 31*  --  22 26* 27* 27* 25*   CREATININE 1.03*  --  0.83 0.90 1.07* 1.09* 1.14*   GLUCOSE 238*  --  254* 265* 176* 240* 546*   CALCIUM 10.2  --  9.5 9.7 9.5 9.4 10.5   MAGNESIUM  --  2.2 1.8 1.9 2.3  --  1.3*    PHOSPHORUS  --  3.4 2.6 1.6* 2.4*  --   --    HEMOGLOBIN A1C  --   --  9.50*  --   --   --   --    TSH  --   --   --   --   --   --  3.360         Lab 04/13/21  1621   TOTAL PROTEIN 7.3   ALBUMIN 3.80   GLOBULIN 3.5   ALT (SGPT) 13   AST (SGOT) 12   BILIRUBIN 0.7   ALK PHOS 88         Lab 04/13/21  1621   PROBNP 764.9   TROPONIN T <0.010             Lab 04/16/21  0807 04/15/21  0850   FOLATE 4.69*  --    VITAMIN B 12  --  638         Lab 04/16/21  0342 04/13/21  1748   PH, ARTERIAL 7.399 7.347*   PCO2, ARTERIAL 64.5* 68.0*   PO2 .0* 84.2   FIO2 35 36   HCO3 ART 39.9* 37.3*   BASE EXCESS ART 12.7* 9.4*   CARBOXYHEMOGLOBIN 1.1 1.9     Brief Urine Lab Results  (Last result in the past 365 days)      Color   Clarity   Blood   Leuk Est   Nitrite   Protein   CREAT   Urine HCG        04/13/21 2005 Yellow Clear Moderate (2+) Negative Negative Trace             Microbiology Results (last 10 days)     Procedure Component Value - Date/Time    Urine Culture - Urine, Urine, Catheter [[id number]]  (Normal) Collected: 04/13/21 2005    Lab Status: Final result Specimen: Urine, Catheter Updated: 04/15/21 1008     Urine Culture No growth    COVID PRE-OP / PRE-PROCEDURE SCREENING ORDER (NO ISOLATION) - Swab, Nasopharynx [[id number]]  (Normal) Collected: 04/13/21 1725    Lab Status: Final result Specimen: Swab from Nasopharynx Updated: 04/13/21 1839    Narrative:      The following orders were created for panel order COVID PRE-OP / PRE-PROCEDURE SCREENING ORDER (NO ISOLATION) - Swab, Nasopharynx.  Procedure                               Abnormality         Status                     ---------                               -----------         ------                     COVID-19,CEPHEID,SUNITA IN-...[[id number]]  Normal              Final result                 Please view results for these tests on the individual orders.    COVID-19,CEPHEID,SUNITA IN-HOUSE(OR EMERGENT/ADD-ON),NP SWAB IN TRANSPORT MEDIA 3-4 HR TAT - Swab, Nasopharynx  [[id number]]  (Normal) Collected: 04/13/21 1725    Lab Status: Final result Specimen: Swab from Nasopharynx Updated: 04/13/21 1839     COVID19 Not Detected    Narrative:      Fact sheet for providers: https://www.fda.gov/media/481412/download     Fact sheet for patients: https://www.fda.gov/media/262949/download    Blood Culture - Blood, Hand, Left [[id number]] Collected: 04/13/21 1621    Lab Status: Preliminary result Specimen: Blood from Hand, Left Updated: 04/17/21 1645     Blood Culture No growth at 4 days    Blood Culture - Blood, Hand, Right [[id number]] Collected: 04/13/21 1615    Lab Status: Preliminary result Specimen: Blood from Hand, Right Updated: 04/17/21 1645     Blood Culture No growth at 4 days          CT Head Without Contrast    Result Date: 4/14/2021  EXAMINATION: CT HEAD WO CONTRAST- 04/13/2021  INDICATION: AMS  TECHNIQUE: 5 mm unenhanced images through the brain  The radiation dose reduction device was turned on for each scan per the ALARA (As Low as Reasonably Achievable) protocol.  COMPARISON: Head CT scan 12/28/2019  FINDINGS: History indicates altered mental status.  The calvarium appears intact. Paranasal sinuses and mastoids appear clear except for a small amount of sphenoid sinus mucosal thickening. Soft tissue window images show expected degree of generalized cerebral atrophy for patient's age. There is no evidence of intracranial hemorrhage, mass, mass effect, edema, hydrocephalus, or abnormal extra-axial collection. No evidence of acute or old infarction is identified. No gross abnormalities are seen in the orbits.      No evidence of acute intracranial disease.  D:  04/13/2021 E:  04/14/2021   This report was finalized on 4/14/2021 9:46 AM by Dr. Virgilio Ackerman MD.      CT Chest Without Contrast Diagnostic    Result Date: 4/14/2021  EXAMINATION: CT CHEST WO CONTRAST, DIAGNOSTIC-04/13/2021:  INDICATION: AMS, ? LLL infiltrate.  TECHNIQUE: 5 mm unenhanced images through the chest and upper  abdomen.  The radiation dose reduction device was turned on for each scan per the ALARA (As Low as Reasonably Achievable) protocol.  COMPARISON: Portable chest radiograph of the same date.  FINDINGS: Hazy opacity of the left lung base corresponds to mild left basilar pleural thickening, and a small area of linear scarring or discoid atelectasis. The pulmonary vasculature is prominent and there is a mild diffuse perihilar disease pattern suspicious for early interstitial edema. No lung consolidation is seen. No pericardial effusion or mediastinal adenopathy is seen. Review of the 12/28/2019 chest CT scan shows very similar left basilar lung scarring.  Included images of the upper abdomen show no significant abnormalities of the visualized portions of the liver, spleen, or right adrenal gland. There is a 2 cm low-density left adrenal nodule, unchanged from 12/28/2019 and apparently an incidental adenoma.      1.  Stable, mild left basilar lung scarring compared to 12/28/2019 chest CT scan. 2.  Cardiomegaly, pulmonary vascular enlargement and suspected early interstitial edema. Findings appear to represent early changes of volume overload/CHF. 3. Small left adrenal adenoma, unchanged.   D:  04/13/2021 E:  04/14/2021  This report was finalized on 4/14/2021 9:45 AM by Dr. Virgilio Ackerman MD.      XR Chest 1 View    Result Date: 4/17/2021   EXAMINATION: XR CHEST 1 VW - 04/17/2021  INDICATION: R41.82-Altered mental status, unspecified; R06.89-Other abnormalities of breathing; E66.01-Morbid (severe) obesity due to excess calories. Increasing oxygen requirements.  COMPARISON: 04/13/2021  FINDINGS: Portable chest reveals heart to be enlarged. Low lung volumes. Mild increased markings identified at the left lung base concerning for infiltrate. Degenerative changes seen within the spine. The upper lung fields are clear.        Ill-defined opacification concerning for infiltrate at the left lung base. Lung volumes are low. Findings  are similar compared to the prior examination.  DICTATED:   04/17/2021 EDITED/ls :   04/17/2021       XR Chest 1 View    Result Date: 4/13/2021  EXAMINATION: XR CHEST 1 VW- 04/13/2021  INDICATION: Weak/Dizzy/AMS triage protocol  COMPARISON: 01/17/2020  FINDINGS: Heart is mildly enlarged. Vasculature is cephalized. Lungs are moderately well-expanded and appear clear except for small area of linear atelectasis in the left lung base. No effusion or pneumothorax is seen.      1. Mild cardiomegaly and pulmonary venous hypertension. 2. Small area of left basilar discoid atelectasis. No other evidence of active chest disease.  D:  04/13/2021 E:  04/13/2021    This report was finalized on 4/13/2021 9:23 PM by Dr. Virgilio Ackerman MD.        Results for orders placed during the hospital encounter of 01/14/20    Duplex Venous Lower Extremity - Bilateral CAR    Interpretation Summary  · Technically difficult and limited exam, however no DVT or SVT of the visualized veins.      Results for orders placed during the hospital encounter of 01/14/20    Duplex Venous Lower Extremity - Bilateral CAR    Interpretation Summary  · Technically difficult and limited exam, however no DVT or SVT of the visualized veins.      Results for orders placed during the hospital encounter of 12/28/19    Adult Transthoracic Echo Complete W/ Cont if Necessary Per Protocol    Interpretation Summary  · Left ventricular systolic function is normal. Estimated EF appears to be in the range of 61 - 65%.  · Left ventricular wall thickness is consistent with moderate concentric hypertrophy.  · Normal right ventricular wall thickness, systolic function and septal motion noted. Right ventricular cavity is borderline dilated.  · Trace tricuspid valve regurgitation is present. Estimated right ventricular systolic pressure from tricuspid regurgitation is normal (<35 mmHg)  · The valve appears trileaflet. The aortic valve is abnormal in structure. There is mild  calcification of the aortic valve.Trace aortic valve regurgitation is present. No hemodynamically significant aortic valve stenosis is present.      Plan for Follow-up of Pending Labs/Results: I will follow up   Pending Labs     Order Current Status    Blood Culture - Blood, Hand, Left Preliminary result    Blood Culture - Blood, Hand, Right Preliminary result        Discharge Details        Discharge Medications      New Medications      Instructions Start Date   Levemir 100 UNIT/ML injection  Generic drug: insulin detemir   20 Units, Subcutaneous, Nightly         Continue These Medications      Instructions Start Date   aspirin 81 MG tablet   1 tablet, Oral, Daily      atorvastatin 10 MG tablet  Commonly known as: LIPITOR   10 mg, Oral, Daily      doxazosin 4 MG tablet  Commonly known as: CARDURA   TAKE 1 TABLET BY MOUTH EVERY DAY EVERY NIGHT      hydroCHLOROthiazide 25 MG tablet  Commonly known as: HYDRODIURIL   TAKE 1 TABLET BY MOUTH EVERY DAY      levothyroxine 100 MCG tablet  Commonly known as: SYNTHROID, LEVOTHROID   100 mcg, Oral, Daily      lisinopril 5 MG tablet  Commonly known as: PRINIVIL,ZESTRIL   TAKE 1 TABLET BY MOUTH EVERY DAY      metoprolol succinate XL 25 MG 24 hr tablet  Commonly known as: TOPROL-XL   TAKE 1 TABLET BY MOUTH EVERY DAY IN THE MORNING      sertraline 50 MG tablet  Commonly known as: ZOLOFT   TAKE 1 TABLET BY MOUTH EVERY DAY      SITagliptin 100 MG tablet  Commonly known as: JANUVIA   100 mg, Oral, Daily             Allergies   Allergen Reactions   • Amoxicillin-Pot Clavulanate Other (See Comments)     HEADACHE - has tolerated ceftriaxone 12/2019   • Bactrim [Sulfamethoxazole-Trimethoprim] Other (See Comments)     .         Discharge Disposition:  Home or Self Care    Diet:  Hospital:  Diet Order   Procedures   • Diet Soft Texture; Ground; Thin; Consistent Carbohydrate       Activity:  PT at home, increase mobility throughout the day.  Trilogy at night.         CODE STATUS:    Code  Status and Medical Interventions:   Ordered at: 04/13/21 2009     Limited Support to NOT Include:    Intubation     Code Status:    No CPR     Medical Interventions (Level of Support Prior to Arrest):    Limited       No future appointments.    Additional Instructions for the Follow-ups that You Need to Schedule     Ambulatory Referral to Home Health   As directed      Face to Face Visit Date: 4/18/2021    Follow-up provider for Plan of Care?: I treated the patient in an acute care facility and will not continue treatment after discharge.    Follow-up provider: HEIDY HOFFMAN [[id number]]    Reason/Clinical Findings: hyperglycemic hyperosmolar nonketotic coma;  hypercapnic respiratory failure    Describe mobility limitations that make leaving home difficult: Impaired functional mobility, gait, balance, endurance    Nursing/Therapeutic Services Requested: Skilled Nursing Physical Therapy Occupational Therapy    Skilled nursing orders: Medication education Cardiopulmonary assessments    PT orders: Strengthening Gait Training Therapeutic exercise Transfer training    Weight Bearing Status: Non-Weight Bearing    Occupational orders: Activities of daily living Energy conservation Strengthening    Frequency: 1 Week 1         Discharge Follow-up with PCP   As directed       Currently Documented PCP:    Heidy Hoffman MD    PCP Phone Number:    [phone]     Follow Up Details: one week                     Yumiko Dalton MD  04/18/21      Time Spent on Discharge:  I spent  40  minutes on this discharge activity which included: face-to-face encounter with the patient, reviewing the data in the system, coordination of the care with the nursing staff as well as consultants, documentation, and entering orders.

## 2021-04-18 NOTE — DISCHARGE PLACEMENT REQUEST
"Edgardo Haynes (71 y.o. Female)     Date of Birth Social Security Number Address Home Phone MRN    1950  [address] [id number] [phone] [id number]    Latter day Marital Status          None        Admission Date Admission Type Admitting Provider Attending Provider Department, Room/Bed    21 Emergency Yumiko Dalton MD Mini, Jocelyn, MD [facility] 4G, S463/1    Discharge Date Discharge Disposition Discharge Destination                       Attending Provider: Yumiko Dalton MD    Allergies: Amoxicillin-pot Clavulanate, Bactrim [Sulfamethoxazole-trimethoprim]    Isolation: None   Infection: None   Code Status: No CPR    Ht: 162.6 cm (64\")   Wt: 125 kg (276 lb 8 oz)    Admission Cmt: None   Principal Problem: HHNC (hyperglycemic hyperosmolar nonketotic coma) (CMS/HCC) [E11.01]                 Active Insurance as of 2021     Primary Coverage     Payor Plan Insurance Group Employer/Plan Group    HUMANA MEDICARE REPLACEMENT HUMANA MEDICARE REPLACEMENT [id number]     Payor Plan Address Payor Plan Phone Number Payor Plan Fax Number Effective Dates    [address] [address] [phone]  2014 - None Entered    [facility] [id number]       Subscriber Name Subscriber Birth Date Member ID       EDGARDO HAYNES 1950 [id number]                 Emergency Contacts      (Rel.) Home Phone Work Phone Mobile Phone    Familia Haynes (Spouse) [phone] -- [phone]                                              [address] [address]  Phone:  [phone]  Fax:  [phone] Date: 2021      Ambulatory Referral to Home Health     Patient:  Edgardo Haynes MRN:  [id number]   [address] [facility] [address] :  1950  SSN:    Phone: [phone] Sex:  F      INSURANCE PAYOR PLAN GROUP # SUBSCRIBER ID   Primary:    HUMANA MEDICARE REPLACEMENT [id number]      Referring " Provider Information:  PEARL MALDONADO Phone: [phone] Fax: [phone]      Referral Information:   # Visits:  1 Referral Type: Home Health [42]   Urgency:  Routine Referral Reason: Specialty Services Required   Start Date: Apr 18, 2021 End Date:  To be determined by Insurer   Diagnosis: HHNC (hyperglycemic hyperosmolar nonketotic coma) (CMS/HCC) (E11.01 [ICD-10-CM] 250.20,250.30 [ICD-9-CM])  Acute on chronic respiratory failure with hypoxia and hypercapnia (CMS/HCC) (J96.21,J96.22 [ICD-10-CM] 518.84,786.09,799.02 [ICD-9-CM])      Refer to Dept:   Refer to Provider:   Refer to Facility:       Face to Face Visit Date: 4/18/2021  Follow-up provider for Plan of Care? I treated the patient in an acute care facility and will not continue treatment after discharge.  Follow-up provider: HEIDY MCCAIN [[id number]]  Reason/Clinical Findings: hyperglycemic hyperosmolar nonketotic coma;  hypercapnic respiratory failure  Describe mobility limitations that make leaving home difficult: Impaired functional mobility, gait, balance, endurance  Nursing/Therapeutic Services Requested: Skilled Nursing  Nursing/Therapeutic Services Requested: Physical Therapy  Nursing/Therapeutic Services Requested: Occupational Therapy  Skilled nursing orders: Medication education  Skilled nursing orders: Cardiopulmonary assessments  PT orders: Strengthening  PT orders: Gait Training  PT orders: Therapeutic exercise  PT orders: Transfer training  Weight Bearing Status: Non-Weight Bearing  Occupational orders: Activities of daily living  Occupational orders: Energy conservation  Occupational orders: Strengthening  Frequency: 1 Week 1     This document serves as a request of services and does not constitute Insurance authorization or approval of services.  To determine eligibility, please contact the members Insurance carrier to verify and review coverage.     If you have medical questions regarding this request for services. Please contact [facility]  [address] at [phone] during normal business hours.       Authorizing Provider:Yumiko Dalton MD  Authorizing Provider's NPI: [id number]  Order Entered By: Maria G Ceja RN 4/18/2021  8:32 AM     Electronically signed by: Yumiko Dalton MD 4/18/2021  8:32 AM                                         History & Physical      Dustin Valencia MD at 04/13/21 1952                Chief complaint: Altered mental status    Subjective     71-year-old female who presented to the emergency room today that became apparent yesterday reportedly.  She was confused and complained of a headache on the night prior to admission.  Her  was not able to get her up.  Today she was sleeping and became increasingly poorly responsive and she was brought to the emergency room.  She reportedly had a urinary tract infection 6 weeks ago and was treated with 2 courses of antibiotics though these results are not in the [facility] chart.  I do see prior UTIs remotely with E. coli but this was 5 years ago.      She is followed by Dr. Hoffman for primary care.  Her past medical history includes type 2 diabetes mellitus, dyslipidemia, hypertension, hypothyroidism, and morbid obesity.  The last chart interaction with Dr. Hoffman was July of last year and her last visit was May of last year.  She is listed as having COPD and is on as needed duo nebs but is a lifelong non-smoker per records.  History is obtained primarily from the chart as the patient is altered and family has left.    She was last admitted to the hospital in January 2020 for respiratory failure due to viral pneumonia.  Her respiratory failure was acute on chronic with a high CO2 at that time.    In our emergency department she underwent CT scanning which revealed no acute pulmonary parenchymal abnormalities.  There was cardiomegaly and pulmonary vascular engorgement.  Arterial blood gases revealed 7.35/68/84/37.  Covid testing was negative.  Glucose was  "elevated at 546.  Thyroid studies, lactic acid, procalcitonin, and BNP were acceptable.  Beta hydroxybutyrate was mildly elevated.    I was asked to admit her to the ICU.      History  Past Medical History:   Diagnosis Date   • Acute bronchitis    • Acute sinusitis    • Anxiety    • Bacterial conjunctivitis    • Chest pain    • COPD (chronic obstructive pulmonary disease) (CMS/HCC)    • Depression    • Diabetes mellitus (CMS/HCC)    • Dyspnea    • Dysuria    • Edema    • Elbow injury    • Elbow pain    • Eye drainage    • Fatigue    • Foot pain, right    • Humerus distal fracture    • Hyperlipidemia    • Hypertension    • Hypothyroidism    • Morbid obesity (CMS/HCC)    • Pickwickian syndrome (CMS/HCC)    • Pressure ulcer stage I    • Psoriasis    • SOB (shortness of breath)    • Symptoms of urinary tract infection    • Urinary frequency    • Urinary tract infection    • Vaginal candidiasis      Past Surgical History:   Procedure Laterality Date   • CERVICAL POLYPECTOMY     • HYSTERECTOMY       Family History   Problem Relation Age of Onset   • Diabetes Father    • Diabetes Sister    • Pancreatic cancer Sister      Social History     Tobacco Use   • Smoking status: Former Smoker     Types: Cigarettes     Quit date: 2006     Years since quitting: 15.2   • Smokeless tobacco: Never Used   Substance Use Topics   • Alcohol use: No   • Drug use: Never       Review of Systems   Review of systems could not be obtained due to   patient confusion.      Objective     Vital Signs  Temp:  [98.7 °F (37.1 °C)-99.2 °F (37.3 °C)] 99.2 °F (37.3 °C)  Heart Rate:  [71-95] 72  Resp:  [18] 18  BP: (119-162)/(53-67) 156/67    Physical Exam:    Objective:  General Appearance:  Ill-appearing.    Vital signs: (most recent): Blood pressure 156/67, pulse 72, temperature 99.2 °F (37.3 °C), temperature source Axillary, resp. rate 18, height 162.6 cm (64\"), weight (!) 141 kg (310 lb), SpO2 98 %, not currently breastfeeding.    HEENT: (BiPAP mask " in place)    Lungs:  There are decreased breath sounds.  No rales, wheezes or rhonchi.    Heart: Normal rate.  Regular rhythm.  S1 normal and S2 normal.  No murmur, gallop or friction rub.   Chest: Symmetric chest wall expansion.   Abdomen: Abdomen is soft and non-distended.  Bowel sounds are normal.   There is no abdominal tenderness.   There is no mass. There is no splenomegaly. There is no hepatomegaly.   Extremities: There is dependent edema.  There is no deformity.    Neurological: (Difficult to arouse  Occasional moaning  No focal motor deficits to painful stimuli).    Pupils:  Pupils are equal, round, and reactive to light.    Skin:  Warm and dry.              Results Review:    I reviewed the patient's new clinical results.  I reviewed the patient's new imaging results and agree with the interpretation.  I reviewed the patient's other test results and agree with the interpretation  I personally viewed and interpreted the patient's EKG/Telemetry data    Assessment/Plan     Assessment:    Active Hospital Problems    Diagnosis    • **HHNC (hyperglycemic hyperosmolar nonketotic coma) (CMS/HCC)    • Hyperglycemia    • Acute on chronic respiratory failure with hypoxia and hypercapnia (CMS/HCC)    • Left adrenal nodule      CT 4/13/21: 2 cm low-density left adrenal nodule.     • ADEEL    • Hypomagnesemia    • Altered mental status    • Morbid obesity with BMI of 50.0-59.9, adult    • Hypertension    • Hypothyroidism    • T2DM    • Dyslipidemia    • Pickwickian syndrome        71-year-old female brought to the ER by family primarily due to altered mental status that has occurred over the last 24 hours and may be longer.  She has CO2 retention which appears compensated and chronic.  I suspect the source of her encephalopathy primarily lies elsewhere.  She has had a history of UTIs and we have not been able to get a urinalysis as of yet but this is certainly a possibility.  Her blood sugar is markedly elevated and a  nonketotic hyperosmolar state may be contributing.    My plan is to admit her to the ICU and treat her with an insulin drip but avoid aggressive fluid resuscitation as her renal function appears acceptable and her imaging suggests volume overload as does her exam.  I will treat her empirically with antibiotics and will obtain urine and blood cultures.  There is no evidence of pneumonia by CT scanning.    Have discussed the situation with her  via telephone and he tells me that she does not want intubation or resuscitation but is comfortable with aggressive care up to that point.    Plan:    1. ICU admission  2. Serial neurologic exams and anticipate improvement with correction of metabolic abnormalities.  3. BiPAP.  Her  says she has not tolerated that in the past though at the current time she is altered so is tolerating that so far.  No intubation.  4. Insulin drip  5. Empiric Rocephin and await any culture data    I discussed the patients findings and my recommendations with nursing staff.     She is critically ill based upon her acute encephalopathy and acute respiratory failure.    Critical Care time spent in direct patient care: 60 minutes (excluding procedure time, if applicable) including high complexity decision making to assess, manipulate, and support vital organ system failure in this individual who has impairment of one or more vital organ systems such that there is a high probability of imminent or life threatening deterioration in the patient’s condition.    Dustin Valencia MD  Pulmonary and Critical Care Medicine  04/13/21  19:54 EDT            Electronically signed by Dustin Valencia MD at 04/13/21 2034

## 2021-04-19 NOTE — OUTREACH NOTE
Prep Survey      Responses   [facility] facility patient discharged from?  [facility]   Is LACE score < 7 ?  No   Emergency Room discharge w/ pulse ox?  No   Eligibility  Readm Mgmt   Discharge diagnosis  HHNC (hyperglycemic hyperosmolar nonketotic coma   Does the patient have one of the following disease processes/diagnoses(primary or secondary)?  Other   Does the patient have Home health ordered?  Yes   What is the Home health agency?   [facility]     Is there a DME ordered?  Yes   What DME was ordered?  [facility] per [facility]    Prep survey completed?  Yes          Amelia Gamez RN

## 2021-04-21 NOTE — OUTREACH NOTE
Medical Week 1 Survey      Responses   [facility] patient discharged from?  [facility]   Does the patient have one of the following disease processes/diagnoses(primary or secondary)?  Other   Week 1 attempt successful?  Yes   Call start time  1350   Call end time  1355   Is patient permission given to speak with other caregiver?  Yes   List who call center can speak with  Familia or  or Destiney, daughter   Meds reviewed with patient/caregiver?  Yes   Is the patient having any side effects they believe may be caused by any medication additions or changes?  No   Does the patient have all medications ordered at discharge?  Yes   Is the patient taking all medications as directed (includes completed medication regime)?  Yes   Comments regarding appointments  [facility] to [place] will set up appt.   Does the patient have a primary care provider?   Yes   Does the patient have an appointment with their PCP within 7 days of discharge?  Yes   Comments regarding PCP  Asa GAGNON   Has the patient kept scheduled appointments due by today?  Yes   What is the Home health agency?   [facility]     Has home health visited the patient within 72 hours of discharge?  Yes   What DME was ordered?  [facility]   Has all DME been delivered?  Yes   Psychosocial issues?  No   Comments  [facility] calls comes,  comes and has hospital bed, uses walker if gets up,    Did the patient receive a copy of their discharge instructions?  Yes   Nursing interventions  Reviewed instructions with patient, Educated on MyChart   What is the patient's perception of their health status since discharge?  Improving   Is the patient/caregiver able to teach back signs and symptoms related to disease process for when to call PCP?  Yes   Is the patient/caregiver able to teach back signs and symptoms related to disease process for when to call 911?  Yes   Is the patient/caregiver able to teach back the hierarchy of who to call/visit  for symptoms/problems? PCP, Specialist, Home health nurse, Urgent Care, ED, 911  Yes   If the patient is a current smoker, are they able to teach back resources for cessation?  Not a smoker   Week 1 call completed?  Yes   Wrap up additional comments  [facility] can do labs and xrays at their house, and HH comes,  states she is doing pretty well, still has memory issues, but close.           Urszula Alfaro, RN

## 2022-01-01 ENCOUNTER — HOSPITAL ENCOUNTER (INPATIENT)
Facility: HOSPITAL | Age: 72
LOS: 1 days | End: 2022-01-10
Attending: EMERGENCY MEDICINE | Admitting: INTERNAL MEDICINE

## 2022-01-01 ENCOUNTER — APPOINTMENT (OUTPATIENT)
Dept: CT IMAGING | Facility: HOSPITAL | Age: 72
End: 2022-01-01

## 2022-01-01 ENCOUNTER — APPOINTMENT (OUTPATIENT)
Dept: GENERAL RADIOLOGY | Facility: HOSPITAL | Age: 72
End: 2022-01-01

## 2022-01-01 DIAGNOSIS — J96.01 ACUTE RESPIRATORY FAILURE WITH HYPOXIA: ICD-10-CM

## 2022-01-01 DIAGNOSIS — I46.9 CARDIAC ARREST: Primary | ICD-10-CM

## 2022-01-01 DIAGNOSIS — E13.11 DIABETIC KETOACIDOSIS WITH COMA ASSOCIATED WITH OTHER SPECIFIED DIABETES MELLITUS: ICD-10-CM

## 2022-01-01 LAB
ALBUMIN SERPL-MCNC: 3.1 G/DL (ref 3.5–5.2)
ALBUMIN/GLOB SERPL: 0.9 G/DL
ALP SERPL-CCNC: 111 U/L (ref 39–117)
ALT SERPL W P-5'-P-CCNC: 291 U/L (ref 1–33)
AMPHET+METHAMPHET UR QL: NEGATIVE
AMPHETAMINES UR QL: NEGATIVE
ANION GAP SERPL CALCULATED.3IONS-SCNC: 29 MMOL/L (ref 5–15)
APTT PPP: 34.5 SECONDS (ref 22–39)
ARTERIAL PATENCY WRIST A: ABNORMAL
AST SERPL-CCNC: 331 U/L (ref 1–32)
ATMOSPHERIC PRESS: ABNORMAL MM[HG]
B-OH-BUTYR SERPL-SCNC: 0.22 MMOL/L (ref 0.02–0.27)
BARBITURATES UR QL SCN: NEGATIVE
BASE EXCESS BLDA CALC-SCNC: -13.3 MMOL/L (ref 0–2)
BASE EXCESS BLDA CALC-SCNC: -9 MMOL/L (ref -5–5)
BASOPHILS # BLD MANUAL: 0 10*3/MM3 (ref 0–0.2)
BASOPHILS NFR BLD MANUAL: 0 % (ref 0–1.5)
BDY SITE: ABNORMAL
BENZODIAZ UR QL SCN: POSITIVE
BILIRUB SERPL-MCNC: 0.5 MG/DL (ref 0–1.2)
BODY TEMPERATURE: 37 C
BUN SERPL-MCNC: 29 MG/DL (ref 8–23)
BUN/CREAT SERPL: 18 (ref 7–25)
BUPRENORPHINE SERPL-MCNC: NEGATIVE NG/ML
CA-I BLDA-SCNC: 1.36 MMOL/L (ref 1.2–1.32)
CA-I SERPL ISE-MCNC: 1.47 MMOL/L (ref 1.12–1.32)
CALCIUM SPEC-SCNC: 11.1 MG/DL (ref 8.6–10.5)
CANNABINOIDS SERPL QL: NEGATIVE
CHLORIDE SERPL-SCNC: 89 MMOL/L (ref 98–107)
CK MB SERPL-CCNC: 2.15 NG/ML
CK SERPL-CCNC: 145 U/L (ref 20–180)
CO2 BLDA-SCNC: 18.1 MMOL/L (ref 22–33)
CO2 BLDA-SCNC: 25 MMOL/L (ref 24–29)
CO2 SERPL-SCNC: 18 MMOL/L (ref 22–29)
COCAINE UR QL: NEGATIVE
COHGB MFR BLD: 1.1 % (ref 0–2)
CREAT BLDA-MCNC: 1.8 MG/DL (ref 0.6–1.3)
CREAT SERPL-MCNC: 1.61 MG/DL (ref 0.57–1)
D-LACTATE SERPL-SCNC: 18 MMOL/L (ref 0.5–2)
DEPRECATED RDW RBC AUTO: 50.9 FL (ref 37–54)
EOSINOPHIL # BLD MANUAL: 0.5 10*3/MM3 (ref 0–0.4)
EOSINOPHIL NFR BLD MANUAL: 2 % (ref 0.3–6.2)
EPAP: 0
ERYTHROCYTE [DISTWIDTH] IN BLOOD BY AUTOMATED COUNT: 13.2 % (ref 12.3–15.4)
FLUAV RNA RESP QL NAA+PROBE: NOT DETECTED
FLUBV RNA RESP QL NAA+PROBE: NOT DETECTED
GFR SERPL CREATININE-BSD FRML MDRD: 31 ML/MIN/1.73
GLOBULIN UR ELPH-MCNC: 3.3 GM/DL
GLUCOSE BLDC GLUCOMTR-MCNC: 403 MG/DL (ref 70–130)
GLUCOSE SERPL-MCNC: 410 MG/DL (ref 65–99)
HBA1C MFR BLD: 5.7 % (ref 4.8–5.6)
HCO3 BLDA-SCNC: 16.4 MMOL/L (ref 20–26)
HCO3 BLDA-SCNC: 22.5 MMOL/L (ref 22–26)
HCT VFR BLD AUTO: 38.6 % (ref 34–46.6)
HCT VFR BLD CALC: 35.4 % (ref 38–51)
HCT VFR BLDA CALC: 37 % (ref 38–51)
HGB BLD-MCNC: 11.4 G/DL (ref 12–15.9)
HGB BLDA-MCNC: 11.5 G/DL (ref 14–18)
HGB BLDA-MCNC: 12.6 G/DL (ref 12–17)
HOLD SPECIMEN: NORMAL
INHALED O2 CONCENTRATION: 100 %
INR PPP: 1.22 (ref 0.85–1.16)
IPAP: 0
LYMPHOCYTES # BLD MANUAL: 12.06 10*3/MM3 (ref 0.7–3.1)
LYMPHOCYTES NFR BLD MANUAL: 2 % (ref 5–12)
Lab: ABNORMAL
MAGNESIUM SERPL-MCNC: 2.6 MG/DL (ref 1.6–2.4)
MCH RBC QN AUTO: 31.2 PG (ref 26.6–33)
MCHC RBC AUTO-ENTMCNC: 29.5 G/DL (ref 31.5–35.7)
MCV RBC AUTO: 105.8 FL (ref 79–97)
METAMYELOCYTES NFR BLD MANUAL: 2 % (ref 0–0)
METHADONE UR QL SCN: NEGATIVE
METHGB BLD QL: 0.3 % (ref 0–1.5)
MODALITY: ABNORMAL
MONOCYTES # BLD: 0.5 10*3/MM3 (ref 0.1–0.9)
MYELOCYTES NFR BLD MANUAL: 2 % (ref 0–0)
NEUTROPHILS # BLD AUTO: 11.05 10*3/MM3 (ref 1.7–7)
NEUTROPHILS NFR BLD MANUAL: 40 % (ref 42.7–76)
NEUTS BAND NFR BLD MANUAL: 4 % (ref 0–5)
NOTE: ABNORMAL
NOTIFIED BY: ABNORMAL
NOTIFIED WHO: ABNORMAL
NRBC SPEC MANUAL: 0 /100 WBC (ref 0–0.2)
NT-PROBNP SERPL-MCNC: 144.6 PG/ML (ref 0–900)
OPIATES UR QL: NEGATIVE
OSMOLALITY SERPL: 324 MOSM/KG (ref 275–295)
OXYCODONE UR QL SCN: NEGATIVE
OXYHGB MFR BLDV: 74.2 % (ref 94–99)
PAW @ PEAK INSP FLOW SETTING VENT: 0 CMH2O
PCO2 BLDA: 54.1 MM HG (ref 35–45)
PCO2 BLDA: 96 MM HG (ref 35–45)
PCO2 TEMP ADJ BLD: 54.1 MM HG (ref 35–45)
PCP UR QL SCN: NEGATIVE
PH BLDA: 6.98 PH UNITS (ref 7.35–7.6)
PH BLDA: 7.09 PH UNITS (ref 7.35–7.45)
PH, TEMP CORRECTED: 7.09 PH UNITS
PHOSPHATE SERPL-MCNC: 7.3 MG/DL (ref 2.5–4.5)
PLAT MORPH BLD: NORMAL
PLATELET # BLD AUTO: 318 10*3/MM3 (ref 140–450)
PMV BLD AUTO: 10.6 FL (ref 6–12)
PO2 BLDA: 53.6 MM HG (ref 83–108)
PO2 BLDA: 54 MMHG (ref 80–105)
PO2 TEMP ADJ BLD: 53.6 MM HG (ref 83–108)
POTASSIUM BLDA-SCNC: 2.9 MMOL/L (ref 3.5–4.9)
POTASSIUM SERPL-SCNC: 3 MMOL/L (ref 3.5–5.2)
PROCALCITONIN SERPL-MCNC: 0.08 NG/ML (ref 0–0.25)
PROPOXYPH UR QL: NEGATIVE
PROT SERPL-MCNC: 6.4 G/DL (ref 6–8.5)
PROTHROMBIN TIME: 15 SECONDS (ref 11.4–14.4)
QT INTERVAL: 408 MS
QTC INTERVAL: 504 MS
RBC # BLD AUTO: 3.65 10*6/MM3 (ref 3.77–5.28)
RBC MORPH BLD: NORMAL
RSV RNA NPH QL NAA+NON-PROBE: NOT DETECTED
SAO2 % BLDA: 66 % (ref 95–98)
SARS-COV-2 RNA RESP QL NAA+PROBE: NOT DETECTED
SODIUM BLD-SCNC: 136 MMOL/L (ref 138–146)
SODIUM SERPL-SCNC: 136 MMOL/L (ref 136–145)
T4 FREE SERPL-MCNC: 1.18 NG/DL (ref 0.93–1.7)
TOTAL RATE: 0 BREATHS/MINUTE
TRICYCLICS UR QL SCN: NEGATIVE
TROPONIN T SERPL-MCNC: <0.01 NG/ML (ref 0–0.03)
TSH SERPL DL<=0.05 MIU/L-ACNC: 4.58 UIU/ML (ref 0.27–4.2)
VARIANT LYMPHS NFR BLD MANUAL: 42 % (ref 19.6–45.3)
VARIANT LYMPHS NFR BLD MANUAL: 6 % (ref 0–5)
WBC MORPH BLD: NORMAL
WBC NRBC COR # BLD: 25.12 10*3/MM3 (ref 3.4–10.8)
WHOLE BLOOD HOLD SPECIMEN: NORMAL
WHOLE BLOOD HOLD SPECIMEN: NORMAL

## 2022-01-01 PROCEDURE — 82805 BLOOD GASES W/O2 SATURATION: CPT

## 2022-01-01 PROCEDURE — 85007 BL SMEAR W/DIFF WBC COUNT: CPT | Performed by: EMERGENCY MEDICINE

## 2022-01-01 PROCEDURE — 83050 HGB METHEMOGLOBIN QUAN: CPT

## 2022-01-01 PROCEDURE — 80053 COMPREHEN METABOLIC PANEL: CPT | Performed by: EMERGENCY MEDICINE

## 2022-01-01 PROCEDURE — 25010000002 FENTANYL CITRATE (PF) 50 MCG/ML SOLUTION: Performed by: EMERGENCY MEDICINE

## 2022-01-01 PROCEDURE — 84443 ASSAY THYROID STIM HORMONE: CPT | Performed by: NURSE PRACTITIONER

## 2022-01-01 PROCEDURE — 92950 HEART/LUNG RESUSCITATION CPR: CPT

## 2022-01-01 PROCEDURE — 87637 SARSCOV2&INF A&B&RSV AMP PRB: CPT | Performed by: EMERGENCY MEDICINE

## 2022-01-01 PROCEDURE — 87040 BLOOD CULTURE FOR BACTERIA: CPT | Performed by: EMERGENCY MEDICINE

## 2022-01-01 PROCEDURE — 36600 WITHDRAWAL OF ARTERIAL BLOOD: CPT

## 2022-01-01 PROCEDURE — 83605 ASSAY OF LACTIC ACID: CPT | Performed by: EMERGENCY MEDICINE

## 2022-01-01 PROCEDURE — 85610 PROTHROMBIN TIME: CPT | Performed by: NURSE PRACTITIONER

## 2022-01-01 PROCEDURE — 82550 ASSAY OF CK (CPK): CPT | Performed by: NURSE PRACTITIONER

## 2022-01-01 PROCEDURE — C9803 HOPD COVID-19 SPEC COLLECT: HCPCS | Performed by: EMERGENCY MEDICINE

## 2022-01-01 PROCEDURE — 3E033XZ INTRODUCTION OF VASOPRESSOR INTO PERIPHERAL VEIN, PERCUTANEOUS APPROACH: ICD-10-PCS | Performed by: INTERNAL MEDICINE

## 2022-01-01 PROCEDURE — 94660 CPAP INITIATION&MGMT: CPT

## 2022-01-01 PROCEDURE — 82803 BLOOD GASES ANY COMBINATION: CPT

## 2022-01-01 PROCEDURE — 80306 DRUG TEST PRSMV INSTRMNT: CPT | Performed by: NURSE PRACTITIONER

## 2022-01-01 PROCEDURE — 84439 ASSAY OF FREE THYROXINE: CPT | Performed by: NURSE PRACTITIONER

## 2022-01-01 PROCEDURE — 99236 HOSP IP/OBS SAME DATE HI 85: CPT | Performed by: NURSE PRACTITIONER

## 2022-01-01 PROCEDURE — 82375 ASSAY CARBOXYHB QUANT: CPT

## 2022-01-01 PROCEDURE — 99291 CRITICAL CARE FIRST HOUR: CPT

## 2022-01-01 PROCEDURE — 82553 CREATINE MB FRACTION: CPT | Performed by: NURSE PRACTITIONER

## 2022-01-01 PROCEDURE — 83036 HEMOGLOBIN GLYCOSYLATED A1C: CPT | Performed by: NURSE PRACTITIONER

## 2022-01-01 PROCEDURE — 85025 COMPLETE CBC W/AUTO DIFF WBC: CPT | Performed by: EMERGENCY MEDICINE

## 2022-01-01 PROCEDURE — 83880 ASSAY OF NATRIURETIC PEPTIDE: CPT | Performed by: EMERGENCY MEDICINE

## 2022-01-01 PROCEDURE — 84295 ASSAY OF SERUM SODIUM: CPT

## 2022-01-01 PROCEDURE — 93005 ELECTROCARDIOGRAM TRACING: CPT | Performed by: EMERGENCY MEDICINE

## 2022-01-01 PROCEDURE — 5A1935Z RESPIRATORY VENTILATION, LESS THAN 24 CONSECUTIVE HOURS: ICD-10-PCS | Performed by: INTERNAL MEDICINE

## 2022-01-01 PROCEDURE — 82565 ASSAY OF CREATININE: CPT

## 2022-01-01 PROCEDURE — 83735 ASSAY OF MAGNESIUM: CPT | Performed by: NURSE PRACTITIONER

## 2022-01-01 PROCEDURE — 82330 ASSAY OF CALCIUM: CPT | Performed by: NURSE PRACTITIONER

## 2022-01-01 PROCEDURE — 84100 ASSAY OF PHOSPHORUS: CPT | Performed by: NURSE PRACTITIONER

## 2022-01-01 PROCEDURE — 0BH18EZ INSERTION OF ENDOTRACHEAL AIRWAY INTO TRACHEA, VIA NATURAL OR ARTIFICIAL OPENING ENDOSCOPIC: ICD-10-PCS | Performed by: EMERGENCY MEDICINE

## 2022-01-01 PROCEDURE — 85014 HEMATOCRIT: CPT

## 2022-01-01 PROCEDURE — 84484 ASSAY OF TROPONIN QUANT: CPT | Performed by: EMERGENCY MEDICINE

## 2022-01-01 PROCEDURE — 85060 BLOOD SMEAR INTERPRETATION: CPT | Performed by: EMERGENCY MEDICINE

## 2022-01-01 PROCEDURE — 82330 ASSAY OF CALCIUM: CPT

## 2022-01-01 PROCEDURE — 84145 PROCALCITONIN (PCT): CPT | Performed by: EMERGENCY MEDICINE

## 2022-01-01 PROCEDURE — 82947 ASSAY GLUCOSE BLOOD QUANT: CPT

## 2022-01-01 PROCEDURE — 25010000002 EPINEPHRINE 1 MG/10ML SOLUTION PREFILLED SYRINGE: Performed by: EMERGENCY MEDICINE

## 2022-01-01 PROCEDURE — 71045 X-RAY EXAM CHEST 1 VIEW: CPT

## 2022-01-01 PROCEDURE — 84132 ASSAY OF SERUM POTASSIUM: CPT

## 2022-01-01 PROCEDURE — 83930 ASSAY OF BLOOD OSMOLALITY: CPT | Performed by: NURSE PRACTITIONER

## 2022-01-01 PROCEDURE — 82010 KETONE BODYS QUAN: CPT | Performed by: INTERNAL MEDICINE

## 2022-01-01 PROCEDURE — 85730 THROMBOPLASTIN TIME PARTIAL: CPT | Performed by: NURSE PRACTITIONER

## 2022-01-01 PROCEDURE — 31500 INSERT EMERGENCY AIRWAY: CPT

## 2022-01-01 RX ORDER — SODIUM CHLORIDE 9 MG/ML
250 INJECTION, SOLUTION INTRAVENOUS CONTINUOUS PRN
Status: DISCONTINUED | OUTPATIENT
Start: 2022-01-01 | End: 2022-01-01 | Stop reason: HOSPADM

## 2022-01-01 RX ORDER — ROCURONIUM BROMIDE 10 MG/ML
100 INJECTION, SOLUTION INTRAVENOUS ONCE
Status: DISCONTINUED | OUTPATIENT
Start: 2022-01-01 | End: 2022-01-01 | Stop reason: HOSPADM

## 2022-01-01 RX ORDER — ASPIRIN 81 MG/1
81 TABLET, CHEWABLE ORAL DAILY
Status: DISCONTINUED | OUTPATIENT
Start: 2022-01-01 | End: 2022-01-01 | Stop reason: HOSPADM

## 2022-01-01 RX ORDER — SODIUM CHLORIDE AND POTASSIUM CHLORIDE 300; 900 MG/100ML; MG/100ML
250 INJECTION, SOLUTION INTRAVENOUS CONTINUOUS PRN
Status: DISCONTINUED | OUTPATIENT
Start: 2022-01-01 | End: 2022-01-01 | Stop reason: HOSPADM

## 2022-01-01 RX ORDER — MIDAZOLAM IN NACL,ISO-OSMOT/PF 50 MG/50ML
1-10 INFUSION BOTTLE (ML) INTRAVENOUS
Status: DISCONTINUED | OUTPATIENT
Start: 2022-01-01 | End: 2022-01-01 | Stop reason: HOSPADM

## 2022-01-01 RX ORDER — DEXTROSE, SODIUM CHLORIDE, AND POTASSIUM CHLORIDE 5; .9; .15 G/100ML; G/100ML; G/100ML
150 INJECTION INTRAVENOUS CONTINUOUS PRN
Status: DISCONTINUED | OUTPATIENT
Start: 2022-01-01 | End: 2022-01-01 | Stop reason: HOSPADM

## 2022-01-01 RX ORDER — VECURONIUM BROMIDE FOR INJECTION 1 MG/ML
10 INJECTION, POWDER, LYOPHILIZED, FOR SOLUTION INTRAVENOUS
Status: DISCONTINUED | OUTPATIENT
Start: 2022-01-01 | End: 2022-01-01 | Stop reason: HOSPADM

## 2022-01-01 RX ORDER — HEPARIN SODIUM 5000 [USP'U]/ML
5000 INJECTION, SOLUTION INTRAVENOUS; SUBCUTANEOUS EVERY 8 HOURS SCHEDULED
Status: DISCONTINUED | OUTPATIENT
Start: 2022-01-01 | End: 2022-01-01 | Stop reason: HOSPADM

## 2022-01-01 RX ORDER — PANTOPRAZOLE SODIUM 40 MG/10ML
40 INJECTION, POWDER, LYOPHILIZED, FOR SOLUTION INTRAVENOUS
Status: DISCONTINUED | OUTPATIENT
Start: 2022-01-11 | End: 2022-01-01 | Stop reason: HOSPADM

## 2022-01-01 RX ORDER — SODIUM CHLORIDE 9 MG/ML
10 INJECTION, SOLUTION INTRAVENOUS CONTINUOUS PRN
Status: DISCONTINUED | OUTPATIENT
Start: 2022-01-01 | End: 2022-01-01 | Stop reason: HOSPADM

## 2022-01-01 RX ORDER — DEXTROSE, SODIUM CHLORIDE, AND POTASSIUM CHLORIDE 5; .45; .3 G/100ML; G/100ML; G/100ML
150 INJECTION INTRAVENOUS CONTINUOUS PRN
Status: DISCONTINUED | OUTPATIENT
Start: 2022-01-01 | End: 2022-01-01 | Stop reason: HOSPADM

## 2022-01-01 RX ORDER — SODIUM CHLORIDE, SODIUM LACTATE, POTASSIUM CHLORIDE, CALCIUM CHLORIDE 600; 310; 30; 20 MG/100ML; MG/100ML; MG/100ML; MG/100ML
125 INJECTION, SOLUTION INTRAVENOUS CONTINUOUS
Status: DISCONTINUED | OUTPATIENT
Start: 2022-01-01 | End: 2022-01-01

## 2022-01-01 RX ORDER — SODIUM CHLORIDE 0.9 % (FLUSH) 0.9 %
3 SYRINGE (ML) INJECTION EVERY 12 HOURS SCHEDULED
Status: DISCONTINUED | OUTPATIENT
Start: 2022-01-01 | End: 2022-01-01 | Stop reason: HOSPADM

## 2022-01-01 RX ORDER — DEXTROSE AND SODIUM CHLORIDE 5; .9 G/100ML; G/100ML
150 INJECTION, SOLUTION INTRAVENOUS CONTINUOUS PRN
Status: DISCONTINUED | OUTPATIENT
Start: 2022-01-01 | End: 2022-01-01 | Stop reason: HOSPADM

## 2022-01-01 RX ORDER — FENTANYL CITRATE 50 UG/ML
INJECTION, SOLUTION INTRAMUSCULAR; INTRAVENOUS
Status: COMPLETED | OUTPATIENT
Start: 2022-01-01 | End: 2022-01-01

## 2022-01-01 RX ORDER — IPRATROPIUM BROMIDE AND ALBUTEROL SULFATE 2.5; .5 MG/3ML; MG/3ML
3 SOLUTION RESPIRATORY (INHALATION)
Status: DISCONTINUED | OUTPATIENT
Start: 2022-01-01 | End: 2022-01-01 | Stop reason: HOSPADM

## 2022-01-01 RX ORDER — EPINEPHRINE 0.1 MG/ML
SYRINGE (ML) INJECTION
Status: COMPLETED | OUTPATIENT
Start: 2022-01-01 | End: 2022-01-01

## 2022-01-01 RX ORDER — SODIUM CHLORIDE 0.9 % (FLUSH) 0.9 %
10 SYRINGE (ML) INJECTION EVERY 12 HOURS SCHEDULED
Status: DISCONTINUED | OUTPATIENT
Start: 2022-01-01 | End: 2022-01-01 | Stop reason: HOSPADM

## 2022-01-01 RX ORDER — DEXTROSE MONOHYDRATE 25 G/50ML
12.5 INJECTION, SOLUTION INTRAVENOUS AS NEEDED
Status: DISCONTINUED | OUTPATIENT
Start: 2022-01-01 | End: 2022-01-01 | Stop reason: HOSPADM

## 2022-01-01 RX ORDER — SODIUM CHLORIDE AND POTASSIUM CHLORIDE 150; 900 MG/100ML; MG/100ML
250 INJECTION, SOLUTION INTRAVENOUS CONTINUOUS PRN
Status: DISCONTINUED | OUTPATIENT
Start: 2022-01-01 | End: 2022-01-01 | Stop reason: HOSPADM

## 2022-01-01 RX ORDER — ATORVASTATIN CALCIUM 10 MG/1
10 TABLET, FILM COATED ORAL DAILY
Status: DISCONTINUED | OUTPATIENT
Start: 2022-01-01 | End: 2022-01-01

## 2022-01-01 RX ORDER — BUPIVACAINE HCL/0.9 % NACL/PF 0.25 %
.02-.3 PLASTIC BAG, INJECTION (ML) EPIDURAL CONTINUOUS PRN
Status: DISCONTINUED | OUTPATIENT
Start: 2022-01-01 | End: 2022-01-01 | Stop reason: HOSPADM

## 2022-01-01 RX ORDER — SODIUM CHLORIDE 0.9 % (FLUSH) 0.9 %
10 SYRINGE (ML) INJECTION AS NEEDED
Status: DISCONTINUED | OUTPATIENT
Start: 2022-01-01 | End: 2022-01-01 | Stop reason: HOSPADM

## 2022-01-01 RX ORDER — SODIUM CHLORIDE 450 MG/100ML
250 INJECTION, SOLUTION INTRAVENOUS CONTINUOUS PRN
Status: DISCONTINUED | OUTPATIENT
Start: 2022-01-01 | End: 2022-01-01 | Stop reason: HOSPADM

## 2022-01-01 RX ORDER — SODIUM CHLORIDE AND POTASSIUM CHLORIDE 150; 450 MG/100ML; MG/100ML
250 INJECTION, SOLUTION INTRAVENOUS CONTINUOUS PRN
Status: DISCONTINUED | OUTPATIENT
Start: 2022-01-01 | End: 2022-01-01 | Stop reason: HOSPADM

## 2022-01-01 RX ORDER — POTASSIUM CHLORIDE, DEXTROSE MONOHYDRATE AND SODIUM CHLORIDE 300; 5; 900 MG/100ML; G/100ML; MG/100ML
150 INJECTION, SOLUTION INTRAVENOUS CONTINUOUS PRN
Status: DISCONTINUED | OUTPATIENT
Start: 2022-01-01 | End: 2022-01-01 | Stop reason: HOSPADM

## 2022-01-01 RX ORDER — DEXTROSE, SODIUM CHLORIDE, AND POTASSIUM CHLORIDE 5; .45; .15 G/100ML; G/100ML; G/100ML
150 INJECTION INTRAVENOUS CONTINUOUS PRN
Status: DISCONTINUED | OUTPATIENT
Start: 2022-01-01 | End: 2022-01-01 | Stop reason: HOSPADM

## 2022-01-01 RX ORDER — SODIUM CHLORIDE 0.9 % (FLUSH) 0.9 %
10 SYRINGE (ML) INJECTION ONCE AS NEEDED
Status: DISCONTINUED | OUTPATIENT
Start: 2022-01-01 | End: 2022-01-01 | Stop reason: HOSPADM

## 2022-01-01 RX ORDER — DEXTROSE AND SODIUM CHLORIDE 5; .45 G/100ML; G/100ML
150 INJECTION, SOLUTION INTRAVENOUS CONTINUOUS PRN
Status: DISCONTINUED | OUTPATIENT
Start: 2022-01-01 | End: 2022-01-01 | Stop reason: HOSPADM

## 2022-01-01 RX ORDER — POTASSIUM CHLORIDE 7.45 MG/ML
10 INJECTION INTRAVENOUS
Status: DISCONTINUED | OUTPATIENT
Start: 2022-01-01 | End: 2022-01-01 | Stop reason: HOSPADM

## 2022-01-01 RX ORDER — POTASSIUM CHLORIDE 29.8 MG/ML
20 INJECTION INTRAVENOUS
Status: DISCONTINUED | OUTPATIENT
Start: 2022-01-01 | End: 2022-01-01

## 2022-01-01 RX ORDER — LEVOTHYROXINE SODIUM 0.1 MG/1
100 TABLET ORAL
Status: DISCONTINUED | OUTPATIENT
Start: 2022-01-11 | End: 2022-01-01 | Stop reason: HOSPADM

## 2022-01-01 RX ORDER — CALCIUM CHLORIDE 100 MG/ML
INJECTION INTRAVENOUS; INTRAVENTRICULAR
Status: COMPLETED | OUTPATIENT
Start: 2022-01-01 | End: 2022-01-01

## 2022-01-01 RX ORDER — ETOMIDATE 2 MG/ML
INJECTION INTRAVENOUS
Status: COMPLETED | OUTPATIENT
Start: 2022-01-01 | End: 2022-01-01

## 2022-01-01 RX ADMIN — ETOMIDATE 20 MG: 2 INJECTION, SOLUTION INTRAVENOUS at 15:37

## 2022-01-01 RX ADMIN — FENTANYL CITRATE 100 MCG: 50 INJECTION, SOLUTION INTRAMUSCULAR; INTRAVENOUS at 15:46

## 2022-01-01 RX ADMIN — EPINEPHRINE 1 MG: 0.1 INJECTION INTRACARDIAC; INTRAVENOUS at 15:29

## 2022-01-01 RX ADMIN — Medication 0.02 MCG/KG/MIN: at 18:19

## 2022-01-01 RX ADMIN — SODIUM BICARBONATE 50 MEQ: 84 INJECTION, SOLUTION INTRAVENOUS at 15:32

## 2022-01-01 RX ADMIN — Medication 1 MG/HR: at 16:57

## 2022-01-01 RX ADMIN — SODIUM CHLORIDE 2000 ML: 9 INJECTION, SOLUTION INTRAVENOUS at 16:40

## 2022-01-01 RX ADMIN — CALCIUM CHLORIDE 1 G: 100 INJECTION INTRAVENOUS; INTRAVENTRICULAR at 15:30

## 2022-01-10 PROBLEM — R74.01 TRANSAMINITIS: Status: ACTIVE | Noted: 2022-01-01

## 2022-01-10 PROBLEM — I46.9 CARDIAC ARREST (HCC): Status: ACTIVE | Noted: 2022-01-01

## 2022-01-10 PROBLEM — E11.10 DKA (DIABETIC KETOACIDOSIS) (HCC): Status: ACTIVE | Noted: 2022-01-01

## 2022-01-10 NOTE — H&P
Intensive Care Admission Note     Cardiac arrest (HCC)    History of Present Illness     Ms. Kee is a 72 yo female with PMH COPD, Diabetes, HLD, HTN, Hypothyroidism, Morbid obesity, pickwickian syndrome who lives at home with her  and has limited mobility (per previous documentation) who presents to ED via EMS as a cardiac arrest. Per limited documentation as patient is unresponsive and no family is present EMS noted they were called out for a glycemic episode. On their arrival patient was found lethargic and bradycardic. When they went to place her on the stretcher, she went into cardiac arrest and resuscitative efforts were initiated and continued en route to hospital. On arrival she remained in cardiac arrest and ROSC was achieved after 5 rounds of epinephrine, 1g calcium, 1 amp of bicarbonate.     VS on arrival: , RR 17, /104, Spo2 92%. Significant Labs: PCT 0.08, troponin < 0.010, .6, ABG 7.09 / 54.1 / 53.6 / 16.4 / -13.3, creat 1.8, iCal 1.36, K 2.9, Na 136, glucose 403.  EKG without acute ischemic changes    While in ED patient received: multiple doses of Epinephrine, Calcium Chloride, Bicarbonate, Etomidate, Fentanyl, Versed and Rocuronium.     Problem List, Surgical History, Family, Social History, and ROS     Patient Active Problem List    Diagnosis    • *Cardiac arrest (HCC) [I46.9]    • DKA (diabetic ketoacidosis) ([facility]) [E11.10]    • Mixed metabolic and respiratory acidosis of  [P84]    • Physical debility [R53.81]    • Left adrenal nodule [E27.8]    • ADEEL [N17.9]    • Hypomagnesemia [E83.42]    • Hyperglycemia [R73.9]    • Altered mental status [R41.82]    • Acute on chronic respiratory failure with hypoxia and hypercapnia ([facility]) [J96.21, J96.22]    • HHNC (hyperglycemic hyperosmolar nonketotic coma) ([facility]) [E11.01]    • Acute foot pain, left [M79.672]    • Recurrent pneumonia [J18.9]    • Human metapneumovirus (hMPV) pneumonia [J12.3]    • Hypoxia [R09.02]    • Renal  insufficiency [N28.9]    • Sacral wound [S31.000A]    • Anxiety [F41.9]    • Chronic obstructive pulmonary disease (HCC) [J44.9]    • T2DM [E11.9]    • Dyslipidemia [E78.5]    • Hypertension [I10]    • Hypothyroidism [E03.9]    • Morbid obesity with BMI of 50.0-59.9, adult [E66.01, Z68.43]    • Pickwickian syndrome [E66.2]      Past Surgical History:   Procedure Laterality Date   • CERVICAL POLYPECTOMY     • HYSTERECTOMY         Allergies   Allergen Reactions   • Amoxicillin-Pot Clavulanate Other (See Comments)     HEADACHE - has tolerated ceftriaxone 2019   • Bactrim [Sulfamethoxazole-Trimethoprim] Other (See Comments)     .     No current facility-administered medications on file prior to encounter.     Current Outpatient Medications on File Prior to Encounter   Medication Sig   • aspirin 81 MG tablet Take 1 tablet by mouth daily.   • atorvastatin (LIPITOR) 10 MG tablet Take 1 tablet by mouth Daily.   • doxazosin (CARDURA) 4 MG tablet TAKE 1 TABLET BY MOUTH EVERY DAY EVERY NIGHT   • hydroCHLOROthiazide (HYDRODIURIL) 25 MG tablet TAKE 1 TABLET BY MOUTH EVERY DAY   • insulin detemir (LEVEMIR) 100 UNIT/ML injection Inject 20 Units under the skin into the appropriate area as directed Daily.   • levothyroxine (SYNTHROID, LEVOTHROID) 100 MCG tablet Take 1 tablet by mouth Daily.   • lisinopril (PRINIVIL,ZESTRIL) 5 MG tablet TAKE 1 TABLET BY MOUTH EVERY DAY   • metoprolol succinate XL (TOPROL-XL) 25 MG 24 hr tablet TAKE 1 TABLET BY MOUTH EVERY DAY IN THE MORNING   • sertraline (ZOLOFT) 50 MG tablet TAKE 1 TABLET BY MOUTH EVERY DAY   • SITagliptin (JANUVIA) 100 MG tablet Take 100 mg by mouth Daily.     MEDICATION LIST AND ALLERGIES REVIEWED.    Family History   Problem Relation Age of Onset   • Diabetes Father    • Diabetes Sister    • Pancreatic cancer Sister      Social History     Tobacco Use   • Smoking status: Former Smoker     Types: Cigarettes     Quit date:      Years since quittin.0   • Smokeless  "tobacco: Never Used   Substance Use Topics   • Alcohol use: No   • Drug use: Never     Social History     Social History Narrative   • Not on file     FAMILY AND SOCIAL HISTORY REVIEWED.    Review of Systems   Unable to perform ROS: Intubated       Physical Exam and Clinical Information   BP 95/53   Pulse 116   Resp 17   Ht 160 cm (63\")   Wt 136 kg (300 lb)   LMP  (LMP Unknown)   SpO2 92%   BMI 53.14 kg/m²      Physical Exam   General Appearance:  Not in distress, no asynchrony with mechanical ventilator.  Head:   Pupils symmetrical B/L & sluggish.  Neck:  Endotracheal tube clean.   Lungs:   B/L Breath sounds present with decreased breath sounds on bases, no wheezing heard, occ crackles.   Heart: S1 and S2 present, no murmur,  Abdomen: Morbidly obese, bowel sounds not heard, distended, significant pannus noted in lower abdomen with some superficial yeast infection  Extremities: no cyanosis or clubbing,  + edema, cool to touch.  Neurologic:  Pt on the vent. Not able to participate in full neurological exam.  No meaningful neurological response on current exam but patient was given paralytics for intubation.    Ventilator settings: A/C: FiO2 60%    Results from last 7 days   Lab Units 01/10/22  1551   HEMOGLOBIN, POC g/dL 12.6     Results from last 7 days   Lab Units 01/10/22  1550   CREATININE mg/dL 1.80*     Estimated Creatinine Clearance: 38.3 mL/min (A) (by C-G formula based on SCr of 1.8 mg/dL (H)).      Results from last 7 days   Lab Units 01/10/22  1611   PH, ARTERIAL pH units 7.091*   PCO2, ARTERIAL mm Hg 54.1*   PO2 ART mm Hg 53.6*     Lab Results   Component Value Date    LACTATE 2.0 04/13/2021        Images:     Ct chest/A/P pending.    CXR reviewed and showed ET above rachel. Left base atelectasis noted. No definite consolidation.     I reviewed the patient's results and images.     Impression     Cardiac arrest (HCC)    Chronic obstructive pulmonary disease (HCC)    Morbid obesity with BMI of " 50.0-59.9, adult    ADEEL    DKA (diabetic ketoacidosis) (HCC)    Mixed metabolic and respiratory acidosis of     Plan/Recommendations     71-year-old female with past medical history of COPD, diabetes, dyslipidemia, hypertension, hypothyroidism, morbid obesity who lives at home with her  who takes care of her with limited mobility.  Patient apparently was in usual state of health today morning when she woke up and had a good breakfast.  After that  found  that she had bowel movement and she was sitting and did not realizing that she had a bowel movement.  He took her to bathroom to clean her up and after he had just cleaned her up she had another bowel movement and he had to clean her up again.  After that he could not get her out of the tub and she was somewhat unresponsive.  He also stated that she  also felt a little cold as well.  He then called EMS.  Patient was found to be somewhat bradycardic on EMS arrival and she was brought to emergency room and on arrival she had a cardiac arrest.  It was documented PEA arrest.  She was resuscitated with 5 rounds of CPR and multiple medications and ROSC obtained.  Patient was intubated during the process.    Initial ABG showed severe combined metabolic and respiratory acidosis.  Patient was also hypothermic with temp of 34.  She was given fluid boluses.  Lactic acid came back at 18.  Blood sugars were elevated.  Anion gap significantly elevated.  LFTs are elevated as well.  According to  patient was complaining of burning during urination and apparently home health nurse was going to come to take her urine sample to evaluate for any UTI.  Has not been on any antibiotics recently but couple months ago was treated for UTI.    1.  Admit to intensive care unit.  2.  Patient with prolonged cardiac arrest s/p resuscitation.  Encephalopathy likely multifactorial with metabolic encephalopathy in the picture.  Anoxic encephalopathy cannot be ruled out.   We will proceed with targeted temperature management.  Will need central line and arterial line placement.  3.  Concern for underlying infectious etiology leading to this.  Will cover with meropenem for now.  Blood culture, urine culture and respiratory cultures will be obtained.  4.  Patient with elevated blood glucose levels.  Probable DKA.  Will check ketone bodies.  Check urinalysis for ketones.  In the meantime cover with insulin infusion.  Continue DKA protocol for now.  5.  Severe hypokalemia.  We will go ahead and replace it aggressively before instituting insulin therapy.  Has received 2 L of fluid boluses.    6.  Patient also has ADEEL.  Monitor urine output closely with fluid resuscitation.  7.  CT head, chest, abdomen and pelvis to evaluate for underlying pathology which could have led to cardiac arrest.  8.  EKG did not show any acute ST changes.  Troponin is negative.  Less likely cardiac origin.  9.  Ventilator adjustments made.  Patient and hypercapnic respiratory failure.  Will follow blood gases and make further adjustments as needed.  DuoNeb scheduled for now.  10.  Continue home doses of aspirin, Zoloft and levothyroxine.  11.  Patient does have elevated LFTs which could be early shock liver.  Hold statins for now.  12.  Patient with severe lactic acidosis secondary to hypoperfusion state.  Will trend lactic acid levels.  13.  Patient had multiple rounds of diarrhea today.  Will check C. Difficile.    Called patient's  and updated him about patient's current critical condition.  He verbalized understanding.  He told me that patient would not want to be resuscitated in the event of cardiac arrest.  We talked about that she was already resuscitated earlier today.  He told me that they have had specific discussions and she did not want to undergo that. He told me that if she has another cardiac arrest he does not want her to be resuscitated.  We discussed about targeted temperature management  and various procedures necessitating to accomplish that including central line and arterial line.  He is not certain if he wants to put her through all this.  He wants to discuss with his daughter and will get back to us.  He understands that patient is critically ill and may not survive this illness.  We will continue supportive care in the meantime.    High level of risk due to:  illness with threat to life or bodily function.    Time spent Critical care 35 min (exclusive of procedure time)  including high complexity decision making to assess, manipulate, and support vital organ system failure in this individual who has impairment of one or more vital organ systems such that there is a high probability of imminent or life threatening deterioration in the patient’s condition.      Tho Quintana MD, [facility]  Pulmonary and Critical Care Medicine  01/10/22 16:31 EST     CC: Eileen Du APRN

## 2022-01-10 NOTE — CONSULTS
Pharmacy -auto renal dosing - meropenem     Patient is 72 yo female    Ht = 160 cm    Wt = 136 kg   IBW = 52.4 kg   AIBW = 85.8 kg    SCr = 1.61   Est CrCl = 42.8    Change meropenem 1 gm iv q8h to  Meropenem 1 gm iv q12h    Pharmacy will continue to monitor renal function & adjust dose as necessary.    Ashlee Stanley [facility]  1/10/2022  18:29 EST

## 2022-01-10 NOTE — ED PROVIDER NOTES
[place]    EMERGENCY DEPARTMENT ENCOUNTER      Pt Name: Julissa Kee  MRN: [id number]  YOB: 1950  Date of evaluation: 1/10/2022  Provider: Tiago Jennings DO    CHIEF COMPLAINT       Chief Complaint   Patient presents with   • Cardiac Arrest         HISTORY OF PRESENT ILLNESS  (Location/Symptom, Timing/Onset, Context/Setting, Quality, Duration, Modifying Factors, Severity.)   Julissa Kee is a 71 y.o. female who presents to the emergency department via EMS in a cardiac arrest.  History is limited, H PI severely limited as the patient is unresponsive on arrival.  Per EMS they were called out for a glycemic episode, when they arrived the patient was lethargic, was bradycardic, with a transferred her over to their stretcher the patient went into a cardiac arrest.  Resuscitative efforts were initiated in route to the hospital.  They note the patient was in PEA in route, there was a significant other at the house.  No family is currently present on arrival to the emergency department to provide any additional history.  There is a question of a remote bariatric history with the patient.  Full HPI unobtainable on arrival secondary to unresponsive state on arrival.      Nursing notes were reviewed.    REVIEW OF SYSTEMS    (2-9 systems for level 4, 10 or more for level 5)   ROS:  Unable to assess secondary to patient's unresponsive state.       PAST MEDICAL HISTORY     Past Medical History:   Diagnosis Date   • Acute bronchitis    • Acute sinusitis    • Anxiety    • Bacterial conjunctivitis    • Chest pain    • COPD (chronic obstructive pulmonary disease) (formerly [facility])    • Depression    • Diabetes mellitus (formerly [facility])    • Dyspnea    • Dysuria    • Edema    • Elbow injury    • Elbow pain    • Eye drainage    • Fatigue    • Foot pain, right    • Humerus distal fracture    • Hyperlipidemia    • Hypertension    • Hypothyroidism    • Morbid obesity (formerly [facility])    • Pickwickian syndrome (formerly [facility])    • Pressure ulcer stage I    •  Psoriasis    • SOB (shortness of breath)    • Symptoms of urinary tract infection    • Urinary frequency    • Urinary tract infection    • Vaginal candidiasis          SURGICAL HISTORY       Past Surgical History:   Procedure Laterality Date   • CERVICAL POLYPECTOMY     • HYSTERECTOMY           CURRENT MEDICATIONS       Current Facility-Administered Medications:   •  artificial tears ophthalmic ointment, , Both Eyes, Q2H, Clementina Gifford APRN  •  dextrose (D50W) (25 g/50 mL) IV injection 12.5 g, 12.5 g, Intravenous, PRN, Clementina Gifford, APRN  •  dextrose 5 % and sodium chloride 0.45 % infusion, 150 mL/hr, Intravenous, Continuous PRN, Clementina Gifford, APRN  •  dextrose 5 % and sodium chloride 0.45 % with KCl 20 mEq/L infusion, 150 mL/hr, Intravenous, Continuous PRN, Clementina Gifford APRN  •  dextrose 5 % and sodium chloride 0.45 % with KCl 40 mEq/L infusion, 150 mL/hr, Intravenous, Continuous PRN, Clementina Gifford APRN  •  dextrose 5 % and sodium chloride 0.9 % infusion, 150 mL/hr, Intravenous, Continuous PRN, Clementina Gifford, APRN  •  dextrose 5 % and sodium chloride 0.9 % with KCl 20 mEq/L infusion, 150 mL/hr, Intravenous, Continuous PRN, Clementina Gifford APRN  •  dextrose 5 % and sodium chloride 0.9 % with KCl 40 mEq/L infusion, 150 mL/hr, Intravenous, Continuous PRN, Clementina Gifford, APRN  •  fentaNYL 2500 mcg/250 mL NS infusion,  mcg/hr, Intravenous, Continuous PRN, Clementina Gifford APRN  •  heparin (porcine) 5000 UNIT/ML injection 5,000 Units, 5,000 Units, Subcutaneous, Q8H, Clementina Gifford APRJORGE LUIS  •  insulin regular 1 unit/mL in 0.9% sodium chloride, 13 Units/hr, Intravenous, Titrated, Clementina Gifford APRN  •  midazolam (VERSED) 100 mg in 100mL NS infusion, 1-10 mg/hr, Intravenous, Titrated, Tiago Jennings DO  •  norepinephrine (LEVOPHED) 8 mg in 250 mL NS infusion (premix), 0.02-0.3 mcg/kg/min, Intravenous, Continuous PRN, Young, Clementina L, APRN  •  potassium chloride 20 mEq in 50 mL IVPB, 20 mEq, Intravenous, Q1H  PRN, Young, Tiona L, APRN  •  propofol (DIPRIVAN) infusion 10 mg/mL 100 mL, 5-50 mcg/kg/min, Intravenous, Continuous PRN, Young, Tiona L, APRN  •  rocuronium (ZEMURON) injection 100 mg, 100 mg, Intravenous, Once, Tiago Jennings, DO  •  sodium chloride 0.45 % 1,000 mL with potassium chloride 40 mEq infusion, 250 mL/hr, Intravenous, Continuous PRN, Young, Tiona L, APRN  •  sodium chloride 0.45 % infusion, 250 mL/hr, Intravenous, Continuous PRN, Young, Tiona L, APRN  •  sodium chloride 0.45 % with KCl 20 mEq/L infusion, 250 mL/hr, Intravenous, Continuous PRN, Young, Tiona L, APRN  •  sodium chloride 0.9 % bolus 2,000 mL, 2,000 mL, Intravenous, Once, Young, Tiona L, APRN, Last Rate: 1,000 mL/hr at 01/10/22 1640, 2,000 mL at 01/10/22 1640  •  sodium chloride 0.9 % flush 10 mL, 10 mL, Intravenous, PRN, Tiago Jennings, DO  •  sodium chloride 0.9 % flush 10 mL, 10 mL, Intravenous, PRN, Young, Clementina L, APRN  •  sodium chloride 0.9 % flush 10 mL, 10 mL, Intravenous, Once PRN, Young, Tiona L, APRN  •  sodium chloride 0.9 % flush 10 mL, 10 mL, Intravenous, Q12H, Young, Tiona L, APRN  •  sodium chloride 0.9 % flush 10 mL, 10 mL, Intravenous, PRN, Young, Tiona L, APRN  •  sodium chloride 0.9 % flush 3 mL, 3 mL, Intravenous, Q12H, Young, Tiona L, APRN  •  sodium chloride 0.9 % infusion, 250 mL/hr, Intravenous, Continuous PRN, Young, Clementina L, APRN  •  sodium chloride 0.9 % infusion, 10 mL/hr, Intravenous, Continuous PRN, Young, Tiona L, APRN  •  sodium chloride 0.9 % with KCl 20 mEq/L infusion, 250 mL/hr, Intravenous, Continuous PRN, Young, Tiona L, APRN  •  sodium chloride 0.9 % with KCl 40 mEq/L infusion, 250 mL/hr, Intravenous, Continuous PRN, Clementina Gifford, APRN  •  vecuronium (NORCURON) injection 10 mg, 10 mg, Intravenous, Q2H PRN, Clementina Gifford, APRN    Current Outpatient Medications:   •  aspirin 81 MG tablet, Take 1 tablet by mouth daily., Disp: , Rfl:   •  atorvastatin (LIPITOR) 10 MG tablet, Take 1 tablet by mouth Daily.,  "Disp: 90 tablet, Rfl: 1  •  doxazosin (CARDURA) 4 MG tablet, TAKE 1 TABLET BY MOUTH EVERY DAY EVERY NIGHT, Disp: 30 tablet, Rfl: 5  •  hydroCHLOROthiazide (HYDRODIURIL) 25 MG tablet, TAKE 1 TABLET BY MOUTH EVERY DAY, Disp: 90 tablet, Rfl: 1  •  insulin detemir (LEVEMIR) 100 UNIT/ML injection, Inject 20 Units under the skin into the appropriate area as directed Daily., Disp: 30 pen, Rfl: 5  •  levothyroxine (SYNTHROID, LEVOTHROID) 100 MCG tablet, Take 1 tablet by mouth Daily., Disp: 90 tablet, Rfl: 1  •  lisinopril (PRINIVIL,ZESTRIL) 5 MG tablet, TAKE 1 TABLET BY MOUTH EVERY DAY, Disp: 30 tablet, Rfl: 5  •  metoprolol succinate XL (TOPROL-XL) 25 MG 24 hr tablet, TAKE 1 TABLET BY MOUTH EVERY DAY IN THE MORNING, Disp: 30 tablet, Rfl: 5  •  sertraline (ZOLOFT) 50 MG tablet, TAKE 1 TABLET BY MOUTH EVERY DAY, Disp: 90 tablet, Rfl: 1  •  SITagliptin (JANUVIA) 100 MG tablet, Take 100 mg by mouth Daily., Disp: , Rfl:     ALLERGIES     Amoxicillin-pot clavulanate and Bactrim [sulfamethoxazole-trimethoprim]    FAMILY HISTORY       Family History   Problem Relation Age of Onset   • Diabetes Father    • Diabetes Sister    • Pancreatic cancer Sister           SOCIAL HISTORY       Social History     Socioeconomic History   • Marital status:    Tobacco Use   • Smoking status: Former Smoker     Types: Cigarettes     Quit date:      Years since quittin.0   • Smokeless tobacco: Never Used   Substance and Sexual Activity   • Alcohol use: No   • Drug use: Never   • Sexual activity: Defer         PHYSICAL EXAM    (up to 7 for level 4, 8 or more for level 5)     Vitals:    01/10/22 1532 01/10/22 1600 01/10/22 1604   BP: (!) 118/104  95/53   Pulse: 116     Resp:   17   SpO2:   92%   Weight:  136 kg (300 lb)    Height:  160 cm (63\")        Physical Exam  General : Patient is unresponsive on arrival, resuscitative efforts in progress on arrival.  HEENT: Pupils are equally round and reactive to light, patient is clenching her " eyes on arrival. there is emesis around the I-gel airway on arrival.  Neck: Neck is supple, trachea midline  Cardiac: PEA, compressions on arrival  Lungs: With decreased breath sound bilaterally, patient has a I-gel in place, being bagged  Abdomen: Abdomen is soft, obese  Musculoskeletal: Unable to fully assess, patient is voluntarily moving her arms during initial evaluation, limited movement of lower extremities.  Neuro: Patient does have some voluntary movement of her arms, clenches her eyes after airway was established.  Patient not following commands, limiting my neurological examination.  Dermatology: Skin is cool unable to assess  Psych: Unable to assess      DIAGNOSTIC RESULTS     EKG: All EKGs are interpreted by the Emergency Department Physician who either signs or Co-signs this chart in the absence of a cardiologist.    ECG 12 Lead   Final Result   Test Reason : SOA Protocol   Blood Pressure :   */*   mmHG   Vent. Rate :  92 BPM     Atrial Rate :  92 BPM      P-R Int : 182 ms          QRS Dur : 104 ms       QT Int : 408 ms       P-R-T Axes : 108 -45  46 degrees      QTc Int : 504 ms      Sinus rhythm with premature atrial complexes   Left anterior fascicular block   Left ventricular hypertrophy with repolarization abnormality   Possible Lateral infarct , age undetermined   Abnormal ECG   When compared with ECG of 13-APR-2021 15:48,   premature atrial complexes are now present   Borderline criteria for Lateral infarct are now present   QT has lengthened   Confirmed by YOUNG HERMAN MD ([id number]) on 1/10/2022 4:07:22 PM      Referred By: EDMD           Confirmed By: YOUNG HERMAN MD      ECG 12 Lead    (Results Pending)       RADIOLOGY:   Non-plain film images such as CT, Ultrasound and MRI are read by the radiologist. Plain radiographic images are visualized and preliminarily interpreted by the emergency physician with the below findings:      [] Radiologist's Report Reviewed:  XR Chest 1 View    (Results  Pending)   CT Chest Pulmonary Embolism    (Results Pending)   CT Head Without Contrast    (Results Pending)         ED BEDSIDE ULTRASOUND:   Performed by ED Physician - none    LABS:    I have reviewed and interpreted all of the currently available lab results from this visit (if applicable):  Results for orders placed or performed during the hospital encounter of 01/10/22   Comprehensive Metabolic Panel    Specimen: Blood   Result Value Ref Range    Glucose 410 (C) 65 - 99 mg/dL    BUN 29 (H) 8 - 23 mg/dL    Creatinine 1.61 (H) 0.57 - 1.00 mg/dL    Sodium 136 136 - 145 mmol/L    Potassium 3.0 (L) 3.5 - 5.2 mmol/L    Chloride 89 (L) 98 - 107 mmol/L    CO2 18.0 (L) 22.0 - 29.0 mmol/L    Calcium 11.1 (H) 8.6 - 10.5 mg/dL    Total Protein 6.4 6.0 - 8.5 g/dL    Albumin 3.10 (L) 3.50 - 5.20 g/dL    ALT (SGPT) 291 (H) 1 - 33 U/L    AST (SGOT) 331 (H) 1 - 32 U/L    Alkaline Phosphatase 111 39 - 117 U/L    Total Bilirubin 0.5 0.0 - 1.2 mg/dL    eGFR Non African Amer 31 (L) >60 mL/min/1.73    Globulin 3.3 gm/dL    A/G Ratio 0.9 g/dL    BUN/Creatinine Ratio 18.0 7.0 - 25.0    Anion Gap 29.0 (H) 5.0 - 15.0 mmol/L   BNP    Specimen: Blood   Result Value Ref Range    proBNP 144.6 0.0 - 900.0 pg/mL   Troponin    Specimen: Blood   Result Value Ref Range    Troponin T <0.010 0.000 - 0.030 ng/mL   Lactic Acid, Plasma    Specimen: Blood   Result Value Ref Range    Lactate 18.0 (C) 0.5 - 2.0 mmol/L   Procalcitonin    Specimen: Blood   Result Value Ref Range    Procalcitonin 0.08 0.00 - 0.25 ng/mL   CBC Auto Differential    Specimen: Blood   Result Value Ref Range    WBC 25.12 (H) 3.40 - 10.80 10*3/mm3    RBC 3.65 (L) 3.77 - 5.28 10*6/mm3    Hemoglobin 11.4 (L) 12.0 - 15.9 g/dL    Hematocrit 38.6 34.0 - 46.6 %    .8 (H) 79.0 - 97.0 fL    MCH 31.2 26.6 - 33.0 pg    MCHC 29.5 (L) 31.5 - 35.7 g/dL    RDW 13.2 12.3 - 15.4 %    RDW-SD 50.9 37.0 - 54.0 fl    MPV 10.6 6.0 - 12.0 fL    Platelets 318 140 - 450 10*3/mm3    nRBC 0.1 0.0 -  0.2 /100 WBC   Blood Gas, Arterial With Co-Ox    Specimen: Arterial Blood   Result Value Ref Range    Site Right Brachial     Real's Test N/A     pH, Arterial 7.091 (C) 7.350 - 7.450 pH units    pCO2, Arterial 54.1 (H) 35.0 - 45.0 mm Hg    pO2, Arterial 53.6 (L) 83.0 - 108.0 mm Hg    HCO3, Arterial 16.4 (L) 20.0 - 26.0 mmol/L    Base Excess, Arterial -13.3 (L) 0.0 - 2.0 mmol/L    Hemoglobin, Blood Gas 11.5 (L) 14 - 18 g/dL    Hematocrit, Blood Gas 35.4 (L) 38.0 - 51.0 %    Oxyhemoglobin 74.2 (L) 94 - 99 %    Methemoglobin 0.30 0.00 - 1.50 %    Carboxyhemoglobin 1.1 0 - 2 %    CO2 Content 18.1 (L) 22 - 33 mmol/L    Temperature 37.0 C    Barometric Pressure for Blood Gas      Modality Ventilator     FIO2 100 %    Rate 0 Breaths/minute    PIP 0 cmH2O    IPAP 0     EPAP 0     Note      Notified Blanche BARNARD     Notified By [id number]     Notified Time 01/10/2022 16:14     pH, Temp Corrected 7.091 pH Units    pCO2, Temperature Corrected 54.1 (H) 35 - 45 mm Hg    pO2, Temperature Corrected 53.6 (L) 83 - 108 mm Hg   Hemoglobin A1c    Specimen: Blood   Result Value Ref Range    Hemoglobin A1C 5.70 (H) 4.80 - 5.60 %   POC Surgery Labs    Specimen: Blood   Result Value Ref Range    Ionized Calcium 1.36 (H) 1.20 - 1.32 mmol/L    POC Potassium 2.9 (L) 3.5 - 4.9 mmol/L    Sodium 136 (L) 138 - 146 mmol/L    Total CO2 25 24 - 29 mmol/L    Hemoglobin 12.6 12.0 - 17.0 g/dL    Hematocrit 37 (L) 38 - 51 %    pCO2, Arterial 96.0 (H) 35 - 45 mm Hg    pO2, Arterial 54 (L) 80 - 105 mmHg    Base Excess -9.0000 (L) -5 - 5 mmol/L    O2 Saturation, Arterial 66 (L) 95 - 98 %    pH, Arterial 6.98 (L) 7.35 - 7.6 pH units    HCO3, Arterial 22.5 22 - 26 mmol/L    Glucose 403 (C) 70 - 130 mg/dL   POC Creatinine    Specimen: Blood   Result Value Ref Range    Creatinine 1.80 (H) 0.60 - 1.30 mg/dL   ECG 12 Lead   Result Value Ref Range    QT Interval 408 ms    QTC Interval 504 ms        All other labs were within normal range or not returned as of this  "dictation.      EMERGENCY DEPARTMENT COURSE and DIFFERENTIAL DIAGNOSIS/MDM:   Vitals:    Vitals:    01/10/22 1532 01/10/22 1600 01/10/22 1604   BP: (!) 118/104  95/53   Pulse: 116     Resp:   17   SpO2:   92%   Weight:  136 kg (300 lb)    Height:  160 cm (63\")             Patient presents in cardiac arrest with resuscitative efforts initiated by EMS in route.  They note the patient was initial call out for a glycemic episode, was found to have a hyperglycemia, bradycardia and then declined into a cardiac and respiratory arrest.  Resuscitative efforts were initiated immediately as the patient was transferred over to the stretcher, patient arrives after a bout of emesis around the supraglottic airway.  We will continue resuscitative efforts upon arrival to the emergency department, patient initially was in PEA, she did receive 5 rounds of epinephrine, run round of calcium, 1 round of bicarb.  Return spontaneous circulation was achieved for 7.5 airway tube was inserted by myself as described below.  We did obtain ABG, labs EKG, chest x-ray.  Case was discussed with cardiology, Dr. Antunez, appreciate his input.  No acute ischemic changes on EKG, sounds are respiratory in nature we will plan on intensivist admission, likely cooling, cardiology will follow.  I have been discussed the case with intensivist, Dr. Quintana, who agrees with admission, CT PE study was placed for further evaluation.  Please refer to code sheet for full timing and administration of medications.  Patient was started on fentanyl, Versed.  Awaiting family members arrival for an update.  Patient with DKA, will initiate protocol, electrolyte replacement.          MEDICATIONS ADMINISTERED IN ED:  Medications   sodium chloride 0.9 % flush 10 mL (has no administration in time range)   midazolam (VERSED) 100 mg in 100mL NS infusion (has no administration in time range)   rocuronium (ZEMURON) injection 100 mg (has no administration in time range)   sodium " chloride 0.9 % flush 3 mL (has no administration in time range)   sodium chloride 0.9 % flush 10 mL (has no administration in time range)   sodium chloride 0.9 % bolus 2,000 mL (2,000 mL Intravenous New Bag 1/10/22 1640)   sodium chloride 0.9 % infusion (has no administration in time range)   sodium chloride 0.9 % with KCl 20 mEq/L infusion (has no administration in time range)   sodium chloride 0.9 % with KCl 40 mEq/L infusion (has no administration in time range)   dextrose 5 % and sodium chloride 0.9 % infusion (has no administration in time range)   dextrose 5 % and sodium chloride 0.9 % with KCl 40 mEq/L infusion (has no administration in time range)   dextrose 5 % and sodium chloride 0.9 % with KCl 20 mEq/L infusion (has no administration in time range)   sodium chloride 0.45 % infusion (has no administration in time range)   sodium chloride 0.45 % with KCl 20 mEq/L infusion (has no administration in time range)   sodium chloride 0.45 % 1,000 mL with potassium chloride 40 mEq infusion (has no administration in time range)   dextrose 5 % and sodium chloride 0.45 % infusion (has no administration in time range)   dextrose 5 % and sodium chloride 0.45 % with KCl 20 mEq/L infusion (has no administration in time range)   dextrose 5 % and sodium chloride 0.45 % with KCl 40 mEq/L infusion (has no administration in time range)   insulin regular 1 unit/mL in 0.9% sodium chloride (has no administration in time range)   dextrose (D50W) (25 g/50 mL) IV injection 12.5 g (has no administration in time range)   sodium chloride 0.9 % flush 10 mL (has no administration in time range)   sodium chloride 0.9 % infusion (has no administration in time range)   sodium chloride 0.9 % flush 10 mL (has no administration in time range)   sodium chloride 0.9 % flush 10 mL (has no administration in time range)   heparin (porcine) 5000 UNIT/ML injection 5,000 Units (has no administration in time range)   propofol (DIPRIVAN) infusion 10  mg/mL 100 mL (has no administration in time range)   fentaNYL 2500 mcg/250 mL NS infusion (has no administration in time range)   vecuronium (NORCURON) injection 10 mg (has no administration in time range)   norepinephrine (LEVOPHED) 8 mg in 250 mL NS infusion (premix) (has no administration in time range)   artificial tears ophthalmic ointment (has no administration in time range)   potassium chloride 20 mEq in 50 mL IVPB (has no administration in time range)   EPINEPHrine (ADRENALIN) injection (1 mg Intravenous Given 1/10/22 1529)   calcium chloride injection (1 g Intravenous Given 1/10/22 1530)   sodium bicarbonate injection 8.4% (50 mEq Intravenous Given 1/10/22 1532)   etomidate (AMIDATE) injection (20 mg Intravenous Given 1/10/22 1537)   fentaNYL citrate (PF) (SUBLIMAZE) injection (100 mcg Intravenous Given 1/10/22 1546)       PROCEDURES:  Procedures   Procedure Note - Intubation:  The benefits, risks, and alternatives of intubation were not discussed secondary to emergent episode. Emergent consent was obtained (or implied if emergent situation dictated) for the procedure. Pre-oxygenation was administered and the appropriate equipment and staff were made available at the bedside. Julissa Kee was sedated/paralyzed; please see the chart for the drugs and dosages administered. A [place] 4 laryngoscope blade was used for a grade 2 view and a 7.5mm endotracheal tube was viewed to pass through the cords on the first attempt. The tube was secured at 22cm to the lip. Tracheal position was confirmed using a colorimetric end-tidal CO2 detector, tube condensation and chest auscultation/visualization. Respiratory therapy is at the bedside and is assisting with ventilatory management.          CRITICAL CARE TIME    Total Critical Care time was 60 minutes, excluding separately reportable procedures.  Cardiac arrest, acute respiratory failure requiring multiple interventions, monitoring, discussions with family members,  multiple consultants. There was a high probability of clinically significant/life threatening deterioration in the patient's condition which required my urgent intervention.      FINAL IMPRESSION      1. Cardiac arrest (HCC)    2. Acute respiratory failure with hypoxia (HCC)    3. Diabetic ketoacidosis with coma associated with other specified diabetes mellitus (HCC)          DISPOSITION/PLAN     ED Disposition     ED Disposition Condition Comment    Decision to Admit  Level of Care: Critical Care [6]   Admitting Physician: MARIA EUGENIA MAYORGA [[id number]]            Comment: Please note this report has been produced using speech recognition software.      Tiago Jennings DO  Attending Emergency Physician               Tiago Jennings DO  01/10/22 [id number]

## 2022-01-11 VITALS
RESPIRATION RATE: 17 BRPM | BODY MASS INDEX: 47.54 KG/M2 | SYSTOLIC BLOOD PRESSURE: 100 MMHG | WEIGHT: 268.3 LBS | TEMPERATURE: 93.4 F | HEIGHT: 63 IN | HEART RATE: 46 BPM | OXYGEN SATURATION: 13 % | DIASTOLIC BLOOD PRESSURE: 48 MMHG

## 2022-01-11 LAB
CYTOLOGIST CVX/VAG CYTO: NORMAL
PATH INTERP BLD-IMP: NORMAL

## 2022-01-11 NOTE — PAYOR COMM NOTE
"Micaela Aguilar, RN Utilization Review [phone]  Fax # [phone]  Ref # [id number]        Julisas Kee (Dcsd. Female)             Date of Birth Social Security Number Address Home Phone MRN    1950  [id number] [facility] [phone] [id number]    Zoroastrian Marital Status             None        Admission Date Admission Type Admitting Provider Attending Provider Department, Room/Bed    1/10/22 Emergency Tho Quintana MD  [facility] 2A ICU, N215/1    Discharge Date Discharge Disposition Discharge Destination          1/10/2022               Attending Provider: (none)   Allergies: Amoxicillin-pot Clavulanate, Bactrim [Sulfamethoxazole-trimethoprim]    Isolation: None   Infection: C.difficile (rule out) (01/10/22)   Code Status: Prior   Advance Care Planning Activity    Ht: 160 cm (63\")   Wt: 122 kg (268 lb 4.8 oz)    Admission Cmt: None   Principal Problem: Cardiac arrest (HCC) [I46.9]                 Active Insurance as of 1/10/2022     Primary Coverage     Payor Plan Insurance Group Employer/Plan Group    HUMANA MEDICARE REPLACEMENT HUMANA MEDICARE REPLACEMENT [id number]     Payor Plan Address Payor Plan Phone Number Payor Plan Fax Number Effective Dates    [address] [address] [phone]  2014 - None Entered    [facility] [id number]       Subscriber Name Subscriber Birth Date Member ID       JULISSA KEE 1950 [id number]                 Emergency Contacts      (Rel.) Home Phone Work Phone Mobile Phone    Familia Kee (Spouse) [phone] -- [phone]    MARLEY MONCADA (Daughter) [phone] -- --               History & Physical      Tho Quintana MD at 01/10/22 1727          Intensive Care Admission Note     Cardiac arrest (HCC)    History of Present Illness     Ms. Kee is a 70 yo female with PMH COPD, Diabetes, HLD, HTN, Hypothyroidism, Morbid obesity, pickwickian syndrome who lives at home with her  " and has limited mobility (per previous documentation) who presents to ED via EMS as a cardiac arrest. Per limited documentation as patient is unresponsive and no family is present EMS noted they were called out for a glycemic episode. On their arrival patient was found lethargic and bradycardic. When they went to place her on the stretcher, she went into cardiac arrest and resuscitative efforts were initiated and continued en route to hospital. On arrival she remained in cardiac arrest and ROSC was achieved after 5 rounds of epinephrine, 1g calcium, 1 amp of bicarbonate.     VS on arrival: , RR 17, /104, Spo2 92%. Significant Labs: PCT 0.08, troponin < 0.010, .6, ABG 7.09 / 54.1 / 53.6 / 16.4 / -13.3, creat 1.8, iCal 1.36, K 2.9, Na 136, glucose 403.  EKG without acute ischemic changes    While in ED patient received: multiple doses of Epinephrine, Calcium Chloride, Bicarbonate, Etomidate, Fentanyl, Versed and Rocuronium.     Problem List, Surgical History, Family, Social History, and ROS     Patient Active Problem List    Diagnosis    • *Cardiac arrest (Formerly [facility]) [I46.9]    • DKA (diabetic ketoacidosis) (Formerly [facility]) [E11.10]    • Mixed metabolic and respiratory acidosis of  [P84]    • Physical debility [R53.81]    • Left adrenal nodule [E27.8]    • ADEEL [N17.9]    • Hypomagnesemia [E83.42]    • Hyperglycemia [R73.9]    • Altered mental status [R41.82]    • Acute on chronic respiratory failure with hypoxia and hypercapnia (Formerly [facility]) [J96.21, J96.22]    • HHNC (hyperglycemic hyperosmolar nonketotic coma) (Formerly [facility]) [E11.01]    • Acute foot pain, left [M79.672]    • Recurrent pneumonia [J18.9]    • Human metapneumovirus (hMPV) pneumonia [J12.3]    • Hypoxia [R09.02]    • Renal insufficiency [N28.9]    • Sacral wound [S31.000A]    • Anxiety [F41.9]    • Chronic obstructive pulmonary disease (HCC) [J44.9]    • T2DM [E11.9]    • Dyslipidemia [E78.5]    • Hypertension [I10]    • Hypothyroidism [E03.9]    • Morbid  obesity with BMI of 50.0-59.9, adult [E66.01, Z68.43]    • Pickwickian syndrome [E66.2]      Past Surgical History:   Procedure Laterality Date   • CERVICAL POLYPECTOMY     • HYSTERECTOMY         Allergies   Allergen Reactions   • Amoxicillin-Pot Clavulanate Other (See Comments)     HEADACHE - has tolerated ceftriaxone 2019   • Bactrim [Sulfamethoxazole-Trimethoprim] Other (See Comments)     .     No current facility-administered medications on file prior to encounter.     Current Outpatient Medications on File Prior to Encounter   Medication Sig   • aspirin 81 MG tablet Take 1 tablet by mouth daily.   • atorvastatin (LIPITOR) 10 MG tablet Take 1 tablet by mouth Daily.   • doxazosin (CARDURA) 4 MG tablet TAKE 1 TABLET BY MOUTH EVERY DAY EVERY NIGHT   • hydroCHLOROthiazide (HYDRODIURIL) 25 MG tablet TAKE 1 TABLET BY MOUTH EVERY DAY   • insulin detemir (LEVEMIR) 100 UNIT/ML injection Inject 20 Units under the skin into the appropriate area as directed Daily.   • levothyroxine (SYNTHROID, LEVOTHROID) 100 MCG tablet Take 1 tablet by mouth Daily.   • lisinopril (PRINIVIL,ZESTRIL) 5 MG tablet TAKE 1 TABLET BY MOUTH EVERY DAY   • metoprolol succinate XL (TOPROL-XL) 25 MG 24 hr tablet TAKE 1 TABLET BY MOUTH EVERY DAY IN THE MORNING   • sertraline (ZOLOFT) 50 MG tablet TAKE 1 TABLET BY MOUTH EVERY DAY   • SITagliptin (JANUVIA) 100 MG tablet Take 100 mg by mouth Daily.     MEDICATION LIST AND ALLERGIES REVIEWED.    Family History   Problem Relation Age of Onset   • Diabetes Father    • Diabetes Sister    • Pancreatic cancer Sister      Social History     Tobacco Use   • Smoking status: Former Smoker     Types: Cigarettes     Quit date:      Years since quittin.0   • Smokeless tobacco: Never Used   Substance Use Topics   • Alcohol use: No   • Drug use: Never     Social History     Social History Narrative   • Not on file     FAMILY AND SOCIAL HISTORY REVIEWED.    Review of Systems   Unable to perform ROS: Intubated  "      Physical Exam and Clinical Information   BP 95/53   Pulse 116   Resp 17   Ht 160 cm (63\")   Wt 136 kg (300 lb)   LMP  (LMP Unknown)   SpO2 92%   BMI 53.14 kg/m²      Physical Exam   General Appearance:  Not in distress, no asynchrony with mechanical ventilator.  Head:   Pupils symmetrical B/L & sluggish.  Neck:  Endotracheal tube clean.   Lungs:   B/L Breath sounds present with decreased breath sounds on bases, no wheezing heard, occ crackles.   Heart: S1 and S2 present, no murmur,  Abdomen: Morbidly obese, bowel sounds not heard, distended, significant pannus noted in lower abdomen with some superficial yeast infection  Extremities: no cyanosis or clubbing,  + edema, cool to touch.  Neurologic:  Pt on the vent. Not able to participate in full neurological exam.  No meaningful neurological response on current exam but patient was given paralytics for intubation.    Ventilator settings: A/C: FiO2 60%    Results from last 7 days   Lab Units 01/10/22  1551   HEMOGLOBIN, POC g/dL 12.6     Results from last 7 days   Lab Units 01/10/22  1550   CREATININE mg/dL 1.80*     Estimated Creatinine Clearance: 38.3 mL/min (A) (by C-G formula based on SCr of 1.8 mg/dL (H)).      Results from last 7 days   Lab Units 01/10/22  1611   PH, ARTERIAL pH units 7.091*   PCO2, ARTERIAL mm Hg 54.1*   PO2 ART mm Hg 53.6*     Lab Results   Component Value Date    LACTATE 2.0 2021        Images:     Ct chest/A/P pending.    CXR reviewed and showed ET above rachel. Left base atelectasis noted. No definite consolidation.     I reviewed the patient's results and images.     Impression     Cardiac arrest (HCC)    Chronic obstructive pulmonary disease (HCC)    Morbid obesity with BMI of 50.0-59.9, adult    ADEEL    DKA (diabetic ketoacidosis) (HCC)    Mixed metabolic and respiratory acidosis of     Plan/Recommendations     71-year-old female with past medical history of COPD, diabetes, dyslipidemia, hypertension, " hypothyroidism, morbid obesity who lives at home with her  who takes care of her with limited mobility.  Patient apparently was in usual state of health today morning when she woke up and had a good breakfast.  After that  found  that she had bowel movement and she was sitting and did not realizing that she had a bowel movement.  He took her to bathroom to clean her up and after he had just cleaned her up she had another bowel movement and he had to clean her up again.  After that he could not get her out of the tub and she was somewhat unresponsive.  He also stated that she  also felt a little cold as well.  He then called EMS.  Patient was found to be somewhat bradycardic on EMS arrival and she was brought to emergency room and on arrival she had a cardiac arrest.  It was documented PEA arrest.  She was resuscitated with 5 rounds of CPR and multiple medications and ROSC obtained.  Patient was intubated during the process.    Initial ABG showed severe combined metabolic and respiratory acidosis.  Patient was also hypothermic with temp of 34.  She was given fluid boluses.  Lactic acid came back at 18.  Blood sugars were elevated.  Anion gap significantly elevated.  LFTs are elevated as well.  According to  patient was complaining of burning during urination and apparently home health nurse was going to come to take her urine sample to evaluate for any UTI.  Has not been on any antibiotics recently but couple months ago was treated for UTI.    1.  Admit to intensive care unit.  2.  Patient with prolonged cardiac arrest s/p resuscitation.  Encephalopathy likely multifactorial with metabolic encephalopathy in the picture.  Anoxic encephalopathy cannot be ruled out.  We will proceed with targeted temperature management.  Will need central line and arterial line placement.  3.  Concern for underlying infectious etiology leading to this.  Will cover with meropenem for now.  Blood culture, urine  culture and respiratory cultures will be obtained.  4.  Patient with elevated blood glucose levels.  Probable DKA.  Will check ketone bodies.  Check urinalysis for ketones.  In the meantime cover with insulin infusion.  Continue DKA protocol for now.  5.  Severe hypokalemia.  We will go ahead and replace it aggressively before instituting insulin therapy.  Has received 2 L of fluid boluses.    6.  Patient also has ADEEL.  Monitor urine output closely with fluid resuscitation.  7.  CT head, chest, abdomen and pelvis to evaluate for underlying pathology which could have led to cardiac arrest.  8.  EKG did not show any acute ST changes.  Troponin is negative.  Less likely cardiac origin.  9.  Ventilator adjustments made.  Patient and hypercapnic respiratory failure.  Will follow blood gases and make further adjustments as needed.  DuoNeb scheduled for now.  10.  Continue home doses of aspirin, Zoloft and levothyroxine.  11.  Patient does have elevated LFTs which could be early shock liver.  Hold statins for now.  12.  Patient with severe lactic acidosis secondary to hypoperfusion state.  Will trend lactic acid levels.  13.  Patient had multiple rounds of diarrhea today.  Will check C. Difficile.    Called patient's  and updated him about patient's current critical condition.  He verbalized understanding.  He told me that patient would not want to be resuscitated in the event of cardiac arrest.  We talked about that she was already resuscitated earlier today.  He told me that they have had specific discussions and she did not want to undergo that. He told me that if she has another cardiac arrest he does not want her to be resuscitated.  We discussed about targeted temperature management and various procedures necessitating to accomplish that including central line and arterial line.  He is not certain if he wants to put her through all this.  He wants to discuss with his daughter and will get back to us.  He  understands that patient is critically ill and may not survive this illness.  We will continue supportive care in the meantime.    High level of risk due to:  illness with threat to life or bodily function.    Time spent Critical care 35 min (exclusive of procedure time)  including high complexity decision making to assess, manipulate, and support vital organ system failure in this individual who has impairment of one or more vital organ systems such that there is a high probability of imminent or life threatening deterioration in the patient’s condition.      Tho Quintana MD, [facility]  Pulmonary and Critical Care Medicine  01/10/22 16:31 EST     CC: Eileen Du APRN      Electronically signed by Tho Quintana MD at 01/10/22 1837          Emergency Department Notes      Tiago Jennings DO at 01/10/22 1549            [place]    EMERGENCY DEPARTMENT ENCOUNTER      Pt Name: Julissa Kee  MRN: [id number]  YOB: 1950  Date of evaluation: 1/10/2022  Provider: Tiago Jennings DO    CHIEF COMPLAINT       Chief Complaint   Patient presents with   • Cardiac Arrest         HISTORY OF PRESENT ILLNESS  (Location/Symptom, Timing/Onset, Context/Setting, Quality, Duration, Modifying Factors, Severity.)   Julissa Kee is a 71 y.o. female who presents to the emergency department via EMS in a cardiac arrest.  History is limited, H PI severely limited as the patient is unresponsive on arrival.  Per EMS they were called out for a glycemic episode, when they arrived the patient was lethargic, was bradycardic, with a transferred her over to their stretcher the patient went into a cardiac arrest.  Resuscitative efforts were initiated in route to the hospital.  They note the patient was in PEA in route, there was a significant other at the house.  No family is currently present on arrival to the emergency department to provide any additional history.  There is a question of a remote bariatric history  with the patient.  Full HPI unobtainable on arrival secondary to unresponsive state on arrival.      Nursing notes were reviewed.    REVIEW OF SYSTEMS    (2-9 systems for level 4, 10 or more for level 5)   ROS:  Unable to assess secondary to patient's unresponsive state.       PAST MEDICAL HISTORY     Past Medical History:   Diagnosis Date   • Acute bronchitis    • Acute sinusitis    • Anxiety    • Bacterial conjunctivitis    • Chest pain    • COPD (chronic obstructive pulmonary disease) ([facility])    • Depression    • Diabetes mellitus ([facility])    • Dyspnea    • Dysuria    • Edema    • Elbow injury    • Elbow pain    • Eye drainage    • Fatigue    • Foot pain, right    • Humerus distal fracture    • Hyperlipidemia    • Hypertension    • Hypothyroidism    • Morbid obesity (HCC)    • Pickwickian syndrome ([facility])    • Pressure ulcer stage I    • Psoriasis    • SOB (shortness of breath)    • Symptoms of urinary tract infection    • Urinary frequency    • Urinary tract infection    • Vaginal candidiasis          SURGICAL HISTORY       Past Surgical History:   Procedure Laterality Date   • CERVICAL POLYPECTOMY     • HYSTERECTOMY           CURRENT MEDICATIONS       Current Facility-Administered Medications:   •  artificial tears ophthalmic ointment, , Both Eyes, Q2H, Joon Giffordy L, APRN  •  dextrose (D50W) (25 g/50 mL) IV injection 12.5 g, 12.5 g, Intravenous, PRN, Young, Clementina L, APRN  •  dextrose 5 % and sodium chloride 0.45 % infusion, 150 mL/hr, Intravenous, Continuous PRN, Young, Clementina L, APRN  •  dextrose 5 % and sodium chloride 0.45 % with KCl 20 mEq/L infusion, 150 mL/hr, Intravenous, Continuous PRN, Young, Champlin L, APRN  •  dextrose 5 % and sodium chloride 0.45 % with KCl 40 mEq/L infusion, 150 mL/hr, Intravenous, Continuous PRN, Young, Champlin L, APRN  •  dextrose 5 % and sodium chloride 0.9 % infusion, 150 mL/hr, Intravenous, Continuous PRN, Young, Champlin L, APRN  •  dextrose 5 % and sodium chloride 0.9 % with KCl 20 mEq/L  infusion, 150 mL/hr, Intravenous, Continuous PRN, Young, Clementina L, APRN  •  dextrose 5 % and sodium chloride 0.9 % with KCl 40 mEq/L infusion, 150 mL/hr, Intravenous, Continuous PRN, Young, Quimby L, APRN  •  fentaNYL 2500 mcg/250 mL NS infusion,  mcg/hr, Intravenous, Continuous PRN, Young, Quimby L, APRN  •  heparin (porcine) 5000 UNIT/ML injection 5,000 Units, 5,000 Units, Subcutaneous, Q8H, Clementina Gifford L, APRN  •  insulin regular 1 unit/mL in 0.9% sodium chloride, 13 Units/hr, Intravenous, Titrated, YoungJoony L, APRN  •  midazolam (VERSED) 100 mg in 100mL NS infusion, 1-10 mg/hr, Intravenous, Titrated, Tiago Jennings DO  •  norepinephrine (LEVOPHED) 8 mg in 250 mL NS infusion (premix), 0.02-0.3 mcg/kg/min, Intravenous, Continuous PRN, Balta, Clementina L, APRN  •  potassium chloride 20 mEq in 50 mL IVPB, 20 mEq, Intravenous, Q1H PRN, Joon Giffordy L, APRN  •  propofol (DIPRIVAN) infusion 10 mg/mL 100 mL, 5-50 mcg/kg/min, Intravenous, Continuous PRN, Clementina Gifford L, APRN  •  rocuronium (ZEMURON) injection 100 mg, 100 mg, Intravenous, Once, Tiago Jennings, DO  •  sodium chloride 0.45 % 1,000 mL with potassium chloride 40 mEq infusion, 250 mL/hr, Intravenous, Continuous PRN, Balta, Clementina L, APRN  •  sodium chloride 0.45 % infusion, 250 mL/hr, Intravenous, Continuous PRN, Young, Clementina L, APRN  •  sodium chloride 0.45 % with KCl 20 mEq/L infusion, 250 mL/hr, Intravenous, Continuous PRN, Balta, Clementina L, APRN  •  sodium chloride 0.9 % bolus 2,000 mL, 2,000 mL, Intravenous, Once, Joon Giffordy L, APRN, Last Rate: 1,000 mL/hr at 01/10/22 1640, 2,000 mL at 01/10/22 1640  •  sodium chloride 0.9 % flush 10 mL, 10 mL, Intravenous, PRN, Tiago Jennings, DO  •  sodium chloride 0.9 % flush 10 mL, 10 mL, Intravenous, PRN, Clementina Gifford, APRN  •  sodium chloride 0.9 % flush 10 mL, 10 mL, Intravenous, Once PRN, Clementina Gifford, APRN  •  sodium chloride 0.9 % flush 10 mL, 10 mL, Intravenous, Q12H, Clementina Gifford, APRN  •   sodium chloride 0.9 % flush 10 mL, 10 mL, Intravenous, PRN, Young, Clementina L, APRN  •  sodium chloride 0.9 % flush 3 mL, 3 mL, Intravenous, Q12H, Young, Graham L, APRN  •  sodium chloride 0.9 % infusion, 250 mL/hr, Intravenous, Continuous PRN, Young, Clementina L, APRN  •  sodium chloride 0.9 % infusion, 10 mL/hr, Intravenous, Continuous PRN, Young, Graham L, APRN  •  sodium chloride 0.9 % with KCl 20 mEq/L infusion, 250 mL/hr, Intravenous, Continuous PRN, Young, Graham L, APRN  •  sodium chloride 0.9 % with KCl 40 mEq/L infusion, 250 mL/hr, Intravenous, Continuous PRN, Young, Graham L, APRN  •  vecuronium (NORCURON) injection 10 mg, 10 mg, Intravenous, Q2H PRN, Young, Graham L, APRN    Current Outpatient Medications:   •  aspirin 81 MG tablet, Take 1 tablet by mouth daily., Disp: , Rfl:   •  atorvastatin (LIPITOR) 10 MG tablet, Take 1 tablet by mouth Daily., Disp: 90 tablet, Rfl: 1  •  doxazosin (CARDURA) 4 MG tablet, TAKE 1 TABLET BY MOUTH EVERY DAY EVERY NIGHT, Disp: 30 tablet, Rfl: 5  •  hydroCHLOROthiazide (HYDRODIURIL) 25 MG tablet, TAKE 1 TABLET BY MOUTH EVERY DAY, Disp: 90 tablet, Rfl: 1  •  insulin detemir (LEVEMIR) 100 UNIT/ML injection, Inject 20 Units under the skin into the appropriate area as directed Daily., Disp: 30 pen, Rfl: 5  •  levothyroxine (SYNTHROID, LEVOTHROID) 100 MCG tablet, Take 1 tablet by mouth Daily., Disp: 90 tablet, Rfl: 1  •  lisinopril (PRINIVIL,ZESTRIL) 5 MG tablet, TAKE 1 TABLET BY MOUTH EVERY DAY, Disp: 30 tablet, Rfl: 5  •  metoprolol succinate XL (TOPROL-XL) 25 MG 24 hr tablet, TAKE 1 TABLET BY MOUTH EVERY DAY IN THE MORNING, Disp: 30 tablet, Rfl: 5  •  sertraline (ZOLOFT) 50 MG tablet, TAKE 1 TABLET BY MOUTH EVERY DAY, Disp: 90 tablet, Rfl: 1  •  SITagliptin (JANUVIA) 100 MG tablet, Take 100 mg by mouth Daily., Disp: , Rfl:     ALLERGIES     Amoxicillin-pot clavulanate and Bactrim [sulfamethoxazole-trimethoprim]    FAMILY HISTORY       Family History   Problem Relation Age of Onset   • Diabetes  "Father    • Diabetes Sister    • Pancreatic cancer Sister           SOCIAL HISTORY       Social History     Socioeconomic History   • Marital status:    Tobacco Use   • Smoking status: Former Smoker     Types: Cigarettes     Quit date: 2006     Years since quittin.0   • Smokeless tobacco: Never Used   Substance and Sexual Activity   • Alcohol use: No   • Drug use: Never   • Sexual activity: Defer         PHYSICAL EXAM    (up to 7 for level 4, 8 or more for level 5)     Vitals:    01/10/22 1532 01/10/22 1600 01/10/22 1604   BP: (!) 118/104  95/53   Pulse: 116     Resp:   17   SpO2:   92%   Weight:  136 kg (300 lb)    Height:  160 cm (63\")        Physical Exam  General : Patient is unresponsive on arrival, resuscitative efforts in progress on arrival.  HEENT: Pupils are equally round and reactive to light, patient is clenching her eyes on arrival. there is emesis around the I-gel airway on arrival.  Neck: Neck is supple, trachea midline  Cardiac: PEA, compressions on arrival  Lungs: With decreased breath sound bilaterally, patient has a I-gel in place, being bagged  Abdomen: Abdomen is soft, obese  Musculoskeletal: Unable to fully assess, patient is voluntarily moving her arms during initial evaluation, limited movement of lower extremities.  Neuro: Patient does have some voluntary movement of her arms, clenches her eyes after airway was established.  Patient not following commands, limiting my neurological examination.  Dermatology: Skin is cool unable to assess  Psych: Unable to assess      DIAGNOSTIC RESULTS     EKG: All EKGs are interpreted by the Emergency Department Physician who either signs or Co-signs this chart in the absence of a cardiologist.    ECG 12 Lead   Final Result   Test Reason : SOA Protocol   Blood Pressure :   */*   mmHG   Vent. Rate :  92 BPM     Atrial Rate :  92 BPM      P-R Int : 182 ms          QRS Dur : 104 ms       QT Int : 408 ms       P-R-T Axes : 108 -45  46 degrees      " QTc Int : 504 ms      Sinus rhythm with premature atrial complexes   Left anterior fascicular block   Left ventricular hypertrophy with repolarization abnormality   Possible Lateral infarct , age undetermined   Abnormal ECG   When compared with ECG of 13-APR-2021 15:48,   premature atrial complexes are now present   Borderline criteria for Lateral infarct are now present   QT has lengthened   Confirmed by YOUNG HERMAN MD ([id number]) on 1/10/2022 4:07:22 PM      Referred By: EDMD           Confirmed By: YOUNG HERMAN MD      ECG 12 Lead    (Results Pending)       RADIOLOGY:   Non-plain film images such as CT, Ultrasound and MRI are read by the radiologist. Plain radiographic images are visualized and preliminarily interpreted by the emergency physician with the below findings:      [] Radiologist's Report Reviewed:  XR Chest 1 View    (Results Pending)   CT Chest Pulmonary Embolism    (Results Pending)   CT Head Without Contrast    (Results Pending)         ED BEDSIDE ULTRASOUND:   Performed by ED Physician - none    LABS:    I have reviewed and interpreted all of the currently available lab results from this visit (if applicable):  Results for orders placed or performed during the hospital encounter of 01/10/22   Comprehensive Metabolic Panel    Specimen: Blood   Result Value Ref Range    Glucose 410 (C) 65 - 99 mg/dL    BUN 29 (H) 8 - 23 mg/dL    Creatinine 1.61 (H) 0.57 - 1.00 mg/dL    Sodium 136 136 - 145 mmol/L    Potassium 3.0 (L) 3.5 - 5.2 mmol/L    Chloride 89 (L) 98 - 107 mmol/L    CO2 18.0 (L) 22.0 - 29.0 mmol/L    Calcium 11.1 (H) 8.6 - 10.5 mg/dL    Total Protein 6.4 6.0 - 8.5 g/dL    Albumin 3.10 (L) 3.50 - 5.20 g/dL    ALT (SGPT) 291 (H) 1 - 33 U/L    AST (SGOT) 331 (H) 1 - 32 U/L    Alkaline Phosphatase 111 39 - 117 U/L    Total Bilirubin 0.5 0.0 - 1.2 mg/dL    eGFR Non African Amer 31 (L) >60 mL/min/1.73    Globulin 3.3 gm/dL    A/G Ratio 0.9 g/dL    BUN/Creatinine Ratio 18.0 7.0 - 25.0    Anion Gap  29.0 (H) 5.0 - 15.0 mmol/L   BNP    Specimen: Blood   Result Value Ref Range    proBNP 144.6 0.0 - 900.0 pg/mL   Troponin    Specimen: Blood   Result Value Ref Range    Troponin T <0.010 0.000 - 0.030 ng/mL   Lactic Acid, Plasma    Specimen: Blood   Result Value Ref Range    Lactate 18.0 (C) 0.5 - 2.0 mmol/L   Procalcitonin    Specimen: Blood   Result Value Ref Range    Procalcitonin 0.08 0.00 - 0.25 ng/mL   CBC Auto Differential    Specimen: Blood   Result Value Ref Range    WBC 25.12 (H) 3.40 - 10.80 10*3/mm3    RBC 3.65 (L) 3.77 - 5.28 10*6/mm3    Hemoglobin 11.4 (L) 12.0 - 15.9 g/dL    Hematocrit 38.6 34.0 - 46.6 %    .8 (H) 79.0 - 97.0 fL    MCH 31.2 26.6 - 33.0 pg    MCHC 29.5 (L) 31.5 - 35.7 g/dL    RDW 13.2 12.3 - 15.4 %    RDW-SD 50.9 37.0 - 54.0 fl    MPV 10.6 6.0 - 12.0 fL    Platelets 318 140 - 450 10*3/mm3    nRBC 0.1 0.0 - 0.2 /100 WBC   Blood Gas, Arterial With Co-Ox    Specimen: Arterial Blood   Result Value Ref Range    Site Right Brachial     Real's Test N/A     pH, Arterial 7.091 (C) 7.350 - 7.450 pH units    pCO2, Arterial 54.1 (H) 35.0 - 45.0 mm Hg    pO2, Arterial 53.6 (L) 83.0 - 108.0 mm Hg    HCO3, Arterial 16.4 (L) 20.0 - 26.0 mmol/L    Base Excess, Arterial -13.3 (L) 0.0 - 2.0 mmol/L    Hemoglobin, Blood Gas 11.5 (L) 14 - 18 g/dL    Hematocrit, Blood Gas 35.4 (L) 38.0 - 51.0 %    Oxyhemoglobin 74.2 (L) 94 - 99 %    Methemoglobin 0.30 0.00 - 1.50 %    Carboxyhemoglobin 1.1 0 - 2 %    CO2 Content 18.1 (L) 22 - 33 mmol/L    Temperature 37.0 C    Barometric Pressure for Blood Gas      Modality Ventilator     FIO2 100 %    Rate 0 Breaths/minute    PIP 0 cmH2O    IPAP 0     EPAP 0     Note      Notified Blanche BARNARD     Notified By [id number]     Notified Time 01/10/2022 16:14     pH, Temp Corrected 7.091 pH Units    pCO2, Temperature Corrected 54.1 (H) 35 - 45 mm Hg    pO2, Temperature Corrected 53.6 (L) 83 - 108 mm Hg   Hemoglobin A1c    Specimen: Blood   Result Value Ref Range     "Hemoglobin A1C 5.70 (H) 4.80 - 5.60 %   POC Surgery Labs    Specimen: Blood   Result Value Ref Range    Ionized Calcium 1.36 (H) 1.20 - 1.32 mmol/L    POC Potassium 2.9 (L) 3.5 - 4.9 mmol/L    Sodium 136 (L) 138 - 146 mmol/L    Total CO2 25 24 - 29 mmol/L    Hemoglobin 12.6 12.0 - 17.0 g/dL    Hematocrit 37 (L) 38 - 51 %    pCO2, Arterial 96.0 (H) 35 - 45 mm Hg    pO2, Arterial 54 (L) 80 - 105 mmHg    Base Excess -9.0000 (L) -5 - 5 mmol/L    O2 Saturation, Arterial 66 (L) 95 - 98 %    pH, Arterial 6.98 (L) 7.35 - 7.6 pH units    HCO3, Arterial 22.5 22 - 26 mmol/L    Glucose 403 (C) 70 - 130 mg/dL   POC Creatinine    Specimen: Blood   Result Value Ref Range    Creatinine 1.80 (H) 0.60 - 1.30 mg/dL   ECG 12 Lead   Result Value Ref Range    QT Interval 408 ms    QTC Interval 504 ms        All other labs were within normal range or not returned as of this dictation.      EMERGENCY DEPARTMENT COURSE and DIFFERENTIAL DIAGNOSIS/MDM:   Vitals:    Vitals:    01/10/22 1532 01/10/22 1600 01/10/22 1604   BP: (!) 118/104  95/53   Pulse: 116     Resp:   17   SpO2:   92%   Weight:  136 kg (300 lb)    Height:  160 cm (63\")             Patient presents in cardiac arrest with resuscitative efforts initiated by EMS in route.  They note the patient was initial call out for a glycemic episode, was found to have a hyperglycemia, bradycardia and then declined into a cardiac and respiratory arrest.  Resuscitative efforts were initiated immediately as the patient was transferred over to the [facility], patient arrives after a bout of emesis around the supraglottic airway.  We will continue resuscitative efforts upon arrival to the emergency department, patient initially was in PEA, she did receive 5 rounds of epinephrine, run round of calcium, 1 round of bicarb.  Return spontaneous circulation was achieved for 7.5 airway tube was inserted by myself as described below.  We did obtain ABG, labs EKG, chest x-ray.  Case was discussed with " cardiology, Dr. Antunez, appreciate his input.  No acute ischemic changes on EKG, sounds are respiratory in nature we will plan on intensivist admission, likely cooling, cardiology will follow.  I have been discussed the case with intensivist, Dr. Quintana, who agrees with admission, CT PE study was placed for further evaluation.  Please refer to code sheet for full timing and administration of medications.  Patient was started on fentanyl, Versed.  Awaiting family members arrival for an update.  Patient with DKA, will initiate protocol, electrolyte replacement.          MEDICATIONS ADMINISTERED IN ED:  Medications   sodium chloride 0.9 % flush 10 mL (has no administration in time range)   midazolam (VERSED) 100 mg in 100mL NS infusion (has no administration in time range)   rocuronium (ZEMURON) injection 100 mg (has no administration in time range)   sodium chloride 0.9 % flush 3 mL (has no administration in time range)   sodium chloride 0.9 % flush 10 mL (has no administration in time range)   sodium chloride 0.9 % bolus 2,000 mL (2,000 mL Intravenous New Bag 1/10/22 1640)   sodium chloride 0.9 % infusion (has no administration in time range)   sodium chloride 0.9 % with KCl 20 mEq/L infusion (has no administration in time range)   sodium chloride 0.9 % with KCl 40 mEq/L infusion (has no administration in time range)   dextrose 5 % and sodium chloride 0.9 % infusion (has no administration in time range)   dextrose 5 % and sodium chloride 0.9 % with KCl 40 mEq/L infusion (has no administration in time range)   dextrose 5 % and sodium chloride 0.9 % with KCl 20 mEq/L infusion (has no administration in time range)   sodium chloride 0.45 % infusion (has no administration in time range)   sodium chloride 0.45 % with KCl 20 mEq/L infusion (has no administration in time range)   sodium chloride 0.45 % 1,000 mL with potassium chloride 40 mEq infusion (has no administration in time range)   dextrose 5 % and sodium chloride  0.45 % infusion (has no administration in time range)   dextrose 5 % and sodium chloride 0.45 % with KCl 20 mEq/L infusion (has no administration in time range)   dextrose 5 % and sodium chloride 0.45 % with KCl 40 mEq/L infusion (has no administration in time range)   insulin regular 1 unit/mL in 0.9% sodium chloride (has no administration in time range)   dextrose (D50W) (25 g/50 mL) IV injection 12.5 g (has no administration in time range)   sodium chloride 0.9 % flush 10 mL (has no administration in time range)   sodium chloride 0.9 % infusion (has no administration in time range)   sodium chloride 0.9 % flush 10 mL (has no administration in time range)   sodium chloride 0.9 % flush 10 mL (has no administration in time range)   heparin (porcine) 5000 UNIT/ML injection 5,000 Units (has no administration in time range)   propofol (DIPRIVAN) infusion 10 mg/mL 100 mL (has no administration in time range)   fentaNYL 2500 mcg/250 mL NS infusion (has no administration in time range)   vecuronium (NORCURON) injection 10 mg (has no administration in time range)   norepinephrine (LEVOPHED) 8 mg in 250 mL NS infusion (premix) (has no administration in time range)   artificial tears ophthalmic ointment (has no administration in time range)   potassium chloride 20 mEq in 50 mL IVPB (has no administration in time range)   EPINEPHrine (ADRENALIN) injection (1 mg Intravenous Given 1/10/22 1529)   calcium chloride injection (1 g Intravenous Given 1/10/22 1530)   sodium bicarbonate injection 8.4% (50 mEq Intravenous Given 1/10/22 1532)   etomidate (AMIDATE) injection (20 mg Intravenous Given 1/10/22 1537)   fentaNYL citrate (PF) (SUBLIMAZE) injection (100 mcg Intravenous Given 1/10/22 1546)       PROCEDURES:  Procedures   Procedure Note - Intubation:  The benefits, risks, and alternatives of intubation were not discussed secondary to emergent episode. Emergent consent was obtained (or implied if emergent situation dictated) for  the procedure. Pre-oxygenation was administered and the appropriate equipment and staff were made available at the bedside. Julissa Kee was sedated/paralyzed; please see the chart for the drugs and dosages administered. A Preston 4 laryngoscope blade was used for a grade 2 view and a 7.5mm endotracheal tube was viewed to pass through the cords on the first attempt. The tube was secured at 22cm to the lip. Tracheal position was confirmed using a colorimetric end-tidal CO2 detector, tube condensation and chest auscultation/visualization. Respiratory therapy is at the bedside and is assisting with ventilatory management.          CRITICAL CARE TIME    Total Critical Care time was 60 minutes, excluding separately reportable procedures.  Cardiac arrest, acute respiratory failure requiring multiple interventions, monitoring, discussions with family members, multiple consultants. There was a high probability of clinically significant/life threatening deterioration in the patient's condition which required my urgent intervention.      FINAL IMPRESSION      1. Cardiac arrest (HCC)    2. Acute respiratory failure with hypoxia (HCC)    3. Diabetic ketoacidosis with coma associated with other specified diabetes mellitus (HCC)          DISPOSITION/PLAN     ED Disposition     ED Disposition Condition Comment    Decision to Admit  Level of Care: Critical Care [6]   Admitting Physician: MARIA EUGENIA MAYORGA [[id number]]            Comment: Please note this report has been produced using speech recognition software.      Tiago Jennings DO  Attending Emergency Physician               Tiago Jennings DO  01/10/22 1700      Electronically signed by Tiago Jennings DO at 01/10/22 1700       Vital Signs (last day) before discharge     Date/Time Temp Temp src Pulse Resp BP Patient Position SpO2    01/10/22 1907 -- -- 46 -- -- -- --    01/10/22 1905 -- -- 66 -- -- -- 13    01/10/22 1900 -- -- 66 -- -- -- 95    01/10/22   -- -- 70 -- -- -- 100    01/10/22 1846 -- -- 58 -- 62/28 -- 98    01/10/22 1845 -- -- 57 -- -- -- --    01/10/22 1844 -- -- -- -- 49/26 -- --    01/10/22 17:59:07 93.4 (34.1) Bladder -- -- -- -- --    01/10/22 1749 -- -- 67 -- 115/99 -- 95    01/10/22 1732 -- -- 82 -- 128/88 -- --    01/10/22 1604 -- -- -- 17 95/53 -- 92    01/10/22 1550 -- -- 96 -- 110/95 -- --    01/10/22 1532 -- -- 116 -- 118/104 -- --          Oxygen Therapy (last day) before discharge     Date/Time SpO2 Device (Oxygen Therapy) Flow (L/min) Oxygen Concentration (%) ETCO2 (mmHg)    01/10/22 1905 13 -- -- -- --    01/10/22 190 95 -- -- -- --    01/10/22 1849 100 -- -- -- 36    01/10/22 1846 98 -- -- -- 32    01/10/22 1845 -- -- -- -- 31    01/10/22 1844 -- -- -- -- 30    01/10/22 1749 95 -- -- -- --    01/10/22 1604 92 ventilator -- -- --    01/10/22 1600 -- -- -- 60 --          Operative/Procedure Notes (all)    No notes of this type exist for this encounter.         Physician Progress Notes (all)    No notes of this type exist for this encounter.            Consult Notes (all)      Ashlee Stanley [facility] at 01/10/22 1829        Pharmacy -auto renal dosing - meropenem     Patient is 70 yo female    Ht = 160 cm    Wt = 136 kg   IBW = 52.4 kg   AIBW = 85.8 kg    SCr = 1.61   Est CrCl = 42.8    Change meropenem 1 gm iv q8h to  Meropenem 1 gm iv q12h    Pharmacy will continue to monitor renal function & adjust dose as necessary.    Ashlee Stanley RPH  1/10/2022  18:29 EST        Electronically signed by Ashlee Stanley RPH at 01/10/22 182          Discharge Summary      Tho Quintana MD at 01/10/22 1907            Death Summary         Patient Name: Julissa Kee    CSN: [id number]    MRN: [id number]    : 1950    Today's Date: 01/10/22    Date of Admission: 1/10/2022    Date/Time of Death: 1/10/2022 @ 1907    Admitting Physician:  Tho Quintana MD    Primary Care Provider: Eileen Du APRN    Consultations:     Consults        No orders found from 2021 to 2022.            Admission Diagnosis:  No admission diagnoses are documented for this encounter.    Diagnoses at Time of Death:     Cardiac arrest (HCC)    Chronic obstructive pulmonary disease (HCC)    Morbid obesity with BMI of 50.0-59.9, adult    ADEEL    DKA (diabetic ketoacidosis) (HCC)    Mixed metabolic and respiratory acidosis of     Transaminitis        Procedures:  None    HPI     History of Present Illness:    Ms. Kee is a 70 yo female with PMH COPD, Diabetes, HLD, HTN, Hypothyroidism, Morbid obesity, pickwickian syndrome who lives at home with her  and has limited mobility (per previous documentation) who presents to ED via EMS as a cardiac arrest. Per limited documentation as patient is unresponsive and no family is present EMS noted they were called out for a glycemic episode. On their arrival patient was found lethargic and bradycardic. When they went to place her on the stretcher, she went into cardiac arrest and resuscitative efforts were initiated and continued en route to hospital. On arrival she remained in cardiac arrest and ROSC was achieved after 5 rounds of epinephrine, 1g calcium, 1 amp of bicarbonate.      VS on arrival: , RR 17, /104, Spo2 92%. Significant Labs: PCT 0.08, troponin < 0.010, .6, ABG 7.09 / 54.1 / 53.6 / 16.4 / -13.3, creat 1.8, iCal 1.36, K 2.9, Na 136, glucose 403.  EKG without acute ischemic changes     While in ED patient received: multiple doses of Epinephrine, Calcium Chloride, Bicarbonate, Etomidate, Fentanyl, Versed and Rocuronium.     Hospital Course       Hospital Course:  Patients BP declined to the point of needing max levophed drip. Family was updated and noted she never wanted any heroic measures. With that they wished to proceed with comfort measures. Levophed was stopped and patient was terminally extubated. Ms. Julissa Kee peacefully passed away on 1/10/2022 @ 1907. Family was  updated.      KALIN Cohen  Pulmonary & Critical Care Medicine  Electronically signed by KALIN Cohen, 01/10/22, 7:08 PM EST.    *Please note that portions of this note were completed with a voice recognition program. Efforts were made to edit the dictations, but occasionally words are mistranscribed.   Addendum;  Reviewed and agree.     Electronically signed by Tho Quintana MD at 01/11/22 2155

## 2022-01-11 NOTE — DISCHARGE SUMMARY
Death Summary         Patient Name: Julissa Kee    CSN: [id number]    MRN: [id number]    : 1950    Today's Date: 01/10/22    Date of Admission: 1/10/2022    Date/Time of Death: 1/10/2022 @ 1907    Admitting Physician:  Tho Quintana MD    Primary Care Provider: Eileen Du APRN    Consultations:     Consults       No orders found from 2021 to 2022.            Admission Diagnosis:  No admission diagnoses are documented for this encounter.    Diagnoses at Time of Death:     Cardiac arrest (HCC)    Chronic obstructive pulmonary disease (HCC)    Morbid obesity with BMI of 50.0-59.9, adult    ADEEL    DKA (diabetic ketoacidosis) (HCC)    Mixed metabolic and respiratory acidosis of     Transaminitis        Procedures:  None    HPI     History of Present Illness:    Ms. Kee is a 70 yo female with PMH COPD, Diabetes, HLD, HTN, Hypothyroidism, Morbid obesity, pickwickian syndrome who lives at home with her  and has limited mobility (per previous documentation) who presents to ED via EMS as a cardiac arrest. Per limited documentation as patient is unresponsive and no family is present EMS noted they were called out for a glycemic episode. On their arrival patient was found lethargic and bradycardic. When they went to place her on the stretcher, she went into cardiac arrest and resuscitative efforts were initiated and continued en route to hospital. On arrival she remained in cardiac arrest and ROSC was achieved after 5 rounds of epinephrine, 1g calcium, 1 amp of bicarbonate.      VS on arrival: , RR 17, /104, Spo2 92%. Significant Labs: PCT 0.08, troponin < 0.010, .6, ABG 7.09 / 54.1 / 53.6 / 16.4 / -13.3, creat 1.8, iCal 1.36, K 2.9, Na 136, glucose 403.  EKG without acute ischemic changes     While in ED patient received: multiple doses of Epinephrine, Calcium Chloride, Bicarbonate, Etomidate, Fentanyl, Versed and Rocuronium.     Hospital Course        Hospital Course:  Patients BP declined to the point of needing max levophed drip. Family was updated and noted she never wanted any heroic measures. With that they wished to proceed with comfort measures. Levophed was stopped and patient was terminally extubated. Ms. Julissa Kee peajackiefully passed away on 1/10/2022 @ 1907. Family was updated.      KALIN Cohen  Pulmonary & Critical Care Medicine  Electronically signed by KALIN Cohen, 01/10/22, 7:08 PM EST.    *Please note that portions of this note were completed with a voice recognition program. Efforts were made to edit the dictations, but occasionally words are mistranscribed.   Addendum;  Reviewed and agree.

## 2022-01-11 NOTE — SIGNIFICANT NOTE
Exam confirms with auscultation zero audible heart tones and zero audible respirations. Ms.Diana LUIS Kee was pronounced dead at 1907.    Electronically signed by KALIN Blanco, 01/10/22, 7:11 PM EST.

## 2022-01-11 NOTE — SIGNIFICANT NOTE
Patient was brought from emergency room to ICU.  On arrival nurses alerted that patient very thready pulse.  Blood pressure was 60s over 20s.  I did call patient's  and talk to him and patient's daughter.  Discussed with further goals of care.   and daughters told me that they do not want patient to undergo any more procedures.  We answered questions as to the possible etiology of her decline.  Discussed that I do not have any definite evidence of any  heart attack.  I am still waiting for a lot of data to try to figure out what is wrong with her.  We could not get any CT scans on her due to unstable course thus far.  Today they verbalized understanding.  They told me that patient would not have wanted any of this and they would want all artificial support to be turned off and let patient pass away.  We discussed that if they want to come in we could support her with blood pressure medicines in the meantime.  Family however did not want to come in due to fear of COVID 19.  We discussed that risk of them catching in our ICU is extremely low and if they want to be here we can make arrangements for them to visit her.  Family told me that they do not want to call them and they would want all support to be turned off.  Patient in the meantime was also starting to have some myoclonic activity.  Patient was extubated to comfort care measures.

## 2022-01-15 LAB
BACTERIA SPEC AEROBE CULT: NORMAL
BACTERIA SPEC AEROBE CULT: NORMAL

## 2022-08-20 NOTE — PROGRESS NOTES
INFECTIOUS DISEASE PROGRESS NOTE     Julissa Kee  1950  [id number]      Admission Date: 1/14/2020      Requesting Provider: No ref. provider found  Evaluating Physician: Austen Redding MD    Reason for Consultation: Pneumonia    History of present illness:  1/15/20:   Patient is a 69 y.o. female , with COPD, Diabetes mellitus, SARAH BETH, seen today for Metapneumoviral pneumonia.  She was discharged from this facility 1/8 off antibiotics after being treated for right middle lobe pneumonia. She has been experiencing worsening hypoxia prompting return to ED.  Chest xray with pulmonary vascular congestion.Oxygen saturations in 70's on admission.  She has been afebrile, without leukocytosis.  She is currently on Vancomycin and Azactam and we were consulted for evaluation and treatment.  She developed worsening respiratory compromise and required transfer to the unit and a BiPAP mask.  1/16/20:  She was given Xanax due to anxiety and now resting.  02 per [place].      Past Medical History:   Diagnosis Date   • Acute bronchitis    • Acute sinusitis    • Anxiety    • Bacterial conjunctivitis    • Chest pain    • COPD (chronic obstructive pulmonary disease) (CMS/HCC)    • Depression    • Diabetes mellitus (CMS/HCC)    • Dyspnea    • Dysuria    • Edema    • Elbow injury    • Elbow pain    • Eye drainage    • Fatigue    • Foot pain, right    • Humerus distal fracture    • Hyperlipidemia    • Hypertension    • Hypothyroidism    • Morbid obesity (CMS/HCC)    • Pickwickian syndrome (CMS/HCC)    • Pressure ulcer stage I    • Psoriasis    • SOB (shortness of breath)    • Symptoms of urinary tract infection    • Urinary frequency    • Urinary tract infection    • Vaginal candidiasis        Past Surgical History:   Procedure Laterality Date   • CERVICAL POLYPECTOMY     • HYSTERECTOMY         Family History   Problem Relation Age of Onset   • Diabetes Father    • Diabetes Sister    • Pancreatic cancer Sister        Social History      Socioeconomic History   • Marital status:      Spouse name: Not on file   • Number of children: Not on file   • Years of education: Not on file   • Highest education level: Not on file   Tobacco Use   • Smoking status: Former Smoker     Types: Cigarettes     Last attempt to quit: 2006     Years since quittin.0   • Smokeless tobacco: Never Used   Substance and Sexual Activity   • Alcohol use: No     Frequency: Never   • Drug use: Never   • Sexual activity: Defer       Allergies   Allergen Reactions   • Amoxicillin-Pot Clavulanate Other (See Comments)     HEADACHE - has tolerated ceftriaxone 2019   • Bactrim [Sulfamethoxazole-Trimethoprim] Other (See Comments)     .         Medication:    Current Facility-Administered Medications:   •  acetaminophen (TYLENOL) tablet 650 mg, 650 mg, Oral, Q4H PRN, 650 mg at 01/15/20 2334 **OR** acetaminophen (TYLENOL) 160 MG/5ML solution 650 mg, 650 mg, Oral, Q4H PRN **OR** acetaminophen (TYLENOL) suppository 650 mg, 650 mg, Rectal, Q4H PRN, Blaire Thomson MD  •  ALPRAZolam (XANAX) tablet 0.25 mg, 0.25 mg, Oral, Nightly PRN, Clementina Gifford, APRN, 0.25 mg at 01/15/20 2334  •  aspirin EC tablet 81 mg, 81 mg, Oral, Daily, Blaire Thomson MD, 81 mg at 20 0822  •  atorvastatin (LIPITOR) tablet 10 mg, 10 mg, Oral, Daily, Blaire Thomson MD, 10 mg at 20 0822  •  castor oil-balsam peru (VENELEX) ointment, , Topical, Q12H, Tho Quintana MD  •  dextrose (D50W) 25 g/ 50mL Intravenous Solution 25 g, 25 g, Intravenous, Q15 Min PRN, Blaire Thomson MD  •  dextrose (GLUTOSE) oral gel 15 g, 15 g, Oral, Q15 Min PRN, Blaire Thomson MD  •  enoxaparin (LOVENOX) syringe 40 mg, 40 mg, Subcutaneous, Q12H, Tho Quintana MD, 40 mg at 20 0532  •  folic acid-vit B6-vit B12 (FOLGARD) tablet 1 tablet, 1 tablet, Oral, Daily, Blaire Thomson MD, 1 tablet at 20 0822  •  glucagon (human recombinant) (GLUCAGEN DIAGNOSTIC) injection 1 mg, 1 mg, Subcutaneous, Q15  Min PRN, Blaire Thomson MD  •  insulin lispro (humaLOG) injection 0-7 Units, 0-7 Units, Subcutaneous, 4x Daily With Meals & Nightly, Blaire Thomson MD  •  ipratropium-albuterol (DUO-NEB) nebulizer solution 3 mL, 3 mL, Nebulization, Q4H PRN, Blaire Thomson MD  •  ipratropium-albuterol (DUO-NEB) nebulizer solution 3 mL, 3 mL, Nebulization, 4x Daily - RT, Tho Quintana MD, 3 mL at 01/16/20 0722  •  levothyroxine (SYNTHROID, LEVOTHROID) tablet 100 mcg, 100 mcg, Oral, Q AM, Blaire Thomson MD, 100 mcg at 01/16/20 0532  •  Magnesium Sulfate 2 gram Bolus, followed by 8 gram infusion (total Mg dose 10 grams)- Mg less than or equal to 1mg/dL, 2 g, Intravenous, PRN **OR** Magnesium Sulfate 2 gram / 50mL Infusion (GIVE X 3 BAGS TO EQUAL 6GM TOTAL DOSE) - Mg 1.1 - 1.5 mg/dl, 2 g, Intravenous, PRN **OR** Magnesium Sulfate 4 gram infusion- Mg 1.6-1.9 mg/dL, 4 g, Intravenous, PRN, Tho Quintana MD  •  melatonin tablet 5 mg, 5 mg, Oral, Nightly, Blaire Thomson MD, 5 mg at 01/15/20 2113  •  methylPREDNISolone sodium succinate (SOLU-Medrol) injection 40 mg, 40 mg, Intravenous, Q12H, Tho Quintana MD, 40 mg at 01/16/20 0822  •  metoprolol succinate XL (TOPROL-XL) 24 hr tablet 25 mg, 25 mg, Oral, Q24H, Blaire Thomson MD, 25 mg at 01/16/20 0822  •  miconazole (MICOTIN) 2 % powder, , Topical, Q12H, Tho Quintana MD  •  ondansetron (ZOFRAN) injection 4 mg, 4 mg, Intravenous, Q6H PRN, Blaire Thomson MD  •  pantoprazole (PROTONIX) injection 40 mg, 40 mg, Intravenous, Q AM, Tho Quintana MD, 40 mg at 01/16/20 0532  •  Pharmacy Consult - [place], , Does not apply, Daily, Simba Montoya, [facility]  •  polyethylene glycol 3350 powder (packet), 17 g, Oral, BID, Blaire Thomson MD, 17 g at 01/16/20 0832  •  potassium & sodium phosphates (PHOS-NAK) 280-160-250 MG packet - for Phosphorus less than 1.25 mg/dL, 2 packet, Oral, Q6H PRN **OR** potassium & sodium phosphates (PHOS-NAK) 280-160-250 MG packet - for Phosphorus  1.25 - 2.5 mg/dL, 2 packet, Oral, Q6H PRN, Tho Quintana MD  •  sennosides-docusate (PERICOLACE) 8.6-50 MG per tablet 2 tablet, 2 tablet, Oral, BID, Blaire Thomson MD, 2 tablet at 20 0822  •  sertraline (ZOLOFT) tablet 50 mg, 50 mg, Oral, Daily, Blaire Thomson MD, 50 mg at 20 0836  •  sodium chloride 0.9 % flush 10 mL, 10 mL, Intravenous, Q12H, Blaire Thomson MD, 10 mL at 20 0833  •  sodium chloride 0.9 % flush 10 mL, 10 mL, Intravenous, PRN, Blaire Thomson MD, 10 mL at 20 0532  •  terazosin (HYTRIN) capsule 2 mg, 2 mg, Oral, Nightly, Blaire Thomson MD, 2 mg at 01/15/20 2113    Antibiotics:  Anti-Infectives (From admission, onward)    Ordered     Dose/Rate Route Frequency Start Stop    20 1708  aztreonam (AZACTAM) 2 g/100 mL 0.9% NS (mbp)     Ordering Provider:  Blaire Thomson MD    2 g  over 30 Minutes Intravenous Once 20 1710 20 20520 1603  vancomycin 3000 mg/500 mL 0.9% NS IVPB ([facility])     Ordering Provider:  Tahir Aparicio MD    20 mg/kg × 166 kg  over 180 Minutes Intravenous Once 20 1604 20 1947                Physical Exam:   Vital Signs  Temp (24hrs), Av.2 °F (36.8 °C), Min:97.7 °F (36.5 °C), Max:98.6 °F (37 °C)    Temp  Min: 97.7 °F (36.5 °C)  Max: 98.6 °F (37 °C)  BP  Min: 102/88  Max: 171/55  Pulse  Min: 49  Max: 107  Resp  Min: 20  Max: 27  SpO2  Min: 83 %  Max: 98 %    GENERAL: Massively obese NAD.  HEENT: Normocephalic, atraumatic.  PERRL. EOMI. No conjunctival injection. No icterus. Oropharynx clear without evidence of thrush or exudate.     HEART: RRR; No murmur, rubs, gallops.   LUNGS expiratory wheezes   ABDOMEN: Soft, nontender, nondistended. Positive bowel sounds. No rebound or guarding. Pannus with crusting lesions, woody induration   EXT:  No cyanosis, clubbing or edema. No cord.  : Mccarty catheter is in place  MSK:  Left hallux with ecchymosis,   SKIN: Warm and dry; sacral decubitus, unstageable, excoriated    NEURO: Oriented to person      Laboratory Data    Results from last 7 days   Lab Units 01/16/20  0442 01/15/20  0736 01/14/20  1038   WBC 10*3/mm3 5.43 5.68 6.66   HEMOGLOBIN g/dL 10.7* 10.5* 11.2*   HEMATOCRIT % 34.3 34.1 36.9   PLATELETS 10*3/mm3 159 141 167     Results from last 7 days   Lab Units 01/16/20  0442   SODIUM mmol/L 138   POTASSIUM mmol/L 4.9   CHLORIDE mmol/L 94*   CO2 mmol/L 28.0   BUN mg/dL 31*   CREATININE mg/dL 0.76   GLUCOSE mg/dL 119*   CALCIUM mg/dL 9.8     Results from last 7 days   Lab Units 01/16/20  0442   ALK PHOS U/L 83   BILIRUBIN mg/dL 0.5   ALT (SGPT) U/L 14   AST (SGOT) U/L 23             Results from last 7 days   Lab Units 01/14/20  1039   LACTATE mmol/L 1.1         Results from last 7 days   Lab Units 01/15/20  0519   VANCOMYCIN RM mcg/mL 23.20     Estimated Creatinine Clearance: 91.1 mL/min (by C-G formula based on SCr of 0.76 mg/dL).      Microbiology:  Blood Culture   Date Value Ref Range Status   01/14/2020 No growth at less than 24 hours  Preliminary   01/14/2020 No growth at less than 24 hours  Preliminary           Radiology:  Imaging Results (Last 72 Hours)     Procedure Component Value Units Date/Time    XR Chest 1 View [[id number]] Collected:  01/14/20 1102     Updated:  01/15/20 0830    Narrative:       EXAMINATION: XR CHEST 1 VW- 01/14/2020     INDICATION: SOA triage protocol      COMPARISON: Chest radiograph 12/28/2019, CT chest 12/28/2019     FINDINGS: Single portable chest radiograph was submitted for review. The  heart is enlarged. Mild pulmonary vascular congestion identified. Hazy  mid and lower lung airspace changes identified, somewhat similar to the  prior exam particularly the CT of the chest performed 12/28/2019. No  large pleural effusion or pneumothorax. Visualized upper abdomen is  unrevealing. No acute osseous abnormality is appreciated.       Impression:       Cardiomegaly mild pulmonary vasculature congestion with  multifocal airspace changes noted  similar when compared to 12/28/2019.        D:  01/14/2020  E:  01/14/2020     This report was finalized on 1/15/2020 8:27 AM by Dr. Ivan Miller MD.       XR Chest 1 View [[id number]] Collected:  01/15/20 0602     Updated:  01/15/20 0604    Narrative:       CR Chest 1 Vw    INDICATION:   Hypoxemia this morning. Evaluate for aspiration pneumonia     COMPARISON:    1/14/2020 12/28/2019    FINDINGS:  Single portable AP view(s) of the chest.        Low lung volumes with mild left base atelectasis. No change. Heart size normal there is minimal interstitial prominence as compared with 12/20/2019      Impression:       There may be minimal left base atelectasis. Minimal interstitial prominence. Otherwise no active disease    Signer Name: Norman Martinez MD   Signed: 1/15/2020 6:02 AM   Workstation Name: [facility]-    [facility]        I read her chest x-ray with the radiologist today    Impression:   1.  Human Metapneumovirus pneumonia-there is no evidence of a superimposed bacterial pneumonia with no fever, no leukocytosis, no new substantial pulmonary infiltrate, and a normal procalcitonin.    2.  COPD-in exacerbation secondary to the metapneumovirus infection  3.  Hypercapnic acute respiratory failure  4.   Sacral decubitus ulcer  5.  Massive obesity   6.  T2 Diabetes Mellitus     PLAN/RECOMMENDATIONS:   1.  Monitor off antibiotics  2.  Therapy for COPD exacerbation  3.  Blood cultures-pending    Dr. Redding has obtained the history, performed the physical exam and formulated the above treatment plan.            Austen Redding MD  1/16/2020  9:02 AM                 Home

## 2024-09-25 NOTE — TELEPHONE ENCOUNTER
Encouraged smoking cessation   Script given for Lung CT screening      SIGNED FORM PLACED UP FRONT IN  FILE.    PT , [place], NOTIFIED THAT FORM IS READY.  VERB GOOD UNDERSTANDING AND APPREC